# Patient Record
Sex: MALE | Race: WHITE | Employment: OTHER | URBAN - METROPOLITAN AREA
[De-identification: names, ages, dates, MRNs, and addresses within clinical notes are randomized per-mention and may not be internally consistent; named-entity substitution may affect disease eponyms.]

---

## 2017-01-23 ENCOUNTER — OFFICE VISIT (OUTPATIENT)
Dept: FAMILY MEDICINE CLINIC | Age: 72
End: 2017-01-23

## 2017-01-23 VITALS
HEART RATE: 53 BPM | SYSTOLIC BLOOD PRESSURE: 109 MMHG | BODY MASS INDEX: 27.72 KG/M2 | RESPIRATION RATE: 16 BRPM | WEIGHT: 216 LBS | OXYGEN SATURATION: 98 % | HEIGHT: 74 IN | DIASTOLIC BLOOD PRESSURE: 66 MMHG | TEMPERATURE: 97.4 F

## 2017-01-23 DIAGNOSIS — I42.9 CARDIOMYOPATHY (HCC): Primary | ICD-10-CM

## 2017-01-23 DIAGNOSIS — I10 ESSENTIAL HYPERTENSION: ICD-10-CM

## 2017-01-23 DIAGNOSIS — E29.1 HYPOGONADISM IN MALE: ICD-10-CM

## 2017-01-23 DIAGNOSIS — I50.22 SYSTOLIC CHF, CHRONIC (HCC): ICD-10-CM

## 2017-01-23 DIAGNOSIS — N52.9 ED (ERECTILE DYSFUNCTION) OF ORGANIC ORIGIN: ICD-10-CM

## 2017-01-23 RX ORDER — ISOSORBIDE DINITRATE 5 MG/1
5 TABLET ORAL 3 TIMES DAILY
Qty: 90 TAB | Refills: 2 | Status: SHIPPED | OUTPATIENT
Start: 2017-01-23 | End: 2017-06-05 | Stop reason: SDUPTHER

## 2017-01-23 RX ORDER — TESTOSTERONE 10 MG/G
3 GEL TOPICAL DAILY
Qty: 3 BOTTLE | Refills: 1 | Status: SHIPPED | OUTPATIENT
Start: 2017-01-23 | End: 2017-05-30 | Stop reason: SDUPTHER

## 2017-01-23 NOTE — PROGRESS NOTES
Subjective:     Chief Complaint   Patient presents with    Follow-up     Cardiomyopathy      He  is a 80 y.o. male who presents for evaluation of:    F/u for CM, Acute ischemic hepatitis due to viral cardiomyopathy, A fib/flutter w/ RVR s/p ablation with Dr. Va Ocampo 3/16/16 and cardiac catheterization without occlusive CAD on 3/19/2016. Following up with Dr. Leandro Slade. Still on B bl, ACEI, diuretic, imdur. Stopped wearing Lifevest and unwilling to restart this. Repeat echo showed EF 20-25%, so planned to have an AICD implant. Saw Dr. Va Ocampo on 7/19/16 for eval for this but pt was not ready to have this done so decided to have another eval with Dr. Rin Norwood - Echo with slight improvement and will repeat in another 6 months to decide on ICD placement. Home BPs running 100-120s/50-70s. Feels well with no sx. Denies PND and orthopnea. Hyperlipidemia & HTN  Pt is doing well on current meds with no medication side effects noted  No new myalgias, no joint pains, no weakness  No TIA's, no chest pain on exertion, no dyspnea on exertion, no swelling of ankles.      Lab Results   Component Value Date/Time    Cholesterol, total 221 10/25/2016 08:42 AM    HDL Cholesterol 39 10/25/2016 08:42 AM    LDL, calculated 155 10/25/2016 08:42 AM    Triglyceride 136 10/25/2016 08:42 AM     ROS  Gen - no fever/chills  Resp - per HPI  CV - per HPI   - + ED and hypogonadism - doing well on testosterone in past  Rest per HPI     Objective:     Vitals:    01/23/17 1015   BP: 109/66   Pulse: (!) 53   Resp: 16   Temp: 97.4 °F (36.3 °C)   TempSrc: Oral   SpO2: 98%   Weight: 216 lb (98 kg)   Height: 6' 2\" (1.88 m)     Physical Examination:  General appearance - alert, well appearing, and in no distress  Eyes -sclera anicteric  Neck - supple, no significant adenopathy, no thyromegaly, no bruits, no JVD even with hepatojugular reflex  Chest - clear to auscultation, no wheezes, rales or rhonchi, symmetric air entry  Heart - normal rate with irregular rhythm, normal S1, S2, no murmurs, rubs, clicks or gallops  Neurological - alert, oriented, no focal findings or movement disorder noted  Extr - no edema     Past Medical History   Diagnosis Date    Arrhythmia 11/11/2014    ED (erectile dysfunction) of organic origin     High cholesterol 3/31/2010    HTN (hypertension) 3/31/2010    Prediabetes 11/11/2014    S/P ablation of atrial flutter 7/89/7433    Systolic CHF, acute ([facility] 3/22/2016     Past Surgical History   Procedure Laterality Date    Hx tonsillectomy      Hx orthopaedic       fx wrist     Current Outpatient Prescriptions on File Prior to Visit   Medication Sig Dispense Refill    lisinopril (PRINIVIL, ZESTRIL) 2.5 mg tablet TAKE ONE TABLET BY MOUTH DAILY 90 Tab 1    gabapentin (NEURONTIN) 300 mg capsule Take 1 Cap by mouth nightly. 90 Cap 3    bumetanide (BUMEX) 2 mg tablet TAKE ONE TABLET BY MOUTH TWICE A DAY (Patient taking differently: TAKE 0.5 TABLET BY MOUTH TWICE A DAY) 60 Tab 3    carvedilol (COREG) 12.5 mg tablet TAKE ONE TABLET BY MOUTH TWICE A DAY 60 Tab 3    apixaban (ELIQUIS) 2.5 mg tablet Take 1 Tab by mouth two (2) times a day. 180 Tab 2    Cholecalciferol, Vitamin D3, (VITAMIN D3) 1,000 unit Cap Take 1 Cap by mouth daily.  aspirin delayed-release 81 mg tablet Take 81 mg by mouth daily. No current facility-administered medications on file prior to visit. Assessment/ Plan:   Gina Goldstein was seen today for follow-up. Diagnoses and all orders for this visit:    Cardiomyopathy ([facility]  Systolic CHF, chronic ([facility]  -    Repeat echo with LV<35% (improved to 20-25%) and improving with time. Seeing Dr. Davian Burton and will have another echo in 3-6 months. Understands CHF recs. Taking Eliquis. BPs improved on lower dose of Bumex. Ct B bl, ACEI, ASA, diuretic, imdur. May need AICD    Essential hypertension - well controlled  -     isosorbide dinitrate (ISORDIL) 5 mg tablet;  Take 1 Tab by mouth three (3) times daily.    ED (erectile dysfunction) of organic origin  Hypogonadism in male  -     testosterone (ANDROGEL) 1.25 gram/ actuation (1 %) glpm; 3 Pump(s) by TransDERmal route daily. Max Daily Amount: 3 Pump(s). I have discussed the diagnosis with the patient and the intended plan as seen in the above orders. The patient has received an after-visit summary and questions were answered concerning future plans. I have discussed medication side effects and warnings with the patient as well. The patient verbalizes understanding and agreement with the plan. Follow-up Disposition:  Return in about 3 months (around 4/23/2017), or if symptoms worsen or fail to improve.

## 2017-01-23 NOTE — PROGRESS NOTES
Chief Complaint   Patient presents with    Follow-up     Cardiomyopathy   Discuss Isosorbide only taking twice daily and wants to discuss whether to continue  Room 2

## 2017-01-23 NOTE — MR AVS SNAPSHOT
Visit Information Date & Time Provider Department Dept. Phone Encounter #  
 1/23/2017 10:10 AM Doug Fernandes MD [facility] at [address] [id number] Follow-up Instructions Return in about 3 months (around 4/23/2017), or if symptoms worsen or fail to improve. Upcoming Health Maintenance Date Due  
 GLAUCOMA SCREENING Q2Y 9/1/2011 MEDICARE YEARLY EXAM 10/12/2012 FOBT Q 1 YEAR AGE 50-75 3/11/2017 DTaP/Tdap/Td series (2 - Td) 6/11/2023 Allergies as of 1/23/2017  Review Complete On: 1/23/2017 By: Agusto Petit LPN No Known Allergies Current Immunizations  Reviewed on 11/11/2014 Name Date Influenza High Dose Vaccine PF 10/18/2016, 11/18/2015, 11/11/2014 Influenza Vaccine 11/5/2013 Influenza Vaccine Split 11/6/2012, 10/12/2011 Pneumococcal Polysaccharide (PPSV-23) 10/18/2016 Pneumococcal Vaccine (Unspecified Type) 10/12/2011 Tdap 6/11/2013  5:20 PM  
  
 Not reviewed this visit You Were Diagnosed With   
  
 Codes Comments Cardiomyopathy ([facility]    -  Primary ICD-10-CM: I42.9 ICD-9-CM: 425.4 Systolic CHF, chronic (HCC)     ICD-10-CM: I50.22 ICD-9-CM: 428.22, 428.0 Essential hypertension     ICD-10-CM: I10 
ICD-9-CM: 401.9 ED (erectile dysfunction) of organic origin     ICD-10-CM: N52.9 ICD-9-CM: 607.84 Hypogonadism in male     ICD-10-CM: E29.1 ICD-9-CM: 257.2 Vitals BP Pulse Temp Resp Height(growth percentile) Weight(growth percentile) 109/66 (BP 1 Location: Left arm, BP Patient Position: Sitting) (!) 53 97.4 °F (36.3 °C) (Oral) 16 6' 2\" (1.88 m) 216 lb (98 kg) SpO2 BMI Smoking Status 98% 27.73 kg/m2 Former Smoker Vitals History BMI and BSA Data Body Mass Index Body Surface Area  
 27.73 kg/m 2 2.26 m 2 Preferred Pharmacy Pharmacy Name Phone [facility] [address], [address] [facility] [phone] Your Updated Medication List  
  
   
This list is accurate as of: 1/23/17 11:12 AM.  Always use your most recent med list.  
  
  
  
  
 apixaban 2.5 mg tablet Commonly known as:  Rosalino Gault Take 1 Tab by mouth two (2) times a day. aspirin delayed-release 81 mg tablet Take 81 mg by mouth daily. bumetanide 2 mg tablet Commonly known as:  Tonai Bueno TAKE ONE TABLET BY MOUTH TWICE A DAY  
  
 carvedilol 12.5 mg tablet Commonly known as:  COREG  
TAKE ONE TABLET BY MOUTH TWICE A DAY  
  
 gabapentin 300 mg capsule Commonly known as:  NEURONTIN Take 1 Cap by mouth nightly. isosorbide dinitrate 5 mg tablet Commonly known as:  ISORDIL Take 1 Tab by mouth three (3) times daily. lisinopril 2.5 mg tablet Commonly known as:  PRINIVIL, ZESTRIL  
TAKE ONE TABLET BY MOUTH DAILY  
  
 testosterone 1.25 gram/ actuation (1 %) Glpm  
Commonly known as:  ANDROGEL  
3 Pump(s) by TransDERmal route daily. Max Daily Amount: 3 Pump(s). VITAMIN D3 1,000 unit Cap Generic drug:  cholecalciferol Take 1 Cap by mouth daily. Prescriptions Printed Refills  
 testosterone (ANDROGEL) 1.25 gram/ actuation (1 %) glpm 1 Sig: 3 Pump(s) by TransDERmal route daily. Max Daily Amount: 3 Pump(s). Class: Print Route: TransDERmal  
  
Prescriptions Sent to Pharmacy Refills  
 isosorbide dinitrate (ISORDIL) 5 mg tablet 2 Sig: Take 1 Tab by mouth three (3) times daily. Class: Normal  
 Pharmacy: [facility] [address] [place] [address] #: [phone] Route: Oral  
  
Follow-up Instructions Return in about 3 months (around 4/23/2017), or if symptoms worsen or fail to improve. Introducing [facility] & HEALTH SERVICES! [employer] introduces [email or web address] patient portal. Now you can access parts of your medical record, email your doctor's office, and request medication refills online.    
 
1. In your internet browser, go to [email or web address] 2. Click on the First Time User? Click Here link in the Sign In box. You will see the New Member Sign Up page. 3. Enter your [employer] Software Access Code exactly as it appears below. You will not need to use this code after youve completed the sign-up process. If you do not sign up before the expiration date, you must request a new code. · [employer] Access Code: [email or web address] Expires: 4/23/2017 10:26 AM 
 
4. Enter the last four digits of your Social Security Number (xxxx) and Date of Birth (mm/dd/yyyy) as indicated and click Submit. You will be taken to the next sign-up page. 5. Create a Dejour E[employer] ID. This will be your [employer] Software login ID and cannot be changed, so think of one that is secure and easy to remember. 6. Create a [employer] password. You can change your password at any time. 7. Enter your Password Reset Question and Answer. This can be used at a later time if you forget your password. 8. Enter your e-mail address. You will receive e-mail notification when new information is available in [address] 9. Click Sign Up. You can now view and download portions of your medical record. 10. Click the Download Summary menu link to download a portable copy of your medical information. If you have questions, please visit the Frequently Asked Questions section of the [employer] website. Remember, [employer] Software is NOT to be used for urgent needs. For medical emergencies, dial 911. Now available from your iPhone and Android! Please provide this summary of care documentation to your next provider. Your primary care clinician is listed as Mehdi Bejarano. If you have any questions after today's visit, please call [phone].

## 2017-02-09 RX ORDER — CARVEDILOL 12.5 MG/1
TABLET ORAL
Qty: 60 TAB | Refills: 2 | Status: SHIPPED | OUTPATIENT
Start: 2017-02-09 | End: 2017-06-05 | Stop reason: SDUPTHER

## 2017-03-29 ENCOUNTER — DOCUMENTATION ONLY (OUTPATIENT)
Dept: FAMILY MEDICINE CLINIC | Age: 72
End: 2017-03-29

## 2017-04-02 RX ORDER — BUMETANIDE 2 MG/1
TABLET ORAL
Qty: 60 TAB | Refills: 2 | Status: SHIPPED | COMMUNITY
Start: 2017-04-02 | End: 2017-08-06 | Stop reason: SDUPTHER

## 2017-04-10 RX ORDER — APIXABAN 2.5 MG/1
TABLET, FILM COATED ORAL
Qty: 60 TAB | Refills: 2 | Status: SHIPPED | OUTPATIENT
Start: 2017-04-10 | End: 2017-04-24 | Stop reason: SDUPTHER

## 2017-04-24 ENCOUNTER — OFFICE VISIT (OUTPATIENT)
Dept: FAMILY MEDICINE CLINIC | Age: 72
End: 2017-04-24

## 2017-04-24 VITALS
HEIGHT: 74 IN | TEMPERATURE: 96.7 F | BODY MASS INDEX: 27.03 KG/M2 | HEART RATE: 52 BPM | RESPIRATION RATE: 16 BRPM | SYSTOLIC BLOOD PRESSURE: 118 MMHG | OXYGEN SATURATION: 98 % | WEIGHT: 210.6 LBS | DIASTOLIC BLOOD PRESSURE: 54 MMHG

## 2017-04-24 DIAGNOSIS — I10 ESSENTIAL HYPERTENSION: ICD-10-CM

## 2017-04-24 DIAGNOSIS — Z13.39 SCREENING FOR ALCOHOLISM: ICD-10-CM

## 2017-04-24 DIAGNOSIS — E78.00 HIGH CHOLESTEROL: ICD-10-CM

## 2017-04-24 DIAGNOSIS — Z12.11 COLON CANCER SCREENING: ICD-10-CM

## 2017-04-24 DIAGNOSIS — E29.1 HYPOGONADISM IN MALE: ICD-10-CM

## 2017-04-24 DIAGNOSIS — Z00.00 ROUTINE GENERAL MEDICAL EXAMINATION AT A HEALTH CARE FACILITY: Primary | ICD-10-CM

## 2017-04-24 DIAGNOSIS — R73.03 PREDIABETES: ICD-10-CM

## 2017-04-24 DIAGNOSIS — E34.9 HYPOTESTOSTERONISM: ICD-10-CM

## 2017-04-24 DIAGNOSIS — Z13.31 SCREENING FOR DEPRESSION: ICD-10-CM

## 2017-04-24 DIAGNOSIS — Z79.899 ENCOUNTER FOR LONG-TERM (CURRENT) USE OF MEDICATIONS: ICD-10-CM

## 2017-04-24 DIAGNOSIS — I25.5 ISCHEMIC CARDIOMYOPATHY: ICD-10-CM

## 2017-04-24 NOTE — MR AVS SNAPSHOT
Visit Information Date & Time Provider Department Dept. Phone Encounter #  
 4/24/2017  9:50 AM Alicia Ortega MD [facility] at [address] [id number] Follow-up Instructions Return in about 3 months (around 7/24/2017), or if symptoms worsen or fail to improve. Upcoming Health Maintenance Date Due  
 GLAUCOMA SCREENING Q2Y 9/1/2011 MEDICARE YEARLY EXAM 10/12/2012 FOBT Q 1 YEAR, 18+ 3/11/2017 DTaP/Tdap/Td series (2 - Td) 6/11/2023 Allergies as of 4/24/2017  Review Complete On: 4/24/2017 By: Alicia Ortega MD  
 No Known Allergies Current Immunizations  Reviewed on 11/11/2014 Name Date Influenza High Dose Vaccine PF 10/18/2016, 11/18/2015, 11/11/2014 Influenza Vaccine 11/5/2013 Influenza Vaccine Split 11/6/2012, 10/12/2011 Pneumococcal Polysaccharide (PPSV-23) 10/18/2016 Pneumococcal Vaccine (Unspecified Type) 10/12/2011 Tdap 6/11/2013  5:20 PM  
  
 Not reviewed this visit You Were Diagnosed With   
  
 Codes Comments Routine general medical examination at a health care facility    -  Primary ICD-10-CM: Z00.00 ICD-9-CM: V70.0 Screening for alcoholism     ICD-10-CM: Z13.89 ICD-9-CM: V79.1 Screening for depression     ICD-10-CM: Z13.89 ICD-9-CM: V79.0 Colon cancer screening     ICD-10-CM: Z12.11 ICD-9-CM: V76.51 Ischemic cardiomyopathy     ICD-10-CM: I25.5 ICD-9-CM: 414.8 High cholesterol     ICD-10-CM: E78.00 ICD-9-CM: 272.0 Hypogonadism in male     ICD-10-CM: E29.1 ICD-9-CM: 257.2 Hypotestosteronism     ICD-10-CM: E29.1 ICD-9-CM: 257.2 Prediabetes     ICD-10-CM: R73.03 
ICD-9-CM: 790.29 Encounter for long-term (current) use of medications     ICD-10-CM: Z79.899 ICD-9-CM: V58.69 Essential hypertension     ICD-10-CM: I10 
ICD-9-CM: 401.9 Vitals BP Pulse Temp Resp Height(growth percentile) Weight(growth percentile) 118/54 (BP 1 Location: Left arm, BP Patient Position: Sitting) (!) 52 96.7 °F (35.9 °C) (Oral) 16 6' 2\" (1.88 m) 210 lb 9.6 oz (95.5 kg) SpO2 BMI Smoking Status 98% 27.04 kg/m2 Former Smoker Vitals History BMI and BSA Data Body Mass Index Body Surface Area  
 27.04 kg/m 2 2.23 m 2 Preferred Pharmacy Pharmacy Name Phone [facility] [address] , [address] [place] [address] [phone] Your Updated Medication List  
  
   
This list is accurate as of: 4/24/17 11:16 AM.  Always use your most recent med list.  
  
  
  
  
 apixaban 2.5 mg tablet Commonly known as:  Elta Cheek Take 1 Tab by mouth two (2) times a day. aspirin delayed-release 81 mg tablet Take 81 mg by mouth daily. bumetanide 2 mg tablet Commonly known as:  Anne Lavender TAKE ONE TABLET BY MOUTH TWICE A DAY  
  
 carvedilol 12.5 mg tablet Commonly known as:  COREG  
TAKE ONE TABLET BY MOUTH TWICE A DAY  
  
 gabapentin 300 mg capsule Commonly known as:  NEURONTIN Take 1 Cap by mouth nightly. isosorbide dinitrate 5 mg tablet Commonly known as:  ISORDIL Take 1 Tab by mouth three (3) times daily. lisinopril 2.5 mg tablet Commonly known as:  PRINIVIL, ZESTRIL  
TAKE ONE TABLET BY MOUTH DAILY  
  
 testosterone 12.5 mg/ 1.25 gram (1 %) Glpm  
Commonly known as:  ANDROGEL  
3 Pump(s) by TransDERmal route daily. Max Daily Amount: 3 Pump(s). VITAMIN D3 1,000 unit Cap Generic drug:  cholecalciferol Take 1 Cap by mouth daily. We Performed the Following [facility] [[id number] [facility]] HEMOGLOBIN A1C WITH EAG [15736 CPT(R)] LIPID PANEL [02152 CPT(R)] METABOLIC PANEL, COMPREHENSIVE [59477 CPT(R)] OCCULT BLOOD, IMMUNOASSAY (FIT) B5583391 CPT(R)] TESTOSTERONE, FREE & TOTAL [26324 CPT(R)] TSH 3RD GENERATION [68428 CPT(R)] Follow-up Instructions Return in about 3 months (around 7/24/2017), or if symptoms worsen or fail to improve. Introducing \[facility]\"" & HEALTH SERVICES! Rg Camacho introduces Phobious patient portal. Now you can access parts of your medical record, email your doctor's office, and request medication refills online. 1. In your internet browser, go to [email or web address] 2. Click on the First Time User? Click Here link in the Sign In box. You will see the New Member Sign Up page. 3. Enter your Phobious Access Code exactly as it appears below. You will not need to use this code after youve completed the sign-up process. If you do not sign up before the expiration date, you must request a new code. · Phobious Access Code: [email or web address] Expires: 7/23/2017 11:16 AM 
 
4. Enter the last four digits of your Social Security Number (xxxx) and Date of Birth (mm/dd/yyyy) as indicated and click Submit. You will be taken to the next sign-up page. 5. Create a Phobious ID. This will be your Phobious login ID and cannot be changed, so think of one that is secure and easy to remember. 6. Create a Phobious password. You can change your password at any time. 7. Enter your Password Reset Question and Answer. This can be used at a later time if you forget your password. 8. Enter your e-mail address. You will receive e-mail notification when new information is available in [address] 9. Click Sign Up. You can now view and download portions of your medical record. 10. Click the Download Summary menu link to download a portable copy of your medical information. If you have questions, please visit the Frequently Asked Questions section of the Phobious website. Remember, Phobious is NOT to be used for urgent needs. For medical emergencies, dial 911. Now available from your iPhone and Android! Please provide this summary of care documentation to your next provider. Your primary care clinician is listed as Dorian Krueger. If you have any questions after today's visit, please call [phone].

## 2017-04-24 NOTE — ACP (ADVANCE CARE PLANNING)
Advance Care Planning    Advance Care Planning (ACP) Provider Conversation Snapshot    Date of ACP Conversation: 04/24/17  Persons included in Conversation:  patient  Length of ACP Conversation in minutes:  <16 minutes (Non-Billable)    Authorized Decision Maker (if patient is incapable of making informed decisions):    This person is:   Healthcare Agent/Medical Power of  under Advance Directive          For Patients with Decision Making Capacity:   Values/Goals: Exploration of values, goals, and preferences if recovery is not expected, even with continued medical treatment in the event of:  Imminent death  Severe, permanent brain injury    Conversation Outcomes / Follow-Up Plan:   Recommended completion of Advance Directive form after review of ACP materials and conversation with prospective healthcare agent

## 2017-04-24 NOTE — PROGRESS NOTES
This is a Subsequent Medicare Annual Wellness Visit providing Personalized Prevention Plan Services (PPPS) (Performed 12 months after initial AWV and PPPS )    I have reviewed the patient's medical history in detail and updated the computerized patient record. History     Doing well today. 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 Katy Palomares 3/16/16 and cardiac catheterization without occlusive CAD on 3/19/2016. Still on B bl, ACEI, diuretic, imdur. Stopped wearing Lifevest and unwilling to restart this. Repeat echo showed EF 20-25%, so planned to have an AICD implant. Saw Dr. Katy Palomares on 7/19/16 for eval for this but pt was not ready to have this done so decided to have another eval with Dr. Ifeanyi Mcmullen - Echo with slight improvement and repeating serially. Home BPs running 100-120s/50-70s. Feels well with no sx. Denies PND and orthopnea.     Hyperlipidemia & HTN  Pt is doing well on current meds with no medication side effects noted  No new myalgias, no joint pains, no weakness  No TIA's, no chest pain on exertion, no dyspnea on exertion, no swelling of ankles.      Lab Results   Component Value Date/Time    Cholesterol, total 221 10/25/2016 08:42 AM    HDL Cholesterol 39 10/25/2016 08:42 AM    LDL, calculated 155 10/25/2016 08:42 AM    Triglyceride 136 10/25/2016 08:42 AM       Past Medical History:   Diagnosis Date    Arrhythmia 11/11/2014    ED (erectile dysfunction) of organic origin     High cholesterol 3/31/2010    HTN (hypertension) 3/31/2010    Prediabetes 11/11/2014    S/P ablation of atrial flutter 0/84/3819    Systolic CHF, acute ([address] 3/22/2016      Past Surgical History:   Procedure Laterality Date    HX ORTHOPAEDIC      fx wrist    HX TONSILLECTOMY       Current Outpatient Prescriptions   Medication Sig Dispense Refill    bumetanide (BUMEX) 2 mg tablet TAKE ONE TABLET BY MOUTH TWICE A DAY 60 Tab 2    carvedilol (COREG) 12.5 mg tablet TAKE ONE TABLET BY MOUTH TWICE A DAY 60 Tab 2    isosorbide dinitrate (ISORDIL) 5 mg tablet Take 1 Tab by mouth three (3) times daily. (Patient taking differently: Take 5 mg by mouth two (2) times a day.) 90 Tab 2    testosterone (ANDROGEL) 1.25 gram/ actuation (1 %) glpm 3 Pump(s) by TransDERmal route daily. Max Daily Amount: 3 Pump(s). 3 Bottle 1    lisinopril (PRINIVIL, ZESTRIL) 2.5 mg tablet TAKE ONE TABLET BY MOUTH DAILY 90 Tab 1    gabapentin (NEURONTIN) 300 mg capsule Take 1 Cap by mouth nightly. 90 Cap 3    apixaban (ELIQUIS) 2.5 mg tablet Take 1 Tab by mouth two (2) times a day. 180 Tab 2    Cholecalciferol, Vitamin D3, (VITAMIN D3) 1,000 unit Cap Take 1 Cap by mouth daily.  aspirin delayed-release 81 mg tablet Take 81 mg by mouth daily.        No Known Allergies  Family History   Problem Relation Age of Onset    Heart Disease Father     Hypertension Father     Hypertension Mother     Heart Disease Mother      Social History   Substance Use Topics    Smoking status: Former Smoker     Quit date: 4/1/1995    Smokeless tobacco: Never Used    Alcohol use 0.6 oz/week     1 Glasses of wine per week      Comment: 1 drink a month     Patient Active Problem List   Diagnosis Code    High cholesterol E78.00    Eczema L30.9    HTN (hypertension) I10    Encounter for long-term (current) use of medications Z79.899    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    Prediabetes R73.03    Arrhythmia I49.9    Acute renal failure (ARF) ([address] N17.9    Atrial flutter with rapid ventricular response (HCC) I48.92    Lower abdominal pain R10.30    Cardiomyopathy ([address] I42.9    Cardiogenic shock ([address] N49.9    Systolic CHF, acute ([address] I50.21    S/P ablation of atrial flutter Z98.890, Z86.79    Advance care planning Z71.89       Depression Risk Factor Screening:     PHQ 2 / 9, over the last two weeks 1/23/2017   Little interest or pleasure in doing things Not at all   Feeling down, depressed or hopeless Not at all   Total Score PHQ 2 0     Alcohol Risk Factor Screening: On any occasion during the past 3 months, have you had more than 4 drinks containing alcohol? No    Do you average more than 14 drinks per week? No      Functional Ability and Level of Safety:     Hearing Loss   none    Activities of Daily Living   Self-care. Requires assistance with: no ADLs    Fall Risk     Fall Risk Assessment, last 12 mths 4/24/2017   Able to walk? Yes   Fall in past 12 months? No     Abuse Screen   Patient is not abused    Review of Systems   A comprehensive review of systems was negative except for that written in the HPI. Physical Examination     Evaluation of Cognitive Function:  Mood/affect:  neutral  Appearance: age appropriate  Family member/caregiver input: None  Visit Vitals    /54 (BP 1 Location: Left arm, BP Patient Position: Sitting)  Comment: Large cuff    Pulse (!) 52    Temp 96.7 °F (35.9 °C) (Oral)    Resp 16    Ht 6' 2\" (1.88 m)    Wt 210 lb 9.6 oz (95.5 kg)    SpO2 98%    BMI 27.04 kg/m2       Physical Examination:  General appearance - alert, well appearing, and in no distress  Eyes -sclera anicteric  Neck - supple, no significant adenopathy, no thyromegaly, no bruits, no JVD even with hepatojugular reflex  Chest - clear to auscultation, no wheezes, rales or rhonchi, symmetric air entry  Heart - normal rate with irregular rhythm, normal S1, S2, 1/6 JON  Abd - soft, NT, ND, + BS  Neurological - alert, oriented, no focal findings or movement disorder noted  Extr - no edema    Patient Care Team:  Reza Matthew MD as PCP - General (Family Practice)  Mckenzie Lobo RN as Nurse Cindy Machuca RN as Ambulatory Care Navigator  Mehdi Rdz MD (Cardiology)    Advice/Referrals/Counseling   Education and counseling provided:  Are appropriate based on today's review and evaluation      Assessment/Plan       ICD-10-CM ICD-9-CM    1.  Routine general medical examination at a health care facility Z00.00 V70.0    2. Screening for alcoholism Z13.89 V79.1    3. Screening for depression Z13.89 V79.0 DEPRESSION SCREEN ANNUAL   4. Colon cancer screening Z12.11 V76.51 OCCULT BLOOD, IMMUNOASSAY (FIT)   5. Ischemic cardiomyopathy I25.5 414.8 LIPID PANEL   6. High cholesterol E78.00 272.0 HEMOGLOBIN A1C WITH EAG      LIPID PANEL      METABOLIC PANEL, COMPREHENSIVE      TSH 3RD GENERATION   7. Hypogonadism in male E29.1 257.2 TESTOSTERONE, FREE & TOTAL   8. Hypotestosteronism E29.1 257.2 TESTOSTERONE, FREE & TOTAL   9. Prediabetes R73.03 790.29 HEMOGLOBIN A1C WITH EAG   10. Encounter for long-term (current) use of medications Z79.899 V58.69 OCCULT BLOOD, IMMUNOASSAY (FIT)      HEMOGLOBIN A1C WITH EAG      LIPID PANEL      METABOLIC PANEL, COMPREHENSIVE      TSH 3RD GENERATION   11.  Essential hypertension I10 401.9

## 2017-05-30 DIAGNOSIS — N52.9 ED (ERECTILE DYSFUNCTION) OF ORGANIC ORIGIN: ICD-10-CM

## 2017-05-30 DIAGNOSIS — E29.1 HYPOGONADISM IN MALE: ICD-10-CM

## 2017-06-02 RX ORDER — TESTOSTERONE 10 MG/G
3 GEL TOPICAL DAILY
Qty: 3 BOTTLE | Refills: 0 | Status: SHIPPED | OUTPATIENT
Start: 2017-06-02 | End: 2017-08-11 | Stop reason: SDUPTHER

## 2017-06-05 DIAGNOSIS — I10 ESSENTIAL HYPERTENSION: ICD-10-CM

## 2017-06-07 RX ORDER — ISOSORBIDE DINITRATE 5 MG/1
5 TABLET ORAL 2 TIMES DAILY
Qty: 180 TAB | Refills: 1 | Status: SHIPPED | OUTPATIENT
Start: 2017-06-07 | End: 2017-12-04 | Stop reason: SDUPTHER

## 2017-06-07 RX ORDER — CARVEDILOL 12.5 MG/1
TABLET ORAL
Qty: 180 TAB | Refills: 1 | Status: SHIPPED | OUTPATIENT
Start: 2017-06-07 | End: 2017-12-04 | Stop reason: SDUPTHER

## 2017-08-07 RX ORDER — BUMETANIDE 2 MG/1
TABLET ORAL
Qty: 60 TAB | Refills: 1 | Status: SHIPPED | OUTPATIENT
Start: 2017-08-07 | End: 2017-10-16 | Stop reason: SDUPTHER

## 2017-08-08 ENCOUNTER — OFFICE VISIT (OUTPATIENT)
Dept: FAMILY MEDICINE CLINIC | Age: 72
End: 2017-08-08

## 2017-08-08 VITALS
TEMPERATURE: 96.6 F | HEART RATE: 55 BPM | OXYGEN SATURATION: 97 % | RESPIRATION RATE: 16 BRPM | BODY MASS INDEX: 27.26 KG/M2 | HEIGHT: 74 IN | SYSTOLIC BLOOD PRESSURE: 130 MMHG | DIASTOLIC BLOOD PRESSURE: 75 MMHG | WEIGHT: 212.4 LBS

## 2017-08-08 DIAGNOSIS — E78.00 HIGH CHOLESTEROL: ICD-10-CM

## 2017-08-08 DIAGNOSIS — I10 ESSENTIAL HYPERTENSION: ICD-10-CM

## 2017-08-08 DIAGNOSIS — I25.5 ISCHEMIC CARDIOMYOPATHY: Primary | ICD-10-CM

## 2017-08-08 DIAGNOSIS — I27.20 PULMONARY HYPERTENSION (HCC): ICD-10-CM

## 2017-08-08 NOTE — PROGRESS NOTES
Subjective:     Chief Complaint   Patient presents with    Cholesterol Problem    Hypertension      He  is a 80 y.o. male who presents for evaluation of:    Doing well today. Had echo with Dr. Donn Chino' office recently. Requested results and review this. EF of 25% now appears to have moderate pulmonary hypertension with a right ventricular peak systolic pressure of 67 mmHg. 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 Angela Shepard 3/16/16 and cardiac catheterization without occlusive CAD on 3/19/2016. Still on B bl, ACEI, diuretic, imdur. Stopped wearing Lifevest and unwilling to restart this. Repeat echo showed EF 20-25%, so planned to have an AICD implant. Saw Dr. Angela Shepard on 7/19/16 for eval for this but pt was not ready to have this done so decided to have another eval with Dr. Donn Chino - Echo with slight improvement and repeating serially. Home BPs running 100-120s/50-70s. Feels well with no sx. Denies PND and orthopnea.     Hyperlipidemia & HTN  Pt is doing well on current meds with no medication side effects noted  No new myalgias, no joint pains, no weakness  No TIA's, no chest pain on exertion, no dyspnea on exertion, no swelling of ankles.      Lab Results   Component Value Date/Time    Cholesterol, total 221 10/25/2016 08:42 AM    HDL Cholesterol 39 10/25/2016 08:42 AM    LDL, calculated 155 10/25/2016 08:42 AM    Triglyceride 136 10/25/2016 08:42 AM     ROS  Gen - no fever/chills  Resp - per HPI  CV - per HPI   - + ED and hypogonadism - doing well on testosterone in past  Rest per HPI     Objective:     Vitals:    08/08/17 0948   BP: 130/75   Pulse: (!) 55   Resp: 16   Temp: 96.6 °F (35.9 °C)   TempSrc: Oral   SpO2: 97%   Weight: 212 lb 6.4 oz (96.3 kg)   Height: 6' 2\" (1.88 m)     Physical Examination:  General appearance - alert, well appearing, and in no distress  Eyes -sclera anicteric  Neck - supple, no significant adenopathy, no thyromegaly, no bruits, no JVD even with hepatojugular reflex  Chest - clear to auscultation, no wheezes, rales or rhonchi, symmetric air entry  Heart - normal rate with irregular rhythm, normal S1, S2, no murmurs, rubs, clicks or gallops  Neurological - alert, oriented, no focal findings or movement disorder noted  Extr - no edema     Past Medical History:   Diagnosis Date    Arrhythmia 11/11/2014    ED (erectile dysfunction) of organic origin     High cholesterol 3/31/2010    HTN (hypertension) 3/31/2010    Prediabetes 11/11/2014    Pulmonary hypertension ([facility] [address] 8/8/2017    S/P ablation of atrial flutter 7/11/2869    Systolic CHF, acute ([facility] 3/22/2016     Past Surgical History:   Procedure Laterality Date    HX ORTHOPAEDIC      fx wrist    HX TONSILLECTOMY       Current Outpatient Prescriptions on File Prior to Visit   Medication Sig Dispense Refill    bumetanide (BUMEX) 2 mg tablet TAKE ONE TABLET BY MOUTH TWICE A DAY 60 Tab 1    lisinopril (PRINIVIL, ZESTRIL) 2.5 mg tablet TAKE ONE TABLET BY MOUTH DAILY 90 Tab 1    carvedilol (COREG) 12.5 mg tablet TAKE ONE TABLET BY MOUTH TWICE A  Tab 1    isosorbide dinitrate (ISORDIL) 5 mg tablet Take 1 Tab by mouth two (2) times a day. 180 Tab 1    testosterone (ANDROGEL) 12.5 mg/ 1.25 gram (1 %) glpm 3 Pump(s) by TransDERmal route daily. Max Daily Amount: 3 Pump(s). 3 Bottle 0    gabapentin (NEURONTIN) 300 mg capsule Take 1 Cap by mouth nightly. 90 Cap 3    apixaban (ELIQUIS) 2.5 mg tablet Take 1 Tab by mouth two (2) times a day. 180 Tab 2    Cholecalciferol, Vitamin D3, (VITAMIN D3) 1,000 unit Cap Take 1 Cap by mouth daily.  aspirin delayed-release 81 mg tablet Take 81 mg by mouth daily. No current facility-administered medications on file prior to visit. Assessment/ Plan:   Diagnoses and all orders for this visit:    1. Ischemic cardiomyopathy - appears stable with EF 25% and no concerning sx.     2. Essential hypertension - well controlled    3. High cholesterol - stable. To get labs drawn today    4. Pulmonary hypertension ([address] - called pt after getting results of echocardiogram and recommended he discuss this with Dr. Anju Jamison. Given the moderate severity, suspect he may go on PDE 5 inhibitor. I have discussed the diagnosis with the patient and the intended plan as seen in the above orders. The patient has received an after-visit summary and questions were answered concerning future plans. I have discussed medication side effects and warnings with the patient as well. The patient verbalizes understanding and agreement with the plan. Follow-up Disposition:  Return in about 4 months (around 12/8/2017).

## 2017-08-08 NOTE — PROGRESS NOTES
Chief Complaint   Patient presents with    Cholesterol Problem    Hypertension     3 mo check    rm B6

## 2017-08-08 NOTE — MR AVS SNAPSHOT
Visit Information Date & Time Provider Department Dept. Phone Encounter #  
 8/8/2017  9:30 AM Roel Tirado MD [facility] at [address] [id number] Follow-up Instructions Return in about 4 months (around 12/8/2017). Upcoming Health Maintenance Date Due  
 GLAUCOMA SCREENING Q2Y 9/1/2011 FOBT Q 1 YEAR, 18+ 3/11/2017 INFLUENZA AGE 9 TO ADULT 8/1/2017 MEDICARE YEARLY EXAM 4/25/2018 DTaP/Tdap/Td series (2 - Td) 6/11/2023 Allergies as of 8/8/2017  Review Complete On: 8/8/2017 By: Roel Tirado MD  
 No Known Allergies Current Immunizations  Reviewed on 11/11/2014 Name Date Influenza High Dose Vaccine PF 10/18/2016, 11/18/2015, 11/11/2014 Influenza Vaccine 11/5/2013 Influenza Vaccine Split 11/6/2012, 10/12/2011 Pneumococcal Polysaccharide (PPSV-23) 10/18/2016 Tdap 6/11/2013  5:20 PM  
 ZZZ-RETIRED (DO NOT USE) Pneumococcal Vaccine (Unspecified Type) 10/12/2011 Not reviewed this visit You Were Diagnosed With   
  
 Codes Comments Ischemic cardiomyopathy    -  Primary ICD-10-CM: I25.5 ICD-9-CM: 414.8 Essential hypertension     ICD-10-CM: I10 
ICD-9-CM: 401.9 High cholesterol     ICD-10-CM: E78.00 ICD-9-CM: 272.0 Vitals BP Pulse Temp Resp Height(growth percentile) Weight(growth percentile) 130/75 (BP 1 Location: Right arm, BP Patient Position: Sitting) (!) 55 96.6 °F (35.9 °C) (Oral) 16 6' 2\" (1.88 m) 212 lb 6.4 oz (96.3 kg) SpO2 BMI Smoking Status 97% 27.27 kg/m2 Former Smoker BMI and BSA Data Body Mass Index Body Surface Area  
 27.27 kg/m 2 2.24 m 2 Preferred Pharmacy Pharmacy Name Phone [facility] [address] , [address] [facility] [phone] Your Updated Medication List  
  
   
This list is accurate as of: 8/8/17 10:17 AM.  Always use your most recent med list.  
  
  
  
  
 apixaban 2.5 mg tablet Commonly known as:  Luciano Janelle Take 1 Tab by mouth two (2) times a day. aspirin delayed-release 81 mg tablet Take 81 mg by mouth daily. bumetanide 2 mg tablet Commonly known as:  [place] TAKE ONE TABLET BY MOUTH TWICE A DAY  
  
 carvedilol 12.5 mg tablet Commonly known as:  COREG  
TAKE ONE TABLET BY MOUTH TWICE A DAY  
  
 gabapentin 300 mg capsule Commonly known as:  NEURONTIN Take 1 Cap by mouth nightly. isosorbide dinitrate 5 mg tablet Commonly known as:  ISORDIL Take 1 Tab by mouth two (2) times a day. lisinopril 2.5 mg tablet Commonly known as:  PRINIVIL, ZESTRIL  
TAKE ONE TABLET BY MOUTH DAILY  
  
 testosterone 12.5 mg/ 1.25 gram (1 %) Glpm  
Commonly known as:  ANDROGEL  
3 Pump(s) by TransDERmal route daily. Max Daily Amount: 3 Pump(s). VITAMIN D3 1,000 unit Cap Generic drug:  cholecalciferol Take 1 Cap by mouth daily. Follow-up Instructions Return in about 4 months (around 12/8/2017). Introducing [facility]! [facility] introduces [employer] patient portal. Now you can access parts of your medical record, email your doctor's office, and request medication refills online. 1. In your internet browser, go to [email or web address] 2. Click on the First Time User? Click Here link in the Sign In box. You will see the New Member Sign Up page. 3. Enter your [employer] Access Code exactly as it appears below. You will not need to use this code after youve completed the sign-up process. If you do not sign up before the expiration date, you must request a new code. · [employer] Access Code: [email or web address] Expires: 11/6/2017 10:17 AM 
 
4. Enter the last four digits of your Social Security Number (xxxx) and Date of Birth (mm/dd/yyyy) as indicated and click Submit. You will be taken to the next sign-up page. 5. Create a [employer] ID.  This will be your [employer] login ID and cannot be changed, so think of one that is secure and easy to remember. 6. Create a [employer] password. You can change your password at any time. 7. Enter your Password Reset Question and Answer. This can be used at a later time if you forget your password. 8. Enter your e-mail address. You will receive e-mail notification when new information is available in [address] 9. Click Sign Up. You can now view and download portions of your medical record. 10. Click the Download Summary menu link to download a portable copy of your medical information. If you have questions, please visit the Frequently Asked Questions section of the [employer] website. Remember, [employer] is NOT to be used for urgent needs. For medical emergencies, dial 911. Now available from your iPhone and Android! Please provide this summary of care documentation to your next provider. Your primary care clinician is listed as Love Choi. If you have any questions after today's visit, please call [phone].

## 2017-08-09 ENCOUNTER — HOSPITAL ENCOUNTER (OUTPATIENT)
Dept: LAB | Age: 72
Discharge: HOME | End: 2017-08-09
Payer: MEDICARE

## 2017-08-09 PROCEDURE — 80061 LIPID PANEL: CPT

## 2017-08-09 PROCEDURE — 84403 ASSAY OF TOTAL TESTOSTERONE: CPT

## 2017-08-09 PROCEDURE — 83036 HEMOGLOBIN GLYCOSYLATED A1C: CPT

## 2017-08-09 PROCEDURE — 84443 ASSAY THYROID STIM HORMONE: CPT

## 2017-08-09 PROCEDURE — 80053 COMPREHEN METABOLIC PANEL: CPT

## 2017-08-09 PROCEDURE — 36415 COLL VENOUS BLD VENIPUNCTURE: CPT

## 2017-08-10 LAB
ALBUMIN SERPL-MCNC: 4.8 G/DL (ref 3.5–4.8)
ALBUMIN/GLOB SERPL: 1.9 {RATIO} (ref 1.2–2.2)
ALP SERPL-CCNC: 48 IU/L (ref 39–117)
ALT SERPL-CCNC: 25 IU/L (ref 0–44)
AST SERPL-CCNC: 25 IU/L (ref 0–40)
BILIRUB SERPL-MCNC: 0.7 MG/DL (ref 0–1.2)
BUN SERPL-MCNC: 16 MG/DL (ref 8–27)
BUN/CREAT SERPL: 13 (ref 10–24)
CALCIUM SERPL-MCNC: 9.9 MG/DL (ref 8.6–10.2)
CHLORIDE SERPL-SCNC: 95 MMOL/L (ref 96–106)
CHOLEST SERPL-MCNC: 248 MG/DL (ref 100–199)
CO2 SERPL-SCNC: 32 MMOL/L (ref 18–29)
CREAT SERPL-MCNC: 1.26 MG/DL (ref 0.76–1.27)
EST. AVERAGE GLUCOSE BLD GHB EST-MCNC: 123 MG/DL
GLOBULIN SER CALC-MCNC: 2.5 G/DL (ref 1.5–4.5)
GLUCOSE SERPL-MCNC: 104 MG/DL (ref 65–99)
HBA1C MFR BLD: 5.9 % (ref 4.8–5.6)
HDLC SERPL-MCNC: 39 MG/DL
INTERPRETATION: NORMAL
LDLC SERPL CALC-MCNC: 185 MG/DL (ref 0–99)
POTASSIUM SERPL-SCNC: 4.3 MMOL/L (ref 3.5–5.2)
PROT SERPL-MCNC: 7.3 G/DL (ref 6–8.5)
SODIUM SERPL-SCNC: 144 MMOL/L (ref 134–144)
TESTOST FREE SERPL-MCNC: 6.6 PG/ML (ref 6.6–18.1)
TESTOST SERPL-MCNC: 299 NG/DL (ref 264–916)
TRIGL SERPL-MCNC: 120 MG/DL (ref 0–149)
TSH SERPL DL<=0.005 MIU/L-ACNC: 1.96 UIU/ML (ref 0.45–4.5)
VLDLC SERPL CALC-MCNC: 24 MG/DL (ref 5–40)

## 2017-08-11 DIAGNOSIS — E29.1 HYPOGONADISM IN MALE: ICD-10-CM

## 2017-08-11 DIAGNOSIS — N52.9 ED (ERECTILE DYSFUNCTION) OF ORGANIC ORIGIN: ICD-10-CM

## 2017-08-11 NOTE — TELEPHONE ENCOUNTER
Pt wanted to leave message     See Pulmonalogist on 9-12-17     Also, needs a refill for: testosterone (ANDROGEL) 12.5 mg/ 1.25 gram (1 %) [facility] [[id number]]       Best number to reach him is [phone]

## 2017-08-14 RX ORDER — TESTOSTERONE 10 MG/G
3 GEL TOPICAL DAILY
Qty: 3 BOTTLE | Refills: 2 | Status: SHIPPED | OUTPATIENT
Start: 2017-08-14 | End: 2018-02-17 | Stop reason: SDUPTHER

## 2017-08-16 ENCOUNTER — TELEPHONE (OUTPATIENT)
Dept: FAMILY MEDICINE CLINIC | Age: 72
End: 2017-08-16

## 2017-08-16 DIAGNOSIS — E78.2 MIXED HYPERLIPIDEMIA: Primary | ICD-10-CM

## 2017-08-16 RX ORDER — ROSUVASTATIN CALCIUM 20 MG/1
20 TABLET, COATED ORAL
Qty: 30 TAB | Refills: 5 | Status: SHIPPED | OUTPATIENT
Start: 2017-08-16 | End: 2018-08-16 | Stop reason: SDUPTHER

## 2017-08-16 NOTE — TELEPHONE ENCOUNTER
Pt checking on:  a refill for request for:  testosterone (ANDROGEL) 12.5 mg/ 1.25 gram (1 %) [facility] [[id number]]         Best number to reach him is [phone]

## 2017-08-17 ENCOUNTER — DOCUMENTATION ONLY (OUTPATIENT)
Dept: FAMILY MEDICINE CLINIC | Age: 72
End: 2017-08-17

## 2017-08-17 NOTE — PROGRESS NOTES
Patient advised cholesterol is elevated and Crestor called into pharmacy The patient advised to recheck lipid panel in 6 weeks. Letter and lab request mailed to patient.

## 2017-08-22 ENCOUNTER — HOSPITAL ENCOUNTER (OUTPATIENT)
Dept: GENERAL RADIOLOGY | Age: 72
Discharge: HOME | End: 2017-08-22
Payer: MEDICARE

## 2017-08-22 DIAGNOSIS — I27.20 PULMONARY HYPERTENSION (HCC): ICD-10-CM

## 2017-08-22 PROCEDURE — 71020 XR CHEST PA LAT: CPT

## 2017-09-05 NOTE — TELEPHONE ENCOUNTER
Patient is requesting refill for   Eliquis 2.5 mg     Please send to [facility] [employer]    Pt's phone  [phone]

## 2017-09-24 DIAGNOSIS — G60.9 IDIOPATHIC PERIPHERAL NEUROPATHY: ICD-10-CM

## 2017-09-25 RX ORDER — GABAPENTIN 300 MG/1
CAPSULE ORAL
Qty: 90 CAP | Refills: 2 | Status: SHIPPED | OUTPATIENT
Start: 2017-09-25 | End: 2018-06-24 | Stop reason: SDUPTHER

## 2017-09-27 DIAGNOSIS — E78.2 MIXED HYPERLIPIDEMIA: ICD-10-CM

## 2017-10-17 RX ORDER — BUMETANIDE 2 MG/1
TABLET ORAL
Qty: 60 TAB | Refills: 0 | Status: SHIPPED | OUTPATIENT
Start: 2017-10-17 | End: 2017-11-26 | Stop reason: SDUPTHER

## 2017-11-27 RX ORDER — BUMETANIDE 2 MG/1
TABLET ORAL
Qty: 60 TAB | Refills: 0 | Status: SHIPPED | OUTPATIENT
Start: 2017-11-27 | End: 2018-01-02 | Stop reason: SDUPTHER

## 2017-12-04 DIAGNOSIS — I10 ESSENTIAL HYPERTENSION: ICD-10-CM

## 2017-12-04 RX ORDER — CARVEDILOL 12.5 MG/1
TABLET ORAL
Qty: 180 TAB | Refills: 0 | Status: SHIPPED | OUTPATIENT
Start: 2017-12-04 | End: 2018-03-05 | Stop reason: SDUPTHER

## 2017-12-04 RX ORDER — ISOSORBIDE DINITRATE 5 MG/1
TABLET ORAL
Qty: 180 TAB | Refills: 0 | Status: SHIPPED | OUTPATIENT
Start: 2017-12-04 | End: 2018-03-05 | Stop reason: SDUPTHER

## 2017-12-12 ENCOUNTER — OFFICE VISIT (OUTPATIENT)
Dept: FAMILY MEDICINE CLINIC | Age: 72
End: 2017-12-12

## 2017-12-12 VITALS
HEIGHT: 74 IN | TEMPERATURE: 96.7 F | RESPIRATION RATE: 20 BRPM | SYSTOLIC BLOOD PRESSURE: 128 MMHG | WEIGHT: 212.8 LBS | DIASTOLIC BLOOD PRESSURE: 70 MMHG | OXYGEN SATURATION: 99 % | HEART RATE: 53 BPM | BODY MASS INDEX: 27.31 KG/M2

## 2017-12-12 DIAGNOSIS — I10 ESSENTIAL HYPERTENSION: ICD-10-CM

## 2017-12-12 DIAGNOSIS — I27.20 PULMONARY HYPERTENSION (HCC): ICD-10-CM

## 2017-12-12 DIAGNOSIS — B33.24 VIRAL CARDIOMYOPATHY (HCC): Primary | ICD-10-CM

## 2017-12-12 DIAGNOSIS — I50.22 CHF NYHA CLASS I (NO SYMPTOMS FROM ORDINARY ACTIVITIES), CHRONIC, SYSTOLIC (HCC): ICD-10-CM

## 2017-12-12 DIAGNOSIS — Z23 ENCOUNTER FOR IMMUNIZATION: ICD-10-CM

## 2017-12-12 NOTE — ASSESSMENT & PLAN NOTE
Key CAD CHF Meds             carvedilol (COREG) 12.5 mg tablet  (Taking) TAKE ONE TABLET BY MOUTH TWICE A DAY    isosorbide dinitrate (ISORDIL) 5 mg tablet  (Taking) TAKE ONE TABLET BY MOUTH TWICE A DAY    bumetanide (BUMEX) 2 mg tablet  (Taking) TAKE ONE TABLET BY MOUTH TWICE A DAY    lisinopril (PRINIVIL, ZESTRIL) 2.5 mg tablet  (Taking) TAKE ONE TABLET BY MOUTH DAILY    apixaban (ELIQUIS) 2.5 mg tablet  (Taking) Take 1 Tab by mouth two (2) times a day. aspirin delayed-release 81 mg tablet  (Taking) Take 81 mg by mouth daily. Key Antihyperlipidemia Meds             rosuvastatin (CRESTOR) 20 mg tablet  (Taking) Take 1 Tab by mouth nightly.         Lab Results   Component Value Date/Time    Sodium 144 08/09/2017 07:47 AM    Potassium 4.3 08/09/2017 07:47 AM    Cholesterol, total 248 08/09/2017 07:47 AM    HDL Cholesterol 39 08/09/2017 07:47 AM    LDL, calculated 185 08/09/2017 07:47 AM    Triglyceride 120 08/09/2017 07:47 AM

## 2017-12-12 NOTE — PROGRESS NOTES
Subjective:     Chief Complaint   Patient presents with    Hypertension      He  is a 71 y.o. male who presents for evaluation of:    Doing well today. Last echo with Dr. Alia Newman showed EF of 25% and possible moderate pulmonary hypertension with a right ventricular peak systolic pressure of 67 mmHg. 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 Aime Browning 3/16/16 and cardiac catheterization without occlusive CAD on 3/19/2016. Still on B bl, ACEI, diuretic, imdur. Stopped wearing Lifevest and unwilling to restart this. Repeat echo showed EF 20-25%, so planned to have an AICD implant. Saw Dr. Aime Browning on 7/19/16 for eval for this but pt was not ready to have this done so decided to have another eval with Dr. Alia Newman - Echo with slight improvement and repeating serially. Home BPs running 100-120s/50-70s. Feels well with no sx. Denies PND and orthopnea.     Hyperlipidemia & HTN  Pt is doing well on current meds with no medication side effects noted  No TIA's, no chest pain on exertion, no dyspnea on exertion, no swelling of ankles.      Lab Results   Component Value Date/Time    Cholesterol, total 248 08/09/2017 07:47 AM    HDL Cholesterol 39 08/09/2017 07:47 AM    LDL, calculated 185 08/09/2017 07:47 AM    Triglyceride 120 08/09/2017 07:47 AM     ROS  Gen - no fever/chills  Resp - per HPI  CV - per HPI   - + ED and hypogonadism - doing well on testosterone in past  Rest per HPI     Objective:     Vitals:    12/12/17 0935   BP: 139/70   Pulse: (!) 53   Resp: 20   Temp: 96.7 °F (35.9 °C)   TempSrc: Oral   SpO2: 99%   Weight: 212 lb 12.8 oz (96.5 kg)   Height: 6' 2\" (1.88 m)     Physical Examination:  General appearance - alert, well appearing, and in no distress  Eyes -sclera anicteric  Neck - supple, no significant adenopathy, no thyromegaly, no bruits, no JVD even with hepatojugular reflex  Chest - clear to auscultation, no wheezes, rales or rhonchi, symmetric air entry  Heart - normal rate with irregular rhythm, normal S1, S2, no murmurs, rubs, clicks or gallops  Neurological - alert, oriented, no focal findings or movement disorder noted  Extr - no edema     Past Medical History:   Diagnosis Date    Arrhythmia 11/11/2014    ED (erectile dysfunction) of organic origin     High cholesterol 3/31/2010    HTN (hypertension) 3/31/2010    Prediabetes 11/11/2014    Pulmonary hypertension ([facility] [address] 8/8/2017    S/P ablation of atrial flutter 5/82/7161    Systolic CHF, acute ([facility] [address] 3/22/2016     Past Surgical History:   Procedure Laterality Date    HX ORTHOPAEDIC      fx wrist    HX TONSILLECTOMY       Current Outpatient Prescriptions on File Prior to Visit   Medication Sig Dispense Refill    carvedilol (COREG) 12.5 mg tablet TAKE ONE TABLET BY MOUTH TWICE A  Tab 0    isosorbide dinitrate (ISORDIL) 5 mg tablet TAKE ONE TABLET BY MOUTH TWICE A  Tab 0    bumetanide (BUMEX) 2 mg tablet TAKE ONE TABLET BY MOUTH TWICE A DAY 60 Tab 0    gabapentin (NEURONTIN) 300 mg capsule TAKE ONE CAPSULE BY MOUTH ONCE NIGHTLY 90 Cap 2    rosuvastatin (CRESTOR) 20 mg tablet Take 1 Tab by mouth nightly. 30 Tab 5    testosterone (ANDROGEL) 12.5 mg/ 1.25 gram (1 %) glpm 3 Pump(s) by TransDERmal route daily. Max Daily Amount: 3 Pump(s). 3 Bottle 2    lisinopril (PRINIVIL, ZESTRIL) 2.5 mg tablet TAKE ONE TABLET BY MOUTH DAILY 90 Tab 1    apixaban (ELIQUIS) 2.5 mg tablet Take 1 Tab by mouth two (2) times a day. 180 Tab 2    Cholecalciferol, Vitamin D3, (VITAMIN D3) 1,000 unit Cap Take 1 Cap by mouth daily.  aspirin delayed-release 81 mg tablet Take 81 mg by mouth daily. No current facility-administered medications on file prior to visit. Assessment/ Plan:   Diagnoses and all orders for this visit:    1. Viral cardiomyopathy - appears stable with EF 25% and no concerning sx. 2. Essential hypertension - well controlled    3. High cholesterol - stable. To get labs drawn today. 4. Pulmonary hypertension ([facility] [address] - seen on cath in 3/2016 and on recent echo. Saw Dr. Alyssa Beckwith for this and awaiting repeat echo to decide if this is truly pulm HTN. On nitrate so may need to go off this if starting on PDE5i. I have discussed the diagnosis with the patient and the intended plan as seen in the above orders. The patient has received an after-visit summary and questions were answered concerning future plans. I have discussed medication side effects and warnings with the patient as well. The patient verbalizes understanding and agreement with the plan. Follow-up Disposition:  Return in about 3 months (around 3/12/2018), or if symptoms worsen or fail to improve.

## 2017-12-12 NOTE — MR AVS SNAPSHOT
Visit Information Date & Time Provider Department Dept. Phone Encounter #  
 12/12/2017  9:30 AM Jaimee Garnica MD [facility] at [address] [id number] Follow-up Instructions Return in about 3 months (around 3/12/2018), or if symptoms worsen or fail to improve. Upcoming Health Maintenance Date Due  
 GLAUCOMA SCREENING Q2Y 9/1/2011 FOBT Q 1 YEAR, 18+ 3/11/2017 MEDICARE YEARLY EXAM 4/25/2018 DTaP/Tdap/Td series (2 - Td) 6/11/2023 Allergies as of 12/12/2017  Review Complete On: 12/12/2017 By: Jaimee Garnica MD  
 No Known Allergies Current Immunizations  Reviewed on 11/11/2014 Name Date Influenza High Dose Vaccine PF 12/12/2017  9:58 AM, 10/18/2016, 11/18/2015, 11/11/2014 Influenza Vaccine 11/5/2013 Influenza Vaccine Split 11/6/2012, 10/12/2011 Pneumococcal Polysaccharide (PPSV-23) 10/18/2016 Tdap 6/11/2013  5:20 PM  
 ZZZ-RETIRED (DO NOT USE) Pneumococcal Vaccine (Unspecified Type) 10/12/2011 Not reviewed this visit You Were Diagnosed With   
  
 Codes Comments [facility]al [facility] ([facility]    -  Primary ICD-10-CM: H74.36 ICD-9-CM: 425.4 Essential hypertension     ICD-10-CM: I10 
ICD-9-CM: 401.9 Pulmonary hypertension     ICD-10-CM: I27.20 ICD-9-CM: 416.8 Encounter for immunization     ICD-10-CM: C17 ICD-9-CM: V03.89   
 CHF NYHA class I (no symptoms from ordinary activities), chronic, systolic (HCC)     OBA-58-VZ: I50.22 ICD-9-CM: 428.22, 428.0 Vitals BP Pulse Temp Resp Height(growth percentile) Weight(growth percentile) 128/70 (!) 53 96.7 °F (35.9 °C) (Oral) 20 6' 2\" (1.88 m) 212 lb 12.8 oz (96.5 kg) SpO2 BMI Smoking Status 99% 27.32 kg/m2 Former Smoker Vitals History BMI and BSA Data Body Mass Index Body Surface Area  
 27.32 kg/m 2 2.24 m 2 Preferred Pharmacy Pharmacy Name Phone [facility] [address] , [address] [facility] [phone] Your Updated Medication List  
  
   
This list is accurate as of: 12/12/17 10:24 AM.  Always use your most recent med list.  
  
  
  
  
 apixaban 2.5 mg tablet Commonly known as:  Leena Kira Take 1 Tab by mouth two (2) times a day. aspirin delayed-release 81 mg tablet Take 81 mg by mouth daily. bumetanide 2 mg tablet Commonly known as:  Lakshmi Giovanna TAKE ONE TABLET BY MOUTH TWICE A DAY  
  
 carvedilol 12.5 mg tablet Commonly known as:  COREG  
TAKE ONE TABLET BY MOUTH TWICE A DAY  
  
 gabapentin 300 mg capsule Commonly known as:  NEURONTIN  
TAKE ONE CAPSULE BY MOUTH ONCE NIGHTLY  
  
 isosorbide dinitrate 5 mg tablet Commonly known as:  ISORDIL  
TAKE ONE TABLET BY MOUTH TWICE A DAY  
  
 lisinopril 2.5 mg tablet Commonly known as:  PRINIVIL, ZESTRIL  
TAKE ONE TABLET BY MOUTH DAILY  
  
 rosuvastatin 20 mg tablet Commonly known as:  CRESTOR Take 1 Tab by mouth nightly. testosterone 12.5 mg/ 1.25 gram (1 %) Glpm  
Commonly known as:  ANDROGEL  
3 Pump(s) by TransDERmal route daily. Max Daily Amount: 3 Pump(s). VITAMIN D3 1,000 unit Cap Generic drug:  cholecalciferol Take 1 Cap by mouth daily. We Performed the Following INFLUENZA VIRUS VACCINE, HIGH DOSE SEASONAL, PRESERVATIVE FREE [97489 CPT(R)] VT IMMUNIZ ADMIN,1 SINGLE/COMB VAC/TOXOID J156681 CPT(R)] Follow-up Instructions Return in about 3 months (around 3/12/2018), or if symptoms worsen or fail to improve. Patient Instructions Vaccine Information Statement Influenza (Flu) Vaccine (Inactivated or Recombinant): What you need to know Many Vaccine Information Statements are available in [place]ian and other languages. See www.immunize.org/vis Hojas de Información Sobre Vacunas están disponibles en Español y en muchos otros idiomas. Visite www.immunize.org/vis 1. Why get vaccinated? Influenza (flu) is a contagious disease that spreads around the [facility] every year, usually between October and May. Flu is caused by influenza viruses, and is spread mainly by coughing, sneezing, and close contact. Anyone can get flu. Flu strikes suddenly and can last several days. Symptoms vary by age, but can include: 
 fever/chills  sore throat  muscle aches  fatigue  cough  headache  runny or stuffy nose Flu can also lead to pneumonia and blood infections, and cause diarrhea and seizures in children. If you have a medical condition, such as heart or lung disease, flu can make it worse. Flu is more dangerous for some people. Infants and young children, people 72years of age and older, pregnant women, and people with certain health conditions or a weakened immune system are at greatest risk. Each year thousands of people in the [facility] die from flu, and many more are hospitalized. Flu vaccine can: 
 keep you from getting flu, 
 make flu less severe if you do get it, and 
 keep you from spreading flu to your family and other people. 2. Inactivated and recombinant flu vaccines A dose of flu vaccine is recommended every flu season. Children 6 months through 6years of age may need two doses during the same flu season. Everyone else needs only one dose each flu season. Some inactivated flu vaccines contain a very small amount of a mercury-based preservative called thimerosal. Studies have not shown thimerosal in vaccines to be harmful, but flu vaccines that do not contain thimerosal are available. There is no live flu virus in flu shots. They cannot cause the flu. There are many flu viruses, and they are always changing. Each year a new flu vaccine is made to protect against three or four viruses that are likely to cause disease in the upcoming flu season.  But even when the vaccine doesnt exactly match these viruses, it may still provide some protection Flu vaccine cannot prevent: 
 flu that is caused by a virus not covered by the vaccine, or 
 illnesses that look like flu but are not. It takes about 2 weeks for protection to develop after vaccination, and protection lasts through the flu season. 3. Some people should not get this vaccine Tell the person who is giving you the vaccine:  If you have any severe, life-threatening allergies. If you ever had a life-threatening allergic reaction after a dose of flu vaccine, or have a severe allergy to any part of this vaccine, you may be advised not to get vaccinated. Most, but not all, types of flu vaccine contain a small amount of egg protein.  If you ever had Guillain-Barré Syndrome (also called GBS). Some people with a history of GBS should not get this vaccine. This should be discussed with your doctor.  If you are not feeling well. It is usually okay to get flu vaccine when you have a mild illness, but you might be asked to come back when you feel better. 4. Risks of a vaccine reaction With any medicine, including vaccines, there is a chance of reactions. These are usually mild and go away on their own, but serious reactions are also possible. Most people who get a flu shot do not have any problems with it. Minor problems following a flu shot include:  
 soreness, redness, or swelling where the shot was given  hoarseness  sore, red or itchy eyes  cough  fever  aches  headache  itching  fatigue If these problems occur, they usually begin soon after the shot and last 1 or 2 days. More serious problems following a flu shot can include the following:  There may be a small increased risk of Guillain-Barré Syndrome (GBS) after inactivated flu vaccine.   This risk has been estimated at 1 or 2 additional cases per million people vaccinated. This is much lower than the risk of severe complications from flu, which can be prevented by flu vaccine.  Young children who get the flu shot along with pneumococcal vaccine (PCV13) and/or DTaP vaccine at the same time might be slightly more likely to have a seizure caused by fever. Ask your doctor for more information. Tell your doctor if a child who is getting flu vaccine has ever had a seizure. Problems that could happen after any injected vaccine:  People sometimes faint after a medical procedure, including vaccination. Sitting or lying down for about 15 minutes can help prevent fainting, and injuries caused by a fall. Tell your doctor if you feel dizzy, or have vision changes or ringing in the ears.  Some people get severe pain in the shoulder and have difficulty moving the arm where a shot was given. This happens very rarely.  Any medication can cause a severe allergic reaction. Such reactions from a vaccine are very rare, estimated at about 1 in a million doses, and would happen within a few minutes to a few hours after the vaccination. As with any medicine, there is a very remote chance of a vaccine causing a serious injury or death. The safety of vaccines is always being monitored. For more information, visit: www.cdc.gov/vaccinesafety/ 
 
5. What if there is a serious reaction? What should I look for?  Look for anything that concerns you, such as signs of a severe allergic reaction, very high fever, or unusual behavior. Signs of a severe allergic reaction can include hives, swelling of the face and throat, difficulty breathing, a fast heartbeat, dizziness, and weakness  usually within a few minutes to a few hours after the vaccination. What should I do?  
 
 If you think it is a severe allergic reaction or other emergency that cant wait, call 9-1-1 and get the person to the nearest hospital. Otherwise, call your doctor.  Reactions should be reported to the Vaccine Adverse Event Reporting System (VAERS). Your doctor should file this report, or you can do it yourself through  the VAERS web site at www.vaers. hhs.gov, or by calling 0-484.456.8950. VAERS does not give medical advice. 6. The National Vaccine Injury Compensation Program 
 
The [facility] Vaccine Injury Compensation Program (VICP) is a federal program that was created to compensate people who may have been injured by certain vaccines. Persons who believe they may have been injured by a vaccine can learn about the program and about filing a claim by calling 6-675.548.3558 or visiting the [employer] website at www.[facility].gov/vaccinecompensation. There is a time limit to file a claim for compensation. 7. How can I learn more?  Ask your healthcare provider. He or she can give you the vaccine package insert or suggest other sources of information.  Call your local or state health department.  Contact the Centers for Disease Control and Prevention (CDC): 
- Call 9-870.145.5452 (1-800-CDC-INFO) or 
- Visit CDCs website at www.cdc.gov/flu Vaccine Information Statement Inactivated Influenza Vaccine 8/7/2015 
42 AMBAR Murray Fljuanis [id number]-[id number] [facility] and [employer] Centers for Disease Control and Prevention Office Use Only [facility] HEALTH SERVICES! Yohana Escobedo introduces [employer] patient portal. Now you can access parts of your medical record, email your doctor's office, and request medication refills online. 1. In your internet browser, go to [email or web address] 2. Click on the First Time User? Click Here link in the Sign In box. You will see the New Member Sign Up page. 3. Enter your [employer] Access Code exactly as it appears below. You will not need to use this code after youve completed the sign-up process.  If you do not sign up before the expiration date, you must request a new code. 
 
· Zigswitch Access Code: [email or web address] Expires: 3/12/2018 10:24 AM 
 
4. Enter the last four digits of your Social Security Number (xxxx) and Date of Birth (mm/dd/yyyy) as indicated and click Submit. You will be taken to the next sign-up page. 5. Create a Zigswitch ID. This will be your Zigswitch login ID and cannot be changed, so think of one that is secure and easy to remember. 6. Create a Zigswitch password. You can change your password at any time. 7. Enter your Password Reset Question and Answer. This can be used at a later time if you forget your password. 8. Enter your e-mail address. You will receive e-mail notification when new information is available in [address] 9. Click Sign Up. You can now view and download portions of your medical record. 10. Click the Download Summary menu link to download a portable copy of your medical information. If you have questions, please visit the Frequently Asked Questions section of the [employer] website. Remember, Zigswitch is NOT to be used for urgent needs. For medical emergencies, dial 911. Now available from your iPhone and Android! Please provide this summary of care documentation to your next provider. Your primary care clinician is listed as Daryl Silvestre. If you have any questions after today's visit, please call [phone].

## 2017-12-12 NOTE — PROGRESS NOTES
Chief Complaint   Patient presents with    Hypertension     Patient is here today as 4 month follow up

## 2017-12-12 NOTE — ASSESSMENT & PLAN NOTE
Well Controlled, based on history, physical exam and review of pertinent labs, studies and medications; meds reconciled; continue current treatment plan. Key CAD CHF Meds             carvedilol (COREG) 12.5 mg tablet  (Taking) TAKE ONE TABLET BY MOUTH TWICE A DAY    isosorbide dinitrate (ISORDIL) 5 mg tablet  (Taking) TAKE ONE TABLET BY MOUTH TWICE A DAY    bumetanide (BUMEX) 2 mg tablet  (Taking) TAKE ONE TABLET BY MOUTH TWICE A DAY    lisinopril (PRINIVIL, ZESTRIL) 2.5 mg tablet  (Taking) TAKE ONE TABLET BY MOUTH DAILY    apixaban (ELIQUIS) 2.5 mg tablet  (Taking) Take 1 Tab by mouth two (2) times a day. aspirin delayed-release 81 mg tablet  (Taking) Take 81 mg by mouth daily. Key Antihyperlipidemia Meds             rosuvastatin (CRESTOR) 20 mg tablet  (Taking) Take 1 Tab by mouth nightly.         Lab Results   Component Value Date/Time    Sodium 144 08/09/2017 07:47 AM    Potassium 4.3 08/09/2017 07:47 AM    Cholesterol, total 248 08/09/2017 07:47 AM    HDL Cholesterol 39 08/09/2017 07:47 AM    LDL, calculated 185 08/09/2017 07:47 AM    Triglyceride 120 08/09/2017 07:47 AM

## 2018-02-17 DIAGNOSIS — N52.9 ED (ERECTILE DYSFUNCTION) OF ORGANIC ORIGIN: ICD-10-CM

## 2018-02-17 DIAGNOSIS — E29.1 HYPOGONADISM IN MALE: ICD-10-CM

## 2018-02-22 ENCOUNTER — DOCUMENTATION ONLY (OUTPATIENT)
Dept: FAMILY MEDICINE CLINIC | Age: 73
End: 2018-02-22

## 2018-02-22 RX ORDER — TESTOSTERONE 10 MG/G
GEL TOPICAL
Qty: 225 G | Refills: 1 | Status: SHIPPED | OUTPATIENT
Start: 2018-02-22 | End: 2018-06-11 | Stop reason: SDUPTHER

## 2018-03-05 DIAGNOSIS — I10 ESSENTIAL HYPERTENSION: ICD-10-CM

## 2018-03-05 RX ORDER — ISOSORBIDE DINITRATE 5 MG/1
TABLET ORAL
Qty: 180 TAB | Refills: 0 | Status: SHIPPED | OUTPATIENT
Start: 2018-03-05 | End: 2018-06-03 | Stop reason: SDUPTHER

## 2018-03-05 RX ORDER — CARVEDILOL 12.5 MG/1
TABLET ORAL
Qty: 180 TAB | Refills: 0 | Status: SHIPPED | OUTPATIENT
Start: 2018-03-05 | End: 2018-06-03 | Stop reason: SDUPTHER

## 2018-03-13 ENCOUNTER — OFFICE VISIT (OUTPATIENT)
Dept: FAMILY MEDICINE CLINIC | Age: 73
End: 2018-03-13

## 2018-03-13 ENCOUNTER — HOSPITAL ENCOUNTER (OUTPATIENT)
Dept: LAB | Age: 73
Discharge: HOME | End: 2018-03-13
Payer: MEDICARE

## 2018-03-13 VITALS
TEMPERATURE: 95.5 F | DIASTOLIC BLOOD PRESSURE: 74 MMHG | WEIGHT: 216.4 LBS | BODY MASS INDEX: 27.77 KG/M2 | HEIGHT: 74 IN | HEART RATE: 67 BPM | RESPIRATION RATE: 18 BRPM | OXYGEN SATURATION: 97 % | SYSTOLIC BLOOD PRESSURE: 139 MMHG

## 2018-03-13 DIAGNOSIS — Z79.899 ENCOUNTER FOR LONG-TERM (CURRENT) USE OF MEDICATIONS: ICD-10-CM

## 2018-03-13 DIAGNOSIS — I27.20 PULMONARY HYPERTENSION (HCC): ICD-10-CM

## 2018-03-13 DIAGNOSIS — I50.22 CHF NYHA CLASS I (NO SYMPTOMS FROM ORDINARY ACTIVITIES), CHRONIC, SYSTOLIC (HCC): Primary | ICD-10-CM

## 2018-03-13 DIAGNOSIS — I10 ESSENTIAL HYPERTENSION: ICD-10-CM

## 2018-03-13 DIAGNOSIS — B33.24 VIRAL CARDIOMYOPATHY (HCC): ICD-10-CM

## 2018-03-13 PROCEDURE — 36415 COLL VENOUS BLD VENIPUNCTURE: CPT

## 2018-03-13 PROCEDURE — 85018 HEMOGLOBIN: CPT

## 2018-03-13 PROCEDURE — 80048 BASIC METABOLIC PNL TOTAL CA: CPT

## 2018-03-13 NOTE — PROGRESS NOTES
Chief Complaint   Patient presents with    Cardiomyopathy     4 month follow-up   1. Have you been to the ER, urgent care clinic since your last visit? Hospitalized since your last visit? No    2. Have you seen or consulted any other health care providers outside of the [address] [place] [address] since your last visit? Include any pap smears or colon screening.  Yes Where: Pulmonary   Room 7

## 2018-03-13 NOTE — PROGRESS NOTES
Subjective:     Chief Complaint   Patient presents with    Cardiomyopathy     4 month follow-up      He  is a 71 y.o. male who presents for evaluation of:    Doing well today. Last echo showed EF of 25% and Pulm HTN improved. 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 Buster Lipscomb 3/16/16 and cardiac catheterization without occlusive CAD on 3/19/2016. Still on B bl, ACEI, diuretic, imdur. Stopped wearing Lifevest and unwilling to restart this. Repeat echo showed EF 20-25%, so planned to have an AICD implant. Saw Dr. Buster Lipscomb on 7/19/16 for eval for this but pt was not ready to have this done so decided to have another eval with Dr. Savanah Thorne - Echo with slight improvement and repeating serially. Home BPs running 100-120s/50-70s. Feels well with no sx. Denies PND and orthopnea. Pt ct to decline AICD for primary prevention though he meets criteria.     Hyperlipidemia & HTN  Pt is doing well on current meds with no medication side effects noted  No TIA's, no chest pain on exertion, no dyspnea on exertion, no swelling of ankles.      Lab Results   Component Value Date/Time    Cholesterol, total 248 (H) 08/09/2017 07:47 AM    HDL Cholesterol 39 (L) 08/09/2017 07:47 AM    LDL, calculated 185 (H) 08/09/2017 07:47 AM    Triglyceride 120 08/09/2017 07:47 AM     ROS  Gen - no fever/chills  Resp - per HPI  CV - per HPI   - + ED and hypogonadism - doing well on testosterone in past  Rest per HPI     Objective:     Vitals:    03/13/18 0856   BP: 139/74   Pulse: 67   Resp: 18   Temp: 95.5 °F (35.3 °C)   TempSrc: Oral   SpO2: 97%   Weight: 216 lb 6.4 oz (98.2 kg)   Height: 6' 2\" (1.88 m)     Physical Examination:  General appearance - alert, well appearing, and in no distress  Eyes -sclera anicteric  Neck - supple, no significant adenopathy, no thyromegaly, no bruits, no JVD even with hepatojugular reflex  Chest - clear to auscultation, no wheezes, rales or rhonchi, symmetric air entry  Heart - normal rate with irregular rhythm, normal S1, S2, no murmurs, rubs, clicks or gallops  Neurological - alert, oriented, no focal findings or movement disorder noted  Extr - no edema     Past Medical History:   Diagnosis Date    Arrhythmia 11/11/2014    ED (erectile dysfunction) of organic origin     High cholesterol 3/31/2010    HTN (hypertension) 3/31/2010    Prediabetes 11/11/2014    Pulmonary hypertension 8/8/2017    S/P ablation of atrial flutter 6/27/9432    Systolic CHF, acute ([address] 3/22/2016     Past Surgical History:   Procedure Laterality Date    HX ORTHOPAEDIC      fx wrist    HX TONSILLECTOMY       Current Outpatient Prescriptions on File Prior to Visit   Medication Sig Dispense Refill    carvedilol (COREG) 12.5 mg tablet TAKE ONE TABLET BY MOUTH TWICE A  Tab 0    isosorbide dinitrate (ISORDIL) 5 mg tablet TAKE ONE TABLET BY MOUTH TWICE A  Tab 0    testosterone 12.5 mg/ 1.25 gram (1 %) glpm APPLY THREE PUMPS TO THE SKIN DAILY 225 g 1    bumetanide (BUMEX) 2 mg tablet TAKE ONE TABLET BY MOUTH TWICE A DAY 60 Tab 5    lisinopril (PRINIVIL, ZESTRIL) 2.5 mg tablet TAKE ONE TABLET BY MOUTH DAILY 90 Tab 1    gabapentin (NEURONTIN) 300 mg capsule TAKE ONE CAPSULE BY MOUTH ONCE NIGHTLY 90 Cap 2    rosuvastatin (CRESTOR) 20 mg tablet Take 1 Tab by mouth nightly. 30 Tab 5    apixaban (ELIQUIS) 2.5 mg tablet Take 1 Tab by mouth two (2) times a day. 180 Tab 2    Cholecalciferol, Vitamin D3, (VITAMIN D3) 1,000 unit Cap Take 1 Cap by mouth daily.  aspirin delayed-release 81 mg tablet Take 81 mg by mouth daily. No current facility-administered medications on file prior to visit. Assessment/ Plan:   Diagnoses and all orders for this visit:    1. CHF NYHA class I (no symptoms from ordinary activities), chronic, systolic (HCC) - stable, discussed considering Entresto. Sending to Dr. Nikolai Kemp since Dr. Anthony Wiley has retired.   - [employer] Int Card Ref [facility]  -     METABOLIC PANEL, BASIC    2. Viral cardiomyopathy ([address]- appears stable with EF 25% and no concerning sx. -     [employer] Int Card Ref [id number]    3. Essential hypertension - well controlled  -     METABOLIC PANEL, BASIC    4. Pulmonary hypertension -  Saw Dr. Nasreen Meredith and had repeat echo with improved pulm artery pressures so to f/u in 6 months. On nitrate so may need to go off this if starting on PDE5i.  -     [employer] Int Card Ref [id number]    5. Encounter for long-term (current) use of medications  -     METABOLIC PANEL, BASIC  -     HEMOGLOBIN    I have discussed the diagnosis with the patient and the intended plan as seen in the above orders. The patient has received an after-visit summary and questions were answered concerning future plans. I have discussed medication side effects and warnings with the patient as well. The patient verbalizes understanding and agreement with the plan. Follow-up Disposition:  Return in about 3 months (around 6/13/2018), or if symptoms worsen or fail to improve, for Regular Follow up.

## 2018-03-13 NOTE — MR AVS SNAPSHOT
Elsy Singh [address] [facility] 
[phone] Patient: Deann Navarro MRN: [id number] TID:5/6/2067 Visit Information Date & Time Provider Department Dept. Phone Encounter #  
 3/13/2018  9:10 AM Devyn Gunter MD [facility] at [address] [place] [address] [id number] Follow-up Instructions Return in about 3 months (around 6/13/2018), or if symptoms worsen or fail to improve, for Regular Follow up. Upcoming Health Maintenance Date Due  
 GLAUCOMA SCREENING Q2Y 9/1/2011 Bone Densitometry (Dexa) Screening 9/1/2011 FOBT Q 1 YEAR, 18+ 3/11/2017 MEDICARE YEARLY EXAM 4/25/2018 DTaP/Tdap/Td series (2 - Td) 6/11/2023 Allergies as of 3/13/2018  Review Complete On: 3/13/2018 By: Christine Foster LPN No Known Allergies Current Immunizations  Reviewed on 11/11/2014 Name Date Influenza High Dose Vaccine PF 12/12/2017  9:58 AM, 10/18/2016, 11/18/2015, 11/11/2014 Influenza Vaccine 11/5/2013 Influenza Vaccine Split 11/6/2012, 10/12/2011 Pneumococcal Polysaccharide (PPSV-23) 10/18/2016 Tdap 6/11/2013  5:20 PM  
 ZZZ-RETIRED (DO NOT USE) Pneumococcal Vaccine (Unspecified Type) 10/12/2011 Not reviewed this visit You Were Diagnosed With   
  
 Codes Comments CHF NYHA class I (no symptoms from ordinary activities), chronic, systolic (HCC)    -  Primary ICD-10-CM: I50.22 ICD-9-CM: 428.22, 428.0 Viral cardiomyopathy ([facility]     ICD-10-CM: B33.24 ICD-9-CM: 425.4 Essential hypertension     ICD-10-CM: I10 
ICD-9-CM: 401.9 Pulmonary hypertension     ICD-10-CM: I27.20 ICD-9-CM: 416.8 Encounter for long-term (current) use of medications     ICD-10-CM: Z79.899 ICD-9-CM: V58.69 Vitals BP Pulse Temp Resp Height(growth percentile) Weight(growth percentile)  139/74 (BP 1 Location: Right arm, BP Patient Position: Sitting) 67 95.5 °F (35.3 °C) (Oral) 18 6' 2\" (1.88 m) 216 lb 6.4 oz (98.2 kg) SpO2 BMI Smoking Status 97% 27.78 kg/m2 Former Smoker Vitals History BMI and BSA Data Body Mass Index Body Surface Area  
 27.78 kg/m 2 2.26 m 2 Preferred Pharmacy Pharmacy Name Phone [facility], [address] [place] [address] [phone] Your Updated Medication List  
  
   
This list is accurate as of 3/13/18  9:42 AM.  Always use your most recent med list.  
  
  
  
  
 apixaban 2.5 mg tablet Commonly known as:  Elmira Lujan Take 1 Tab by mouth two (2) times a day. aspirin delayed-release 81 mg tablet Take 81 mg by mouth daily. bumetanide 2 mg tablet Commonly known as:  Lowella Tonasket TAKE ONE TABLET BY MOUTH TWICE A DAY  
  
 carvedilol 12.5 mg tablet Commonly known as:  COREG  
TAKE ONE TABLET BY MOUTH TWICE A DAY  
  
 gabapentin 300 mg capsule Commonly known as:  NEURONTIN  
TAKE ONE CAPSULE BY MOUTH ONCE NIGHTLY  
  
 isosorbide dinitrate 5 mg tablet Commonly known as:  ISORDIL  
TAKE ONE TABLET BY MOUTH TWICE A DAY  
  
 lisinopril 2.5 mg tablet Commonly known as:  PRINIVIL, ZESTRIL  
TAKE ONE TABLET BY MOUTH DAILY  
  
 rosuvastatin 20 mg tablet Commonly known as:  CRESTOR Take 1 Tab by mouth nightly. testosterone 12.5 mg/ 1.25 gram (1 %) Glpm  
APPLY THREE PUMPS TO THE SKIN DAILY  
  
 VITAMIN D3 1,000 unit Cap Generic drug:  cholecalciferol Take 1 Cap by mouth daily. We Performed the Following HEMOGLOBIN [id number] CPT(R)] METABOLIC PANEL, BASIC [74181 CPT(R)] REFERRAL TO CARDIOLOGY [EFQ99 Custom] Comments:  
 Viral CM with EF 25% with Pulm HTN, prev followed by Dr. Ras Mcadams and needs to transition Follow-up Instructions Return in about 3 months (around 6/13/2018), or if symptoms worsen or fail to improve, for Regular Follow up. Referral Information Referral ID Referred By Referred To [id number] [facility] [address] [place], [address] Visits Status Start Date End Date 1 New Request 3/13/18 3/13/19 If your referral has a status of pending review or denied, additional information will be sent to support the outcome of this decision. Introducing [facility] & HEALTH SERVICES! [facility] introduces Thinker Thing patient portal. Now you can access parts of your medical record, email your doctor's office, and request medication refills online. 1. In your internet browser, go to [email or web address] 2. Click on the First Time User? Click Here link in the Sign In box. You will see the New Member Sign Up page. 3. Enter your Thinker Thing Access Code exactly as it appears below. You will not need to use this code after youve completed the sign-up process. If you do not sign up before the expiration date, you must request a new code. · Thinker Thing Access Code: [email or web address] Expires: 6/11/2018  9:42 AM 
 
4. Enter the last four digits of your Social Security Number (xxxx) and Date of Birth (mm/dd/yyyy) as indicated and click Submit. You will be taken to the next sign-up page. 5. Create a Thinker Thing ID. This will be your Thinker Thing login ID and cannot be changed, so think of one that is secure and easy to remember. 6. Create a Thinker Thing password. You can change your password at any time. 7. Enter your Password Reset Question and Answer. This can be used at a later time if you forget your password. 8. Enter your e-mail address. You will receive e-mail notification when new information is available in [address] 9. Click Sign Up. You can now view and download portions of your medical record. 10. Click the Download Summary menu link to download a portable copy of your medical information.  
 
If you have questions, please visit the Frequently Asked Questions section of the ScoreStream. Remember, Bizimplyt is NOT to be used for urgent needs. For medical emergencies, dial 911. Now available from your iPhone and Android! Please provide this summary of care documentation to your next provider. Your primary care clinician is listed as Court Johnson. If you have any questions after today's visit, please call [phone].

## 2018-03-14 LAB
BUN SERPL-MCNC: 20 MG/DL (ref 8–27)
BUN/CREAT SERPL: 17 (ref 10–24)
CALCIUM SERPL-MCNC: 9.2 MG/DL (ref 8.6–10.2)
CHLORIDE SERPL-SCNC: 97 MMOL/L (ref 96–106)
CO2 SERPL-SCNC: 34 MMOL/L (ref 18–29)
CREAT SERPL-MCNC: 1.21 MG/DL (ref 0.76–1.27)
GFR SERPLBLD CREATININE-BSD FMLA CKD-EPI: 60 ML/MIN/1.73
GFR SERPLBLD CREATININE-BSD FMLA CKD-EPI: 69 ML/MIN/1.73
GLUCOSE SERPL-MCNC: 109 MG/DL (ref 65–99)
HGB BLD-MCNC: 15.2 G/DL (ref 13–17.7)
POTASSIUM SERPL-SCNC: 3.6 MMOL/L (ref 3.5–5.2)
SODIUM SERPL-SCNC: 144 MMOL/L (ref 134–144)

## 2018-03-23 ENCOUNTER — TELEPHONE (OUTPATIENT)
Dept: FAMILY MEDICINE CLINIC | Age: 73
End: 2018-03-23

## 2018-03-23 NOTE — TELEPHONE ENCOUNTER
Carisa with [facility]     Would like last OV note       Appt on 3/26/18    Best number to reach them is [phone]  Fax [phone]

## 2018-04-02 RX ORDER — APIXABAN 2.5 MG/1
TABLET, FILM COATED ORAL
Qty: 60 TAB | Refills: 4 | Status: SHIPPED | OUTPATIENT
Start: 2018-04-02 | End: 2018-09-03 | Stop reason: SDUPTHER

## 2018-05-02 ENCOUNTER — DOCUMENTATION ONLY (OUTPATIENT)
Dept: FAMILY MEDICINE CLINIC | Age: 73
End: 2018-05-02

## 2018-06-03 DIAGNOSIS — I10 ESSENTIAL HYPERTENSION: ICD-10-CM

## 2018-06-04 RX ORDER — ISOSORBIDE DINITRATE 5 MG/1
TABLET ORAL
Qty: 180 TAB | Refills: 0 | Status: SHIPPED | OUTPATIENT
Start: 2018-06-04 | End: 2018-09-03 | Stop reason: SDUPTHER

## 2018-06-04 RX ORDER — CARVEDILOL 12.5 MG/1
TABLET ORAL
Qty: 180 TAB | Refills: 0 | Status: SHIPPED | OUTPATIENT
Start: 2018-06-04 | End: 2018-09-03 | Stop reason: SDUPTHER

## 2018-06-11 DIAGNOSIS — N52.9 ED (ERECTILE DYSFUNCTION) OF ORGANIC ORIGIN: ICD-10-CM

## 2018-06-11 DIAGNOSIS — E29.1 HYPOGONADISM IN MALE: ICD-10-CM

## 2018-06-11 RX ORDER — TESTOSTERONE 10 MG/G
GEL TOPICAL
Qty: 225 BOTTLE | Refills: 0 | Status: SHIPPED | OUTPATIENT
Start: 2018-06-11 | End: 2018-08-08 | Stop reason: SDUPTHER

## 2018-06-13 ENCOUNTER — TELEPHONE (OUTPATIENT)
Dept: FAMILY MEDICINE CLINIC | Age: 73
End: 2018-06-13

## 2018-06-13 ENCOUNTER — DOCUMENTATION ONLY (OUTPATIENT)
Dept: FAMILY MEDICINE CLINIC | Age: 73
End: 2018-06-13

## 2018-06-19 ENCOUNTER — OFFICE VISIT (OUTPATIENT)
Dept: FAMILY MEDICINE CLINIC | Age: 73
End: 2018-06-19

## 2018-06-19 VITALS
BODY MASS INDEX: 27.52 KG/M2 | HEIGHT: 74 IN | HEART RATE: 52 BPM | RESPIRATION RATE: 16 BRPM | DIASTOLIC BLOOD PRESSURE: 70 MMHG | SYSTOLIC BLOOD PRESSURE: 106 MMHG | WEIGHT: 214.4 LBS | OXYGEN SATURATION: 95 % | TEMPERATURE: 97.7 F

## 2018-06-19 DIAGNOSIS — I27.20 PULMONARY HYPERTENSION (HCC): ICD-10-CM

## 2018-06-19 DIAGNOSIS — I10 ESSENTIAL HYPERTENSION: ICD-10-CM

## 2018-06-19 DIAGNOSIS — I50.22 CHF NYHA CLASS I (NO SYMPTOMS FROM ORDINARY ACTIVITIES), CHRONIC, SYSTOLIC (HCC): Primary | ICD-10-CM

## 2018-06-19 DIAGNOSIS — B33.24 VIRAL CARDIOMYOPATHY (HCC): ICD-10-CM

## 2018-06-19 RX ORDER — LISINOPRIL 5 MG/1
5 TABLET ORAL DAILY
Qty: 90 TAB | Refills: 0 | Status: SHIPPED | OUTPATIENT
Start: 2018-06-19 | End: 2018-09-23 | Stop reason: SDUPTHER

## 2018-06-19 NOTE — PROGRESS NOTES
Chief Complaint   Patient presents with    Cardiomyopathy     3 month follow-up   1. Have you been to the ER, urgent care clinic since your last visit? Hospitalized since your last visit? No    2. Have you seen or consulted any other health care providers outside of the [address] [place] [address] since your last visit? Include any pap smears or colon screening.  Yes Where: Cardiologist   Room 7

## 2018-06-19 NOTE — PROGRESS NOTES
Subjective:     Chief Complaint   Patient presents with    Cardiomyopathy     3 month follow-up      He  is a 71 y.o. male who presents for evaluation of:    Doing well today. Last echo showed EF of 25% and Pulm HTN improved. 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 Surendra Wilder 3/16/16 and cardiac catheterization without occlusive CAD on 3/19/2016. Still on B bl, ACEI, diuretic, imdur. Stopped wearing Lifevest and unwilling to restart this. Repeat echo showed EF 20-25%, so planned to have an AICD implant. Saw Dr. Surendra Wilder on 7/19/16 for eval for this but pt was not ready to have this done so decided to have another eval with Dr. Kelly Plane - Echo with slight improvement and repeating serially. Home BPs running 100-120s/50-70s. Feels well with no sx. Denies PND and orthopnea. Pt ct to decline AICD for primary prevention though he meets criteria.     Hyperlipidemia & HTN  Pt is doing well on current meds with no medication side effects noted  No TIA's, no chest pain on exertion, no dyspnea on exertion, no swelling of ankles.      Lab Results   Component Value Date/Time    Cholesterol, total 248 (H) 08/09/2017 07:47 AM    HDL Cholesterol 39 (L) 08/09/2017 07:47 AM    LDL, calculated 185 (H) 08/09/2017 07:47 AM    Triglyceride 120 08/09/2017 07:47 AM     ROS  Gen - no fever/chills  Resp - per HPI  CV - per HPI   - + ED and hypogonadism - doing well on testosterone  Rest per HPI     Objective:     Vitals:    06/19/18 1001   BP: 106/70   Pulse: (!) 52   Resp: 16   Temp: 97.7 °F (36.5 °C)   TempSrc: Oral   SpO2: 95%   Weight: 214 lb 6.4 oz (97.3 kg)   Height: 6' 2\" (1.88 m)     Physical Examination:  General appearance - alert, well appearing, and in no distress  Eyes -sclera anicteric  Neck - supple, no significant adenopathy, no thyromegaly, no bruits, no JVD even with hepatojugular reflex  Chest - clear to auscultation, no wheezes, rales or rhonchi, symmetric air entry  Heart - normal rate with irregular rhythm, normal S1, S2, no murmurs, rubs, clicks or gallops  Neurological - alert, oriented, no focal findings or movement disorder noted  Extr - no edema     Past Medical History:   Diagnosis Date    Arrhythmia 11/11/2014    ED (erectile dysfunction) of organic origin     High cholesterol 3/31/2010    HTN (hypertension) 3/31/2010    Prediabetes 11/11/2014    Pulmonary hypertension ([facility] 8/8/2017    S/P ablation of atrial flutter 2/58/9045    Systolic CHF, acute ([facility] 3/22/2016     Past Surgical History:   Procedure Laterality Date    HX ORTHOPAEDIC      fx wrist    HX TONSILLECTOMY       Current Outpatient Prescriptions on File Prior to Visit   Medication Sig Dispense Refill    testosterone 12.5 mg/ 1.25 gram (1 %) glpm APPLY THREE PUMPS TO THE SKIN DAILY 225 Bottle 0    carvedilol (COREG) 12.5 mg tablet TAKE ONE TABLET BY MOUTH TWICE A  Tab 0    isosorbide dinitrate (ISORDIL) 5 mg tablet TAKE ONE TABLET BY MOUTH TWICE A  Tab 0    ELIQUIS 2.5 mg tablet TAKE ONE TABLET BY MOUTH TWICE A DAY 60 Tab 4    bumetanide (BUMEX) 2 mg tablet TAKE ONE TABLET BY MOUTH TWICE A DAY 60 Tab 5    gabapentin (NEURONTIN) 300 mg capsule TAKE ONE CAPSULE BY MOUTH ONCE NIGHTLY 90 Cap 2    rosuvastatin (CRESTOR) 20 mg tablet Take 1 Tab by mouth nightly. 30 Tab 5    Cholecalciferol, Vitamin D3, (VITAMIN D3) 1,000 unit Cap Take 1 Cap by mouth daily.  aspirin delayed-release 81 mg tablet Take 81 mg by mouth daily. No current facility-administered medications on file prior to visit. Assessment/ Plan:   Diagnoses and all orders for this visit:    1. CHF NYHA class I (no symptoms from ordinary activities), chronic, systolic (HCC) - stable, discussed considering Entresto, following with Cardiology. Increasing Lisinopril to 5 mg daily    2. Viral cardiomyopathy ([facility]- appears stable with EF 25% and no concerning sx.     3. Essential hypertension - well controlled    4. Pulmonary hypertension - Following with Pulm    I have discussed the diagnosis with the patient and the intended plan as seen in the above orders. The patient has received an after-visit summary and questions were answered concerning future plans. I have discussed medication side effects and warnings with the patient as well. The patient verbalizes understanding and agreement with the plan. Follow-up Disposition:  Return in about 3 months (around 9/19/2018), or if symptoms worsen or fail to improve, for Regular Follow up.

## 2018-06-19 NOTE — MR AVS SNAPSHOT
Elsy Singh [address] [facility] 
[phone] Patient: Janith Sacks MRN: [id number] USB:4[birth year] Visit Information Date & Time Provider Department Dept. Phone Encounter #  
 6/19/2018  9:50 AM Daryle Adolf, MD [facility] at [address] [id number] Follow-up Instructions Return in about 3 months (around 9/19/2018), or if symptoms worsen or fail to improve, for Regular Follow up. Upcoming Health Maintenance Date Due  
 GLAUCOMA SCREENING Q2Y 9/1/2011 FOBT Q 1 YEAR, 18+ 3/11/2017 MEDICARE YEARLY EXAM 4/25/2018 Influenza Age 5 to Adult 8/1/2018 DTaP/Tdap/Td series (2 - Td) 6/11/2023 Allergies as of 6/19/2018  Review Complete On: 6/19/2018 By: Daryle Adolf, MD  
 No Known Allergies Current Immunizations  Reviewed on 11/11/2014 Name Date Influenza High Dose Vaccine PF 12/12/2017  9:58 AM, 10/18/2016, 11/18/2015, 11/11/2014 Influenza Vaccine 11/5/2013 Influenza Vaccine Split 11/6/2012, 10/12/2011 Pneumococcal Polysaccharide (PPSV-23) 10/18/2016 Tdap 6/11/2013  5:20 PM  
 ZZZ-RETIRED (DO NOT USE) Pneumococcal Vaccine (Unspecified Type) 10/12/2011 Not reviewed this visit You Were Diagnosed With   
  
 Codes Comments CHF NYHA class I (no symptoms from ordinary activities), chronic, systolic (HCC)    -  Primary ICD-10-CM: I50.22 ICD-9-CM: 428.22, 428.0 Viral cardiomyopathy ([facility]     ICD-10-CM: B33.24 ICD-9-CM: 425.4 Essential hypertension     ICD-10-CM: I10 
ICD-9-CM: 401.9 Pulmonary hypertension (HCC)     ICD-10-CM: I27.20 ICD-9-CM: 416.8 Vitals BP Pulse Temp Resp Height(growth percentile) Weight(growth percentile) 106/70 (BP 1 Location: Right arm, BP Patient Position: Sitting) (!) 52 97.7 °F (36.5 °C) (Oral) 16 6' 2\" (1.88 m) 214 lb 6.4 oz (97.3 kg) SpO2 BMI Smoking Status 95% 27.53 kg/m2 Former Smoker Vitals History BMI and BSA Data Body Mass Index Body Surface Area  
 27.53 kg/m 2 2.25 m 2 Preferred Pharmacy Pharmacy Name Phone [address] [phone] Your Updated Medication List  
  
   
This list is accurate as of 6/19/18 11:20 AM.  Always use your most recent med list.  
  
  
  
  
 aspirin delayed-release 81 mg tablet Take 81 mg by mouth daily. bumetanide 2 mg tablet Commonly known as:  Bernell Rumble TAKE ONE TABLET BY MOUTH TWICE A DAY  
  
 carvedilol 12.5 mg tablet Commonly known as:  COREG  
TAKE ONE TABLET BY MOUTH TWICE A DAY  
  
 ELIQUIS 2.5 mg tablet Generic drug:  apixaban TAKE ONE TABLET BY MOUTH TWICE A DAY  
  
 gabapentin 300 mg capsule Commonly known as:  NEURONTIN  
TAKE ONE CAPSULE BY MOUTH ONCE NIGHTLY  
  
 isosorbide dinitrate 5 mg tablet Commonly known as:  ISORDIL  
TAKE ONE TABLET BY MOUTH TWICE A DAY  
  
 lisinopril 5 mg tablet Commonly known as:  Lollie Jump Take 1 Tab by mouth daily. rosuvastatin 20 mg tablet Commonly known as:  CRESTOR Take 1 Tab by mouth nightly. testosterone 12.5 mg/ 1.25 gram (1 %) Glpm  
APPLY THREE PUMPS TO THE SKIN DAILY  
  
 VITAMIN D3 1,000 unit Cap Generic drug:  cholecalciferol Take 1 Cap by mouth daily. Prescriptions Sent to Pharmacy Refills  
 lisinopril (PRINIVIL, ZESTRIL) 5 mg tablet 0 Sig: Take 1 Tab by mouth daily. Class: Normal  
 Pharmacy: [address] Ph #: [phone] Route: Oral  
  
Follow-up Instructions Return in about 3 months (around 9/19/2018), or if symptoms worsen or fail to improve, for Regular Follow up. Introducing [facility]!    
 [facility] [employer] introduces StudyApps patient portal. Now you can access parts of your medical record, email your doctor's office, and request medication refills online. 1. In your internet browser, go to [email or web address] in the Boxt 2. Click on the First Time User? Click Here link in the Sign In box. You will see the New Member Sign Up page. 3. Enter your Kngroo Access Code exactly as it appears below. You will not need to use this code after youve completed the sign-up process. If you do not sign up before the expiration date, you must request a new code. · Kngroo Access Code: [email or web address] Expires: 9/17/2018 10:05 AM 
 
4. Enter the last four digits of your Social Security Number (xxxx) and Date of Birth (mm/dd/yyyy) as indicated and click Submit. You will be taken to the next sign-up page. 5. Create a Kngroo ID. This will be your Kngroo login ID and cannot be changed, so think of one that is secure and easy to remember. 6. Create a Kngroo password. You can change your password at any time. 7. Enter your Password Reset Question and Answer. This can be used at a later time if you forget your password. 8. Enter your e-mail address. You will receive e-mail notification when new information is available in [address] 9. Click Sign Up. You can now view and download portions of your medical record. 10. Click the Download Summary menu link to download a portable copy of your medical information. If you have questions, please visit the Frequently Asked Questions section of the Kngroo website. Remember, Kngroo is NOT to be used for urgent needs. For medical emergencies, dial 911. Now available from your iPhone and Android! Please provide this summary of care documentation to your next provider. Your primary care clinician is listed as Ezra De. If you have any questions after today's visit, please call [phone].

## 2018-06-24 DIAGNOSIS — G60.9 IDIOPATHIC PERIPHERAL NEUROPATHY: ICD-10-CM

## 2018-06-25 RX ORDER — GABAPENTIN 300 MG/1
CAPSULE ORAL
Qty: 90 CAP | Refills: 1 | Status: SHIPPED | OUTPATIENT
Start: 2018-06-25 | End: 2018-12-21 | Stop reason: SDUPTHER

## 2018-07-24 RX ORDER — BUMETANIDE 2 MG/1
TABLET ORAL
Qty: 60 TAB | Refills: 4 | Status: SHIPPED | OUTPATIENT
Start: 2018-07-24 | End: 2018-12-17 | Stop reason: SDUPTHER

## 2018-08-08 DIAGNOSIS — N52.9 ED (ERECTILE DYSFUNCTION) OF ORGANIC ORIGIN: ICD-10-CM

## 2018-08-08 DIAGNOSIS — E29.1 HYPOGONADISM IN MALE: ICD-10-CM

## 2018-08-08 RX ORDER — TESTOSTERONE 10 MG/G
GEL TOPICAL
Qty: 225 G | Refills: 3 | Status: SHIPPED | OUTPATIENT
Start: 2018-08-08 | End: 2018-08-10 | Stop reason: SDUPTHER

## 2018-08-10 ENCOUNTER — DOCUMENTATION ONLY (OUTPATIENT)
Dept: FAMILY MEDICINE CLINIC | Age: 73
End: 2018-08-10

## 2018-08-10 DIAGNOSIS — E29.1 HYPOGONADISM IN MALE: ICD-10-CM

## 2018-08-10 DIAGNOSIS — N52.9 ED (ERECTILE DYSFUNCTION) OF ORGANIC ORIGIN: ICD-10-CM

## 2018-08-14 RX ORDER — TESTOSTERONE 10 MG/G
GEL TOPICAL
Qty: 225 G | Refills: 3 | Status: SHIPPED | OUTPATIENT
Start: 2018-08-14 | End: 2019-02-20 | Stop reason: SDUPTHER

## 2018-09-03 DIAGNOSIS — I10 ESSENTIAL HYPERTENSION: ICD-10-CM

## 2018-09-04 RX ORDER — ISOSORBIDE DINITRATE 5 MG/1
TABLET ORAL
Qty: 180 TAB | Refills: 0 | Status: SHIPPED | OUTPATIENT
Start: 2018-09-04 | End: 2018-11-02 | Stop reason: SDUPTHER

## 2018-09-04 RX ORDER — APIXABAN 2.5 MG/1
TABLET, FILM COATED ORAL
Qty: 60 TAB | Refills: 3 | Status: SHIPPED | OUTPATIENT
Start: 2018-09-04 | End: 2019-01-02 | Stop reason: SDUPTHER

## 2018-09-04 RX ORDER — CARVEDILOL 12.5 MG/1
TABLET ORAL
Qty: 180 TAB | Refills: 0 | Status: SHIPPED | OUTPATIENT
Start: 2018-09-04 | End: 2018-11-02 | Stop reason: SDUPTHER

## 2018-09-20 ENCOUNTER — OFFICE VISIT (OUTPATIENT)
Dept: FAMILY MEDICINE CLINIC | Age: 73
End: 2018-09-20

## 2018-09-20 VITALS — HEIGHT: 74 IN

## 2018-09-20 NOTE — PROGRESS NOTES
Subjective:     No chief complaint on file. He  is a 68 y.o. male who presents for evaluation of:    Doing well today. Last echo showed EF of 25% and Pulm HTN improved. 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 Aime Browning 3/16/16 and cardiac catheterization without occlusive CAD on 3/19/2016. Still on B bl, ACEI, diuretic, imdur. Stopped wearing Lifevest and unwilling to restart this. Repeat echo showed EF 20-25%, so planned to have an AICD implant. Saw Dr. Aime Browning on 7/19/16 for eval for this but pt was not ready to have this done so decided to have another eval with Dr. Alia Newman - Echo with slight improvement and repeating serially. Home BPs running 100-120s/50-70s. Feels well with no sx. Denies PND and orthopnea. Pt ct to decline AICD for primary prevention though he meets criteria.     Hyperlipidemia & HTN  Pt is doing well on current meds with no medication side effects noted  No TIA's, no chest pain on exertion, no dyspnea on exertion, no swelling of ankles. Lab Results   Component Value Date/Time    Cholesterol, total 248 (H) 08/09/2017 07:47 AM    HDL Cholesterol 39 (L) 08/09/2017 07:47 AM    LDL, calculated 185 (H) 08/09/2017 07:47 AM    Triglyceride 120 08/09/2017 07:47 AM     ROS  Gen - no fever/chills  Resp - per HPI  CV - per HPI   - + ED and hypogonadism - doing well on testosterone  Rest per HPI     Objective: There were no vitals filed for this visit.   Physical Examination:  General appearance - alert, well appearing, and in no distress  Eyes -sclera anicteric  Neck - supple, no significant adenopathy, no thyromegaly, no bruits, no JVD even with hepatojugular reflex  Chest - clear to auscultation, no wheezes, rales or rhonchi, symmetric air entry  Heart - normal rate with irregular rhythm, normal S1, S2, no murmurs, rubs, clicks or gallops  Neurological - alert, oriented, no focal findings or movement disorder noted  Extr - no edema     Past Medical History:   Diagnosis Date    Arrhythmia 11/11/2014    ED (erectile dysfunction) of organic origin     High cholesterol 3/31/2010    HTN (hypertension) 3/31/2010    Prediabetes 11/11/2014    Pul[facility] 8/8/2017    S/P ablation of atrial flutter 3/98/3292    Systolic CHF, acute ([facility] 3/22/2016     Past Surgical History:   Procedure Laterality Date    HX ORTHOPAEDIC      fx wrist    HX TONSILLECTOMY       Current Outpatient Prescriptions on File Prior to Visit   Medication Sig Dispense Refill    ELIQUIS 2.5 mg tablet TAKE ONE TABLET BY MOUTH TWICE A DAY 60 Tab 3    carvedilol (COREG) 12.5 mg tablet TAKE ONE TABLET BY MOUTH TWICE A  Tab 0    isosorbide dinitrate (ISORDIL) 5 mg tablet TAKE ONE TABLET BY MOUTH TWICE A  Tab 0    rosuvastatin (CRESTOR) 20 mg tablet TAKE ONE TABLET BY MOUTH ONCE NIGHTLY 30 Tab 5    testosterone 12.5 mg/ 1.25 gram (1 %) glpm APPLY 3 PUMPS TO THE SKIN DAILY 225 g 3    bumetanide (BUMEX) 2 mg tablet TAKE ONE TABLET BY MOUTH TWICE A DAY 60 Tab 4    gabapentin (NEURONTIN) 300 mg capsule TAKE ONE CAPSULE BY MOUTH ONCE NIGHTLY 90 Cap 1    lisinopril (PRINIVIL, ZESTRIL) 5 mg tablet Take 1 Tab by mouth daily. 90 Tab 0    Cholecalciferol, Vitamin D3, (VITAMIN D3) 1,000 unit Cap Take 1 Cap by mouth daily.  aspirin delayed-release 81 mg tablet Take 81 mg by mouth daily. No current facility-administered medications on file prior to visit. Assessment/ Plan:   Diagnoses and all orders for this visit:    1. CHF NYHA class I (no symptoms from ordinary activities), chronic, systolic (HCC) - stable, discussed considering Entresto, following with Cardiology. Increasing Lisinopril to 5 mg daily    2. Viral cardiomyopathy ([facility]- appears stable with EF 25% and no concerning sx. 3. Essential hypertension - well controlled    4.  Pulmonary hypertension - Following with Pulm    I have discussed the diagnosis with the patient and the intended plan as seen in the above orders. The patient has received an after-visit summary and questions were answered concerning future plans. I have discussed medication side effects and warnings with the patient as well. The patient verbalizes understanding and agreement with the plan.     Follow-up Disposition: Not on File

## 2018-09-23 DIAGNOSIS — I10 ESSENTIAL HYPERTENSION: ICD-10-CM

## 2018-09-23 DIAGNOSIS — I50.22 CHF NYHA CLASS I (NO SYMPTOMS FROM ORDINARY ACTIVITIES), CHRONIC, SYSTOLIC (HCC): ICD-10-CM

## 2018-09-24 RX ORDER — LISINOPRIL 5 MG/1
TABLET ORAL
Qty: 90 TAB | Refills: 0 | Status: SHIPPED | OUTPATIENT
Start: 2018-09-24 | End: 2018-12-21 | Stop reason: SDUPTHER

## 2018-10-10 ENCOUNTER — OFFICE VISIT (OUTPATIENT)
Dept: FAMILY MEDICINE CLINIC | Age: 73
End: 2018-10-10

## 2018-10-10 VITALS
DIASTOLIC BLOOD PRESSURE: 80 MMHG | BODY MASS INDEX: 28.06 KG/M2 | SYSTOLIC BLOOD PRESSURE: 111 MMHG | WEIGHT: 218.6 LBS | RESPIRATION RATE: 16 BRPM | OXYGEN SATURATION: 96 % | TEMPERATURE: 96.7 F | HEIGHT: 74 IN | HEART RATE: 55 BPM

## 2018-10-10 DIAGNOSIS — B33.24 VIRAL CARDIOMYOPATHY (HCC): ICD-10-CM

## 2018-10-10 DIAGNOSIS — I50.22 CHF NYHA CLASS I (NO SYMPTOMS FROM ORDINARY ACTIVITIES), CHRONIC, SYSTOLIC (HCC): ICD-10-CM

## 2018-10-10 DIAGNOSIS — Z79.899 ENCOUNTER FOR LONG-TERM (CURRENT) USE OF MEDICATIONS: ICD-10-CM

## 2018-10-10 DIAGNOSIS — I10 ESSENTIAL HYPERTENSION: ICD-10-CM

## 2018-10-10 DIAGNOSIS — Z00.00 MEDICARE ANNUAL WELLNESS VISIT, SUBSEQUENT: Primary | ICD-10-CM

## 2018-10-10 DIAGNOSIS — Z13.31 SCREENING FOR DEPRESSION: ICD-10-CM

## 2018-10-10 DIAGNOSIS — E78.2 MIXED HYPERLIPIDEMIA: ICD-10-CM

## 2018-10-10 DIAGNOSIS — Z13.39 SCREENING FOR ALCOHOLISM: ICD-10-CM

## 2018-10-10 DIAGNOSIS — Z12.11 COLON CANCER SCREENING: ICD-10-CM

## 2018-10-10 NOTE — PROGRESS NOTES
Chief Complaint Patient presents with Ritika Sadler Annual Wellness Visit Medicare 1. Have you been to the ER, urgent care clinic since your last visit? Hospitalized since your last visit? No 
 
2. Have you seen or consulted any other health care providers outside of the [address] [place] [address] since your last visit? Include any pap smears or colon screening. Yes Dr Wilfredo Newberry Room 4

## 2018-10-10 NOTE — PATIENT INSTRUCTIONS
Medicare Wellness Visit, Male The best way to live healthy is to have a lifestyle where you eat a well-balanced diet, exercise regularly, limit alcohol use, and quit all forms of tobacco/nicotine, if applicable. Regular preventive services are another way to keep healthy. Preventive services (vaccines, screening tests, monitoring & exams) can help personalize your care plan, which helps you manage your own care. Screening tests can find health problems at the earliest stages, when they are easiest to treat. [address] [facility] follows the current, evidence-based guidelines published by the [facility] Jason Cherelle ([facility]) when recommending preventive services for our patients. Because we follow these guidelines, sometimes recommendations change over time as research supports it. (For example, a prostate screening blood test is no longer routinely recommended for men with no symptoms.) Of course, you and your doctor may decide to screen more often for some diseases, based on your risk and co-morbidities (chronic disease you are already diagnosed with). Preventive services for you include: - Medicare offers their members a free annual wellness visit, which is time for you and your primary care provider to discuss and plan for your preventive service needs. Take advantage of this benefit every year! 
-All adults over age 72 should receive the recommended pneumonia vaccines. Current USPSTF guidelines recommend a series of two vaccines for the best pneumonia protection.  
-All adults should have a flu vaccine yearly and an ECG.  All adults age 61 and older should receive a shingles vaccine once in their lifetime.   
-All adults age 38-68 who are overweight should have a diabetes screening test once every three years.  
-Other screening tests & preventive services for persons with diabetes include: an eye exam to screen for diabetic retinopathy, a kidney function test, a foot exam, and stricter control over your cholesterol.  
-Cardiovascular screening for adults with routine risk involves an electrocardiogram (ECG) at intervals determined by the provider.  
-Colorectal cancer screening should be done for adults age 54-65 with no increased risk factors for colorectal cancer. There are a number of acceptable methods of screening for this type of cancer. Each test has its own benefits and drawbacks. Discuss with your provider what is most appropriate for you during your annual wellness visit. The different tests include: colonoscopy (considered the best screening method), a fecal occult blood test, a fecal DNA test, and sigmoidoscopy. 
-All adults born between [facility] should be screened once for Hepatitis C. 
-An Abdominal Aortic Aneurysm (AAA) Screening is recommended for men age 73-68 who has ever smoked in their lifetime. Here is a list of your current Health Maintenance items (your personalized list of preventive services) with a due date: 
Health Maintenance Due Topic Date Due  Shingles Vaccine (1 of 2) 09/01/1996  Glaucoma Screening   09/01/2011  Stool testing for trace blood  03/11/2017 [facility] Annual Well Visit  04/25/2018

## 2018-10-10 NOTE — PROGRESS NOTES
Subjective: Chief Complaint Patient presents with Yessica Dominguez Annual Wellness Visit Medicare He  is a 68 y.o. male who presents for evaluation of: Doing well today. Last echo in 3/2018 showed EF of 25% and Pulm HTN improved. 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 Cristy Carney 3/16/16 and cardiac catheterization without occlusive CAD on 3/19/2016. Still on B bl, ACEI, diuretic, imdur. Stopped wearing Lifevest and unwilling to restart this. Repeat echo showed EF 20-25%, so planned to have an AICD implant. Saw Dr. Cristy Carney on 7/19/16 for eval for this but pt was not ready to have this done so decided to have another eval with Dr. Evaristo Aaron - Echo with slight improvement and repeating serially. Home BPs running 100-120s/50-70s. Feels well with no sx. Denies PND and orthopnea. Pt ct to decline AICD for primary prevention though he meets criteria. After Dr. Trip Arevalo care home, patient has started seeing Dr. Rachna Pickard and is happy to continue monitoring ejection fraction. 
  
Hyperlipidemia & HTN Pt is doing well on current meds with no medication side effects noted No TIA's, no chest pain on exertion, no dyspnea on exertion, no swelling of ankles. Lab Results Component Value Date/Time Cholesterol, total 248 (H) 08/09/2017 07:47 AM  
 HDL Cholesterol 39 (L) 08/09/2017 07:47 AM  
 LDL, calculated 185 (H) 08/09/2017 07:47 AM  
 Triglyceride 120 08/09/2017 07:47 AM  
 
ROS Gen - no fever/chills Resp - per HPI 
CV - per HPI 
 - + ED and hypogonadism - doing well on testosterone Rest per HPI Objective:  
 
Vitals:  
 10/10/18 1035 BP: 111/80 Pulse: (!) 55 Resp: 16 Temp: 96.7 °F (35.9 °C) TempSrc: Oral  
SpO2: 96% Weight: 218 lb 9.6 oz (99.2 kg) Height: 6' 2\" (1.88 m) Physical Examination: 
General appearance - alert, well appearing, and in no distress Eyes -sclera anicteric Neck - supple, no significant adenopathy, no thyromegaly, no bruits, no JVD even with hepatojugular reflex Chest - clear to auscultation, no wheezes, rales or rhonchi, symmetric air entry Heart - normal rate with irregular rhythm, normal S1, S2, no murmurs, rubs, clicks or gallops Neurological - alert, oriented, no focal findings or movement disorder noted Extr - no edema Past Medical History:  
Diagnosis Date  Arrhythmia 11/11/2014  ED (erectile dysfunction) of organic origin  High cholesterol 3/31/2010  
 HTN (hypertension) 3/31/2010  Prediabetes 11/11/2014  Pulmonary hypertension ([facility] [facility] 8/8/2017  S/P ablation of atrial flutter 3/22/2016  [facility]2016 Past Surgical History:  
Procedure Laterality Date  HX ORTHOPAEDIC    
 fx wrist  
 HX TONSILLECTOMY Current Outpatient Prescriptions on File Prior to Visit Medication Sig Dispense Refill  lisinopril (PRINIVIL, ZESTRIL) 5 mg tablet TAKE ONE TABLET BY MOUTH DAILY 90 Tab 0  
 ELIQUIS 2.5 mg tablet TAKE ONE TABLET BY MOUTH TWICE A DAY 60 Tab 3  carvedilol (COREG) 12.5 mg tablet TAKE ONE TABLET BY MOUTH TWICE A  Tab 0  
 isosorbide dinitrate (ISORDIL) 5 mg tablet TAKE ONE TABLET BY MOUTH TWICE A  Tab 0  
 rosuvastatin (CRESTOR) 20 mg tablet TAKE ONE TABLET BY MOUTH ONCE NIGHTLY 30 Tab 5  
 testosterone 12.5 mg/ 1.25 gram (1 %) glpm APPLY 3 PUMPS TO THE SKIN DAILY 225 g 3  
 bumetanide (BUMEX) 2 mg tablet TAKE ONE TABLET BY MOUTH TWICE A DAY 60 Tab 4  
 gabapentin (NEURONTIN) 300 mg capsule TAKE ONE CAPSULE BY MOUTH ONCE NIGHTLY 90 Cap 1  Cholecalciferol, Vitamin D3, (VITAMIN D3) 1,000 unit Cap Take 1 Cap by mouth daily.  aspirin delayed-release 81 mg tablet Take 81 mg by mouth daily. No current facility-administered medications on file prior to visit.    
 
 
Assessment/ Plan:  
Diagnoses and all orders for this visit: 
 
 1. Medicare annual wellness visit, subsequent 2. Screening for alcoholism -     Annual  Alcohol Screen 15 min () 3. Screening for depression -     Depression Screen Annual 
 
4. CHF NYHA class I (no symptoms from ordinary activities), chronic, [facility]- stable, discussed considering Entresto, following with Cardiology. Tolerating lisinopril 5 mg daily 
-     HEMOGLOBIN A1C WITH EAG 
-     LIPID PANEL 
-     METABOLIC PANEL, COMPREHENSIVE 
-     TSH 3RD GENERATION 5. Essential hypertensionwell controlled -     METABOLIC PANEL, COMPREHENSIVE 6. Mixed hyperlipidemiastable 
-     HEMOGLOBIN A1C WITH EAG 
-     LIPID PANEL 
-     METABOLIC PANEL, COMPREHENSIVE 
-     TSH 3RD GENERATION 7. Viral cardiomyopathy [facility]- appears stable with EF 25% and no concerning sx. 8. Colon cancer screening -     OCCULT BLOOD IMMUNOASSAY,DIAGNOSTIC 9. Encounter for long-term (current) use of medications 
-     CBC W/O DIFF 
-     HEMOGLOBIN A1C WITH EAG 
-     LIPID PANEL 
-     METABOLIC PANEL, COMPREHENSIVE 
-     TSH 3RD GENERATION I have discussed the diagnosis with the patient and the intended plan as seen in the above orders. The patient has received an after-visit summary and questions were answered concerning future plans. I have discussed medication side effects and warnings with the patient as well. The patient verbalizes understanding and agreement with the plan. Follow-up Disposition: 
Return in about 3 months (around 1/10/2019), or if symptoms worsen or fail to improve. This is the Subsequent Medicare Annual Wellness Exam, performed 12 months or more after the Initial AWV or the last Subsequent AWV I have reviewed the patient's medical history in detail and updated the computerized patient record. History Past Medical History:  
Diagnosis Date  Arrhythmia 11/11/2014  ED (erectile dysfunction) of organic origin  High cholesterol 3/31/2010  HTN (hypertension) 3/31/2010  Prediabetes 11/11/2014  Pulmonary hypertension ([facility] 8/8/2017  S/P ablation of atrial flutter 3/22/2016  Systolic CHF, acute ([facility] 3/22/2016 Past Surgical History:  
Procedure Laterality Date  HX ORTHOPAEDIC    
 fx wrist  
 HX TONSILLECTOMY Current Outpatient Prescriptions Medication Sig Dispense Refill  lisinopril (PRINIVIL, ZESTRIL) 5 mg tablet TAKE ONE TABLET BY MOUTH DAILY 90 Tab 0  
 ELIQUIS 2.5 mg tablet TAKE ONE TABLET BY MOUTH TWICE A DAY 60 Tab 3  carvedilol (COREG) 12.5 mg tablet TAKE ONE TABLET BY MOUTH TWICE A  Tab 0  
 isosorbide dinitrate (ISORDIL) 5 mg tablet TAKE ONE TABLET BY MOUTH TWICE A  Tab 0  
 rosuvastatin (CRESTOR) 20 mg tablet TAKE ONE TABLET BY MOUTH ONCE NIGHTLY 30 Tab 5  
 testosterone 12.5 mg/ 1.25 gram (1 %) glpm APPLY 3 PUMPS TO THE SKIN DAILY 225 g 3  
 bumetanide (BUMEX) 2 mg tablet TAKE ONE TABLET BY MOUTH TWICE A DAY 60 Tab 4  
 gabapentin (NEURONTIN) 300 mg capsule TAKE ONE CAPSULE BY MOUTH ONCE NIGHTLY 90 Cap 1  Cholecalciferol, Vitamin D3, (VITAMIN D3) 1,000 unit Cap Take 1 Cap by mouth daily.  aspirin delayed-release 81 mg tablet Take 81 mg by mouth daily. No Known Allergies Family History Problem Relation Age of Onset  Heart Disease Father  Hypertension Father  Hypertension Mother  Heart Disease Mother Social History Substance Use Topics  Smoking status: Former Smoker Quit date: 4/1/1995  Smokeless tobacco: Never Used  Alcohol use 0.6 oz/week 1 Glasses of wine per week Comment: 1 drink a month Patient Active Problem List  
Diagnosis Code  High cholesterol E78.00  
 Eczema L30.9  
 HTN (hypertension) I10  
 Encounter for long-term (current) use of medications Z79.899  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  Prediabetes R73.03  
 Arrhythmia I49.9  Cardiomyopathy ([facility] I42.9  CHF NYHA class I (no symptoms from ordinary activities), chronic, systolic (HCC) S08.82  S/P ablation of atrial flutter Z98.890, Z86.79  
 Advance care planning Z71.89  
 Pulmonary hypertension (HCC) I27.20 Depression Risk Factor Screening: PHQ over the last two weeks 10/10/2018 Little interest or pleasure in doing things Not at all Feeling down, depressed, irritable, or hopeless Not at all Total Score PHQ 2 0 Alcohol Risk Factor Screening: You do not drink alcohol or very rarely. Functional Ability and Level of Safety:  
Hearing Loss Hearing is good. Activities of Daily Living The home contains: no safety equipment. Patient does total self care Fall Risk Fall Risk Assessment, last 12 mths 10/10/2018 Able to walk? Yes Fall in past 12 months? No  
 
 
Abuse Screen Patient is not abused Cognitive Screening Evaluation of Cognitive Function: 
Has your family/caregiver stated any concerns about your memory: no 
Normal, Verbal Fluency Test 
 
Patient Care Team  
Patient Care Team: 
Geovani Miguel MD as PCP - [facility]) Nicole Bojorquez RN as Nurse Navigator Laure Arita RN as Ambulatory Care Navigator Johanna Xiao MD (Cardiology) Leonard Fritz MD (Pulmonary Disease) Assessment/Plan Education and counseling provided: 
Are appropriate based on today's review and evaluation Diagnoses and all orders for this visit: 
 
1. Medicare annual wellness visit, subsequent 2. Screening for alcoholism -     Annual  Alcohol Screen 15 min () 3. Screening for depression -     Depression Screen Annual 
 
4. CHF NYHA class I (no symptoms from ordinary activities), chronic, systolic (HCC) 
-     HEMOGLOBIN A1C WITH EAG 
-     LIPID PANEL 
-     METABOLIC PANEL, COMPREHENSIVE 
-     TSH 3RD GENERATION 5. Essential hypertension -     METABOLIC PANEL, COMPREHENSIVE 6.  Mixed hyperlipidemia 
-     HEMOGLOBIN A1C WITH EAG 
 -     LIPID PANEL 
-     METABOLIC PANEL, COMPREHENSIVE 
-     TSH 3RD GENERATION 7. Viral cardiomyopathy ([facility] [facility] 8. Colon cancer screening -     OCCULT BLOOD IMMUNOASSAY,DIAGNOSTIC 9. Encounter for long-term (current) use of medications 
-     CBC W/O DIFF 
-     HEMOGLOBIN A1C WITH EAG 
-     LIPID PANEL 
-     METABOLIC PANEL, COMPREHENSIVE 
-     TSH 3RD GENERATION Health Maintenance Due Topic Date Due  Shingrix Vaccine Age 50> (1 of 2) 09/01/1996  GLAUCOMA SCREENING Q2Y  09/01/2011  
 FOBT Q 1 YEAR, 18+  03/11/2017  MEDICARE YEARLY EXAM  04/25/2018

## 2018-10-10 NOTE — MR AVS SNAPSHOT
[address] [place] [facility] 
[phone] Patient: Stephany Theodore MRN: [id number] VRL:1[birth year] Visit Information Date & Time Provider Department Dept. Phone Encounter #  
 10/10/2018  9:50 AM Philippe Winters MD [facility] at [address] [id number] Follow-up Instructions Return in about 3 months (around 1/10/2019), or if symptoms worsen or fail to improve. Upcoming Health Maintenance Date Due Shingrix Vaccine Age 50> (1 of 2) 9/1/1996 GLAUCOMA SCREENING Q2Y 9/1/2011 FOBT Q 1 YEAR, 18+ 3/11/2017 MEDICARE YEARLY EXAM 4/25/2018 DTaP/Tdap/Td series (2 - Td) 6/11/2023 Allergies as of 10/10/2018  Review Complete On: 10/10/2018 By: Philippe Winters MD  
 No Known Allergies Current Immunizations  Reviewed on 11/11/2014 Name Date Influenza High Dose Vaccine PF 9/12/2018, 12/12/2017  9:58 AM, 10/18/2016, 11/18/2015, 11/11/2014 Influenza Vaccine 11/5/2013 Influenza Vaccine Split 11/6/2012, 10/12/2011 Pneumococcal Polysaccharide (PPSV-23) 10/18/2016 Tdap 6/11/2013  5:20 PM  
 ZZZ-RETIRED (DO NOT USE) Pneumococcal Vaccine (Unspecified Type) 10/12/2011 Not reviewed this visit You Were Diagnosed With   
  
 Codes Comments Medicare annual wellness visit, subsequent    -  Primary ICD-10-CM: Z00.00 ICD-9-CM: V70.0 Screening for alcoholism     ICD-10-CM: Z13.39 
ICD-9-CM: V79.1 Screening for depression     ICD-10-CM: Z13.31 
ICD-9-CM: V79.0   
 CHF NYHA class I (no symptoms from ordinary activities), chronic, systolic (HCC)     MVY-48-UH: I50.22 ICD-9-CM: 428.22, 428.0 Essential hypertension     ICD-10-CM: I10 
ICD-9-CM: 401.9 Mixed hyperlipidemia     ICD-10-CM: E78.2 ICD-9-CM: 272.2 Viral cardiomyopathy ([address]     ICD-10-CM: B33.24 ICD-9-CM: 425.4 Colon cancer screening     ICD-10-CM: Z12.11 ICD-9-CM: V76.51   
 Encounter for long-term (current) use of medications     ICD-10-CM: Z79.899 ICD-9-CM: V58.69 Vitals BP Pulse Temp Resp Height(growth percentile) Weight(growth percentile) 111/80 (BP 1 Location: Right arm, BP Patient Position: Sitting) (!) 55 96.7 °F (35.9 °C) (Oral) 16 6' 2\" (1.88 m) 218 lb 9.6 oz (99.2 kg) SpO2 BMI Smoking Status 96% 28.07 kg/m2 Former Smoker Vitals History BMI and BSA Data Body Mass Index Body Surface Area 28.07 kg/m 2 2.28 m 2 Preferred Pharmacy Pharmacy Name Phone [facility] [address] [facility], [address] [place] [address] [phone] Your Updated Medication List  
  
   
This list is accurate as of 10/10/18 11:38 AM.  Always use your most recent med list.  
  
  
  
  
 aspirin delayed-release 81 mg tablet Take 81 mg by mouth daily. bumetanide 2 mg tablet Commonly known as:  Santos How TAKE ONE TABLET BY MOUTH TWICE A DAY  
  
 carvedilol 12.5 mg tablet Commonly known as:  COREG  
TAKE ONE TABLET BY MOUTH TWICE A DAY  
  
 ELIQUIS 2.5 mg tablet Generic drug:  apixaban TAKE ONE TABLET BY MOUTH TWICE A DAY  
  
 gabapentin 300 mg capsule Commonly known as:  NEURONTIN  
TAKE ONE CAPSULE BY MOUTH ONCE NIGHTLY  
  
 isosorbide dinitrate 5 mg tablet Commonly known as:  ISORDIL  
TAKE ONE TABLET BY MOUTH TWICE A DAY  
  
 lisinopril 5 mg tablet Commonly known as:  PRINIVIL, ZESTRIL  
TAKE ONE TABLET BY MOUTH DAILY  
  
 rosuvastatin 20 mg tablet Commonly known as:  CRESTOR  
TAKE ONE TABLET BY MOUTH ONCE NIGHTLY  
  
 testosterone 12.5 mg/ 1.25 gram (1 %) Glpm  
APPLY 3 PUMPS TO THE SKIN DAILY  
  
 VITAMIN D3 1,000 unit Cap Generic drug:  cholecalciferol Take 1 Cap by mouth daily. We Performed the Following CBC W/O DIFF [43709 CPT(R)] [address] [[id number] [facility]] HEMOGLOBIN A1C WITH EAG [40543 CPT(R)] LIPID PANEL [05812 CPT(R)] METABOLIC PANEL, COMPREHENSIVE [33928 CPT(R)] OCCULT BLOOD IMMUNOASSAY,DIAGNOSTIC [17331 CPT(R)] [place] ANNUAL ALCOHOL SCREEN 15 MIN [id number] [facility]] TSH 3RD GENERATION [66348 CPT(R)] Follow-up Instructions Return in about 3 months (around 1/10/2019), or if symptoms worsen or fail to improve. Patient Instructions Medicare Wellness Visit, Male The best way to live healthy is to have a lifestyle where you eat a well-balanced diet, exercise regularly, limit alcohol use, and quit all forms of tobacco/nicotine, if applicable. Regular preventive services are another way to keep healthy. Preventive services (vaccines, screening tests, monitoring & exams) can help personalize your care plan, which helps you manage your own care. Screening tests can find health problems at the earliest stages, when they are easiest to treat. [address] [facility] follows the current, evidence-based guidelines published by the [facility] Jason Villarreal ([facility]) when recommending preventive services for our patients. Because we follow these guidelines, sometimes recommendations change over time as research supports it. (For example, a prostate screening blood test is no longer routinely recommended for men with no symptoms.) Of course, you and your doctor may decide to screen more often for some diseases, based on your risk and co-morbidities (chronic disease you are already diagnosed with). Preventive services for you include: - Medicare offers their members a free annual wellness visit, which is time for you and your primary care provider to discuss and plan for your preventive service needs. Take advantage of this benefit every year! 
-All adults over age 72 should receive the recommended pneumonia vaccines. Current USPSTF guidelines recommend a series of two vaccines for the best pneumonia protection.  
-All adults should have a flu vaccine yearly and an ECG.  All adults age 61 and older should receive a shingles vaccine once in their lifetime.   
-All adults age 38-68 who are overweight should have a diabetes screening test once every three years.  
-Other screening tests & preventive services for persons with diabetes include: an eye exam to screen for diabetic retinopathy, a kidney function test, a foot exam, and stricter control over your cholesterol.  
-Cardiovascular screening for adults with routine risk involves an electrocardiogram (ECG) at intervals determined by the provider.  
-Colorectal cancer screening should be done for adults age 54-65 with no increased risk factors for colorectal cancer. There are a number of acceptable methods of screening for this type of cancer. Each test has its own benefits and drawbacks. Discuss with your provider what is most appropriate for you during your annual wellness visit. The different tests include: colonoscopy (considered the best screening method), a fecal occult blood test, a fecal DNA test, and sigmoidoscopy. 
-All adults born between [facility] should be screened once for Hepatitis C. 
-An Abdominal Aortic Aneurysm (AAA) Screening is recommended for men age 73-68 who has ever smoked in their lifetime. Here is a list of your current Health Maintenance items (your personalized list of preventive services) with a due date: 
Health Maintenance Due Topic Date Due  Shingles Vaccine (1 of 2) 09/01/1996  Glaucoma Screening   09/01/2011  Stool testing for trace blood  03/11/2017 [email or web address] Annual Well Visit  04/25/2018 Introducing [facility]! [employer] introduces [employer] patient portal. Now you can access parts of your medical record, email your doctor's office, and request medication refills online. 1. In your internet browser, go to [email or web address] 2. Click on the First Time User? Click Here link in the Sign In box. You will see the New Member Sign Up page. 3. Enter your [facility] Access Code exactly as it appears below. You will not need to use this code after youve completed the sign-up process. If you do not sign up before the expiration date, you must request a new code. · [facility] Access Code: [email or web address] Expires: 1/8/2019 11:38 AM 
 
4. Enter the last four digits of your Social Security Number (xxxx) and Date of Birth (mm/dd/yyyy) as indicated and click Submit. You will be taken to the next sign-up page. 5. Create a [facility] ID. This will be your [facility] login ID and cannot be changed, so think of one that is secure and easy to remember. 6. Create a [facility] password. You can change your password at any time. 7. Enter your Password Reset Question and Answer. This can be used at a later time if you forget your password. 8. Enter your e-mail address. You will receive e-mail notification when new information is available in [address]. 9. Click Sign Up. You can now view and download portions of your medical record. 10. Click the Download Summary menu link to download a portable copy of your medical information. If you have questions, please visit the Frequently Asked Questions section of the [employer] [employer] website. Remember, [employer] is NOT to be used for urgent needs. For medical emergencies, dial 911. Now available from your iPhone and Android! Please provide this summary of care documentation to your next provider. Your primary care clinician is listed as Madeline Cedillo. If you have any questions after today's visit, please call [phone].

## 2018-10-11 ENCOUNTER — HOSPITAL ENCOUNTER (OUTPATIENT)
Dept: LAB | Age: 73
Discharge: HOME | End: 2018-10-11
Payer: MEDICARE

## 2018-10-11 PROCEDURE — 82270 OCCULT BLOOD FECES: CPT

## 2018-10-11 PROCEDURE — 80061 LIPID PANEL: CPT

## 2018-10-11 PROCEDURE — 85027 COMPLETE CBC AUTOMATED: CPT

## 2018-10-11 PROCEDURE — 36415 COLL VENOUS BLD VENIPUNCTURE: CPT

## 2018-10-11 PROCEDURE — 80053 COMPREHEN METABOLIC PANEL: CPT

## 2018-10-11 PROCEDURE — 84443 ASSAY THYROID STIM HORMONE: CPT

## 2018-10-11 PROCEDURE — 83036 HEMOGLOBIN GLYCOSYLATED A1C: CPT

## 2018-10-12 LAB
ALBUMIN SERPL-MCNC: 4.8 G/DL (ref 3.5–4.8)
ALBUMIN/GLOB SERPL: 2.3 {RATIO} (ref 1.2–2.2)
ALP SERPL-CCNC: 36 IU/L (ref 39–117)
ALT SERPL-CCNC: 25 IU/L (ref 0–44)
AST SERPL-CCNC: 22 IU/L (ref 0–40)
BILIRUB SERPL-MCNC: 0.8 MG/DL (ref 0–1.2)
BUN SERPL-MCNC: 18 MG/DL (ref 8–27)
BUN/CREAT SERPL: 12 (ref 10–24)
CALCIUM SERPL-MCNC: 9.5 MG/DL (ref 8.6–10.2)
CHLORIDE SERPL-SCNC: 96 MMOL/L (ref 96–106)
CHOLEST SERPL-MCNC: 153 MG/DL (ref 100–199)
CO2 SERPL-SCNC: 30 MMOL/L (ref 20–29)
CREAT SERPL-MCNC: 1.49 MG/DL (ref 0.76–1.27)
ERYTHROCYTE [DISTWIDTH] IN BLOOD BY AUTOMATED COUNT: 14.7 % (ref 12.3–15.4)
EST. AVERAGE GLUCOSE BLD GHB EST-MCNC: 131 MG/DL
GLOBULIN SER CALC-MCNC: 2.1 G/DL (ref 1.5–4.5)
GLUCOSE SERPL-MCNC: 116 MG/DL (ref 65–99)
HBA1C MFR BLD: 6.2 % (ref 4.8–5.6)
HCT VFR BLD AUTO: 47.6 % (ref 37.5–51)
HDLC SERPL-MCNC: 32 MG/DL
HGB BLD-MCNC: 16.2 G/DL (ref 13–17.7)
INTERPRETATION: NORMAL
LDLC SERPL CALC-MCNC: 101 MG/DL (ref 0–99)
MCH RBC QN AUTO: 32.5 PG (ref 26.6–33)
MCHC RBC AUTO-ENTMCNC: 34 G/DL (ref 31.5–35.7)
MCV RBC AUTO: 96 FL (ref 79–97)
PLATELET # BLD AUTO: 137 X10E3/UL (ref 150–379)
POTASSIUM SERPL-SCNC: 4 MMOL/L (ref 3.5–5.2)
PROT SERPL-MCNC: 6.9 G/DL (ref 6–8.5)
RBC # BLD AUTO: 4.98 X10E6/UL (ref 4.14–5.8)
SODIUM SERPL-SCNC: 143 MMOL/L (ref 134–144)
TRIGL SERPL-MCNC: 102 MG/DL (ref 0–149)
TSH SERPL DL<=0.005 MIU/L-ACNC: 0.91 UIU/ML (ref 0.45–4.5)
VLDLC SERPL CALC-MCNC: 20 MG/DL (ref 5–40)
WBC # BLD AUTO: 7.6 X10E3/UL (ref 3.4–10.8)

## 2018-10-16 LAB — HEMOCCULT STL QL IA: NEGATIVE

## 2018-12-17 RX ORDER — BUMETANIDE 2 MG/1
TABLET ORAL
Qty: 60 TAB | Refills: 3 | Status: SHIPPED | OUTPATIENT
Start: 2018-12-17 | End: 2019-05-22 | Stop reason: SDUPTHER

## 2019-01-11 ENCOUNTER — HOSPITAL ENCOUNTER (OUTPATIENT)
Dept: LAB | Age: 74
Discharge: HOME | End: 2019-01-11
Payer: MEDICARE

## 2019-01-11 ENCOUNTER — OFFICE VISIT (OUTPATIENT)
Dept: FAMILY MEDICINE CLINIC | Age: 74
End: 2019-01-11

## 2019-01-11 VITALS
WEIGHT: 220 LBS | OXYGEN SATURATION: 95 % | BODY MASS INDEX: 28.23 KG/M2 | DIASTOLIC BLOOD PRESSURE: 56 MMHG | TEMPERATURE: 95.9 F | HEIGHT: 74 IN | SYSTOLIC BLOOD PRESSURE: 129 MMHG | HEART RATE: 54 BPM | RESPIRATION RATE: 18 BRPM

## 2019-01-11 DIAGNOSIS — N18.30 STAGE 3 CHRONIC KIDNEY DISEASE (HCC): ICD-10-CM

## 2019-01-11 DIAGNOSIS — B33.24 VIRAL CARDIOMYOPATHY (HCC): Primary | ICD-10-CM

## 2019-01-11 DIAGNOSIS — E78.2 MIXED HYPERLIPIDEMIA: ICD-10-CM

## 2019-01-11 DIAGNOSIS — I10 ESSENTIAL HYPERTENSION: ICD-10-CM

## 2019-01-11 DIAGNOSIS — I50.22 CHF NYHA CLASS I (NO SYMPTOMS FROM ORDINARY ACTIVITIES), CHRONIC, SYSTOLIC (HCC): ICD-10-CM

## 2019-01-11 PROCEDURE — 80048 BASIC METABOLIC PNL TOTAL CA: CPT

## 2019-01-11 PROCEDURE — 36415 COLL VENOUS BLD VENIPUNCTURE: CPT

## 2019-01-11 NOTE — PROGRESS NOTES
Subjective:     Chief Complaint   Patient presents with    Cardiomyopathy     6 month follow-up      He  is a 68 y.o. male who presents for evaluation of:    Doing well today. Last echo in 3/2018 showed EF of 25% and Pulm HTN improved. 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 Jake Rowan 3/16/16 and cardiac catheterization without occlusive CAD on 3/19/2016. Still on B bl, ACEI, diuretic, imdur. Stopped wearing Lifevest and unwilling to restart this. Repeat echo showed EF 20-25%, so planned to have an AICD implant. Saw Dr. Jake Rowan on 7/19/16 for eval for this but pt was not ready to have this done so decided to have another eval with Dr. Robert Khoruy - Echo with slight improvement and repeating serially. Home BPs running 100-120s/50-70s. Feels well with no sx. Denies PND and orthopnea. Pt ct to decline AICD for primary prevention though he meets criteria. After Dr. Rickie Proctor [facility], patient has started seeing Dr. Daysi Ashley and is happy to continue monitoring ejection fraction.     Hyperlipidemia & HTN  Pt is doing well on current meds with no medication side effects noted  No TIA's, no chest pain on exertion, no dyspnea on exertion, no swelling of ankles.      Lab Results   Component Value Date/Time    Cholesterol, total 153 10/11/2018 09:29 AM    HDL Cholesterol 32 (L) 10/11/2018 09:29 AM    LDL, calculated 101 (H) 10/11/2018 09:29 AM    Triglyceride 102 10/11/2018 09:29 AM     ROS  Gen - no fever/chills  Resp - per HPI  CV - per HPI   - + ED and hypogonadism - doing well on testosterone  Rest per HPI     Objective:     Vitals:    01/11/19 1005   BP: 129/56   Pulse: (!) 54   Resp: 18   Temp: 95.9 °F (35.5 °C)   TempSrc: Oral   SpO2: 95%   Weight: 220 lb (99.8 kg)   Height: 6' 2\" (1.88 m)     Physical Examination:  General appearance - alert, well appearing, and in no distress  Eyes -sclera anicteric  Neck - supple, no significant adenopathy, no thyromegaly, no bruits, no JVD even with hepatojugular reflex  Chest - clear to auscultation, no wheezes, rales or rhonchi, symmetric air entry  Heart - normal rate with irregular rhythm, normal S1, S2, no murmurs, rubs, clicks or gallops  Neurological - alert, oriented, no focal findings or movement disorder noted  Extr - no edema     Past Medical History:   Diagnosis Date    Arrhythmia 11/11/2014    ED (erectile dysfunction) of organic origin     High cholesterol 3/31/2010    HTN (hypertension) 3/31/2010    Prediabetes 11/11/2014    Pulmonary hypertension ([facility] 8/8/2017    S/P ablation of atrial flutter 1/03/1916    Systolic CHF, acute ([facility] 3/22/2016     Past Surgical History:   Procedure Laterality Date    HX ORTHOPAEDIC      fx wrist    HX TONSILLECTOMY       Current Outpatient Medications on File Prior to Visit   Medication Sig Dispense Refill    ELIQUIS 2.5 mg tablet TAKE ONE TABLET BY MOUTH TWICE A DAY 60 Tab 5    lisinopril (PRINIVIL, ZESTRIL) 5 mg tablet TAKE ONE TABLET BY MOUTH DAILY 90 Tab 1    gabapentin (NEURONTIN) 300 mg capsule TAKE ONE CAPSULE BY MOUTH ONCE NIGHTLY 90 Cap 1    bumetanide (BUMEX) 2 mg tablet TAKE ONE TABLET BY MOUTH TWICE A DAY (Patient taking differently: TAKE ONE TABLET BY MOUTH in morning and 1/2 at bedtime) 60 Tab 3    carvedilol (COREG) 12.5 mg tablet TAKE ONE TABLET BY MOUTH TWICE A  Tab 1    isosorbide dinitrate (ISORDIL) 5 mg tablet TAKE ONE TABLET BY MOUTH TWICE A  Tab 1    rosuvastatin (CRESTOR) 20 mg tablet TAKE ONE TABLET BY MOUTH ONCE NIGHTLY 30 Tab 5    testosterone 12.5 mg/ 1.25 gram (1 %) glpm APPLY 3 PUMPS TO THE SKIN DAILY 225 g 3    Cholecalciferol, Vitamin D3, (VITAMIN D3) 1,000 unit Cap Take 1 Cap by mouth daily.  aspirin delayed-release 81 mg tablet Take 81 mg by mouth daily. No current facility-administered medications on file prior to visit.         Assessment/ Plan:   Diagnoses and all orders for this visit: 1. Viral cardiomyopathy ([facility]- doing remarkably well with no sx    2. CHF NYHA class I (no symptoms from ordinary activities), chronic, systolic ([facility]- stable, discussed considering Entresto, following with Cardiology. Tolerating lisinopril 5 mg daily    3. Essential hypertension-well controlled  -     METABOLIC PANEL, BASIC    4. Mixed hyperlipidemia-stable    5. Stage 3 chronic kidney disease (HCC) - GFR down slightly so rechecking labs  -     METABOLIC PANEL, BASIC    I have discussed the diagnosis with the patient and the intended plan as seen in the above orders. The patient has received an after-visit summary and questions were answered concerning future plans. I have discussed medication side effects and warnings with the patient as well. The patient verbalizes understanding and agreement with the plan. Follow-up Disposition:  Return in about 3 months (around 4/11/2019), or if symptoms worsen or fail to improve.

## 2019-01-11 NOTE — PROGRESS NOTES
Chief Complaint   Patient presents with    Cardiomyopathy     6 month follow-up   1. Have you been to the ER, urgent care clinic since your last visit? Hospitalized since your last visit? No    2. Have you seen or consulted any other health care providers outside of the [address] [place] [address] since your last visit? Include any pap smears or colon screening.  Yes Pulmonologist  Has ultrasound schedule of carotid 2/5/19

## 2019-01-12 LAB
BUN SERPL-MCNC: 18 MG/DL (ref 8–27)
BUN/CREAT SERPL: 14 (ref 10–24)
CALCIUM SERPL-MCNC: 9.3 MG/DL (ref 8.6–10.2)
CHLORIDE SERPL-SCNC: 95 MMOL/L (ref 96–106)
CO2 SERPL-SCNC: 31 MMOL/L (ref 20–29)
CREAT SERPL-MCNC: 1.26 MG/DL (ref 0.76–1.27)
GLUCOSE SERPL-MCNC: 104 MG/DL (ref 65–99)
INTERPRETATION: NORMAL
POTASSIUM SERPL-SCNC: 3.6 MMOL/L (ref 3.5–5.2)
SODIUM SERPL-SCNC: 142 MMOL/L (ref 134–144)

## 2019-02-20 DIAGNOSIS — E29.1 HYPOGONADISM IN MALE: ICD-10-CM

## 2019-02-20 DIAGNOSIS — N52.9 ED (ERECTILE DYSFUNCTION) OF ORGANIC ORIGIN: ICD-10-CM

## 2019-02-21 RX ORDER — TESTOSTERONE 10 MG/G
GEL TOPICAL
Qty: 225 G | Refills: 2 | Status: SHIPPED | OUTPATIENT
Start: 2019-02-21 | End: 2019-04-23 | Stop reason: SDUPTHER

## 2019-02-22 ENCOUNTER — TELEPHONE (OUTPATIENT)
Dept: FAMILY MEDICINE CLINIC | Age: 74
End: 2019-02-22

## 2019-04-11 DIAGNOSIS — I10 ESSENTIAL HYPERTENSION: ICD-10-CM

## 2019-04-15 RX ORDER — ISOSORBIDE DINITRATE 5 MG/1
TABLET ORAL
Qty: 180 TAB | Refills: 0 | Status: SHIPPED | OUTPATIENT
Start: 2019-04-15 | End: 2019-07-19 | Stop reason: SDUPTHER

## 2019-04-15 RX ORDER — CARVEDILOL 12.5 MG/1
TABLET ORAL
Qty: 180 TAB | Refills: 0 | Status: SHIPPED | OUTPATIENT
Start: 2019-04-15 | End: 2019-07-19 | Stop reason: SDUPTHER

## 2019-04-16 ENCOUNTER — HOSPITAL ENCOUNTER (OUTPATIENT)
Dept: LAB | Age: 74
Discharge: HOME | End: 2019-04-16
Payer: MEDICARE

## 2019-04-16 ENCOUNTER — OFFICE VISIT (OUTPATIENT)
Dept: FAMILY MEDICINE CLINIC | Age: 74
End: 2019-04-16

## 2019-04-16 VITALS
DIASTOLIC BLOOD PRESSURE: 77 MMHG | RESPIRATION RATE: 16 BRPM | OXYGEN SATURATION: 99 % | SYSTOLIC BLOOD PRESSURE: 129 MMHG | HEART RATE: 47 BPM | HEIGHT: 74 IN | TEMPERATURE: 96.6 F | WEIGHT: 211.8 LBS | BODY MASS INDEX: 27.18 KG/M2

## 2019-04-16 DIAGNOSIS — Z79.899 ENCOUNTER FOR LONG-TERM (CURRENT) USE OF MEDICATIONS: ICD-10-CM

## 2019-04-16 DIAGNOSIS — Z23 ENCOUNTER FOR IMMUNIZATION: ICD-10-CM

## 2019-04-16 DIAGNOSIS — B33.24 VIRAL CARDIOMYOPATHY (HCC): ICD-10-CM

## 2019-04-16 DIAGNOSIS — N18.30 STAGE 3 CHRONIC KIDNEY DISEASE (HCC): ICD-10-CM

## 2019-04-16 DIAGNOSIS — Z79.890 ENCOUNTER FOR MONITORING TESTOSTERONE REPLACEMENT THERAPY: ICD-10-CM

## 2019-04-16 DIAGNOSIS — E29.1 HYPOGONADISM IN MALE: ICD-10-CM

## 2019-04-16 DIAGNOSIS — I10 ESSENTIAL HYPERTENSION: Primary | ICD-10-CM

## 2019-04-16 DIAGNOSIS — N52.9 ED (ERECTILE DYSFUNCTION) OF ORGANIC ORIGIN: ICD-10-CM

## 2019-04-16 DIAGNOSIS — I50.22 CHF NYHA CLASS I (NO SYMPTOMS FROM ORDINARY ACTIVITIES), CHRONIC, SYSTOLIC (HCC): ICD-10-CM

## 2019-04-16 DIAGNOSIS — Z51.81 ENCOUNTER FOR MONITORING TESTOSTERONE REPLACEMENT THERAPY: ICD-10-CM

## 2019-04-16 DIAGNOSIS — E78.2 MIXED HYPERLIPIDEMIA: ICD-10-CM

## 2019-04-16 PROCEDURE — 36415 COLL VENOUS BLD VENIPUNCTURE: CPT

## 2019-04-16 PROCEDURE — 84403 ASSAY OF TOTAL TESTOSTERONE: CPT

## 2019-04-16 PROCEDURE — 84153 ASSAY OF PSA TOTAL: CPT

## 2019-04-16 PROCEDURE — 80053 COMPREHEN METABOLIC PANEL: CPT

## 2019-04-16 PROCEDURE — 85027 COMPLETE CBC AUTOMATED: CPT

## 2019-04-16 PROCEDURE — 80061 LIPID PANEL: CPT

## 2019-04-16 NOTE — PROGRESS NOTES
Chief Complaint   Patient presents with    Hypertension     6 month follow-up   1. Have you been to the ER, urgent care clinic since your last visit? Hospitalized since your last visit? No    2. Have you seen or consulted any other health care providers outside of the [address] [place] [address] since your last visit? Include any pap smears or colon screening. Yes Where: Cardiology     He denies any symptoms , reactions or allergies that would exclude them from being immunized today. Risks and adverse reactions were discussed and the VIS was given to them. All questions were addressed. He was observed for 15 min post injection. There were no reactions observed.     Calvin Bravo LPN

## 2019-04-16 NOTE — PROGRESS NOTES
Subjective:     Chief Complaint   Patient presents with    Hypertension     6 month follow-up      He  is a 68 y.o. male who presents for evaluation of:    Doing well today. Last echo in 3/2018 showed EF of 25% and Pulm HTN improved. Will see Dr. 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 Harding Parents 3/16/16 and cardiac catheterization without occlusive CAD on 3/19/2016. Still on B bl, ACEI, diuretic, imdur. Stopped wearing Lifevest and unwilling to restart this. Repeat echo showed EF 20-25%, so planned to have an AICD implant. Saw Dr. Meaghan Jessica on 7/19/16 for eval for this but pt was not ready to have this done so decided to have another eval with Dr. Bill Price - Echo with slight improvement and repeating serially. Home BPs running 100-120s/50-70s. Feels well with no sx. Denies PND and orthopnea. Pt ct to decline AICD for primary prevention though he meets criteria. After Dr. Ebonie Merino senior care, patient has started seeing Dr. Promise Toledo and is happy to continue monitoring ejection fraction.     Hyperlipidemia & HTN  Pt is doing well on current meds with no medication side effects noted  No TIA's, no chest pain on exertion, no dyspnea on exertion, no swelling of ankles. Lab Results   Component Value Date/Time    Cholesterol, total 153 10/11/2018 09:29 AM    HDL Cholesterol 32 (L) 10/11/2018 09:29 AM    LDL, calculated 101 (H) 10/11/2018 09:29 AM    Triglyceride 102 10/11/2018 09:29 AM     ROS  Gen - no fever/chills  Resp - per HPI  CV - per HPI   - + ED and hypogonadism - doing well on testosterone. No levels checked in a while.   Rest per HPI     Objective:     Vitals:    04/16/19 0957   BP: 129/77   Pulse: (!) 47   Resp: 16   Temp: 96.6 °F (35.9 °C)   TempSrc: Oral   SpO2: 99%   Weight: 211 lb 12.8 oz (96.1 kg)   Height: 6' 2\" (1.88 m)     Physical Examination:  General appearance - alert, well appearing, and in no distress  Eyes -sclera anicteric  Neck - supple, no significant adenopathy, no thyromegaly, no bruits, no JVD even with hepatojugular reflex  Chest - clear to auscultation, no wheezes, rales or rhonchi, symmetric air entry  Heart - normal rate with irregular rhythm, normal S1, S2, no murmurs, rubs, clicks or gallops  Neurological - alert, oriented, no focal findings or movement disorder noted  Extr - no edema     Past Medical History:   Diagnosis Date    Arrhythmia 11/11/2014    ED (erectile dysfunction) of organic origin     High cholesterol 3/31/2010    HTN (hypertension) 3/31/2010    Prediabetes 11/11/2014    Pulmonary hypertension ([facility] [address] 8/8/2017    S/P ablation of atrial flutter 1/55/5241    [facility] 3/22/2016     Past Surgical History:   Procedure Laterality Date    HX ORTHOPAEDIC      fx wrist    HX TONSILLECTOMY       Current Outpatient Medications on File Prior to Visit   Medication Sig Dispense Refill    isosorbide dinitrate (ISORDIL) 5 mg tablet TAKE ONE TABLET BY MOUTH TWICE A  Tab 0    carvedilol (COREG) 12.5 mg tablet TAKE ONE TABLET BY MOUTH TWICE A  Tab 0    testosterone 12.5 mg/ 1.25 gram (1 %) glpm APPLY 3 PUMPS TO SHOULDERS AND UPPER ARMS ONCE DAILY IN THE MORNING 225 g 2    ELIQUIS 2.5 mg tablet TAKE ONE TABLET BY MOUTH TWICE A DAY 60 Tab 5    lisinopril (PRINIVIL, ZESTRIL) 5 mg tablet TAKE ONE TABLET BY MOUTH DAILY 90 Tab 1    gabapentin (NEURONTIN) 300 mg capsule TAKE ONE CAPSULE BY MOUTH ONCE NIGHTLY 90 Cap 1    bumetanide (BUMEX) 2 mg tablet TAKE ONE TABLET BY MOUTH TWICE A DAY (Patient taking differently: TAKE ONE TABLET BY MOUTH in morning and 1 at bedtime) 60 Tab 3    rosuvastatin (CRESTOR) 20 mg tablet TAKE ONE TABLET BY MOUTH ONCE NIGHTLY 30 Tab 5    Cholecalciferol, Vitamin D3, (VITAMIN D3) 1,000 unit Cap Take 1 Cap by mouth daily.  aspirin delayed-release 81 mg tablet Take 81 mg by mouth daily.        No current facility-administered medications on file prior to visit. Assessment/ Plan:   Diagnoses and all orders for this visit:    1. Viral cardiomyopathy ([facility] [address]- doing remarkably well with no sx    2. CHF NYHA class I (no symptoms from ordinary activities), chronic, systolic ([facility] [address] [facility]- stable, taking ACE, B bl, ASA, and statin. following with Cardiology.  -     METABOLIC PANEL, COMPREHENSIVE  -     LIPID PANEL    3. Essential hypertension-well controlled  -     METABOLIC PANEL, COMPREHENSIVE    4. Mixed hyperlipidemia-stable  -     METABOLIC PANEL, COMPREHENSIVE  -     LIPID PANEL    5. Encounter for immunization  -     PNEUMOCOCCAL CONJ VACCINE 13 VALENT IM  -     ADMIN INFLUENZA VIRUS VAC  -     ADMIN PNEUMOCOCCAL VACCINE      6. Stage 3 chronic kidney disease (HCC) - stable, rechecking labs  -     TESTOSTERONE, TOTAL, ADULT MALE  -     METABOLIC PANEL, COMPREHENSIVE  -     LIPID PANEL  -     CBC W/O DIFF    7. Hypogonadism in male  8. ED (erectile dysfunction) of organic origin  - on testosterone replacement so rechecking labs and will plan for rectal exam at next appt. -     TESTOSTERONE, TOTAL, ADULT MALE  -     PSA, DIAGNOSTIC (PROSTATE SPECIFIC AG)    9. Encounter for monitoring testosterone replacement therapy  -     TESTOSTERONE, TOTAL, ADULT MALE  -     CBC W/O DIFF  -     PSA, DIAGNOSTIC (PROSTATE SPECIFIC AG)    I have discussed the diagnosis with the patient and the intended plan as seen in the above orders. The patient has received an after-visit summary and questions were answered concerning future plans. I have discussed medication side effects and warnings with the patient as well. The patient verbalizes understanding and agreement with the plan. Follow-up and Dispositions    · Return in about 6 months (around 10/16/2019).

## 2019-04-17 LAB
ALBUMIN SERPL-MCNC: 5 G/DL (ref 3.5–4.8)
ALBUMIN/GLOB SERPL: 2.2 {RATIO} (ref 1.2–2.2)
ALP SERPL-CCNC: 46 IU/L (ref 39–117)
ALT SERPL-CCNC: 17 IU/L (ref 0–44)
AST SERPL-CCNC: 22 IU/L (ref 0–40)
BILIRUB SERPL-MCNC: 1.1 MG/DL (ref 0–1.2)
BUN SERPL-MCNC: 16 MG/DL (ref 8–27)
BUN/CREAT SERPL: 12 (ref 10–24)
CALCIUM SERPL-MCNC: 9.6 MG/DL (ref 8.6–10.2)
CHLORIDE SERPL-SCNC: 99 MMOL/L (ref 96–106)
CHOLEST SERPL-MCNC: 264 MG/DL (ref 100–199)
CO2 SERPL-SCNC: 31 MMOL/L (ref 20–29)
COMMENT, 011824: ABNORMAL
CREAT SERPL-MCNC: 1.36 MG/DL (ref 0.76–1.27)
ERYTHROCYTE [DISTWIDTH] IN BLOOD BY AUTOMATED COUNT: 14.3 % (ref 12.3–15.4)
GLOBULIN SER CALC-MCNC: 2.3 G/DL (ref 1.5–4.5)
GLUCOSE SERPL-MCNC: 112 MG/DL (ref 65–99)
HCT VFR BLD AUTO: 46 % (ref 37.5–51)
HDLC SERPL-MCNC: 40 MG/DL
HGB BLD-MCNC: 16.1 G/DL (ref 13–17.7)
INTERPRETATION: NORMAL
LDLC SERPL CALC-MCNC: 201 MG/DL (ref 0–99)
MCH RBC QN AUTO: 32.6 PG (ref 26.6–33)
MCHC RBC AUTO-ENTMCNC: 35 G/DL (ref 31.5–35.7)
MCV RBC AUTO: 93 FL (ref 79–97)
PLATELET # BLD AUTO: 161 X10E3/UL (ref 150–379)
POTASSIUM SERPL-SCNC: 3.7 MMOL/L (ref 3.5–5.2)
PROT SERPL-MCNC: 7.3 G/DL (ref 6–8.5)
PSA SERPL-MCNC: 0.7 NG/ML (ref 0–4)
RBC # BLD AUTO: 4.94 X10E6/UL (ref 4.14–5.8)
SODIUM SERPL-SCNC: 147 MMOL/L (ref 134–144)
TESTOST SERPL-MCNC: 265 NG/DL (ref 264–916)
TRIGL SERPL-MCNC: 113 MG/DL (ref 0–149)
VLDLC SERPL CALC-MCNC: 23 MG/DL (ref 5–40)
WBC # BLD AUTO: 7.6 X10E3/UL (ref 3.4–10.8)

## 2019-04-22 DIAGNOSIS — E78.2 MIXED HYPERLIPIDEMIA: ICD-10-CM

## 2019-04-23 DIAGNOSIS — E29.1 HYPOGONADISM IN MALE: ICD-10-CM

## 2019-04-23 DIAGNOSIS — N52.9 ED (ERECTILE DYSFUNCTION) OF ORGANIC ORIGIN: ICD-10-CM

## 2019-04-23 RX ORDER — ROSUVASTATIN CALCIUM 20 MG/1
TABLET, COATED ORAL
Qty: 30 TAB | Refills: 4 | Status: SHIPPED | OUTPATIENT
Start: 2019-04-23 | End: 2019-09-25 | Stop reason: SDUPTHER

## 2019-04-23 NOTE — TELEPHONE ENCOUNTER
Patient called, stating he will need a new rx for the testosterone     You had told him at the last visit to use 4 pumps    His rx is for 3 pumps    Maurilio Alejandro    pt's phone [phone]

## 2019-04-24 RX ORDER — TESTOSTERONE 10 MG/G
GEL TOPICAL
Qty: 225 G | Refills: 2 | Status: SHIPPED | OUTPATIENT
Start: 2019-04-24 | End: 2019-08-21 | Stop reason: SDUPTHER

## 2019-05-02 ENCOUNTER — DOCUMENTATION ONLY (OUTPATIENT)
Dept: FAMILY MEDICINE CLINIC | Age: 74
End: 2019-05-02

## 2019-05-22 RX ORDER — BUMETANIDE 2 MG/1
TABLET ORAL
Qty: 60 TAB | Refills: 2 | Status: SHIPPED | OUTPATIENT
Start: 2019-05-22 | End: 2019-09-02 | Stop reason: SDUPTHER

## 2019-06-16 DIAGNOSIS — I50.22 CHF NYHA CLASS I (NO SYMPTOMS FROM ORDINARY ACTIVITIES), CHRONIC, SYSTOLIC (HCC): ICD-10-CM

## 2019-06-16 DIAGNOSIS — G60.9 IDIOPATHIC PERIPHERAL NEUROPATHY: ICD-10-CM

## 2019-06-16 DIAGNOSIS — I10 ESSENTIAL HYPERTENSION: ICD-10-CM

## 2019-06-17 RX ORDER — GABAPENTIN 300 MG/1
CAPSULE ORAL
Qty: 90 CAP | Refills: 0 | Status: SHIPPED | OUTPATIENT
Start: 2019-06-17 | End: 2019-09-16 | Stop reason: SDUPTHER

## 2019-06-17 RX ORDER — LISINOPRIL 5 MG/1
TABLET ORAL
Qty: 90 TAB | Refills: 0 | Status: SHIPPED | OUTPATIENT
Start: 2019-06-17 | End: 2019-09-16 | Stop reason: SDUPTHER

## 2019-07-02 NOTE — TELEPHONE ENCOUNTER
Patient called, requesting a refill for  Eliquis   2.5 mg   1 twice a day    [facility] [address]    Pt's phone  [phone]

## 2019-07-04 RX ORDER — APIXABAN 2.5 MG/1
TABLET, FILM COATED ORAL
Qty: 60 TAB | Refills: 4 | Status: SHIPPED | OUTPATIENT
Start: 2019-07-04 | End: 2019-10-22 | Stop reason: SDUPTHER

## 2019-07-19 DIAGNOSIS — I10 ESSENTIAL HYPERTENSION: ICD-10-CM

## 2019-07-19 RX ORDER — CARVEDILOL 12.5 MG/1
TABLET ORAL
Qty: 180 TAB | Refills: 0 | Status: SHIPPED | OUTPATIENT
Start: 2019-07-19 | End: 2019-11-27 | Stop reason: SDUPTHER

## 2019-07-19 RX ORDER — ISOSORBIDE DINITRATE 5 MG/1
TABLET ORAL
Qty: 180 TAB | Refills: 0 | Status: SHIPPED | OUTPATIENT
Start: 2019-07-19 | End: 2019-11-27 | Stop reason: SDUPTHER

## 2019-08-21 DIAGNOSIS — N52.9 ED (ERECTILE DYSFUNCTION) OF ORGANIC ORIGIN: ICD-10-CM

## 2019-08-21 DIAGNOSIS — E29.1 HYPOGONADISM IN MALE: ICD-10-CM

## 2019-08-22 DIAGNOSIS — N52.9 ED (ERECTILE DYSFUNCTION) OF ORGANIC ORIGIN: ICD-10-CM

## 2019-08-22 DIAGNOSIS — E29.1 HYPOGONADISM IN MALE: ICD-10-CM

## 2019-08-22 RX ORDER — TESTOSTERONE 10 MG/G
GEL TOPICAL
Qty: 225 G | Refills: 2 | Status: SHIPPED | OUTPATIENT
Start: 2019-08-22 | End: 2019-08-22 | Stop reason: SDUPTHER

## 2019-08-22 RX ORDER — TESTOSTERONE 10 MG/G
GEL TOPICAL
Qty: 225 G | Refills: 2 | Status: SHIPPED | OUTPATIENT
Start: 2019-08-22 | End: 2019-12-19 | Stop reason: SDUPTHER

## 2019-08-22 NOTE — TELEPHONE ENCOUNTER
Pt stated that he called [facility] in regards to his RX for testosterone gel 12.5 mg 1.25 gram 1% 3 a day the dr up to 4 a day. No more refills.     Please change dosage 4 pumps a day  [facility] [address] and [employer] contact number [phone]

## 2019-08-23 ENCOUNTER — DOCUMENTATION ONLY (OUTPATIENT)
Dept: FAMILY MEDICINE CLINIC | Age: 74
End: 2019-08-23

## 2019-09-03 RX ORDER — BUMETANIDE 2 MG/1
TABLET ORAL
Qty: 60 TAB | Refills: 1 | Status: SHIPPED | OUTPATIENT
Start: 2019-09-03 | End: 2019-10-29 | Stop reason: SDUPTHER

## 2019-09-25 DIAGNOSIS — E78.2 MIXED HYPERLIPIDEMIA: ICD-10-CM

## 2019-09-25 RX ORDER — ROSUVASTATIN CALCIUM 20 MG/1
TABLET, COATED ORAL
Qty: 90 TAB | Refills: 3 | Status: SHIPPED | OUTPATIENT
Start: 2019-09-25 | End: 2020-08-05 | Stop reason: SDUPTHER

## 2019-09-28 DIAGNOSIS — G60.9 IDIOPATHIC PERIPHERAL NEUROPATHY: ICD-10-CM

## 2019-09-30 RX ORDER — GABAPENTIN 300 MG/1
CAPSULE ORAL
Qty: 90 CAP | Refills: 0 | Status: SHIPPED | OUTPATIENT
Start: 2019-09-30 | End: 2020-04-23 | Stop reason: SDUPTHER

## 2019-10-04 ENCOUNTER — DOCUMENTATION ONLY (OUTPATIENT)
Dept: FAMILY MEDICINE CLINIC | Age: 74
End: 2019-10-04

## 2019-10-22 ENCOUNTER — OFFICE VISIT (OUTPATIENT)
Dept: FAMILY MEDICINE CLINIC | Age: 74
End: 2019-10-22

## 2019-10-22 VITALS
DIASTOLIC BLOOD PRESSURE: 76 MMHG | TEMPERATURE: 96.3 F | HEART RATE: 57 BPM | SYSTOLIC BLOOD PRESSURE: 124 MMHG | WEIGHT: 206 LBS | RESPIRATION RATE: 16 BRPM | BODY MASS INDEX: 26.44 KG/M2 | HEIGHT: 74 IN | OXYGEN SATURATION: 98 %

## 2019-10-22 DIAGNOSIS — Z13.31 SCREENING FOR DEPRESSION: ICD-10-CM

## 2019-10-22 DIAGNOSIS — Z12.11 COLON CANCER SCREENING: ICD-10-CM

## 2019-10-22 DIAGNOSIS — E78.2 MIXED HYPERLIPIDEMIA: ICD-10-CM

## 2019-10-22 DIAGNOSIS — I10 ESSENTIAL HYPERTENSION: ICD-10-CM

## 2019-10-22 DIAGNOSIS — Z79.899 ENCOUNTER FOR LONG-TERM (CURRENT) USE OF MEDICATIONS: ICD-10-CM

## 2019-10-22 DIAGNOSIS — Z51.81 ENCOUNTER FOR MONITORING TESTOSTERONE REPLACEMENT THERAPY: ICD-10-CM

## 2019-10-22 DIAGNOSIS — Z00.00 MEDICARE ANNUAL WELLNESS VISIT, SUBSEQUENT: Primary | ICD-10-CM

## 2019-10-22 DIAGNOSIS — N18.30 STAGE 3 CHRONIC KIDNEY DISEASE (HCC): ICD-10-CM

## 2019-10-22 DIAGNOSIS — I50.22 CHF NYHA CLASS I (NO SYMPTOMS FROM ORDINARY ACTIVITIES), CHRONIC, SYSTOLIC (HCC): ICD-10-CM

## 2019-10-22 DIAGNOSIS — Z71.89 ADVANCED DIRECTIVES, COUNSELING/DISCUSSION: ICD-10-CM

## 2019-10-22 DIAGNOSIS — Z79.890 ENCOUNTER FOR MONITORING TESTOSTERONE REPLACEMENT THERAPY: ICD-10-CM

## 2019-10-22 DIAGNOSIS — Z13.39 SCREENING FOR ALCOHOLISM: ICD-10-CM

## 2019-10-22 NOTE — ACP (ADVANCE CARE PLANNING)
Advance Care Planning    Advance Care Planning (ACP) Provider Conversation Snapshot    Date of ACP Conversation: 10/24/19  Persons included in Conversation:  patient  Length of ACP Conversation in minutes:  <16 minutes (Non-Billable)    Authorized Decision Maker (if patient is incapable of making informed decisions):    This person is:   Healthcare Agent/Medical Power of  under Advance Directive            For Patients with Decision Making Capacity:   Values/Goals: Exploration of values, goals, and preferences if recovery is not expected, even with continued medical treatment in the event of:  Imminent death    Conversation Outcomes / Follow-Up Plan:   Recommended completion of Advance Directive form after review of ACP materials and conversation with prospective healthcare agent

## 2019-10-22 NOTE — PATIENT INSTRUCTIONS
Medicare Wellness Visit, Male The best way to live healthy is to have a lifestyle where you eat a well-balanced diet, exercise regularly, limit alcohol use, and quit all forms of tobacco/nicotine, if applicable. Regular preventive services are another way to keep healthy. Preventive services (vaccines, screening tests, monitoring & exams) can help personalize your care plan, which helps you manage your own care. Screening tests can find health problems at the earliest stages, when they are easiest to treat. [facility] follows the current, evidence-based guidelines published by the [facility] Cherelle ([facility]) when recommending preventive services for our patients. Because we follow these guidelines, sometimes recommendations change over time as research supports it. (For example, a prostate screening blood test is no longer routinely recommended for men with no symptoms.) Of course, you and your doctor may decide to screen more often for some diseases, based on your risk and co-morbidities (chronic disease you are already diagnosed with). Preventive services for you include: - Medicare offers their members a free annual wellness visit, which is time for you and your primary care provider to discuss and plan for your preventive service needs. Take advantage of this benefit every year! 
-All adults over age 72 should receive the recommended pneumonia vaccines. Current USPSTF guidelines recommend a series of two vaccines for the best pneumonia protection.  
-All adults should have a flu vaccine yearly and an ECG.  All adults age 61 and older should receive a shingles vaccine once in their lifetime.   
-All adults age 38-68 who are overweight should have a diabetes screening test once every three years.  
-Other screening tests & preventive services for persons with diabetes include: an eye exam to screen for diabetic retinopathy, a kidney function test, a foot exam, and stricter control over your cholesterol.  
-Cardiovascular screening for adults with routine risk involves an electrocardiogram (ECG) at intervals determined by the provider.  
-Colorectal cancer screening should be done for adults age 54-65 with no increased risk factors for colorectal cancer. There are a number of acceptable methods of screening for this type of cancer. Each test has its own benefits and drawbacks. Discuss with your provider what is most appropriate for you during your annual wellness visit. The different tests include: colonoscopy (considered the best screening method), a fecal occult blood test, a fecal DNA test, and sigmoidoscopy. 
-All adults born between [facility] should be screened once for Hepatitis C. 
-An Abdominal Aortic Aneurysm (AAA) Screening is recommended for men age 73-68 who has ever smoked in their lifetime. Here is a list of your current Health Maintenance items (your personalized list of preventive services) with a due date: 
Health Maintenance Due Topic Date Due  Shingles Vaccine (1 of 2) 09/01/1996  Glaucoma Screening   09/01/2011  Stool testing for trace blood  10/11/2019 [address] [place] [address] Annual Well Visit  10/11/2019

## 2019-10-22 NOTE — PROGRESS NOTES
Subjective:     Chief Complaint   Patient presents with   [facility] Annual Wellness Visit     Medicare      He  is a 69 y.o. male who presents for evaluation of:    Doing well today. Last echo in 8/2018 and pt reports this was stable/improved. Ct to follow with Dr. 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 Ariane Upton 3/16/16 and cardiac catheterization without occlusive CAD on 3/19/2016. Still on B bl, ACEI, diuretic, imdur. Stopped wearing Lifevest and unwilling to restart this. Repeat echo showed EF 20-25%, so planned to have an AICD implant. Saw Dr. Ariane Upton on 7/19/16 for eval for this but pt was not ready to have this done so decided to have another eval with Dr. Iyer High - Echo with slight improvement and repeating serially. Home BPs running 100-120s/50-70s. Feels well with no sx. Denies PND and orthopnea. Pt ct to decline AICD for primary prevention though he meets criteria. After Dr. Kate Davalos [facility], patient has started seeing Dr. Suad Miller and is happy to continue monitoring ejection fraction.     Hyperlipidemia & HTN  Pt is doing well on current meds with no medication side effects noted  No TIA's, no chest pain on exertion, no dyspnea on exertion, no swelling of ankles. Lab Results   Component Value Date/Time    Cholesterol, total 264 (H) 04/16/2019 11:35 AM    HDL Cholesterol 40 04/16/2019 11:35 AM    LDL, calculated 201 (H) 04/16/2019 11:35 AM    Triglyceride 113 04/16/2019 11:35 AM     ROS  Gen - no fever/chills  Resp - per HPI  CV - per HPI   - + ED and hypogonadism - doing well on testosterone.   Rest per HPI     Objective:     Vitals:    10/22/19 0934   BP: 124/76   Pulse: (!) 57   Resp: 16   Temp: 96.3 °F (35.7 °C)   TempSrc: Oral   SpO2: 98%   Weight: 206 lb (93.4 kg)   Height: 6' 2\" (1.88 m)     Physical Examination:  General appearance - alert, well appearing, and in no distress  Eyes -sclera anicteric  Neck - supple, no significant adenopathy, no thyromegaly, no bruits, no JVD even with hepatojugular reflex  Chest - clear to auscultation, no wheezes, rales or rhonchi, symmetric air entry  Heart - normal rate with irregular rhythm, normal S1, S2, no murmurs, rubs, clicks or gallops  Neurological - alert, oriented, no focal findings or movement disorder noted  Extr - no edema     Past Medical History:   Diagnosis Date    Arrhythmia 11/11/2014    ED (erectile dysfunction) of organic origin     High cholesterol 3/31/2010    HTN (hypertension) 3/31/2010    Prediabetes 11/11/2014    Pulmonary hypertension ([facility] [address] 8/8/2017    S/P ablation of atrial flutter 2/79/4727    Systolic CHF, acute ([facility] [address] 3/22/2016     Past Surgical History:   Procedure Laterality Date    HX ORTHOPAEDIC      fx wrist    HX TONSILLECTOMY       Current Outpatient Medications on File Prior to Visit   Medication Sig Dispense Refill    gabapentin (NEURONTIN) 300 mg capsule TAKE ONE CAPSULE BY MOUTH ONCE NIGHTLY 90 Cap 0    rosuvastatin (CRESTOR) 20 mg tablet TAKE ONE TABLET BY MOUTH ONCE NIGHTLY 90 Tab 3    lisinopril (PRINIVIL, ZESTRIL) 5 mg tablet TAKE ONE TABLET BY MOUTH DAILY 90 Tab 1    bumetanide (BUMEX) 2 mg tablet TAKE ONE TABLET BY MOUTH TWICE A DAY 60 Tab 1    testosterone 12.5 mg/ 1.25 gram (1 %) glpm APPLY 4 PUMPS TO SHOULDERS AND UPPER ARMS ONCE DAILY IN THE MORNING 225 g 2    carvedilol (COREG) 12.5 mg tablet TAKE ONE TABLET BY MOUTH TWICE A  Tab 0    isosorbide dinitrate (ISORDIL) 5 mg tablet TAKE ONE TABLET BY MOUTH TWICE A  Tab 0    apixaban (ELIQUIS) 2.5 mg tablet TAKE ONE TABLET BY MOUTH TWICE A DAY 60 Tab 0    Cholecalciferol, Vitamin D3, (VITAMIN D3) 1,000 unit Cap Take 1 Cap by mouth daily.  aspirin delayed-release 81 mg tablet Take 81 mg by mouth daily.       [DISCONTINUED] ELIQUIS 2.5 mg tablet TAKE ONE TABLET BY MOUTH TWICE A DAY 60 Tab 4     No current facility-administered medications on file prior to visit. Assessment/ Plan:   Diagnoses and all orders for this visit:    1. Medicare annual wellness visit, subsequent  2. Advanced directives, counseling/discussion  3. Screening for alcoholism  -     [place] ANNUAL ALCOHOL SCREEN 15 MIN  4. Screening for depression  -     DEPRESSION SCREEN ANNUAL    5. CHF NYHA class I (no symptoms from ordinary activities), chronic, systolic (HCC) - stable, taking ACE, B bl, ASA, and statin. following with Cardiology.  -     HEMOGLOBIN A1C WITH EAG  -     LIPID PANEL  -     METABOLIC PANEL, COMPREHENSIVE  -     TSH 3RD GENERATION    6. Mixed hyperlipidemia - stable, rechecking labs  -     HEMOGLOBIN A1C WITH EAG  -     LIPID PANEL  -     METABOLIC PANEL, COMPREHENSIVE  -     TSH 3RD GENERATION    7. Essential hypertension - controlled  -     METABOLIC PANEL, COMPREHENSIVE  -     URINALYSIS W/ RFLX MICROSCOPIC    8. Stage 3 chronic kidney disease (HCC) - stable  -     METABOLIC PANEL, COMPREHENSIVE    9. Encounter for monitoring testosterone replacement therapy  -     HEMOGLOBIN A1C WITH EAG  -     LIPID PANEL  -     METABOLIC PANEL, COMPREHENSIVE  -     TSH 3RD GENERATION  -     CBC W/O DIFF  -     URINALYSIS W/ RFLX MICROSCOPIC  -     TESTOSTERONE, FREE+TOTAL    10. Encounter for long-term (current) use of medications  -     HEMOGLOBIN A1C WITH EAG  -     LIPID PANEL  -     METABOLIC PANEL, COMPREHENSIVE  -     TSH 3RD GENERATION  -     CBC W/O DIFF  -     URINALYSIS W/ RFLX MICROSCOPIC  -     TESTOSTERONE, FREE+TOTAL    11. Colon cancer screening  -     OCCULT BLOOD IMMUNOASSAY,DIAGNOSTIC    I have discussed the diagnosis with the patient and the intended plan as seen in the above orders. The patient has received an after-visit summary and questions were answered concerning future plans. I have discussed medication side effects and warnings with the patient as well. The patient verbalizes understanding and agreement with the plan.     Follow-up and Dispositions    · Return in about 6 months (around 4/22/2020), or if symptoms worsen or fail to improve. This is the Subsequent Medicare Annual Wellness Exam, performed 12 months or more after the Initial AWV or the last Subsequent AWV    I have reviewed the patient's medical history in detail and updated the computerized patient record. History     Past Medical History:   Diagnosis Date    Arrhythmia 11/11/2014    ED (erectile dysfunction) of organic origin     High cholesterol 3/31/2010    HTN (hypertension) 3/31/2010    Prediabetes 11/11/2014    Pulmonary hypertension ([facility] [address] 8/8/2017    S/P ablation of atrial flutter 5/99/9350    Systolic CHF, acute ([facility] [address] 3/22/2016      Past Surgical History:   Procedure Laterality Date    HX ORTHOPAEDIC      fx wrist    HX TONSILLECTOMY       Current Outpatient Medications   Medication Sig Dispense Refill    gabapentin (NEURONTIN) 300 mg capsule TAKE ONE CAPSULE BY MOUTH ONCE NIGHTLY 90 Cap 0    rosuvastatin (CRESTOR) 20 mg tablet TAKE ONE TABLET BY MOUTH ONCE NIGHTLY 90 Tab 3    lisinopril (PRINIVIL, ZESTRIL) 5 mg tablet TAKE ONE TABLET BY MOUTH DAILY 90 Tab 1    bumetanide (BUMEX) 2 mg tablet TAKE ONE TABLET BY MOUTH TWICE A DAY 60 Tab 1    testosterone 12.5 mg/ 1.25 gram (1 %) glpm APPLY 4 PUMPS TO SHOULDERS AND UPPER ARMS ONCE DAILY IN THE MORNING 225 g 2    carvedilol (COREG) 12.5 mg tablet TAKE ONE TABLET BY MOUTH TWICE A  Tab 0    isosorbide dinitrate (ISORDIL) 5 mg tablet TAKE ONE TABLET BY MOUTH TWICE A  Tab 0    apixaban (ELIQUIS) 2.5 mg tablet TAKE ONE TABLET BY MOUTH TWICE A DAY 60 Tab 0    Cholecalciferol, Vitamin D3, (VITAMIN D3) 1,000 unit Cap Take 1 Cap by mouth daily.  aspirin delayed-release 81 mg tablet Take 81 mg by mouth daily.        No Known Allergies  Family History   Problem Relation Age of Onset    Heart Disease Father     Hypertension Father     Hypertension Mother     Heart Disease Mother      Social History     Tobacco Use  Smoking status: Former Smoker     Last attempt to quit: 1995     Years since quittin.5    Smokeless tobacco: Never Used   Substance Use Topics    Alcohol use: Yes     Alcohol/week: 1.0 standard drinks     Types: 1 Glasses of wine per week     Comment: 1 drink a month     Patient Active Problem List   Diagnosis Code    High cholesterol E78.00    Eczema L30.9    HTN (hypertension) I10    Encounter for long-term (current) use of medications Z79.899    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    Prediabetes R73.03    Arrhythmia I49.9    Cardiomyopathy ([facility] [address] I42.9    CHF NYHA class I (no symptoms from ordinary activities), chronic, systolic (HCC) L81.31    S/P ablation of atrial flutter Z98.890, Z86.79    Advance care planning Z71.89    Pulmonary hypertension ([facility] [address] I27.20       Depression Risk Factor Screening:     3 most recent PHQ Screens 10/22/2019   Little interest or pleasure in doing things Not at all   Feeling down, depressed, irritable, or hopeless Not at all   Total Score PHQ 2 0     Alcohol Risk Factor Screening: You do not drink alcohol or very rarely. Functional Ability and Level of Safety:   Hearing Loss  Hearing is good. Activities of Daily Living  The home contains: no safety equipment. Patient does total self care    Fall Risk  Fall Risk Assessment, last 12 mths 10/22/2019   Able to walk? Yes   Fall in past 12 months? No       Abuse Screen  Patient is not abused    Cognitive Screening   Evaluation of Cognitive Function:  Has your family/caregiver stated any concerns about your memory: no  Normal, Verbal Fluency Test    Patient Care Team   Patient Care Team:  Marcia Britton MD as PCP - General (Family Practice)  Hernan Hernandez MD (Cardiology)  Uriel Schuster MD (Pulmonary Disease)    Assessment/Plan   Education and counseling provided:  Are appropriate based on today's review and evaluation    Diagnoses and all orders for this visit:    1.  Medicare annual wellness visit, subsequent    2. Advanced directives, counseling/discussion    3. Screening for alcoholism  -     [place] ANNUAL ALCOHOL SCREEN 15 MIN    4. Screening for depression  -     DEPRESSION SCREEN ANNUAL    5. CHF NYHA class I (no symptoms from ordinary activities), chronic, systolic (HCC)  -     HEMOGLOBIN A1C WITH EAG  -     LIPID PANEL  -     METABOLIC PANEL, COMPREHENSIVE  -     TSH 3RD GENERATION    6. Mixed hyperlipidemia  -     HEMOGLOBIN A1C WITH EAG  -     LIPID PANEL  -     METABOLIC PANEL, COMPREHENSIVE  -     TSH 3RD GENERATION    7. Essential hypertension  -     METABOLIC PANEL, COMPREHENSIVE  -     URINALYSIS W/ RFLX MICROSCOPIC    8. Stage 3 chronic kidney disease (HCC)  -     METABOLIC PANEL, COMPREHENSIVE    9. Encounter for monitoring testosterone replacement therapy  -     HEMOGLOBIN A1C WITH EAG  -     LIPID PANEL  -     METABOLIC PANEL, COMPREHENSIVE  -     TSH 3RD GENERATION  -     CBC W/O DIFF  -     URINALYSIS W/ RFLX MICROSCOPIC  -     TESTOSTERONE, FREE+TOTAL    10. Encounter for long-term (current) use of medications  -     HEMOGLOBIN A1C WITH EAG  -     LIPID PANEL  -     METABOLIC PANEL, COMPREHENSIVE  -     TSH 3RD GENERATION  -     CBC W/O DIFF  -     URINALYSIS W/ RFLX MICROSCOPIC  -     TESTOSTERONE, FREE+TOTAL    11.  Colon cancer screening  -     OCCULT BLOOD IMMUNOASSAY,DIAGNOSTIC        Health Maintenance Due   Topic Date Due    Shingrix Vaccine Age 50> (1 of 2) 09/01/1996    GLAUCOMA SCREENING Q2Y  09/01/2011    FOBT Q 1 YEAR, 18+  10/11/2019    MEDICARE YEARLY EXAM  10/11/2019

## 2019-10-22 NOTE — PROGRESS NOTES
Chief Complaint   Patient presents with   [facility] Annual Wellness Visit     Medicare   1. Have you been to the ER, urgent care clinic since your last visit? Hospitalized since your last visit? No    2. Have you seen or consulted any other health care providers outside of the [address] [place] [address] since your last visit? Include any pap smears or colon screening.  Yes Where: Cardiologist   Discuss   Isordil

## 2019-10-24 ENCOUNTER — HOSPITAL ENCOUNTER (OUTPATIENT)
Dept: LAB | Age: 74
Discharge: HOME | End: 2019-10-24
Payer: MEDICARE

## 2019-10-24 PROCEDURE — 80053 COMPREHEN METABOLIC PANEL: CPT

## 2019-10-24 PROCEDURE — 84403 ASSAY OF TOTAL TESTOSTERONE: CPT

## 2019-10-24 PROCEDURE — 82270 OCCULT BLOOD FECES: CPT

## 2019-10-24 PROCEDURE — 36415 COLL VENOUS BLD VENIPUNCTURE: CPT

## 2019-10-24 PROCEDURE — 85027 COMPLETE CBC AUTOMATED: CPT

## 2019-10-24 PROCEDURE — 80061 LIPID PANEL: CPT

## 2019-10-24 PROCEDURE — 84443 ASSAY THYROID STIM HORMONE: CPT

## 2019-10-24 PROCEDURE — 83036 HEMOGLOBIN GLYCOSYLATED A1C: CPT

## 2019-10-24 PROCEDURE — 81003 URINALYSIS AUTO W/O SCOPE: CPT

## 2019-10-27 LAB
ALBUMIN SERPL-MCNC: 4.8 G/DL (ref 3.5–4.8)
ALBUMIN/GLOB SERPL: 2.2 {RATIO} (ref 1.2–2.2)
ALP SERPL-CCNC: 44 IU/L (ref 39–117)
ALT SERPL-CCNC: 25 IU/L (ref 0–44)
APPEARANCE UR: CLEAR
AST SERPL-CCNC: 30 IU/L (ref 0–40)
BILIRUB SERPL-MCNC: 1.1 MG/DL (ref 0–1.2)
BILIRUB UR QL STRIP: NEGATIVE
BUN SERPL-MCNC: 16 MG/DL (ref 8–27)
BUN/CREAT SERPL: 12 (ref 10–24)
CALCIUM SERPL-MCNC: 9.7 MG/DL (ref 8.6–10.2)
CHLORIDE SERPL-SCNC: 95 MMOL/L (ref 96–106)
CHOLEST SERPL-MCNC: 234 MG/DL (ref 100–199)
CO2 SERPL-SCNC: 32 MMOL/L (ref 20–29)
COLOR UR: YELLOW
CREAT SERPL-MCNC: 1.35 MG/DL (ref 0.76–1.27)
ERYTHROCYTE [DISTWIDTH] IN BLOOD BY AUTOMATED COUNT: 13.1 % (ref 12.3–15.4)
EST. AVERAGE GLUCOSE BLD GHB EST-MCNC: 126 MG/DL
GLOBULIN SER CALC-MCNC: 2.2 G/DL (ref 1.5–4.5)
GLUCOSE SERPL-MCNC: 104 MG/DL (ref 65–99)
GLUCOSE UR QL: NEGATIVE
HBA1C MFR BLD: 6 % (ref 4.8–5.6)
HCT VFR BLD AUTO: 48.3 % (ref 37.5–51)
HDLC SERPL-MCNC: 36 MG/DL
HGB BLD-MCNC: 16.1 G/DL (ref 13–17.7)
HGB UR QL STRIP: NEGATIVE
INTERPRETATION: NORMAL
KETONES UR QL STRIP: NEGATIVE
LDLC SERPL CALC-MCNC: 174 MG/DL (ref 0–99)
LEUKOCYTE ESTERASE UR QL STRIP: NEGATIVE
MCH RBC QN AUTO: 31.7 PG (ref 26.6–33)
MCHC RBC AUTO-ENTMCNC: 33.3 G/DL (ref 31.5–35.7)
MCV RBC AUTO: 95 FL (ref 79–97)
MICRO URNS: NORMAL
NITRITE UR QL STRIP: NEGATIVE
PH UR STRIP: 7.5 [PH] (ref 5–7.5)
PLATELET # BLD AUTO: 108 X10E3/UL (ref 150–450)
POTASSIUM SERPL-SCNC: 4 MMOL/L (ref 3.5–5.2)
PROT SERPL-MCNC: 7 G/DL (ref 6–8.5)
PROT UR QL STRIP: NEGATIVE
RBC # BLD AUTO: 5.08 X10E6/UL (ref 4.14–5.8)
SODIUM SERPL-SCNC: 141 MMOL/L (ref 134–144)
SP GR UR: 1.01 (ref 1–1.03)
TESTOST FREE SERPL-MCNC: 1.7 PG/ML (ref 6.6–18.1)
TESTOST SERPL-MCNC: 157.8 NG/DL (ref 264–916)
TRIGL SERPL-MCNC: 118 MG/DL (ref 0–149)
TSH SERPL DL<=0.005 MIU/L-ACNC: 1.18 UIU/ML (ref 0.45–4.5)
UROBILINOGEN UR STRIP-MCNC: 0.2 MG/DL (ref 0.2–1)
VLDLC SERPL CALC-MCNC: 24 MG/DL (ref 5–40)
WBC # BLD AUTO: 6 X10E3/UL (ref 3.4–10.8)

## 2019-10-30 LAB — HEMOCCULT STL QL IA: NEGATIVE

## 2019-11-26 DIAGNOSIS — I10 ESSENTIAL HYPERTENSION: ICD-10-CM

## 2019-11-26 NOTE — TELEPHONE ENCOUNTER
----- Message from Dick Santana sent at 11/26/2019 11:55 AM EST -----  Regarding: Dr. Elisabet Mendoza refill  Medication Refill    Caller (if not patient):      Relationship of caller (if not patient):      Best contact number(s): [phone]      Name of medication and dosage if known: isosorbide dinitrate (ISORDIL) 5 mg tablet ,  apixaban (ELIQUIS) 2.5 mg tablet , and carvedilol (COREG) 12.5 mg tablet       Is patient out of this medication (yes/no): yes       Pharmacy name: [facility]     Pharmacy listed in chart? (yes/no): yes   Pharmacy phone number:      Details to clarify the request: The pharmacy has been sending request for over a week with no response       Dick Santana

## 2019-11-27 RX ORDER — CARVEDILOL 12.5 MG/1
TABLET ORAL
Qty: 180 TAB | Refills: 1 | Status: SHIPPED | OUTPATIENT
Start: 2019-11-27 | End: 2020-04-23 | Stop reason: SDUPTHER

## 2019-11-27 RX ORDER — ISOSORBIDE DINITRATE 5 MG/1
TABLET ORAL
Qty: 180 TAB | Refills: 1 | Status: SHIPPED | OUTPATIENT
Start: 2019-11-27 | End: 2020-04-23 | Stop reason: SDUPTHER

## 2019-12-19 DIAGNOSIS — E29.1 HYPOGONADISM IN MALE: ICD-10-CM

## 2019-12-19 DIAGNOSIS — N52.9 ED (ERECTILE DYSFUNCTION) OF ORGANIC ORIGIN: ICD-10-CM

## 2019-12-19 RX ORDER — TESTOSTERONE 10 MG/G
GEL TOPICAL
Qty: 225 G | Refills: 2 | Status: SHIPPED | OUTPATIENT
Start: 2019-12-19 | End: 2020-04-23 | Stop reason: SDUPTHER

## 2020-02-19 ENCOUNTER — HOSPITAL ENCOUNTER (OUTPATIENT)
Dept: GENERAL RADIOLOGY | Age: 75
Discharge: HOME | End: 2020-02-19
Payer: MEDICARE

## 2020-02-19 DIAGNOSIS — R06.02 SHORTNESS OF BREATH: ICD-10-CM

## 2020-02-19 PROCEDURE — 71046 X-RAY EXAM CHEST 2 VIEWS: CPT

## 2020-03-16 DIAGNOSIS — I10 ESSENTIAL HYPERTENSION: ICD-10-CM

## 2020-03-16 DIAGNOSIS — I50.22 CHF NYHA CLASS I (NO SYMPTOMS FROM ORDINARY ACTIVITIES), CHRONIC, SYSTOLIC (HCC): ICD-10-CM

## 2020-03-16 RX ORDER — LISINOPRIL 5 MG/1
TABLET ORAL
Qty: 90 TAB | Refills: 0 | Status: SHIPPED | OUTPATIENT
Start: 2020-03-16 | End: 2020-04-23 | Stop reason: SDUPTHER

## 2020-04-23 ENCOUNTER — VIRTUAL VISIT (OUTPATIENT)
Dept: FAMILY MEDICINE CLINIC | Age: 75
End: 2020-04-23

## 2020-04-23 VITALS
HEIGHT: 74 IN | TEMPERATURE: 97.6 F | BODY MASS INDEX: 26.44 KG/M2 | WEIGHT: 206 LBS | SYSTOLIC BLOOD PRESSURE: 100 MMHG | HEART RATE: 68 BPM | DIASTOLIC BLOOD PRESSURE: 61 MMHG

## 2020-04-23 DIAGNOSIS — I10 ESSENTIAL HYPERTENSION: ICD-10-CM

## 2020-04-23 DIAGNOSIS — E29.1 HYPOGONADISM IN MALE: ICD-10-CM

## 2020-04-23 DIAGNOSIS — I50.22 CHF NYHA CLASS I (NO SYMPTOMS FROM ORDINARY ACTIVITIES), CHRONIC, SYSTOLIC (HCC): ICD-10-CM

## 2020-04-23 DIAGNOSIS — G60.9 IDIOPATHIC PERIPHERAL NEUROPATHY: ICD-10-CM

## 2020-04-23 DIAGNOSIS — N52.9 ED (ERECTILE DYSFUNCTION) OF ORGANIC ORIGIN: ICD-10-CM

## 2020-04-23 DIAGNOSIS — I50.22 CHF NYHA CLASS I (NO SYMPTOMS FROM ORDINARY ACTIVITIES), CHRONIC, SYSTOLIC (HCC): Primary | ICD-10-CM

## 2020-04-23 RX ORDER — ALBUTEROL SULFATE 90 UG/1
AEROSOL, METERED RESPIRATORY (INHALATION)
COMMUNITY
Start: 2020-03-29 | End: 2020-08-05 | Stop reason: SINTOL

## 2020-04-23 RX ORDER — APIXABAN 5 MG/1
5 TABLET, FILM COATED ORAL 2 TIMES DAILY
COMMUNITY
Start: 2020-04-09

## 2020-04-23 RX ORDER — BUMETANIDE 2 MG/1
TABLET ORAL
Qty: 60 TAB | Refills: 4 | Status: SHIPPED | OUTPATIENT
Start: 2020-04-23 | End: 2020-08-05 | Stop reason: SDUPTHER

## 2020-04-23 RX ORDER — CARVEDILOL 12.5 MG/1
TABLET ORAL
Qty: 180 TAB | Refills: 1 | Status: SHIPPED | OUTPATIENT
Start: 2020-04-23 | End: 2020-08-05 | Stop reason: SDUPTHER

## 2020-04-23 RX ORDER — LISINOPRIL 5 MG/1
TABLET ORAL
Qty: 90 TAB | Refills: 0 | Status: SHIPPED | OUTPATIENT
Start: 2020-04-23 | End: 2020-08-05 | Stop reason: SDUPTHER

## 2020-04-23 RX ORDER — BUMETANIDE 2 MG/1
TABLET ORAL
Qty: 60 TAB | Refills: 5 | Status: SHIPPED | OUTPATIENT
Start: 2020-04-23 | End: 2020-04-23

## 2020-04-23 RX ORDER — GABAPENTIN 300 MG/1
CAPSULE ORAL
Qty: 90 CAP | Refills: 0 | Status: SHIPPED | OUTPATIENT
Start: 2020-04-23 | End: 2020-08-05 | Stop reason: SDUPTHER

## 2020-04-23 RX ORDER — ISOSORBIDE DINITRATE 5 MG/1
TABLET ORAL
Qty: 180 TAB | Refills: 1 | Status: SHIPPED | OUTPATIENT
Start: 2020-04-23 | End: 2020-08-05 | Stop reason: SDUPTHER

## 2020-04-23 RX ORDER — TESTOSTERONE 10 MG/G
GEL TOPICAL
Qty: 225 G | Refills: 2 | Status: SHIPPED | OUTPATIENT
Start: 2020-04-23 | End: 2020-08-05 | Stop reason: SDUPTHER

## 2020-04-23 NOTE — LETTER
5/20/2020 4:09 PM 
 
Mr. Marek Valles [address] [facility] [id number] Dear Mr. Throckmorton: Please call our office at [phone] and schedule a follow up appointment for 3 months in office for labs with Dr John Bobby for your continued care.  
 
 
 
Sincerely, 
 
 
Ziggy Joseph MD

## 2020-04-23 NOTE — PROGRESS NOTES
Lucian Gee is a 69 y.o. male evaluated via telephone on 4/23/2020. Consent:  He and/or health care decision maker is aware that he may receive a bill for this telephone service, depending on his insurance coverage, and has provided verbal consent to proceed: Yes    Documentation:  I communicated with the patient and/or health care decision maker about CHF, HTN. Hyperlipidemia, CKD stage III a  Details of this discussion including any medical advice provided: Doing well overall with no significant concerns today. Still trying to stay active. Doing excellent with COVID-19 precautions. Denies any chest pain, shortness of breath, increasing edema, or weight gain. Since last appointment, saw Dr. Benita Araya for concerns with URI sx and was given prednisone and albuterol. He then saw Dr. Chari Cerda for routine cardiology follow-up but had abn EKG after using albuterol. Had a repeat EKG after all of this a few weeks later and this looked better. I affirm this is a Patient Initiated Episode with an Established Patient who has not had a related appointment within my department in the past 7 days or scheduled within the next 24 hours.     Total Time: minutes: 5-10 minutes    Note: not billable if this call serves to triage the patient into an appointment for the relevant concern      Aaron Castañeda MD

## 2020-08-05 ENCOUNTER — HOSPITAL ENCOUNTER (OUTPATIENT)
Dept: LAB | Age: 75
Discharge: HOME | End: 2020-08-05
Payer: MEDICARE

## 2020-08-05 ENCOUNTER — OFFICE VISIT (OUTPATIENT)
Dept: FAMILY MEDICINE CLINIC | Age: 75
End: 2020-08-05
Payer: MEDICARE

## 2020-08-05 VITALS
SYSTOLIC BLOOD PRESSURE: 109 MMHG | DIASTOLIC BLOOD PRESSURE: 61 MMHG | HEART RATE: 53 BPM | BODY MASS INDEX: 26.87 KG/M2 | WEIGHT: 209.4 LBS | RESPIRATION RATE: 16 BRPM | TEMPERATURE: 97 F | HEIGHT: 74 IN | OXYGEN SATURATION: 97 %

## 2020-08-05 DIAGNOSIS — G60.9 IDIOPATHIC PERIPHERAL NEUROPATHY: ICD-10-CM

## 2020-08-05 DIAGNOSIS — M10.9 ACUTE GOUT INVOLVING TOE OF RIGHT FOOT, UNSPECIFIED CAUSE: ICD-10-CM

## 2020-08-05 DIAGNOSIS — N52.9 ED (ERECTILE DYSFUNCTION) OF ORGANIC ORIGIN: ICD-10-CM

## 2020-08-05 DIAGNOSIS — Z51.81 ENCOUNTER FOR MONITORING TESTOSTERONE REPLACEMENT THERAPY: ICD-10-CM

## 2020-08-05 DIAGNOSIS — I50.22 CHF NYHA CLASS I (NO SYMPTOMS FROM ORDINARY ACTIVITIES), CHRONIC, SYSTOLIC (HCC): ICD-10-CM

## 2020-08-05 DIAGNOSIS — N18.30 STAGE 3 CHRONIC KIDNEY DISEASE (HCC): ICD-10-CM

## 2020-08-05 DIAGNOSIS — Z86.79 S/P ABLATION OF ATRIAL FLUTTER: ICD-10-CM

## 2020-08-05 DIAGNOSIS — B33.24 VIRAL CARDIOMYOPATHY (HCC): ICD-10-CM

## 2020-08-05 DIAGNOSIS — E78.2 MIXED HYPERLIPIDEMIA: ICD-10-CM

## 2020-08-05 DIAGNOSIS — Z98.890 S/P ABLATION OF ATRIAL FLUTTER: ICD-10-CM

## 2020-08-05 DIAGNOSIS — Z79.890 ENCOUNTER FOR MONITORING TESTOSTERONE REPLACEMENT THERAPY: ICD-10-CM

## 2020-08-05 DIAGNOSIS — E29.1 HYPOGONADISM IN MALE: ICD-10-CM

## 2020-08-05 DIAGNOSIS — R73.03 PREDIABETES: ICD-10-CM

## 2020-08-05 DIAGNOSIS — I10 ESSENTIAL HYPERTENSION: Primary | ICD-10-CM

## 2020-08-05 DIAGNOSIS — Z79.899 ENCOUNTER FOR LONG-TERM (CURRENT) USE OF MEDICATIONS: ICD-10-CM

## 2020-08-05 DIAGNOSIS — E78.00 HIGH CHOLESTEROL: ICD-10-CM

## 2020-08-05 DIAGNOSIS — R35.1 NOCTURIA: ICD-10-CM

## 2020-08-05 DIAGNOSIS — I27.20 PULMONARY HYPERTENSION (HCC): ICD-10-CM

## 2020-08-05 PROCEDURE — 99215 OFFICE O/P EST HI 40 MIN: CPT | Performed by: FAMILY MEDICINE

## 2020-08-05 PROCEDURE — 84153 ASSAY OF PSA TOTAL: CPT

## 2020-08-05 PROCEDURE — G8510 SCR DEP NEG, NO PLAN REQD: HCPCS | Performed by: FAMILY MEDICINE

## 2020-08-05 PROCEDURE — G8419 CALC BMI OUT NRM PARAM NOF/U: HCPCS | Performed by: FAMILY MEDICINE

## 2020-08-05 PROCEDURE — 84403 ASSAY OF TOTAL TESTOSTERONE: CPT

## 2020-08-05 PROCEDURE — 80053 COMPREHEN METABOLIC PANEL: CPT

## 2020-08-05 PROCEDURE — 36415 COLL VENOUS BLD VENIPUNCTURE: CPT

## 2020-08-05 PROCEDURE — G8752 SYS BP LESS 140: HCPCS | Performed by: FAMILY MEDICINE

## 2020-08-05 PROCEDURE — G8536 NO DOC ELDER MAL SCRN: HCPCS | Performed by: FAMILY MEDICINE

## 2020-08-05 PROCEDURE — 3017F COLORECTAL CA SCREEN DOC REV: CPT | Performed by: FAMILY MEDICINE

## 2020-08-05 PROCEDURE — G8754 DIAS BP LESS 90: HCPCS | Performed by: FAMILY MEDICINE

## 2020-08-05 PROCEDURE — G8427 DOCREV CUR MEDS BY ELIG CLIN: HCPCS | Performed by: FAMILY MEDICINE

## 2020-08-05 PROCEDURE — 1101F PT FALLS ASSESS-DOCD LE1/YR: CPT | Performed by: FAMILY MEDICINE

## 2020-08-05 PROCEDURE — 84443 ASSAY THYROID STIM HORMONE: CPT

## 2020-08-05 PROCEDURE — 80061 LIPID PANEL: CPT

## 2020-08-05 RX ORDER — TESTOSTERONE 10 MG/G
GEL TOPICAL
Qty: 225 G | Refills: 2 | Status: SHIPPED | OUTPATIENT
Start: 2020-08-05 | End: 2020-09-04 | Stop reason: SDUPTHER

## 2020-08-05 RX ORDER — GABAPENTIN 300 MG/1
CAPSULE ORAL
Qty: 90 CAP | Refills: 1 | Status: SHIPPED | OUTPATIENT
Start: 2020-08-05 | End: 2021-03-25

## 2020-08-05 RX ORDER — ROSUVASTATIN CALCIUM 20 MG/1
TABLET, COATED ORAL
Qty: 90 TAB | Refills: 3 | Status: SHIPPED | OUTPATIENT
Start: 2020-08-05 | End: 2021-09-07

## 2020-08-05 RX ORDER — BUMETANIDE 2 MG/1
TABLET ORAL
Qty: 270 TAB | Refills: 1 | Status: SHIPPED | OUTPATIENT
Start: 2020-08-05 | End: 2021-02-03

## 2020-08-05 RX ORDER — LISINOPRIL 5 MG/1
TABLET ORAL
Qty: 90 TAB | Refills: 3 | Status: SHIPPED | OUTPATIENT
Start: 2020-08-05 | End: 2021-07-23

## 2020-08-05 RX ORDER — MONTELUKAST SODIUM 10 MG/1
TABLET ORAL
COMMUNITY
Start: 2020-07-06 | End: 2020-08-05 | Stop reason: SINTOL

## 2020-08-05 RX ORDER — PREDNISONE 10 MG/1
TABLET ORAL
Qty: 13 TAB | Refills: 0 | Status: SHIPPED | OUTPATIENT
Start: 2020-08-05 | End: 2020-11-04 | Stop reason: ALTCHOICE

## 2020-08-05 RX ORDER — CARVEDILOL 12.5 MG/1
TABLET ORAL
Qty: 180 TAB | Refills: 3 | Status: ON HOLD | OUTPATIENT
Start: 2020-08-05 | End: 2021-07-23 | Stop reason: SDUPTHER

## 2020-08-05 RX ORDER — ISOSORBIDE DINITRATE 5 MG/1
TABLET ORAL
Qty: 180 TAB | Refills: 3 | Status: SHIPPED | OUTPATIENT
Start: 2020-08-05 | End: 2021-07-23

## 2020-08-05 NOTE — PROGRESS NOTES
Chief Complaint   Patient presents with    Hypertension     6 month follow-up   1. Have you been to the ER, urgent care clinic since your last visit? Hospitalized since your last visit? No    2. Have you seen or consulted any other health care providers outside of the [address] [place] [address] since your last visit? Include any pap smears or colon screening.  No   Discuss possible Gout right big toe

## 2020-08-05 NOTE — PROGRESS NOTES
Subjective:     Chief Complaint   Patient presents with    Hypertension     6 month follow-up        He  is a 69 y.o. male who presents for evaluation of:    Since last appt, had right foot with redness and swelling over 1st MTP and med malleolus. New issue. No injury. Never had gout dx. Since last appointment had echo on 3/3/2020 with VCS and EF was down to 10 to 15%. There was also moderate calcific aortic stenosis with minimal regurgitation and moderate mitral regurgitation as well as mild pulmonary hypertension. He continues to follow with Dr. Vishnu Richards for cardiology and Dr. Jack Hamilton for pulmonology    Was given albuterol and singulair by Pulm for URI and postnasal drip. Had A Fib set off by Albuterol so stopped this and singulair caused problems with sleep so stopped this. Breathing is much better now. CM/CHF  Acute ischemic hepatitis due to viral cardiomyopathy resolved. A fib/flutter w/ RVR s/p ablation with Dr. Mana Morris 3/16/16 and cardiac catheterization without occlusive CAD on 3/19/2016  Still on B bl, ACEI, diuretic, imdur  Stopped wearing Lifevest and unwilling to restart this. Repeat echo after initial hospitalization showed EF 20-25%, so planned to have an AICD implant. Saw Dr. Mana Morris on 7/19/16 for eval for this but pt was not ready to have this done so decided to have another eval with Dr. Romel Zuniga - Echo with slight improvement and repeating serially. Home BPs running 100-120s/50-70s. Feels well with no sx. Denies PND and orthopnea. Pt ct to decline AICD for primary prevention though he meets criteria. After Dr[facility] Rolly [facility], transitioned to Dr. Guanaco Bradshaw and is happy to continue monitoring ejection fraction.     He has been using Bumex 2 mg BID and recently started taking another 1/2 tab at bedtime which he feels has helped with his sx.     Hyperlipidemia & HTN  Pt is doing well on current meds with no medication side effects noted  No TIA's, no chest pain on exertion, mild dyspnea on exertion, no swelling of ankles. Lab Results   Component Value Date/Time    Cholesterol, total 234 (H) 10/24/2019 09:50 AM    HDL Cholesterol 36 (L) 10/24/2019 09:50 AM    LDL, calculated 174 (H) 10/24/2019 09:50 AM    Triglyceride 118 10/24/2019 09:50 AM     ROS  Gen - no fever/chills  Resp - no dyspnea or cough  CV - no chest pain or FERRER  Rest per HPI    Past Medical History:   Diagnosis Date    Arrhythmia 11/11/2014    ED (erectile dysfunction) of organic origin     High cholesterol 3/31/2010    HTN (hypertension) 3/31/2010    Prediabetes 11/11/2014    Pulmonary hypertension ([facility] [address] 8/8/2017    S/P ablation of atrial flutter 6/18/0520    Systolic CHF, acute ([facility] 3/22/2016     Past Surgical History:   Procedure Laterality Date    HX ORTHOPAEDIC      fx wrist    HX TONSILLECTOMY       Current Outpatient Medications on File Prior to Visit   Medication Sig Dispense Refill    [DISCONTINUED] montelukast (SINGULAIR) 10 mg tablet       Eliquis 5 mg tablet Take 5 mg by mouth two (2) times a day.       [DISCONTINUED] albuterol (PROVENTIL HFA, VENTOLIN HFA, PROAIR HFA) 90 mcg/actuation inhaler       [DISCONTINUED] testosterone 12.5 mg/ 1.25 gram (1 %) glpm APPLY 4 PUMPS TO SHOULDERS AND UPPER ARMS ONCE DAILY IN THE MORNING 225 g 2    [DISCONTINUED] lisinopriL (PRINIVIL, ZESTRIL) 5 mg tablet TAKE ONE TABLET BY MOUTH DAILY 90 Tab 0    [DISCONTINUED] isosorbide dinitrate (ISORDIL) 5 mg tablet TAKE ONE TABLET BY MOUTH TWICE A  Tab 1    [DISCONTINUED] gabapentin (NEURONTIN) 300 mg capsule TAKE ONE CAPSULE BY MOUTH ONCE NIGHTLY 90 Cap 0    [DISCONTINUED] carvediloL (COREG) 12.5 mg tablet TAKE ONE TABLET BY MOUTH TWICE A  Tab 1    [DISCONTINUED] bumetanide (BUMEX) 2 mg tablet TAKE ONE TABLET BY MOUTH TWICE A DAY 60 Tab 4    [DISCONTINUED] rosuvastatin (CRESTOR) 20 mg tablet TAKE ONE TABLET BY MOUTH ONCE NIGHTLY 90 Tab 3    Cholecalciferol, Vitamin D3, (VITAMIN D3) 1,000 unit Cap Take 1 Cap by mouth daily.  aspirin delayed-release 81 mg tablet Take 81 mg by mouth daily. No current facility-administered medications on file prior to visit. Objective:     Vitals:    08/05/20 0957   BP: 109/61   Pulse: (!) 53   Resp: 16   Temp: 97 °F (36.1 °C)   TempSrc: Temporal   SpO2: 97%   Weight: 209 lb 6.4 oz (95 kg)   Height: 6' 2\" (1.88 m)       Physical Examination:  General appearance - alert, well appearing, and in no distress  Eyes -sclera anicteric  Neck - supple, no significant adenopathy, no thyromegaly, no JVD with hepatojugular reflex  Chest - clear to auscultation, no wheezes, rales or rhonchi, symmetric air entry  Heart - normal rate, regular rhythm, normal S1, S2, no murmurs, rubs, clicks or gallops  Neurological - alert, oriented, no focal findings or movement disorder noted  Extremitiesno edema  Psychnormal mood and affect    Assessment/ Plan:   Diagnoses and all orders for this visit:    1. Essential hypertension  -     METABOLIC PANEL, COMPREHENSIVE  -     lisinopriL (PRINIVIL, ZESTRIL) 5 mg tablet; TAKE ONE TABLET BY MOUTH DAILY  -     isosorbide dinitrate (ISORDIL) 5 mg tablet; TAKE ONE TABLET BY MOUTH TWICE A DAY  -     carvediloL (COREG) 12.5 mg tablet; TAKE ONE TABLET BY MOUTH TWICE A DAY    2. Viral cardiomyopathy (HCC)  -     LIPID PANEL    3. CHF NYHA class I (no symptoms from ordinary activities), chronic, systolic (HCC)  -     METABOLIC PANEL, COMPREHENSIVE  -     LIPID PANEL  -     TSH 3RD GENERATION  -     lisinopriL (PRINIVIL, ZESTRIL) 5 mg tablet; TAKE ONE TABLET BY MOUTH DAILY  -     carvediloL (COREG) 12.5 mg tablet; TAKE ONE TABLET BY MOUTH TWICE A DAY  -     bumetanide (BUMEX) 2 mg tablet; Take 2 tabs in am and 1 in pm    4. Pulmonary hypertension ([address]    5. High cholesterol  -     METABOLIC PANEL, COMPREHENSIVE  -     LIPID PANEL  -     TSH 3RD GENERATION    6. Prediabetes  -     METABOLIC PANEL, COMPREHENSIVE    7.  Encounter for long-term (current) use of medications  -     METABOLIC PANEL, COMPREHENSIVE  -     LIPID PANEL  -     TSH 3RD GENERATION    8. S/P ablation of atrial flutter    9. ED (erectile dysfunction) of organic origin  -     testosterone 12.5 mg/ 1.25 gram (1 %) glpm; APPLY 4 PUMPS TO SHOULDERS AND UPPER ARMS ONCE DAILY IN THE MORNING    10. Hypogonadism in male  -     testosterone 12.5 mg/ 1.25 gram (1 %) glpm; APPLY 4 PUMPS TO SHOULDERS AND UPPER ARMS ONCE DAILY IN THE MORNING    11. Mixed hyperlipidemia  -     rosuvastatin (CRESTOR) 20 mg tablet; TAKE ONE TABLET BY MOUTH ONCE NIGHTLY    12. Idiopathic peripheral neuropathy  -     gabapentin (NEURONTIN) 300 mg capsule; TAKE ONE CAPSULE BY MOUTH ONCE NIGHTLY    13. Stage 3 chronic kidney disease ([facility] [address]    14. Nocturia  -     PSA, DIAGNOSTIC (PROSTATE SPECIFIC AG)    15. Encounter for monitoring testosterone replacement therapy  -     PSA, DIAGNOSTIC (PROSTATE SPECIFIC AG)  -     TESTOSTERONE, TOTAL, ADULT MALE    16. Acute gout involving toe of right foot, unspecified cause  -     predniSONE (DELTASONE) 10 mg tablet; Take 3 pills for 2 days, then 2 pills for 2 days, then 1 pill for 3 days      Doing well overall. Checking labs, ct following with Dr. Seymour Briones and Dr. Reji Ojeda. Not interested in AICD still. Prednisone taper for gout flare and may consider allopurinol if needed in future. Refilling meds. I have discussed the diagnosis with the patient and the intended plan as seen in the above orders. The patient has received an after-visit summary and questions were answered concerning future plans. I have discussed medication side effects and warnings with the patient as well. The patient verbalizes understanding and agreement with the plan. Follow-up and Dispositions    · Return in about 3 months (around 11/5/2020).

## 2020-08-06 LAB
ALBUMIN SERPL-MCNC: 4.7 G/DL (ref 3.7–4.7)
ALBUMIN/GLOB SERPL: 1.8 {RATIO} (ref 1.2–2.2)
ALP SERPL-CCNC: 56 IU/L (ref 39–117)
ALT SERPL-CCNC: 24 IU/L (ref 0–44)
AST SERPL-CCNC: 27 IU/L (ref 0–40)
BILIRUB SERPL-MCNC: 1.2 MG/DL (ref 0–1.2)
BUN SERPL-MCNC: 19 MG/DL (ref 8–27)
BUN/CREAT SERPL: 13 (ref 10–24)
CALCIUM SERPL-MCNC: 9.7 MG/DL (ref 8.6–10.2)
CHLORIDE SERPL-SCNC: 95 MMOL/L (ref 96–106)
CHOLEST SERPL-MCNC: 122 MG/DL (ref 100–199)
CO2 SERPL-SCNC: 29 MMOL/L (ref 20–29)
CREAT SERPL-MCNC: 1.44 MG/DL (ref 0.76–1.27)
GLOBULIN SER CALC-MCNC: 2.6 G/DL (ref 1.5–4.5)
GLUCOSE SERPL-MCNC: 112 MG/DL (ref 65–99)
HDLC SERPL-MCNC: 36 MG/DL
INTERPRETATION: NORMAL
LDLC SERPL CALC-MCNC: 71 MG/DL (ref 0–99)
POTASSIUM SERPL-SCNC: 4 MMOL/L (ref 3.5–5.2)
PROT SERPL-MCNC: 7.3 G/DL (ref 6–8.5)
PSA SERPL-MCNC: 0.7 NG/ML (ref 0–4)
SODIUM SERPL-SCNC: 142 MMOL/L (ref 134–144)
TESTOST SERPL-MCNC: 386 NG/DL (ref 264–916)
TRIGL SERPL-MCNC: 73 MG/DL (ref 0–149)
TSH SERPL DL<=0.005 MIU/L-ACNC: 1.6 UIU/ML (ref 0.45–4.5)
VLDLC SERPL CALC-MCNC: 15 MG/DL (ref 5–40)

## 2020-08-12 DIAGNOSIS — E87.29 HYPERCHLOREMIC METABOLIC ACIDOSIS: Primary | ICD-10-CM

## 2020-08-12 RX ORDER — SODIUM BICARBONATE 650 MG/1
650 TABLET ORAL 2 TIMES DAILY
Qty: 180 TAB | Refills: 0 | Status: SHIPPED | OUTPATIENT
Start: 2020-08-12 | End: 2020-11-04 | Stop reason: SDUPTHER

## 2020-09-03 DIAGNOSIS — N52.9 ED (ERECTILE DYSFUNCTION) OF ORGANIC ORIGIN: ICD-10-CM

## 2020-09-03 DIAGNOSIS — E29.1 HYPOGONADISM IN MALE: ICD-10-CM

## 2020-09-03 NOTE — TELEPHONE ENCOUNTER
Pt is calling about: testosterone     He got a letter from his insurance about getting approval but it seems to be for the doctor not him     Pls advise       Best number to reach him is [phone]

## 2020-09-07 RX ORDER — TESTOSTERONE 10 MG/G
GEL TOPICAL
Qty: 225 G | Refills: 2 | Status: SHIPPED | OUTPATIENT
Start: 2020-09-07 | End: 2021-02-15

## 2020-11-04 ENCOUNTER — OFFICE VISIT (OUTPATIENT)
Dept: FAMILY MEDICINE CLINIC | Age: 75
End: 2020-11-04
Payer: MEDICARE

## 2020-11-04 VITALS
OXYGEN SATURATION: 100 % | TEMPERATURE: 97.5 F | WEIGHT: 203.8 LBS | SYSTOLIC BLOOD PRESSURE: 123 MMHG | BODY MASS INDEX: 26.17 KG/M2 | DIASTOLIC BLOOD PRESSURE: 62 MMHG | RESPIRATION RATE: 16 BRPM | HEART RATE: 67 BPM

## 2020-11-04 DIAGNOSIS — Z86.79 S/P ABLATION OF ATRIAL FLUTTER: ICD-10-CM

## 2020-11-04 DIAGNOSIS — Z78.9 VARICELLA VACCINATION STATUS UNKNOWN: ICD-10-CM

## 2020-11-04 DIAGNOSIS — Z00.00 MEDICARE ANNUAL WELLNESS VISIT, SUBSEQUENT: Primary | ICD-10-CM

## 2020-11-04 DIAGNOSIS — R73.03 PREDIABETES: ICD-10-CM

## 2020-11-04 DIAGNOSIS — R00.1 BRADYCARDIA: ICD-10-CM

## 2020-11-04 DIAGNOSIS — N18.31 STAGE 3A CHRONIC KIDNEY DISEASE (HCC): ICD-10-CM

## 2020-11-04 DIAGNOSIS — Z12.11 COLON CANCER SCREENING: ICD-10-CM

## 2020-11-04 DIAGNOSIS — Z98.890 S/P ABLATION OF ATRIAL FLUTTER: ICD-10-CM

## 2020-11-04 DIAGNOSIS — E87.29 HYPERCHLOREMIC METABOLIC ACIDOSIS: ICD-10-CM

## 2020-11-04 DIAGNOSIS — I27.20 PULMONARY HYPERTENSION (HCC): ICD-10-CM

## 2020-11-04 DIAGNOSIS — Z79.899 ENCOUNTER FOR LONG-TERM (CURRENT) USE OF MEDICATIONS: ICD-10-CM

## 2020-11-04 DIAGNOSIS — I50.22 CHF NYHA CLASS I (NO SYMPTOMS FROM ORDINARY ACTIVITIES), CHRONIC, SYSTOLIC (HCC): ICD-10-CM

## 2020-11-04 DIAGNOSIS — B33.24 VIRAL CARDIOMYOPATHY (HCC): ICD-10-CM

## 2020-11-04 PROCEDURE — G0439 PPPS, SUBSEQ VISIT: HCPCS | Performed by: FAMILY MEDICINE

## 2020-11-04 PROCEDURE — G8432 DEP SCR NOT DOC, RNG: HCPCS | Performed by: FAMILY MEDICINE

## 2020-11-04 PROCEDURE — G8536 NO DOC ELDER MAL SCRN: HCPCS | Performed by: FAMILY MEDICINE

## 2020-11-04 PROCEDURE — G8427 DOCREV CUR MEDS BY ELIG CLIN: HCPCS | Performed by: FAMILY MEDICINE

## 2020-11-04 PROCEDURE — G8754 DIAS BP LESS 90: HCPCS | Performed by: FAMILY MEDICINE

## 2020-11-04 PROCEDURE — G8419 CALC BMI OUT NRM PARAM NOF/U: HCPCS | Performed by: FAMILY MEDICINE

## 2020-11-04 PROCEDURE — G8752 SYS BP LESS 140: HCPCS | Performed by: FAMILY MEDICINE

## 2020-11-04 PROCEDURE — 99214 OFFICE O/P EST MOD 30 MIN: CPT | Performed by: FAMILY MEDICINE

## 2020-11-04 PROCEDURE — 1101F PT FALLS ASSESS-DOCD LE1/YR: CPT | Performed by: FAMILY MEDICINE

## 2020-11-04 PROCEDURE — G0463 HOSPITAL OUTPT CLINIC VISIT: HCPCS | Performed by: FAMILY MEDICINE

## 2020-11-04 PROCEDURE — 3017F COLORECTAL CA SCREEN DOC REV: CPT | Performed by: FAMILY MEDICINE

## 2020-11-04 RX ORDER — SODIUM BICARBONATE 650 MG/1
650 TABLET ORAL 2 TIMES DAILY
Qty: 180 TAB | Refills: 1 | Status: SHIPPED | OUTPATIENT
Start: 2020-11-04 | End: 2021-04-25

## 2020-11-04 NOTE — PROGRESS NOTES
Subjective:     Chief Complaint   Patient presents with    Hypertension     follow up        He  is a 77 y.o. male who presents for evaluation of:    Here for routine follow-up    Since last appointment had echo on 3/3/2020 with VCS and EF was down to 10 to 15%. There was also moderate calcific aortic stenosis with minimal regurgitation and moderate mitral regurgitation as well as mild pulmonary hypertension. He continues to follow with Dr. Laverna Hamman for cardiology and Dr. Author Cowan for pulmonology    Was given albuterol and singulair by Pulm for URI and postnasal drip. Had A Fib set off by Albuterol so stopped this and singulair caused problems with sleep so stopped this. Breathing is much better now. CM/CHF  Acute ischemic hepatitis due to viral cardiomyopathy resolved. A fib/flutter w/ RVR s/p ablation with Dr. Sammy Smith 3/16/16 and cardiac catheterization without occlusive CAD on 3/19/2016  Still on B bl, ACEI, diuretic, imdur  Stopped wearing Lifevest and unwilling to restart this. Repeat echo after initial hospitalization showed EF 20-25%, so planned to have an AICD implant. Saw Dr. Sammy Smith on 7/19/16 for eval for this but pt was not ready to have this done so decided to have another eval with Dr. Nikolas Durand - Echo with slight improvement and repeating serially. Home BPs running 100-120s/50-70s. Feels well with no sx. Denies PND and orthopnea. Pt ct to decline AICD for primary prevention though he meets criteria. After Dr. Eubanks Sober [facility], transitioned to Dr. Donna Das and is happy to continue monitoring ejection fraction. He has been using Bumex 2 mg BID and recently started taking another 1/2 tab at bedtime which he feels has helped with his sx.     Hyperlipidemia & HTN  Pt is doing well on current meds with no medication side effects noted  No TIA's, no chest pain on exertion, mild dyspnea on exertion, no swelling of ankles.      Lab Results   Component Value Date/Time    Cholesterol, total 122 08/05/2020 11:47 AM    HDL Cholesterol 36 (L) 08/05/2020 11:47 AM    LDL, calculated 71 08/05/2020 11:47 AM    Triglyceride 73 08/05/2020 11:47 AM     ROS  Gen - no fever/chills  Resp - no dyspnea or cough  CV - no chest pain or FERRER  Rest per HPI    Past Medical History:   Diagnosis Date    Arrhythmia 11/11/2014    ED (erectile dysfunction) of organic origin     High cholesterol 3/31/2010    HTN (hypertension) 3/31/2010    Prediabetes 11/11/2014    Pulmonary hypertension ([facility] [address] 8/8/2017    S/P ablation of atrial flutter 7/35/8156    Systolic CHF, acute ([facility] [address] 3/22/2016     Past Surgical History:   Procedure Laterality Date    HX ORTHOPAEDIC      fx wrist    HX TONSILLECTOMY       Current Outpatient Medications on File Prior to Visit   Medication Sig Dispense Refill    testosterone 12.5 mg/ 1.25 gram (1 %) glpm APPLY 4 PUMPS TO SHOULDERS AND UPPER ARMS ONCE DAILY IN THE MORNING 225 g 2    sodium bicarbonate 650 mg tablet Take 1 Tab by mouth two (2) times a day. 180 Tab 0    rosuvastatin (CRESTOR) 20 mg tablet TAKE ONE TABLET BY MOUTH ONCE NIGHTLY 90 Tab 3    lisinopriL (PRINIVIL, ZESTRIL) 5 mg tablet TAKE ONE TABLET BY MOUTH DAILY 90 Tab 3    isosorbide dinitrate (ISORDIL) 5 mg tablet TAKE ONE TABLET BY MOUTH TWICE A  Tab 3    gabapentin (NEURONTIN) 300 mg capsule TAKE ONE CAPSULE BY MOUTH ONCE NIGHTLY 90 Cap 1    carvediloL (COREG) 12.5 mg tablet TAKE ONE TABLET BY MOUTH TWICE A  Tab 3    bumetanide (BUMEX) 2 mg tablet Take 2 tabs in am and 1 in pm 270 Tab 1    Eliquis 5 mg tablet Take 5 mg by mouth two (2) times a day.  Cholecalciferol, Vitamin D3, (VITAMIN D3) 1,000 unit Cap Take 1 Cap by mouth daily.  aspirin delayed-release 81 mg tablet Take 81 mg by mouth daily.  sodium bicarb and citrate 1,940-1,000 mg tbef TAKE ONE TABLET BY MOUTH TWICE A DAY       No current facility-administered medications on file prior to visit.          Objective:     Vitals:    11/04/20 1020 11/04/20 1106   BP: 123/62    Pulse: (!) 31 67   Resp: 16    Temp: 97.5 °F (36.4 °C)    TempSrc: Temporal    SpO2: 100%    Weight: 203 lb 12.8 oz (92.4 kg)        Physical Examination:  General appearance - alert, well appearing, and in no distress  Eyes -sclera anicteric  Neck - supple, no significant adenopathy, no thyromegaly  Chest - clear to auscultation, no wheezes, rales or rhonchi, symmetric air entry  Heart - normal rate, regular rhythm, normal S1, S2, no murmurs, rubs, clicks or gallops  Neurological - alert, oriented, no focal findings or movement disorder noted  Extremitiesno edema  Psychnormal mood and affect    Assessment/ Plan:   Diagnoses and all orders for this visit:    1. Medicare annual wellness visit, subsequent    2. CHF NYHA class I (no symptoms from ordinary activities), chronic, systolic ([facility] [address]  3. Viral cardiomyopathy ([facility] [address]  4. S/P ablation of atrial flutter  Seems to be stable, following with Dr. Yuriy De Los Santos    5. Pulmonary hypertension (HCC)stable, following with Dr. Tay Chaparro    6. Bradycardiaabnormal on initial evaluation but pulse in 50s upon exam and repeat in 60s    7. Prediabetesrechecking labs  -     HEMOGLOBIN A1C WITH EAG; Future    8. Encounter for long-term (current) use of medications  -     HEMOGLOBIN A1C WITH EAG; Future  -     METABOLIC PANEL, BASIC; Future    9. Stage 3a chronic kidney diseasestable, rechecking labs  -     HEMOGLOBIN A1C WITH EAG; Future  -     METABOLIC PANEL, BASIC; Future    10. Varicella vaccination status unknown  -     VZV AB, IGG    11. Colon cancer screening  -     COLOGUARD TEST (FECAL DNA COLORECTAL CANCER SCREENING)     I have discussed the diagnosis with the patient and the intended plan as seen in the above orders. The patient has received an after-visit summary and questions were answered concerning future plans. I have discussed medication side effects and warnings with the patient as well.   The patient verbalizes understanding and agreement with the plan. Follow-up and Dispositions    · Return in about 3 months (around 2/4/2021), or if symptoms worsen or fail to improve. This is the Subsequent Medicare Annual Wellness Exam, performed 12 months or more after the Initial AWV or the last Subsequent AWV    I have reviewed the patient's medical history in detail and updated the computerized patient record. History     Patient Active Problem List   Diagnosis Code    High cholesterol E78.00    Eczema L30.9    HTN (hypertension) I10    Encounter for long-term (current) use of medications Z79.899    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    Prediabetes R73.03    Arrhythmia I49.9    Cardiomyopathy ([address] I42.9    CHF NYHA class I (no symptoms from ordinary activities), chronic, systolic (HCC) Y60.03    S/P ablation of atrial flutter Z98.890, Z86.79    Advance care planning Z71.89    Pulmonary hypertension ([facility] [address] I27.20     Past Medical History:   Diagnosis Date    Arrhythmia 11/11/2014    ED (erectile dysfunction) of organic origin     High cholesterol 3/31/2010    HTN (hypertension) 3/31/2010    Prediabetes 11/11/2014    Pulmonary hypertension ([address] 8/8/2017    S/P ablation of atrial flutter 0/73/1159    Systolic CHF, acute ([address] 3/22/2016      Past Surgical History:   Procedure Laterality Date    HX ORTHOPAEDIC      fx wrist    HX TONSILLECTOMY       Current Outpatient Medications   Medication Sig Dispense Refill    testosterone 12.5 mg/ 1.25 gram (1 %) glpm APPLY 4 PUMPS TO SHOULDERS AND UPPER ARMS ONCE DAILY IN THE MORNING 225 g 2    sodium bicarbonate 650 mg tablet Take 1 Tab by mouth two (2) times a day.  180 Tab 0    rosuvastatin (CRESTOR) 20 mg tablet TAKE ONE TABLET BY MOUTH ONCE NIGHTLY 90 Tab 3    lisinopriL (PRINIVIL, ZESTRIL) 5 mg tablet TAKE ONE TABLET BY MOUTH DAILY 90 Tab 3    isosorbide dinitrate (ISORDIL) 5 mg tablet TAKE ONE TABLET BY MOUTH TWICE A  Tab 3    gabapentin (NEURONTIN) 300 mg capsule TAKE ONE CAPSULE BY MOUTH ONCE NIGHTLY 90 Cap 1    carvediloL (COREG) 12.5 mg tablet TAKE ONE TABLET BY MOUTH TWICE A  Tab 3    bumetanide (BUMEX) 2 mg tablet Take 2 tabs in am and 1 in pm 270 Tab 1    Eliquis 5 mg tablet Take 5 mg by mouth two (2) times a day.  Cholecalciferol, Vitamin D3, (VITAMIN D3) 1,000 unit Cap Take 1 Cap by mouth daily.  aspirin delayed-release 81 mg tablet Take 81 mg by mouth daily.  sodium bicarb and citrate 1,940-1,000 mg tbef TAKE ONE TABLET BY MOUTH TWICE A DAY       No Known Allergies    Family History   Problem Relation Age of Onset    Heart Disease Father     Hypertension Father     Hypertension Mother     Heart Disease Mother      Social History     Tobacco Use    Smoking status: Former Smoker     Last attempt to quit: 1995     Years since quittin.6    Smokeless tobacco: Never Used   Substance Use Topics    Alcohol use: Yes     Alcohol/week: 1.0 standard drinks     Types: 1 Glasses of wine per week     Comment: 1 drink a month       Depression Risk Factor Screening:     3 most recent PHQ Screens 2020   Little interest or pleasure in doing things Not at all   Feeling down, depressed, irritable, or hopeless Not at all   Total Score PHQ 2 0       Alcohol Risk Screen   Do you average more than 1 drink per night or more than 7 drinks a week: No    In the past three months have you have had more than 4 drinks containing alcohol on one occasion: No        Functional Ability and Level of Safety:   Hearing: Hearing is good. Activities of Daily Living: The home contains: no safety equipment. Patient does total self care     Ambulation: with no difficulty     Fall Risk:  Fall Risk Assessment, last 12 mths 2020   Able to walk? Yes   Fall in past 12 months?  No     Abuse Screen:  Patient is not abused       Cognitive Screening   Has your family/caregiver stated any concerns about your memory: no     Cognitive Screening: Normal - Verbal Fluency Test    Patient Care Team   Patient Care Team:  Barbara Mistry MD as PCP - General (Family Medicine)  Barbara Mistry MD as PCP - [facility]  Kristyn Shetty MD (Cardiology)  Garret Clark MD (Pulmonary Disease)    Assessment/Plan   Education and counseling provided:  Are appropriate based on today's review and evaluation    Diagnoses and all orders for this visit:    1. Medicare annual wellness visit, subsequent    2. CHF NYHA class I (no symptoms from ordinary activities), chronic, systolic ([facility] [address]    3. Viral cardiomyopathy ([facility] [address]    4. S/P ablation of atrial flutter    5. Pulmonary hypertension ([facility] [address]    6. Bradycardia    7. Prediabetes  -     HEMOGLOBIN A1C WITH EAG; Future    8. Encounter for long-term (current) use of medications  -     HEMOGLOBIN A1C WITH EAG; Future  -     METABOLIC PANEL, BASIC; Future    9. Stage 3a chronic kidney disease  -     HEMOGLOBIN A1C WITH EAG; Future  -     METABOLIC PANEL, BASIC; Future    10.  Varicella vaccination status unknown  -     VZV AB, IGG    11. Colon cancer screening  -     COLOGUARD TEST (FECAL DNA COLORECTAL CANCER SCREENING)        Health Maintenance Due   Topic Date Due    Shingrix Vaccine Age 49> (1 of 2) 09/01/1996    GLAUCOMA SCREENING Q2Y  09/01/2011    Flu Vaccine (1) 09/01/2020    A1C test (Diabetic or Prediabetic)  10/24/2020    Medicare Yearly Exam  10/22/2020    Colorectal Cancer Screening Combo  10/24/2020

## 2020-11-04 NOTE — PATIENT INSTRUCTIONS
Medicare Wellness Visit, Male The best way to live healthy is to have a lifestyle where you eat a well-balanced diet, exercise regularly, limit alcohol use, and quit all forms of tobacco/nicotine, if applicable. Regular preventive services are another way to keep healthy. Preventive services (vaccines, screening tests, monitoring & exams) can help personalize your care plan, which helps you manage your own care. Screening tests can find health problems at the earliest stages, when they are easiest to treat. [facility] follows the current, evidence-based guidelines published by the [facility] [employer] [facility] Cherelle ([facility]) when recommending preventive services for our patients. Because we follow these guidelines, sometimes recommendations change over time as research supports it. (For example, a prostate screening blood test is no longer routinely recommended for men with no symptoms). Of course, you and your doctor may decide to screen more often for some diseases, based on your risk and co-morbidities (chronic disease you are already diagnosed with). Preventive services for you include: - Medicare offers their members a free annual wellness visit, which is time for you and your primary care provider to discuss and plan for your preventive service needs. Take advantage of this benefit every year! 
-All adults over age 72 should receive the recommended pneumonia vaccines. Current USPSTF guidelines recommend a series of two vaccines for the best pneumonia protection.  
-All adults should have a flu vaccine yearly and tetanus vaccine every 10 years. 
-All adults age 48 and older should receive the shingles vaccines (series of two vaccines).       
-All adults age 38-68 who are overweight should have a diabetes screening test once every three years.  
-Other screening tests & preventive services for persons with diabetes include: an eye exam to screen for diabetic retinopathy, a kidney function test, a foot exam, and stricter control over your cholesterol.  
-Cardiovascular screening for adults with routine risk involves an electrocardiogram (ECG) at intervals determined by the provider.  
-Colorectal cancer screening should be done for adults age 54-65 with no increased risk factors for colorectal cancer. There are a number of acceptable methods of screening for this type of cancer. Each test has its own benefits and drawbacks. Discuss with your provider what is most appropriate for you during your annual wellness visit. The different tests include: colonoscopy (considered the best screening method), a fecal occult blood test, a fecal DNA test, and sigmoidoscopy. 
-All adults born between [facility] should be screened once for Hepatitis C. 
-An Abdominal Aortic Aneurysm (AAA) Screening is recommended for men age 73-68 who has ever smoked in their lifetime. Here is a list of your current Health Maintenance items (your personalized list of preventive services) with a due date: 
Health Maintenance Due Topic Date Due  Shingles Vaccine (1 of 2) 09/01/1996  Glaucoma Screening   09/01/2011  Yearly Flu Vaccine (1) 09/01/2020  Hemoglobin A1C    10/24/2020 [address] [place] [address] Annual Well Visit  10/22/2020  Colorectal Screening  10/24/2020

## 2020-11-04 NOTE — PROGRESS NOTES
Chief Complaint   Patient presents with    Hypertension     follow up       1. Have you been to the ER, urgent care clinic since your last visit? Hospitalized since your last visit? No    2. Have you seen or consulted any other health care providers outside of the [address] [place] [address] since your last visit? Include any pap smears or colon screening.  No

## 2020-11-05 DIAGNOSIS — Z79.899 ENCOUNTER FOR LONG-TERM (CURRENT) USE OF MEDICATIONS: ICD-10-CM

## 2020-11-05 DIAGNOSIS — N18.31 STAGE 3A CHRONIC KIDNEY DISEASE (HCC): ICD-10-CM

## 2020-11-05 DIAGNOSIS — R73.03 PREDIABETES: ICD-10-CM

## 2020-11-11 ENCOUNTER — HOSPITAL ENCOUNTER (OUTPATIENT)
Dept: LAB | Age: 75
Discharge: HOME | End: 2020-11-11
Payer: MEDICARE

## 2020-11-11 PROCEDURE — 86787 VARICELLA-ZOSTER ANTIBODY: CPT

## 2020-11-11 PROCEDURE — 36415 COLL VENOUS BLD VENIPUNCTURE: CPT

## 2020-11-12 ENCOUNTER — ANESTHESIA EVENT (OUTPATIENT)
Dept: CARDIAC CATH/INVASIVE PROCEDURES | Age: 75
End: 2020-11-12
Payer: MEDICARE

## 2020-11-12 ENCOUNTER — HOSPITAL ENCOUNTER (OUTPATIENT)
Age: 75
Setting detail: OUTPATIENT SURGERY
Discharge: HOME | End: 2020-11-12
Attending: INTERNAL MEDICINE | Admitting: INTERNAL MEDICINE
Payer: MEDICARE

## 2020-11-12 ENCOUNTER — ANESTHESIA (OUTPATIENT)
Dept: CARDIAC CATH/INVASIVE PROCEDURES | Age: 75
End: 2020-11-12
Payer: MEDICARE

## 2020-11-12 DIAGNOSIS — I48.91 ATRIAL FIBRILLATION, UNSPECIFIED TYPE (HCC): ICD-10-CM

## 2020-11-12 LAB — VZV IGG SER IA-ACNC: >4000 INDEX

## 2020-11-12 PROCEDURE — 93005 ELECTROCARDIOGRAM TRACING: CPT

## 2020-11-12 NOTE — PROGRESS NOTES
Patient brought back to cardiac cath lab recovery room [facility]. Verified patient's name and date of birth and planned cardioversion procedure. Patient changed into gown and placed on cardiac monitor. Patient noted to be in normal sinus rhythm. EKG obtained per protocol and Dr. Desire Collier notified. Dr. Desire Collier came to see patient and reviewed EKG. Procedure cancelled due to patient being in normal sinus rhythm. Patient instructed to follow up with Dr. Desire Collier. Patient changed into clothing from home and left cath lab ambulatory.

## 2020-11-12 NOTE — PROGRESS NOTES
Pls call - the chicken pox labs came back showing that you did have the chicken pox in the past so it would be worthwhile to get the shingles shot at your convenience to help prevent you from having the shingles.  Thanks

## 2020-11-13 LAB
ATRIAL RATE: 71 BPM
CALCULATED P AXIS, ECG09: 94 DEGREES
CALCULATED R AXIS, ECG10: 80 DEGREES
CALCULATED T AXIS, ECG11: -5 DEGREES
DIAGNOSIS, 93000: NORMAL
P-R INTERVAL, ECG05: 216 MS
Q-T INTERVAL, ECG07: 470 MS
QRS DURATION, ECG06: 134 MS
QTC CALCULATION (BEZET), ECG08: 510 MS
VENTRICULAR RATE, ECG03: 71 BPM

## 2020-12-08 ENCOUNTER — TRANSCRIBE ORDER (OUTPATIENT)
Dept: SCHEDULING | Age: 75
End: 2020-12-08

## 2020-12-08 DIAGNOSIS — I35.0 NON-RHEUMATIC AORTIC STENOSIS: Primary | ICD-10-CM

## 2021-01-22 ENCOUNTER — HOSPITAL ENCOUNTER (OUTPATIENT)
Dept: PREADMISSION TESTING | Age: 76
Discharge: HOME | End: 2021-01-22
Payer: MEDICARE

## 2021-01-22 LAB — SARS-COV-2, COV2: NORMAL

## 2021-01-22 PROCEDURE — U0003 INFECTIOUS AGENT DETECTION BY NUCLEIC ACID (DNA OR RNA); SEVERE ACUTE RESPIRATORY SYNDROME CORONAVIRUS 2 (SARS-COV-2) (CORONAVIRUS DISEASE [COVID-19]), AMPLIFIED PROBE TECHNIQUE, MAKING USE OF HIGH THROUGHPUT TECHNOLOGIES AS DESCRIBED BY CMS-2020-01-R: HCPCS

## 2021-01-23 LAB — SARS-COV-2, COV2NT: NOT DETECTED

## 2021-01-26 ENCOUNTER — ANESTHESIA (OUTPATIENT)
Dept: CARDIAC CATH/INVASIVE PROCEDURES | Age: 76
End: 2021-01-26
Payer: MEDICARE

## 2021-01-26 ENCOUNTER — HOSPITAL ENCOUNTER (OUTPATIENT)
Age: 76
Discharge: HOME | End: 2021-01-26
Attending: INTERNAL MEDICINE | Admitting: INTERNAL MEDICINE
Payer: MEDICARE

## 2021-01-26 ENCOUNTER — APPOINTMENT (OUTPATIENT)
Dept: GENERAL RADIOLOGY | Age: 76
End: 2021-01-26
Attending: INTERNAL MEDICINE
Payer: MEDICARE

## 2021-01-26 ENCOUNTER — ANESTHESIA EVENT (OUTPATIENT)
Dept: CARDIAC CATH/INVASIVE PROCEDURES | Age: 76
End: 2021-01-26
Payer: MEDICARE

## 2021-01-26 VITALS
HEART RATE: 80 BPM | SYSTOLIC BLOOD PRESSURE: 128 MMHG | RESPIRATION RATE: 16 BRPM | OXYGEN SATURATION: 98 % | TEMPERATURE: 97.7 F | WEIGHT: 203 LBS | DIASTOLIC BLOOD PRESSURE: 85 MMHG | HEIGHT: 72 IN | BODY MASS INDEX: 27.5 KG/M2

## 2021-01-26 DIAGNOSIS — I42.9 CARDIOMYOPATHY, UNSPECIFIED TYPE (HCC): ICD-10-CM

## 2021-01-26 PROBLEM — Z95.0 S/P BIVENTRICULAR CARDIAC PACEMAKER PROCEDURE: Status: ACTIVE | Noted: 2021-01-26

## 2021-01-26 LAB
ATRIAL RATE: 68 BPM
CALCULATED P AXIS, ECG09: 94 DEGREES
CALCULATED R AXIS, ECG10: 78 DEGREES
CALCULATED T AXIS, ECG11: 20 DEGREES
DIAGNOSIS, 93000: NORMAL
P-R INTERVAL, ECG05: 206 MS
Q-T INTERVAL, ECG07: 466 MS
QRS DURATION, ECG06: 136 MS
QTC CALCULATION (BEZET), ECG08: 495 MS
VENTRICULAR RATE, ECG03: 68 BPM

## 2021-01-26 PROCEDURE — C1777 LEAD, AICD, ENDO SINGLE COIL: HCPCS | Performed by: INTERNAL MEDICINE

## 2021-01-26 PROCEDURE — 77030010507 HC ADH SKN DERMBND J&J -B: Performed by: INTERNAL MEDICINE

## 2021-01-26 PROCEDURE — 74011000250 HC RX REV CODE- 250: Performed by: INTERNAL MEDICINE

## 2021-01-26 PROCEDURE — C1898 LEAD, PMKR, OTHER THAN TRANS: HCPCS | Performed by: INTERNAL MEDICINE

## 2021-01-26 PROCEDURE — 76060000035 HC ANESTHESIA 2 TO 2.5 HR: Performed by: INTERNAL MEDICINE

## 2021-01-26 PROCEDURE — C1769 GUIDE WIRE: HCPCS | Performed by: INTERNAL MEDICINE

## 2021-01-26 PROCEDURE — 74011000258 HC RX REV CODE- 258: Performed by: NURSE ANESTHETIST, CERTIFIED REGISTERED

## 2021-01-26 PROCEDURE — 74011250636 HC RX REV CODE- 250/636: Performed by: NURSE ANESTHETIST, CERTIFIED REGISTERED

## 2021-01-26 PROCEDURE — 74011000250 HC RX REV CODE- 250: Performed by: NURSE ANESTHETIST, CERTIFIED REGISTERED

## 2021-01-26 PROCEDURE — 77030031139 HC SUT VCRL2 J&J -A: Performed by: INTERNAL MEDICINE

## 2021-01-26 PROCEDURE — C1751 CATH, INF, PER/CENT/MIDLINE: HCPCS | Performed by: INTERNAL MEDICINE

## 2021-01-26 PROCEDURE — C1900 LEAD, CORONARY VENOUS: HCPCS | Performed by: INTERNAL MEDICINE

## 2021-01-26 PROCEDURE — 71045 X-RAY EXAM CHEST 1 VIEW: CPT

## 2021-01-26 PROCEDURE — C1893 INTRO/SHEATH, FIXED,NON-PEEL: HCPCS | Performed by: INTERNAL MEDICINE

## 2021-01-26 PROCEDURE — 77030002996 HC SUT SLK J&J -A: Performed by: INTERNAL MEDICINE

## 2021-01-26 PROCEDURE — 77030018729 HC ELECTRD DEFIB PAD CARD -B: Performed by: INTERNAL MEDICINE

## 2021-01-26 PROCEDURE — 93005 ELECTROCARDIOGRAM TRACING: CPT

## 2021-01-26 PROCEDURE — C1894 INTRO/SHEATH, NON-LASER: HCPCS | Performed by: INTERNAL MEDICINE

## 2021-01-26 PROCEDURE — C1882 AICD, OTHER THAN SING/DUAL: HCPCS | Performed by: INTERNAL MEDICINE

## 2021-01-26 PROCEDURE — 74011000636 HC RX REV CODE- 636: Performed by: INTERNAL MEDICINE

## 2021-01-26 PROCEDURE — 2709999900 HC NON-CHARGEABLE SUPPLY: Performed by: INTERNAL MEDICINE

## 2021-01-26 PROCEDURE — 74011000272 HC RX REV CODE- 272: Performed by: INTERNAL MEDICINE

## 2021-01-26 PROCEDURE — 33225 L VENTRIC PACING LEAD ADD-ON: CPT

## 2021-01-26 PROCEDURE — 74011250636 HC RX REV CODE- 250/636: Performed by: INTERNAL MEDICINE

## 2021-01-26 PROCEDURE — 33249 INSJ/RPLCMT DEFIB W/LEAD(S): CPT | Performed by: INTERNAL MEDICINE

## 2021-01-26 DEVICE — LEFT HEART LEAD
Type: IMPLANTABLE DEVICE | Status: FUNCTIONAL
Brand: QUARTET™

## 2021-01-26 DEVICE — CARDIAC RESYNCHRONIZATION DEVICE, TIERED-THERAPY CARDIOVERTER/DEFIBRILLATOR VVED DDDRV
Type: IMPLANTABLE DEVICE | Status: FUNCTIONAL
Brand: QUADRA ASSURA MP™

## 2021-01-26 DEVICE — LEAD DEFIB 8FR L58CM OPTIM EXT RET HELIX SGL COIL TRUE BPLR: Type: IMPLANTABLE DEVICE | Status: FUNCTIONAL

## 2021-01-26 DEVICE — LEAD PCMKR TENDRIL 52CM --: Type: IMPLANTABLE DEVICE | Status: FUNCTIONAL

## 2021-01-26 RX ORDER — LIDOCAINE HYDROCHLORIDE AND EPINEPHRINE 10; 10 MG/ML; UG/ML
INJECTION, SOLUTION INFILTRATION; PERINEURAL AS NEEDED
Status: DISCONTINUED | OUTPATIENT
Start: 2021-01-26 | End: 2021-01-26 | Stop reason: HOSPADM

## 2021-01-26 RX ORDER — MIDAZOLAM HYDROCHLORIDE 1 MG/ML
INJECTION, SOLUTION INTRAMUSCULAR; INTRAVENOUS AS NEEDED
Status: DISCONTINUED | OUTPATIENT
Start: 2021-01-26 | End: 2021-01-26 | Stop reason: HOSPADM

## 2021-01-26 RX ORDER — ONDANSETRON 2 MG/ML
4 INJECTION INTRAMUSCULAR; INTRAVENOUS AS NEEDED
Status: CANCELLED | OUTPATIENT
Start: 2021-01-26

## 2021-01-26 RX ORDER — SODIUM CHLORIDE 0.9 % (FLUSH) 0.9 %
5-40 SYRINGE (ML) INJECTION AS NEEDED
Status: CANCELLED | OUTPATIENT
Start: 2021-01-26

## 2021-01-26 RX ORDER — SODIUM CHLORIDE 0.9 % (FLUSH) 0.9 %
5-40 SYRINGE (ML) INJECTION EVERY 8 HOURS
Status: CANCELLED | OUTPATIENT
Start: 2021-01-26

## 2021-01-26 RX ORDER — DIPHENHYDRAMINE HYDROCHLORIDE 50 MG/ML
12.5 INJECTION, SOLUTION INTRAMUSCULAR; INTRAVENOUS
Status: CANCELLED | OUTPATIENT
Start: 2021-01-26 | End: 2021-01-27

## 2021-01-26 RX ORDER — SODIUM CHLORIDE, SODIUM LACTATE, POTASSIUM CHLORIDE, CALCIUM CHLORIDE 600; 310; 30; 20 MG/100ML; MG/100ML; MG/100ML; MG/100ML
25 INJECTION, SOLUTION INTRAVENOUS CONTINUOUS
Status: CANCELLED | OUTPATIENT
Start: 2021-01-26 | End: 2021-01-27

## 2021-01-26 RX ORDER — LIDOCAINE HYDROCHLORIDE 10 MG/ML
0.1 INJECTION, SOLUTION EPIDURAL; INFILTRATION; INTRACAUDAL; PERINEURAL AS NEEDED
Status: CANCELLED | OUTPATIENT
Start: 2021-01-26

## 2021-01-26 RX ORDER — EPHEDRINE SULFATE/0.9% NACL/PF 50 MG/5 ML
SYRINGE (ML) INTRAVENOUS AS NEEDED
Status: DISCONTINUED | OUTPATIENT
Start: 2021-01-26 | End: 2021-01-26 | Stop reason: HOSPADM

## 2021-01-26 RX ORDER — DEXMEDETOMIDINE HYDROCHLORIDE 100 UG/ML
INJECTION, SOLUTION INTRAVENOUS AS NEEDED
Status: DISCONTINUED | OUTPATIENT
Start: 2021-01-26 | End: 2021-01-26 | Stop reason: HOSPADM

## 2021-01-26 RX ORDER — FENTANYL CITRATE 50 UG/ML
25 INJECTION, SOLUTION INTRAMUSCULAR; INTRAVENOUS
Status: CANCELLED | OUTPATIENT
Start: 2021-01-26

## 2021-01-26 RX ORDER — SODIUM CHLORIDE, SODIUM LACTATE, POTASSIUM CHLORIDE, CALCIUM CHLORIDE 600; 310; 30; 20 MG/100ML; MG/100ML; MG/100ML; MG/100ML
25 INJECTION, SOLUTION INTRAVENOUS CONTINUOUS
Status: CANCELLED | OUTPATIENT
Start: 2021-01-26

## 2021-01-26 RX ORDER — SODIUM CHLORIDE 9 MG/ML
INJECTION, SOLUTION INTRAVENOUS
Status: DISCONTINUED | OUTPATIENT
Start: 2021-01-26 | End: 2021-01-26 | Stop reason: HOSPADM

## 2021-01-26 RX ORDER — AMOXICILLIN AND CLAVULANATE POTASSIUM 875; 125 MG/1; MG/1
1 TABLET, FILM COATED ORAL 2 TIMES DAILY
Qty: 14 TAB | Refills: 0 | Status: SHIPPED | OUTPATIENT
Start: 2021-01-26 | End: 2021-02-02

## 2021-01-26 RX ORDER — PROPOFOL 10 MG/ML
INJECTION, EMULSION INTRAVENOUS
Status: DISCONTINUED | OUTPATIENT
Start: 2021-01-26 | End: 2021-01-26 | Stop reason: HOSPADM

## 2021-01-26 RX ORDER — HEPARIN SODIUM 200 [USP'U]/100ML
INJECTION, SOLUTION INTRAVENOUS
Status: COMPLETED | OUTPATIENT
Start: 2021-01-26 | End: 2021-01-26

## 2021-01-26 RX ORDER — ACETAMINOPHEN 325 MG/1
650 TABLET ORAL
Status: DISCONTINUED | OUTPATIENT
Start: 2021-01-26 | End: 2021-01-26 | Stop reason: HOSPADM

## 2021-01-26 RX ORDER — OXYCODONE AND ACETAMINOPHEN 5; 325 MG/1; MG/1
1 TABLET ORAL
Status: DISCONTINUED | OUTPATIENT
Start: 2021-01-26 | End: 2021-01-26 | Stop reason: HOSPADM

## 2021-01-26 RX ORDER — MORPHINE SULFATE 10 MG/ML
2 INJECTION, SOLUTION INTRAMUSCULAR; INTRAVENOUS
Status: CANCELLED | OUTPATIENT
Start: 2021-01-26

## 2021-01-26 RX ADMIN — PROPOFOL 25 MCG/KG/MIN: 10 INJECTION, EMULSION INTRAVENOUS at 08:58

## 2021-01-26 RX ADMIN — Medication 20 MG: at 09:15

## 2021-01-26 RX ADMIN — SODIUM CHLORIDE: 9 INJECTION, SOLUTION INTRAVENOUS at 08:49

## 2021-01-26 RX ADMIN — DEXMEDETOMIDINE HYDROCHLORIDE 25 MCG: 100 INJECTION, SOLUTION, CONCENTRATE INTRAVENOUS at 08:56

## 2021-01-26 RX ADMIN — MIDAZOLAM 1 MG: 1 INJECTION INTRAMUSCULAR; INTRAVENOUS at 08:54

## 2021-01-26 NOTE — PROGRESS NOTES
Patient ambulated in hallway with steady gait. Surgical site clean dry and intact. No complaints of dizziness, sob, chest pain. Discharge instructions reviewed with patient and answered questions as needed.

## 2021-01-26 NOTE — DISCHARGE INSTRUCTIONS
DISCHARGE INSTRUCTIONS FOR PATIENTS WITH ICD'S    You had a [employer] BIV-ICD implanted at the left chest on 1/26 by Dr. Edwin Danielle. 1. Remember to call for an appointment in 2-4 weeks [phone] to check healing and implant programming with Dr. Vimal Washington nurse, [facility]. 2. Medic Alert Bracelets are available from your pharmacist to wear at all times if you choose to wear one. 3. Carry your ID card for your ICD with you at all times. This card will be given to you in the hospital or mailed to you. 4. The ICD will bulge slightly under your skin. The bulge will decrease in size over the next few weeks. Please notify the doctor's office if you notice any of the following around your ICD site:   A.  A bruise that does not go away. B.  Soreness or yellow, green, or brown drainage from the site. C. Any swelling from the site. D. If you have a fever of 100 degrees or higher that lasts for a few days. INCISION CARE     1.  Leave the skin glue over your site until it dissolves on its own, usually in a few weeks. 2.  You may shower after 3 days as long as your incision isnt submerged or directly sprayed upon until well healed. 3.  For comfort, wear loose fitting clothing. 4.  Report any signs of infection, fever, pain, swelling, redness, oozing, or heat at site especially if these symptoms increase after the first 3 to 4 days. ACTIVITY PRECAUTIONS   1. Avoid rough contact with the implant site. 2. No driving for 14 days. 3. Avoid lifting your arm over your head, carrying anything on the affected side, or lifting over 10 pounds for 30 days. For the first 2 days only bend your arm at the elbow. 4. Any extreme activity such as golf, weight lifting or exercise biking should be restricted for 60 days. 5. Do not carry objects by holding them against your implant site. 6.  No shooting rifles or any type of gun with the affected shoulder permanently.   7.  Welding and chainsaws are prohibited. SPECIAL PRECAUTIONS  1. You should avoid all strong magnetic fields, such as arc welding, large transformers, large motors. Some ICD devices will beep if it detects a strong magnet. If this occurs, move out of the area. 2.  You may not have an MRI which uses a strong magnet to take pictures. 3.  Treatments or surgery that requires diathermy or electrocautery should be discussed with your doctor before scheduled. 4.  Avoid radio frequency transmitters, including radar. 5.  Advise dentist or other medical personnel you see that you have an ICD. 6.  Cell phones and microwave oven use is okay. 7.  If you plan to move or take a trip to a new area, the doctor's office will give you a name of a doctor to contact for any problems. SPECIAL INSTRUCTIONS ON SHOCKS   1. Notify your doctor for any of the following:       A. Anytime a shock is received in a 24 hour period. An office visit is not usually required for a single shock. B.  Two or more shocks in a row. If you do not feel well, call the [facility], otherwise call your doctor. This may require an office visit. C. Two or more shocks spaced apart by several hours. This may require an office visit. 2.  Keep a record of events. Include date, time, symptoms and activity at that time. ANTIBIOTIC THERAPY  During the first 8 weeks after your ICD insertion, you may need antibiotics before any dental work or certain tests or operations. Let the dentist or doctor who is caring for you know that you have had an implanted device. You will receive a prescription for a prophylactic antibiotic called Augmentin for 7 days after your implant.     Signed By: Boyd Mann MD     January 26, 2021

## 2021-01-26 NOTE — ANESTHESIA PREPROCEDURE EVALUATION
Anesthetic History   No history of anesthetic complications            Review of Systems / Medical History  Patient summary reviewed, nursing notes reviewed and pertinent labs reviewed    Pulmonary          Smoker      Comments: Former smoker, quit 1995   Neuro/Psych   Within defined limits           Cardiovascular    Hypertension: well controlled      CHF: NYHA Classification I, dyspnea on exertion  Dysrhythmias : atrial flutter, SVT and PVC  PAD and hyperlipidemia    Exercise tolerance: <4 METS  Comments: Cardiomyopathy ([address]      Cardiogenic shock -EF 10-15%, mod MR, and AS and moderate pulmonary hypertension  S/P A-Flutter ablation  Mild bilateral carotid disease   Took his beta blocker last night           GI/Hepatic/Renal         Renal disease: ARF       Endo/Other  Within defined limits          Comments: Pre-diabetes Other Findings            Physical Exam    Airway  Mallampati: III  TM Distance: < 4 cm  Neck ROM: decreased range of motion   Mouth opening: Diminished (comment)     Cardiovascular    Rhythm: irregular  Rate: normal         Dental    Dentition: Caps/crowns  Comments: Chipped upper right molar   Pulmonary  Breath sounds clear to auscultation               Abdominal  GI exam deferred       Other Findings            Anesthetic Plan    ASA: 4  Anesthesia type: MAC and total IV anesthesia    Monitoring Plan: BIS        Anesthetic plan and risks discussed with: Patient

## 2021-01-26 NOTE — Clinical Note
Sitz bath with Epsom salt brought to pt room, educated pt on using. Pt to call out when she is ready for sitz. TRANSFER - IN REPORT:     Verbal report received from: [facility]. Report consisted of patient's Situation, Background, Assessment and   Recommendations(SBAR). Opportunity for questions and clarification was provided. Assessment completed upon patient's arrival to unit and care assumed. Patient transported with a Registered Nurse.

## 2021-01-26 NOTE — ANESTHESIA POSTPROCEDURE EVALUATION
Procedure(s):  INSERT ICD BIV MULTI.    MAC, total IV anesthesia    Anesthesia Post Evaluation        Patient location during evaluation: PACU  Note status: Adequate. Level of consciousness: responsive to verbal stimuli and sleepy but conscious  Pain management: satisfactory to patient  Airway patency: patent  Anesthetic complications: no  Cardiovascular status: acceptable  Respiratory status: acceptable  Hydration status: acceptable  Comments: +Post-Anesthesia Evaluation and Assessment    Patient: King Costa MRN: [id number]  SSN: [id number]   YOB: 1945  Age: 77 y.o. Sex: male      Cardiovascular Function/Vital Signs    /88 (BP 1 Location: Left arm, BP Patient Position: At rest)   Pulse 66   Temp 36.5 °C (97.7 °F)   Resp 14   Ht 6' (1.829 m)   Wt 92.1 kg (203 lb)   SpO2 93%   BMI 27.53 kg/m²     Patient is status post Procedure(s):  INSERT ICD BIV MULTI. Nausea/Vomiting: Controlled. Postoperative hydration reviewed and adequate. Pain:  Pain Scale 1: Numeric (0 - 10) (01/26/21 1215)  Pain Intensity 1: 0 (01/26/21 1215)   Managed. Neurological Status: At baseline. Mental Status and Level of Consciousness: Arousable. Pulmonary Status:   O2 Device: Room air (01/26/21 1215)   Adequate oxygenation and airway patent. Complications related to anesthesia: None    Post-anesthesia assessment completed. No concerns. Signed By: Chuckie Murcia MD    1/26/2021  Post anesthesia nausea and vomiting:  controlled      INITIAL Post-op Vital signs:   Vitals Value Taken Time   /88 01/26/21 1215   Temp 36.5 °C (97.7 °F) 01/26/21 1055   Pulse 81 01/26/21 1236   Resp 15 01/26/21 1236   SpO2 92 % 01/26/21 1236   Vitals shown include unvalidated device data.

## 2021-01-26 NOTE — PROGRESS NOTES
TRANSFER - IN REPORT:    Verbal report received from MARIKA Malik RN  on Phyllis Mena  being received from EP LAb for routine progression of care. Report consisted of patients Situation, Background, Assessment and Recommendations(SBAR). Information from the following report(s) SBAR was reviewed with the receiving clinician. Opportunity for questions and clarification was provided. Assessment completed upon patients arrival to [address] [place] [address] and care assumed. Cardiac Cath Lab Recovery Arrival Note:    Phyllis Mena arrived to [facility] recovery area. Patient procedure= icd implant. Patient on cardiac monitor, non-invasive blood pressure, SPO2 monitor. On RA . IV  of KVO. Patient status doing well without problems. Patient is A&Ox 4. Patient reports no complaints. PROCEDURE SITE CHECK:    Procedure site:without any bleeding and no hematoma, no pain/discomfort reported at procedure site. No change in patient status. Continue to monitor patient and status.

## 2021-01-26 NOTE — PROGRESS NOTES
Cardiac Cath Lab Recovery Arrival Note:      Naomi Macias arrived to Cardiac Cath Lab, Recovery Area. Staff introduced to patient. Patient identifiers verified with NAME and DATE OF BIRTH. Procedure verified with patient. Consent forms reviewed and signed by patient or authorized representative and verified. Allergies verified. Patient and family oriented to department. Patient and family informed of procedure and plan of care. Questions answered with review. Patient prepped for procedure, per orders from physician, prior to arrival.    Patient on cardiac monitor, non-invasive blood pressure, SPO2 monitor. On RA. Patient is A&Ox 4. Patient reports no issues at this time. Patient in stretcher, in low position, with side rails up, call bell within reach, patient instructed to call if assistance as needed. Patient prep in: [address], [place] [address]. Patient family has pager #   Family in: no family with patient. Prep by: MIRZA Magaña RN

## 2021-01-26 NOTE — PROGRESS NOTES
S/p SJM L chest BIV-ICD, no complications, full note to follow. Defib testing not performed due to EF 10%, can be tested at a later time after some CRT if needed  Home later today if no issues.

## 2021-02-01 DIAGNOSIS — I50.22 CHF NYHA CLASS I (NO SYMPTOMS FROM ORDINARY ACTIVITIES), CHRONIC, SYSTOLIC (HCC): ICD-10-CM

## 2021-02-03 RX ORDER — BUMETANIDE 2 MG/1
TABLET ORAL
Qty: 270 TAB | Refills: 1 | Status: SHIPPED | OUTPATIENT
Start: 2021-02-03 | End: 2021-05-10

## 2021-02-05 ENCOUNTER — OFFICE VISIT (OUTPATIENT)
Dept: FAMILY MEDICINE CLINIC | Age: 76
End: 2021-02-05
Payer: MEDICARE

## 2021-02-05 VITALS
DIASTOLIC BLOOD PRESSURE: 66 MMHG | HEIGHT: 72 IN | WEIGHT: 207.8 LBS | BODY MASS INDEX: 28.15 KG/M2 | HEART RATE: 56 BPM | SYSTOLIC BLOOD PRESSURE: 114 MMHG | RESPIRATION RATE: 16 BRPM | TEMPERATURE: 97.6 F

## 2021-02-05 DIAGNOSIS — I35.0 AORTIC STENOSIS, MODERATE: ICD-10-CM

## 2021-02-05 DIAGNOSIS — N18.31 STAGE 3A CHRONIC KIDNEY DISEASE (HCC): ICD-10-CM

## 2021-02-05 DIAGNOSIS — I10 ESSENTIAL HYPERTENSION: ICD-10-CM

## 2021-02-05 DIAGNOSIS — Z98.890 STATUS POST ABLATION OF ATRIAL FIBRILLATION: ICD-10-CM

## 2021-02-05 DIAGNOSIS — I34.0 NONRHEUMATIC MITRAL VALVE REGURGITATION: ICD-10-CM

## 2021-02-05 DIAGNOSIS — R06.09 DOE (DYSPNEA ON EXERTION): ICD-10-CM

## 2021-02-05 DIAGNOSIS — E78.2 MIXED HYPERLIPIDEMIA: ICD-10-CM

## 2021-02-05 DIAGNOSIS — Z95.0 S/P BIVENTRICULAR CARDIAC PACEMAKER PROCEDURE: ICD-10-CM

## 2021-02-05 DIAGNOSIS — I27.20 PULMONARY HYPERTENSION (HCC): ICD-10-CM

## 2021-02-05 DIAGNOSIS — B33.24 VIRAL CARDIOMYOPATHY (HCC): Primary | ICD-10-CM

## 2021-02-05 DIAGNOSIS — Z86.79 STATUS POST ABLATION OF ATRIAL FIBRILLATION: ICD-10-CM

## 2021-02-05 DIAGNOSIS — Z79.899 ENCOUNTER FOR LONG-TERM (CURRENT) USE OF MEDICATIONS: ICD-10-CM

## 2021-02-05 DIAGNOSIS — E78.00 HIGH CHOLESTEROL: ICD-10-CM

## 2021-02-05 PROCEDURE — G8754 DIAS BP LESS 90: HCPCS | Performed by: FAMILY MEDICINE

## 2021-02-05 PROCEDURE — G0463 HOSPITAL OUTPT CLINIC VISIT: HCPCS | Performed by: FAMILY MEDICINE

## 2021-02-05 PROCEDURE — G8427 DOCREV CUR MEDS BY ELIG CLIN: HCPCS | Performed by: FAMILY MEDICINE

## 2021-02-05 PROCEDURE — 99214 OFFICE O/P EST MOD 30 MIN: CPT | Performed by: FAMILY MEDICINE

## 2021-02-05 PROCEDURE — G8752 SYS BP LESS 140: HCPCS | Performed by: FAMILY MEDICINE

## 2021-02-05 PROCEDURE — G8536 NO DOC ELDER MAL SCRN: HCPCS | Performed by: FAMILY MEDICINE

## 2021-02-05 PROCEDURE — G8419 CALC BMI OUT NRM PARAM NOF/U: HCPCS | Performed by: FAMILY MEDICINE

## 2021-02-05 PROCEDURE — 3017F COLORECTAL CA SCREEN DOC REV: CPT | Performed by: FAMILY MEDICINE

## 2021-02-05 PROCEDURE — 1101F PT FALLS ASSESS-DOCD LE1/YR: CPT | Performed by: FAMILY MEDICINE

## 2021-02-05 PROCEDURE — G8510 SCR DEP NEG, NO PLAN REQD: HCPCS | Performed by: FAMILY MEDICINE

## 2021-02-05 NOTE — PROGRESS NOTES
*ATTENTION:  This note has been created by a medical student for educational purposes only. Please do not refer to the content of this note for clinical decision-making, billing, or other purposes. Please see attending physicians note to obtain clinical information on this patient. *     Subjective:  Mr. Jill Kelly is a 80y/o man w/ medical history of hypertension, CHF, arrhythmia, pulmonary hypertension, hyperlipidemia presenting for 3 month follow-up of his chronic conditions. About 6 weeks ago he noted worsening shortness of breath and was seen by his cardiologist, Dr. Laure Churchill. It was determined that he would benefit from a defibrillator/pacemaker and underwent this operation about 1 week ago. Since then he has had worsening shortness of breath and significant soreness in the area of device. Mr. Jill Kelly experiences dyspnea on exertion, noting shortness of breath going to the mailbox or walking in the grocery store. He does not have dyspnea at rest. He also notes his legs feel tired during these episodes. He denies chest pain, palpitations, diaphoresis, or cough associated with his shortness of breath. Dyspnea resolves with rest.    Mr. Jill Kelly notes he has been constipated since his operation. He also feels more sensitive to the cold. He has been monitoring his BP at home and continues to have readings in 110s-120s. He has been eating well but has not been exercising due to his shortness of breath. He has not been sleeping well, often waking around 3am to urinate and unable to fall back to sleep afterwards. He does not smoke or use alcohol.     ROS:  Denies fever, chills, headache, vision change, rash  Denies chest pain, palpitations, cough, edema  Denies abdominal pain or urinary changes     Current Outpatient Medications on File Prior to Visit   Medication Sig Dispense Refill    bumetanide (BUMEX) 2 mg tablet TAKE TWO TABLETS BY MOUTH EVERY MORNING & TAKE ONE TABLET BY MOUTH EVERY EVENING 270 Tab 1    sodium bicarbonate 650 mg tablet Take 1 Tab by mouth two (2) times a day. 180 Tab 1    testosterone 12.5 mg/ 1.25 gram (1 %) glpm APPLY 4 PUMPS TO SHOULDERS AND UPPER ARMS ONCE DAILY IN THE MORNING 225 g 2    rosuvastatin (CRESTOR) 20 mg tablet TAKE ONE TABLET BY MOUTH ONCE NIGHTLY 90 Tab 3    lisinopriL (PRINIVIL, ZESTRIL) 5 mg tablet TAKE ONE TABLET BY MOUTH DAILY 90 Tab 3    isosorbide dinitrate (ISORDIL) 5 mg tablet TAKE ONE TABLET BY MOUTH TWICE A  Tab 3    gabapentin (NEURONTIN) 300 mg capsule TAKE ONE CAPSULE BY MOUTH ONCE NIGHTLY 90 Cap 1    carvediloL (COREG) 12.5 mg tablet TAKE ONE TABLET BY MOUTH TWICE A  Tab 3    Eliquis 5 mg tablet Take 5 mg by mouth two (2) times a day.  Cholecalciferol, Vitamin D3, (VITAMIN D3) 1,000 unit Cap Take 1 Cap by mouth daily.  aspirin delayed-release 81 mg tablet Take 81 mg by mouth daily. No current facility-administered medications on file prior to visit.         Past Medical History:   Diagnosis Date    Arrhythmia 11/11/2014    ED (erectile dysfunction) of organic origin     High cholesterol 3/31/2010    HTN (hypertension) 3/31/2010    Prediabetes 11/11/2014    Pulmonary hypertension ([facility] [address] 8/8/2017    S/P ablation of atrial flutter 3/22/2016    S/P biventricular cardiac pacemaker procedure 5/02/8847    Systolic CHF, acute ([facility] [address] 3/22/2016     Past Surgical History:   Procedure Laterality Date    HX ORTHOPAEDIC      fx wrist    HX TONSILLECTOMY      WA INSJ/RPLCMT PERM DFB W/TRNSVNS LDS 1/DUAL CHMBR N/A 1/26/2021    INSERT ICD BIV MULTI performed by Mali Bill MD at [facility] CARDIAC CATH LAB     Objective:  Visit Vitals  /66 (BP 1 Location: Left upper arm, BP Patient Position: Sitting, BP Cuff Size: Adult)   Pulse (!) 56   Temp 97.6 °F (36.4 °C) (Temporal)   Resp 16   Ht 6' (1.829 m)   Wt 207 lb 12.8 oz (94.3 kg)   BMI 28.18 kg/m²     Physical Exam:  General--alert and engaged,appropriately groomed, in no distress  HENT--anicteric sclera, no LAD  Lung--clear to ausculation bilaterally, no wheezes or crackles  CV--normal rate, regular rhythm. No murmurs, rubs, or extra sounds  Abd--bowel sounds present, no tenderness to palpation  Extremities--pulses equal bilaterally, trace edema in LE  Psych--appropriate mood and affect    Assessment and Plan:  Mr. Agata Sales is a 78 y/o man w/ history of hypertension, CHF, pulmonary hypertension, and hyperlipidemia presenting for 3 month follow-up. He continues to have dyspnea on exertion after recent defibrillator/pacemaker placement. Hypertension well managed. 1) Dyspnea on exertion  --following up with cardiology next week. Longstanding cardiac history with worsening of EF in recent months (10-15%). Likely CHF exacerbation, also consider worsening pulmonary hypertension. Current symptoms may be exacerbated due to recent procedure and recovery. Continue to monitor over the coming weeks as he recovers.    2) Hypertension  --stable, maintain current regimen  3) Constipation  --recommended trial of Miralax if symptoms do not resolve in the coming days    Sharyle Living, Medical Student

## 2021-02-05 NOTE — PROGRESS NOTES
Chief Complaint   Patient presents with    Follow-up     4 month follow-up   1. Have you been to the ER, urgent care clinic since your last visit? Hospitalized since your last visit? Yes Where: ED [facility] Pacemaker and Defibrillator    2. Have you seen or consulted any other health care providers outside of the [address] [place] [address] since your last visit? Include any pap smears or colon screening.  Yes Where: Dr Nora Urrutia ,Dr Karina Morrow of breath and fatigue

## 2021-02-05 NOTE — PROGRESS NOTES
Blair Eubanks (: 1945) is a 77 y.o. male, established patient, here for evaluation of the following chief complaint(s):  Follow-up (4 month follow-up)       ASSESSMENT/PLAN:  Diagnoses and all orders for this visit:    1. Viral cardiomyopathy ([facility] [address]  2. S/P biventricular cardiac pacemaker procedure  3. S/P ablation of atrial fibrillation  4. Pulmonary hypertension ([address]  5. Aortic stenosis, moderate  6. Nonrheumatic mitral valve regurgitation  7. FERRER (dyspnea on exertion)  Continue working with Dr. Lissette Hooks and Dr. Zack Peña on this. Recovering well from ICD    8. Essential hypertensionwell-controlled    9. High cholesterolhas been controlled, awaiting new labs    10. Mixed hyperlipidemiaas above    11. Stage 3a chronic kidney diseaseawaiting new labs      Return in about 3 months (around 2021), or if symptoms worsen or fail to improve. SUBJECTIVE/OBJECTIVE:  Had BIV ICD placed on 21 with Dr. Zack Peña. Was getting dyspneic and had cardiac eval with Dr. Lissette Hooks. Ended up being sent to Dr. Zack Peña after this. Feeling better now. Some chest discomfort in area of procedure but mild. Notes reviewed from Dr. Lissette Hooks and Dr. Zack Peña. Most recent echo with EF of 10 to 15%. Has known mild to moderate aortic stenosis, moderate mitral regurgitation, moderate pulmonary hypertension, and asymptomatic bilateral carotid disease with 1-49% stenosis bilaterally. Patient reports dyspnea is worse and feels limited with any moderate activity. Still no orthopnea or PND    Blood pressure 114/66, pulse (!) 56, temperature 97.6 °F (36.4 °C), temperature source Temporal, resp. rate 16, height 6' (1.829 m), weight 207 lb 12.8 oz (94.3 kg).     PE  General appearance - alert, well appearing, and in no distress  Eyes -sclera anicteric  Neck - supple, no significant adenopathy, no thyromegaly  Chest - clear to auscultation, no wheezes, rales or rhonchi, symmetric air entry  Heart - normal rate, regular rhythm, normal S1, S2, no murmurs, rubs, clicks or gallops  Neurological - alert, oriented, normal speech, no focal findings or movement disorder noted  Extr - no edema  Psych - normal mood and affect    On this date 02/05/21 I have spent 30 minutes reviewing previous notes, test results and face to face with the patient discussing the diagnosis and importance of compliance with the treatment plan as well as documenting on the day of the visit. An electronic signature was used to authenticate this note.   -- Deven Dobbs MD

## 2021-02-14 DIAGNOSIS — E29.1 HYPOGONADISM IN MALE: ICD-10-CM

## 2021-02-14 DIAGNOSIS — N52.9 ED (ERECTILE DYSFUNCTION) OF ORGANIC ORIGIN: ICD-10-CM

## 2021-02-15 RX ORDER — TESTOSTERONE 10 MG/G
GEL TOPICAL
Qty: 225 G | Refills: 2 | Status: SHIPPED | OUTPATIENT
Start: 2021-02-15 | End: 2021-08-27

## 2021-02-17 ENCOUNTER — TRANSCRIBE ORDER (OUTPATIENT)
Dept: REGISTRATION | Age: 76
End: 2021-02-17

## 2021-02-17 ENCOUNTER — HOSPITAL ENCOUNTER (OUTPATIENT)
Dept: GENERAL RADIOLOGY | Age: 76
Discharge: HOME | End: 2021-02-17
Payer: MEDICARE

## 2021-02-17 DIAGNOSIS — R06.00 DYSPNEA AND RESPIRATORY ABNORMALITY: ICD-10-CM

## 2021-02-17 DIAGNOSIS — R06.89 DYSPNEA AND RESPIRATORY ABNORMALITY: Primary | ICD-10-CM

## 2021-02-17 DIAGNOSIS — R06.00 DYSPNEA AND RESPIRATORY ABNORMALITY: Primary | ICD-10-CM

## 2021-02-17 DIAGNOSIS — R06.89 DYSPNEA AND RESPIRATORY ABNORMALITY: ICD-10-CM

## 2021-02-17 PROCEDURE — 71046 X-RAY EXAM CHEST 2 VIEWS: CPT

## 2021-02-23 ENCOUNTER — HOSPITAL ENCOUNTER (OUTPATIENT)
Dept: LAB | Age: 76
Discharge: HOME | End: 2021-02-23
Payer: MEDICARE

## 2021-02-23 PROCEDURE — 80053 COMPREHEN METABOLIC PANEL: CPT

## 2021-02-23 PROCEDURE — 83036 HEMOGLOBIN GLYCOSYLATED A1C: CPT

## 2021-02-23 PROCEDURE — 36415 COLL VENOUS BLD VENIPUNCTURE: CPT

## 2021-02-23 PROCEDURE — 85027 COMPLETE CBC AUTOMATED: CPT

## 2021-02-23 PROCEDURE — 80061 LIPID PANEL: CPT

## 2021-02-24 LAB
ALBUMIN SERPL-MCNC: 4.5 G/DL (ref 3.7–4.7)
ALBUMIN/GLOB SERPL: 2 {RATIO} (ref 1.2–2.2)
ALP SERPL-CCNC: 57 IU/L (ref 39–117)
ALT SERPL-CCNC: 19 IU/L (ref 0–44)
AST SERPL-CCNC: 27 IU/L (ref 0–40)
BILIRUB SERPL-MCNC: 0.9 MG/DL (ref 0–1.2)
BUN SERPL-MCNC: 22 MG/DL (ref 8–27)
BUN/CREAT SERPL: 16 (ref 10–24)
CALCIUM SERPL-MCNC: 9.7 MG/DL (ref 8.6–10.2)
CHLORIDE SERPL-SCNC: 96 MMOL/L (ref 96–106)
CHOLEST SERPL-MCNC: 121 MG/DL (ref 100–199)
CO2 SERPL-SCNC: 30 MMOL/L (ref 20–29)
CREAT SERPL-MCNC: 1.41 MG/DL (ref 0.76–1.27)
ERYTHROCYTE [DISTWIDTH] IN BLOOD BY AUTOMATED COUNT: 13.9 % (ref 11.6–15.4)
EST. AVERAGE GLUCOSE BLD GHB EST-MCNC: 123 MG/DL
GLOBULIN SER CALC-MCNC: 2.3 G/DL (ref 1.5–4.5)
GLUCOSE SERPL-MCNC: 99 MG/DL (ref 65–99)
HBA1C MFR BLD: 5.9 % (ref 4.8–5.6)
HCT VFR BLD AUTO: 39.6 % (ref 37.5–51)
HDLC SERPL-MCNC: 34 MG/DL
HGB BLD-MCNC: 13.4 G/DL (ref 13–17.7)
INTERPRETATION: NORMAL
LDLC SERPL CALC-MCNC: 61 MG/DL (ref 0–99)
MCH RBC QN AUTO: 33.3 PG (ref 26.6–33)
MCHC RBC AUTO-ENTMCNC: 33.8 G/DL (ref 31.5–35.7)
MCV RBC AUTO: 98 FL (ref 79–97)
MORPHOLOGY BLD-IMP: ABNORMAL
PLATELET # BLD AUTO: 94 X10E3/UL (ref 150–450)
POTASSIUM SERPL-SCNC: 3.5 MMOL/L (ref 3.5–5.2)
PROT SERPL-MCNC: 6.8 G/DL (ref 6–8.5)
RBC # BLD AUTO: 4.03 X10E6/UL (ref 4.14–5.8)
SODIUM SERPL-SCNC: 142 MMOL/L (ref 134–144)
TRIGL SERPL-MCNC: 150 MG/DL (ref 0–149)
VLDLC SERPL CALC-MCNC: 26 MG/DL (ref 5–40)
WBC # BLD AUTO: 7.2 X10E3/UL (ref 3.4–10.8)

## 2021-03-24 ENCOUNTER — TRANSCRIBE ORDER (OUTPATIENT)
Dept: SCHEDULING | Age: 76
End: 2021-03-24

## 2021-03-24 DIAGNOSIS — I35.0 NON-RHEUMATIC AORTIC STENOSIS: Primary | ICD-10-CM

## 2021-03-25 DIAGNOSIS — G60.9 IDIOPATHIC PERIPHERAL NEUROPATHY: ICD-10-CM

## 2021-03-25 RX ORDER — GABAPENTIN 300 MG/1
CAPSULE ORAL
Qty: 90 CAP | Refills: 1 | Status: SHIPPED | OUTPATIENT
Start: 2021-03-25 | End: 2021-09-27

## 2021-03-30 ENCOUNTER — HOSPITAL ENCOUNTER (OUTPATIENT)
Dept: NON INVASIVE DIAGNOSTICS | Age: 76
Discharge: HOME | End: 2021-03-30
Attending: INTERNAL MEDICINE
Payer: MEDICARE

## 2021-03-30 VITALS
HEART RATE: 62 BPM | WEIGHT: 190 LBS | HEIGHT: 72 IN | DIASTOLIC BLOOD PRESSURE: 73 MMHG | SYSTOLIC BLOOD PRESSURE: 121 MMHG | BODY MASS INDEX: 25.73 KG/M2

## 2021-03-30 DIAGNOSIS — I35.0 NON-RHEUMATIC AORTIC STENOSIS: ICD-10-CM

## 2021-03-30 LAB
ECHO AV AREA PEAK VELOCITY: 0.61 CM2
ECHO AV AREA VTI: 0.67 CM2
ECHO AV AREA/BSA PEAK VELOCITY: 0.3 CM2/M2
ECHO AV AREA/BSA VTI: 0.3 CM2/M2
ECHO AV MEAN GRADIENT: 33.28 MMHG
ECHO AV PEAK GRADIENT: 72.16 MMHG
ECHO AV PEAK VELOCITY: 424.74 CM/S
ECHO AV VTI: 81.19 CM
ECHO LVOT DIAM: 2.2 CM
ECHO LVOT PEAK GRADIENT: 1.83 MMHG
ECHO LVOT PEAK VELOCITY: 67.64 CM/S
ECHO LVOT SV: 54.7 ML
ECHO LVOT VTI: 14.4 CM
STRESS BASELINE DIAS BP: 71 MMHG
STRESS BASELINE HR: 61 BPM
STRESS BASELINE SYS BP: 107 MMHG
STRESS ESTIMATED WORKLOAD: 1 METS
STRESS EXERCISE DUR MIN: NORMAL
STRESS PEAK DIAS BP: 104 MMHG
STRESS PEAK SYS BP: 121 MMHG
STRESS PERCENT HR ACHIEVED: 63 %
STRESS POST PEAK HR: 92 BPM
STRESS RATE PRESSURE PRODUCT: NORMAL BPM*MMHG
STRESS STAGE 1 BP: NORMAL MMHG
STRESS STAGE 1 HR: 62 BPM
STRESS STAGE 2 BP: NORMAL MMHG
STRESS STAGE 2 HR: 67 BPM
STRESS STAGE 3 BP: NORMAL MMHG
STRESS STAGE 3 HR: 70 BPM
STRESS STAGE 4 HR: 78 BPM
STRESS STAGE RECOVERY 1 BP: NORMAL MMHG
STRESS STAGE RECOVERY 1 HR: 67 BPM
STRESS STAGE RECOVERY 2 BP: NORMAL MMHG
STRESS STAGE RECOVERY 2 HR: 63 BPM
STRESS TARGET HR: 146 BPM

## 2021-03-30 PROCEDURE — 74011250636 HC RX REV CODE- 250/636: Performed by: INTERNAL MEDICINE

## 2021-03-30 PROCEDURE — 93351 STRESS TTE COMPLETE: CPT

## 2021-03-30 RX ORDER — ATROPINE SULFATE 0.1 MG/ML
1 INJECTION INTRAVENOUS
Status: ACTIVE | OUTPATIENT
Start: 2021-03-30 | End: 2021-03-30

## 2021-03-30 RX ORDER — DOBUTAMINE HYDROCHLORIDE 200 MG/100ML
10-40 INJECTION INTRAVENOUS
Status: COMPLETED | OUTPATIENT
Start: 2021-03-30 | End: 2021-03-30

## 2021-03-30 RX ORDER — ESMOLOL HYDROCHLORIDE 10 MG/ML
10 INJECTION INTRAVENOUS
Status: ACTIVE | OUTPATIENT
Start: 2021-03-30 | End: 2021-03-30

## 2021-03-30 RX ADMIN — DOBUTAMINE HYDROCHLORIDE 7.97 MCG/KG/MIN: 200 INJECTION INTRAVENOUS at 12:05

## 2021-04-25 DIAGNOSIS — E87.29 HYPERCHLOREMIC METABOLIC ACIDOSIS: ICD-10-CM

## 2021-04-25 RX ORDER — SODIUM BICARBONATE 650 MG/1
TABLET ORAL
Qty: 180 TAB | Refills: 1 | Status: SHIPPED | OUTPATIENT
Start: 2021-04-25 | End: 2021-07-26

## 2021-05-10 ENCOUNTER — HOSPITAL ENCOUNTER (OUTPATIENT)
Age: 76
Setting detail: OUTPATIENT SURGERY
Discharge: HOME | End: 2021-05-10
Attending: INTERNAL MEDICINE | Admitting: INTERNAL MEDICINE
Payer: MEDICARE

## 2021-05-10 VITALS
DIASTOLIC BLOOD PRESSURE: 76 MMHG | SYSTOLIC BLOOD PRESSURE: 129 MMHG | BODY MASS INDEX: 25.87 KG/M2 | RESPIRATION RATE: 18 BRPM | HEIGHT: 72 IN | HEART RATE: 72 BPM | OXYGEN SATURATION: 95 % | TEMPERATURE: 97.7 F | WEIGHT: 191 LBS

## 2021-05-10 DIAGNOSIS — I35.0 AORTIC VALVE STENOSIS, ETIOLOGY OF CARDIAC VALVE DISEASE UNSPECIFIED: ICD-10-CM

## 2021-05-10 PROCEDURE — 99153 MOD SED SAME PHYS/QHP EA: CPT | Performed by: INTERNAL MEDICINE

## 2021-05-10 PROCEDURE — 77030008543 HC TBNG MON PRSS MRTM -A: Performed by: INTERNAL MEDICINE

## 2021-05-10 PROCEDURE — 77030013744: Performed by: INTERNAL MEDICINE

## 2021-05-10 PROCEDURE — 74011000250 HC RX REV CODE- 250: Performed by: INTERNAL MEDICINE

## 2021-05-10 PROCEDURE — 77030013797 HC KT TRNSDUC PRSSR EDWD -A: Performed by: INTERNAL MEDICINE

## 2021-05-10 PROCEDURE — C1887 CATHETER, GUIDING: HCPCS | Performed by: INTERNAL MEDICINE

## 2021-05-10 PROCEDURE — C1769 GUIDE WIRE: HCPCS | Performed by: INTERNAL MEDICINE

## 2021-05-10 PROCEDURE — 77030004532 HC CATH ANGI DX IMP BSC -A: Performed by: INTERNAL MEDICINE

## 2021-05-10 PROCEDURE — 77030019569 HC BND COMPR RAD TERU -B: Performed by: INTERNAL MEDICINE

## 2021-05-10 PROCEDURE — 99152 MOD SED SAME PHYS/QHP 5/>YRS: CPT | Performed by: INTERNAL MEDICINE

## 2021-05-10 PROCEDURE — 93453 R&L HRT CATH W/VENTRICLGRPHY: CPT | Performed by: INTERNAL MEDICINE

## 2021-05-10 PROCEDURE — 74011250636 HC RX REV CODE- 250/636: Performed by: INTERNAL MEDICINE

## 2021-05-10 PROCEDURE — 77030010221 HC SPLNT WR POS TELE -B: Performed by: INTERNAL MEDICINE

## 2021-05-10 PROCEDURE — C1751 CATH, INF, PER/CENT/MIDLINE: HCPCS | Performed by: INTERNAL MEDICINE

## 2021-05-10 PROCEDURE — 74011000636 HC RX REV CODE- 636: Performed by: INTERNAL MEDICINE

## 2021-05-10 RX ORDER — NALOXONE HYDROCHLORIDE 1 MG/ML
0.4 INJECTION INTRAMUSCULAR; INTRAVENOUS; SUBCUTANEOUS AS NEEDED
Status: DISCONTINUED | OUTPATIENT
Start: 2021-05-10 | End: 2021-05-10 | Stop reason: HOSPADM

## 2021-05-10 RX ORDER — LIDOCAINE HYDROCHLORIDE 10 MG/ML
INJECTION INFILTRATION; PERINEURAL AS NEEDED
Status: DISCONTINUED | OUTPATIENT
Start: 2021-05-10 | End: 2021-05-10 | Stop reason: HOSPADM

## 2021-05-10 RX ORDER — TORSEMIDE 20 MG/1
60 TABLET ORAL
Qty: 180 TAB | Refills: 6 | Status: SHIPPED | OUTPATIENT
Start: 2021-05-10

## 2021-05-10 RX ORDER — MIDAZOLAM HYDROCHLORIDE 1 MG/ML
INJECTION, SOLUTION INTRAMUSCULAR; INTRAVENOUS AS NEEDED
Status: DISCONTINUED | OUTPATIENT
Start: 2021-05-10 | End: 2021-05-10 | Stop reason: HOSPADM

## 2021-05-10 RX ORDER — SODIUM CHLORIDE 0.9 % (FLUSH) 0.9 %
5-40 SYRINGE (ML) INJECTION AS NEEDED
Status: DISCONTINUED | OUTPATIENT
Start: 2021-05-10 | End: 2021-05-10 | Stop reason: HOSPADM

## 2021-05-10 RX ORDER — SODIUM CHLORIDE 9 MG/ML
500 INJECTION, SOLUTION INTRAVENOUS CONTINUOUS
Status: DISCONTINUED | OUTPATIENT
Start: 2021-05-10 | End: 2021-05-10 | Stop reason: HOSPADM

## 2021-05-10 RX ORDER — HEPARIN SODIUM 1000 [USP'U]/ML
INJECTION, SOLUTION INTRAVENOUS; SUBCUTANEOUS AS NEEDED
Status: DISCONTINUED | OUTPATIENT
Start: 2021-05-10 | End: 2021-05-10 | Stop reason: HOSPADM

## 2021-05-10 RX ORDER — DOBUTAMINE HYDROCHLORIDE 200 MG/100ML
INJECTION INTRAVENOUS
Status: DISCONTINUED | OUTPATIENT
Start: 2021-05-10 | End: 2021-05-10 | Stop reason: HOSPADM

## 2021-05-10 RX ORDER — FENTANYL CITRATE 50 UG/ML
INJECTION, SOLUTION INTRAMUSCULAR; INTRAVENOUS AS NEEDED
Status: DISCONTINUED | OUTPATIENT
Start: 2021-05-10 | End: 2021-05-10 | Stop reason: HOSPADM

## 2021-05-10 RX ORDER — VERAPAMIL HYDROCHLORIDE 2.5 MG/ML
INJECTION, SOLUTION INTRAVENOUS AS NEEDED
Status: DISCONTINUED | OUTPATIENT
Start: 2021-05-10 | End: 2021-05-10 | Stop reason: HOSPADM

## 2021-05-10 RX ORDER — DIPHENHYDRAMINE HYDROCHLORIDE 50 MG/ML
25 INJECTION, SOLUTION INTRAMUSCULAR; INTRAVENOUS
Status: DISCONTINUED | OUTPATIENT
Start: 2021-05-10 | End: 2021-05-10 | Stop reason: HOSPADM

## 2021-05-10 RX ORDER — BUDESONIDE AND FORMOTEROL FUMARATE DIHYDRATE 160; 4.5 UG/1; UG/1
2 AEROSOL RESPIRATORY (INHALATION) 2 TIMES DAILY
COMMUNITY
End: 2021-07-26 | Stop reason: ALTCHOICE

## 2021-05-10 RX ORDER — SODIUM CHLORIDE 0.9 % (FLUSH) 0.9 %
5-40 SYRINGE (ML) INJECTION EVERY 8 HOURS
Status: DISCONTINUED | OUTPATIENT
Start: 2021-05-10 | End: 2021-05-10 | Stop reason: HOSPADM

## 2021-05-10 RX ADMIN — SODIUM CHLORIDE 500 ML: 9 INJECTION, SOLUTION INTRAVENOUS at 09:49

## 2021-05-10 NOTE — PROGRESS NOTES
Cardiac Cath Lab Recovery Arrival Note:      Rj Vazquez arrived to Cardiac Cath Lab, Recovery Area. Staff introduced to patient. Patient identifiers verified with NAME and DATE OF BIRTH. Procedure verified with patient. Consent forms reviewed and signed by patient or authorized representative and verified. Allergies verified. Patient and family oriented to department. Patient and family informed of procedure and plan of care. Questions answered with review. Patient prepped for procedure, per orders from physician, prior to arrival.    Patient on cardiac monitor, non-invasive blood pressure, SPO2 monitor. On Room Air. Patient is A&Ox 4. Patient reports No Pain. Patient in stretcher, in low position, with side rails up, call bell within reach, patient instructed to call if assistance as needed. Patient prep in: [address], [place] [address]. Family in: Waiting Room .    Prep by: Tamika Muller RN

## 2021-05-10 NOTE — H&P
Outpatient Cath Lab History and Physical     5/10/2021  Patient: Maryjane Schroeder 77 y.o. 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 Progressive FERRER, now FERRER with walking across the room c/w NYHA class III symptoms. Here for L/RHC/coronaries/AV study to determine severity of AS. Recent  stress echo showed increase in MG from 16 to 33 mmHg with MARQUEZ remaining less than 1. He took carvedilol prior to this study so the gradient was unable to be augmented more. CRT-D placed in January with no significant improvement in symptoms. History:(for details see office notes)  Past Medical History:   Diagnosis Date    Arrhythmia 11/11/2014    ED (erectile dysfunction) of organic origin     High cholesterol 3/31/2010    HTN (hypertension) 3/31/2010    Prediabetes 11/11/2014    Pulmonary hypertension ([facility] [address] 8/8/2017    S/P ablation of atrial flutter 3/22/2016    S/P biventricular cardiac pacemaker procedure 2/66/4783    Systolic CHF, acute ([address] 3/22/2016       Review of Systems  Except as noted in HPI, patient denies recent fever or chills, nausea, vomiting, diarrhea, hemoptysis, hematemesis, dysuria, myalgias, focal neurologic symptoms, ecchymosis, angioedema, odynophagia, dysphagia, sore throat, earache,rash, melena, hematochezia, depression, GERD, cold intolerance, petechia, bleeding gums, or significant weight loss. A comprehensive review of systems was negative except for that written in the HPI.       Family History   Problem Relation Age of Onset    Heart Disease Father     Hypertension Father     Hypertension Mother     Heart Disease Mother    (see office notes for details)  Social History     Tobacco Use    Smoking status: Former Smoker     Packs/day: 2.00     Years: 35.00     Pack years: 70.00     Types: Cigarettes     Start date: 1963     Quit date: 1995     Years since quittin.1    Smokeless tobacco: Never Used   Substance Use Topics    Alcohol use: Yes     Alcohol/week: 1.0 standard drinks     Types: 1 Glasses of wine per week     Comment: 1 drink a month   (see office notes for details)    Allergies: No Known Allergies    Prior to Admission Medications (details in office records):  Prior to Admission medications    Medication Sig Start Date End Date Taking? Authorizing Provider   budesonide-formoteroL (SYMBICORT) 160-4.5 mcg/actuation HFAA Take 2 Puffs by inhalation two (2) times a day. Yes Provider, Historical   sodium bicarbonate 650 mg tablet TAKE ONE TABLET BY MOUTH TWICE A DAY 21  Yes Jeevan Wheat MD   testosterone 12.5 mg/ 1.25 gram (1 %) glpm APPLY 4 PUMPS TO SHOULDERS AND UPPER ARMS ONCE DAILY IN THE MORNING 2/15/21  Yes Jeevan Wheat MD   bumetanide (BUMEX) 2 mg tablet TAKE TWO TABLETS BY MOUTH EVERY MORNING & TAKE ONE TABLET BY MOUTH EVERY EVENING 2/3/21  Yes Jeevan Wheat MD   rosuvastatin (CRESTOR) 20 mg tablet TAKE ONE TABLET BY MOUTH ONCE NIGHTLY 20  Yes Jeevan Wheat MD   lisinopriL (PRINIVIL, ZESTRIL) 5 mg tablet TAKE ONE TABLET BY MOUTH DAILY 20  Yes Jeevan Wheat MD   isosorbide dinitrate (ISORDIL) 5 mg tablet TAKE ONE TABLET BY MOUTH TWICE A DAY 20  Yes Jeevan Wheat MD   carvediloL (COREG) 12.5 mg tablet TAKE ONE TABLET BY MOUTH TWICE A DAY 20  Yes Jeevan Wheat MD   Cholecalciferol, Vitamin D3, (VITAMIN D3) 1,000 unit Cap Take 1 Cap by mouth daily. Yes Provider, Historical   aspirin delayed-release 81 mg tablet Take 81 mg by mouth daily. Yes Provider, Historical   gabapentin (NEURONTIN) 300 mg capsule TAKE ONE CAPSULE BY MOUTH EVERY EVENING 3/25/21   Jeevan Wheat MD   Eliquis 5 mg tablet Take 5 mg by mouth two (2) times a day.  20   Provider, Historical         Physical Exam:  Overall VSSAF;    Visit Vitals  /72 (BP 1 Location: Left upper arm, BP Patient Position: At rest)   Pulse 64   Temp 98.4 °F (36.9 °C)   Ht 6' (1.829 m)   Wt 86.6 kg (191 lb)   SpO2 94%   BMI 25.90 kg/m²     Temp (24hrs), Av.4 °F (36.9 °C), Min:98.4 °F (36.9 °C), Max:98.4 °F (36.9 °C)      Physical Exam  GEN: NAD, appears stated age  HEENT: EOMI, MMM, OP clear  NECK: Normal JVP  CV: RRR, normal S1, soft S2, II/VI mid-to-late peaking systolic murmur at LUSB, no rubs or gallops  LUNGS: CTAB, no W/R/R  ABD: NABS, soft, NT/ND  EXT: No edema, 2+ and symmetrical radial pulses and pedal pulses b/l  PSYCH: Mood and affect normal  NEURO: AAO, MAEW, face symmetrical, speech intact    Labs: per outpatient records  CXR: per outpatient records    Plan of Care/Planned Procedure:  Risks, benefits, and alternatives reviewed with patient and he agrees to proceed with the procedure. For other plans, see orders. L/RHC/AV study/coronaries to evaluate severity of AS. Pardeep Ferreira, [address] [place] [address] / [address] [place] [address] [facility]  05/10/21

## 2021-05-10 NOTE — PROGRESS NOTES
Transfer to [facility] RR from Procedure Area    Verbal report given to jane nevarez on Anna Steele being transferred to Cardiac Cath Lab RR for routine progression of care   Patient is post r/lhc   procedure. Patient stable upon transfer to . Report consisted of patients Situation, Background, Assessment and   Recommendations(SBAR). Information from the following report(s) SBAR was reviewed with the receiving nurse. Opportunity for questions and clarification was provided. Patient medicated during procedure with orders obtained and verified by Dr. Ladarius Naylor. Refer to patient PROCEDURE REPORT for vital signs, assessment, status, and response during procedure.

## 2021-05-10 NOTE — PROGRESS NOTES
Pt verbalized understanding of discharge instructions PIV removed and guaze with tape placed; no bleeding or redness. Pt escorted to car via wheelchair with belongings.

## 2021-05-10 NOTE — PROGRESS NOTES
Pt verbalized understanding of discharge instructions . PIV removed and guaze with tape placed; no bleeding or redness. Pt escorted to car via wheelchair with belongings . Pt's friend is driving.

## 2021-05-10 NOTE — PROGRESS NOTES
TRANSFER - IN REPORT:    Verbal report received from Delonte Weeks CVT on Colonel Room  being received from latonya sousa for routine progression of care. Report consisted of patients Situation, Background, Assessment and Recommendations(SBAR). Information from the following report(s) SBAR, Procedure Summary, MAR, Recent Results and Cardiac Rhythm Paced was reviewed with the receiving clinician. Opportunity for questions and clarification was provided. Assessment completed upon patients arrival to [address] [place] [address] and care assumed. Cardiac Cath Lab Recovery Arrival Note:    Colonel Room arrived to [facility] recovery area. Patient procedure= R/LHC. Patient on cardiac monitor, non-invasive blood pressure, SPO2 monitor. On Room Air . IV  of NS on pump at 75 ml/hr. Patient status doing well without problems. Patient is A&Ox 4. Patient reports No PAin. PROCEDURE SITE CHECK:    Procedure site:without any bleeding and No Hematoma, No pain/discomfort reported at procedure site. No change in patient status. Continue to monitor patient and status.

## 2021-05-10 NOTE — PROGRESS NOTES
Cardiac Cath Lab Procedure Area Arrival Note:    Gracie Mccann arrived to Cardiac Cath Lab, Procedure Area. Patient identifiers verified with NAME and DATE OF BIRTH. Procedure verified with patient. Consent forms verified. Allergies verified. Patient informed of procedure and plan of care. Questions answered with review. Patient voiced understanding of procedure and plan of care. Patient on cardiac monitor, non-invasive blood pressure, SPO2 monitor. On ra . IV of ns on pump at 25 ml/hr. Patient status doing well without problems. Patient is A&Ox 4. Patient reports no cp or sob. Patient medicated during procedure with orders obtained and verified by Dr. Alvarado Jozef. Refer to patients Cardiac Cath Lab PROCEDURE REPORT for vital signs, assessment, status, and response during procedure, printed at end of case. Printed report on chart or scanned into chart.

## 2021-05-10 NOTE — DISCHARGE INSTRUCTIONS
**Your aortic valve is severely narrowed. We will discuss your case at our valve treatment conference this Wednesday (you do NOT need to be present). The valve clinic at [facility] will call you with treatment recommendations. **You do NOT have any significant blockages in your heart arteries. **Please keep your previously scheduled follow-up with Dr. Vero Valentino. **You will be scheduled to meet a cardiothoracic surgeon, Dr. Jing Gilmore, to evaluate your candidacy for TAVR (Transcatheter Aortic Valve Replacement) -- this is the catheter-based, minimally invasive way to replace your aortic valve. **Please call Dr. Eulogio Hameed office if you have any questions. **The only medication change was to STOP BUMEX and START TORSEMIDE -- see below. This is a diuretic that will hopefully make you urinate more. YOUR CURRENT MEDICATIONS  Current Discharge Medication List      START taking these medications    Details   torsemide (DEMADEX) 20 mg tablet Take 3 Tabs by mouth Before breakfast and dinner. Indications: Congestive Heart Failure (this replaces bumetanide or Bumex)  Qty: 180 Tab, Refills: 6  Start date: 5/10/2021         CONTINUE these medications which have NOT CHANGED    Details   budesonide-formoteroL (SYMBICORT) 160-4.5 mcg/actuation HFAA Take 2 Puffs by inhalation two (2) times a day. sodium bicarbonate 650 mg tablet TAKE ONE TABLET BY MOUTH TWICE A DAY  Qty: 180 Tab, Refills: 1    Associated Diagnoses: Hyperchloremic metabolic acidosis      testosterone 12.5 mg/ 1.25 gram (1 %) glpm APPLY 4 PUMPS TO SHOULDERS AND UPPER ARMS ONCE DAILY IN THE MORNING  Qty: 225 g, Refills: 2    Associated Diagnoses: ED (erectile dysfunction) of organic origin;  Hypogonadism in male      rosuvastatin (CRESTOR) 20 mg tablet TAKE ONE TABLET BY MOUTH ONCE NIGHTLY  Qty: 90 Tab, Refills: 3    Associated Diagnoses: Mixed hyperlipidemia      lisinopriL (PRINIVIL, ZESTRIL) 5 mg tablet TAKE ONE TABLET BY MOUTH DAILY  Qty: 90 Tab, Refills: 3    Associated Diagnoses: CHF NYHA class I (no symptoms from ordinary activities), chronic, systolic ([address] Essential hypertension      isosorbide dinitrate (ISORDIL) 5 mg tablet TAKE ONE TABLET BY MOUTH TWICE A DAY  Qty: 180 Tab, Refills: 3    Associated Diagnoses: Essential hypertension      carvediloL (COREG) 12.5 mg tablet TAKE ONE TABLET BY MOUTH TWICE A DAY  Qty: 180 Tab, Refills: 3    Associated Diagnoses: CHF NYHA class I (no symptoms from ordinary activities), chronic, systolic ([address] Essential hypertension      Cholecalciferol, Vitamin D3, (VITAMIN D3) 1,000 unit Cap Take 1 Cap by mouth daily. aspirin delayed-release 81 mg tablet Take 81 mg by mouth daily. gabapentin (NEURONTIN) 300 mg capsule TAKE ONE CAPSULE BY MOUTH EVERY EVENING  Qty: 90 Cap, Refills: 1    Associated Diagnoses: Idiopathic peripheral neuropathy      Eliquis 5 mg tablet Take 5 mg by mouth two (2) times a day. STOP taking these medications       bumetanide (BUMEX) 2 mg tablet Comments:   Reason for Stopping:                After Your Cardiac Catheterization    Cardiac catheterization (also called cardiac cath) is an invasive imaging procedure that allows your doctor to look at your coronary arteries to diagnose coronary artery disease. It can also be used to measure pressures in your chambers, and evaluate the function of your heart. Instructions for going home after Cardiac Catheterization    Care for the Catheter Insertion Site  Procedures may be performed in the femoral artery in the groin (in the area at the top of your thigh) or in the radial artery in your arm. When you go home, there will be a bandage (dressing) over the catheter insertion site (also called the wound site).  The morning after your procedure, you may take the dressing off. The easiest way to do this is when you are showering, get the tape and dressing wet and remove it.     After the bandage is removed, cover the area with a small adhesive bandage. It is normal for the catheter insertion site to be black and blue for a couple of days. The site may also be slightly swollen and pink, and there may be a small lump (about the size of a quarter) at the site.  Wash the catheter insertion site at least once daily with soap and water. Place soapy water on your hand or washcloth and gently wash the insertion site; do not rub.  Keep the area clean and dry when you are not showering.  Do not use powders, creams, lotions or ointment on the wound site.  Wear loose clothes and loose underwear.  Do not take a bath, tub soak, go in a Jacuzzi, or swim in a pool or lake for one week after the procedure.  You may notice a small lump at the site. This is normal and may last up to 6 weeks. CHECK THE CATHETER INSERTION SITE DAILY:    If bleeding at the cath site occurs, take a clean washcloth and apply direct pressure just above the puncture site for at least 15 minutes. Call 911 immediately if the bleeding is not controlled. Continue to apply direct pressure until an ambulance gets to your location. Do not try to drive yourself or have someone else drive you to the hospital.  Have the ambulance bring you to the emergency room. Activity Guidelines  Your doctor will tell you when you can resume activities. In general, you will need to take it easy for the first two days after you get home. You can expect to feel tired and weak the day after the procedure. Take walks around your house and plan to rest during the day. For femoral cardiac cath   Do not strain during bowel movements for the first 3 to 4 days after the procedure to prevent bleeding from the catheter insertion site.  Avoid heavy lifting (more than 10 pounds) and pushing or pulling heavy objects for the first 5 to 7 days after the procedure.  Do not participate in strenuous activities for 5 days after the procedure.  This includes most sports - jogging, golfing, play tennis, and bowling.  You may climb stairs if needed, but walk up and down the stairs more slowly than usual.    Gradually increase your activities until you reach your normal activity level within one week after the procedure. For radial cardiac cath   Do not participate in strenuous activities for 2 days after the procedure. This includes most sports - jogging, golfing, play tennis, and bowling.  Gradually increase your activities until you reach your normal activity level within two days after the procedure. Ask your doctor when it is safe to   Return to work.  Resume sexual activity.  Resume driving. Most people are able to resume driving within 24 hours after going home. Medications   Please review your medications with your doctor before you go home. Ask your doctor if you should continue taking the medications you were taking before the procedure.  If you have diabetes, your doctor may adjust your diabetes medications for one to two days after your procedure. Please be sure to ask for specific directions about taking your diabetes medication after the procedure.  Depending on the results of your procedure, your doctor may prescribe new medication. Please make sure you understand what medications you should be taking after the procedure and how often to take them.  You may use Tylenol 325mg 1-2 tablets every 6 hours as needed for pain or discomfort, unless otherwise instructed. If you have significant         discomfort more than 48 hours after your procedure, please call your physicians office.  If you take METFORMIN, do NOT take this for the next 2 days after your procedure. Fluid Guidelines  Be sure to drink eight to ten glasses of clear fluids (water is preferred) for the 24 hours to flush the contrast material from your system. Importance of a Heart-Healthy Lifestyle  It is important for you to be committed to leading a heart-healthy lifestyle.  Your health care team can help you achieve your goals, but it is up to you to take your medications as prescribed, make dietary changes, quit smoking, exercise regularly, keep your follow-up appointments and be an active member of the treatment team.    Follow Up  Please call your cardiologist to schedule a follow-up appointment in the next 1-2 weeks if possible. Ask your primary care doctor when you should return for follow-up. Please ask your doctor if you have any questions about cardiac catheterization, angioplasty or stenting. If possible, have someone stay with you for the first night. It is normal to feel tired for the first couple of days. Take it easy and follow your physicians instructions on activity. CALL THE PHYSICIAN:  ? If the site becomes red, swollen, or feels warm to the touch, or is healing poorly    ? If you note any large/extending bruise, fever >101.0 or chills  ? If your extremity has numbness, tingling, discoloration, abnormal swelling, tightness or pain   ? If you have difficulty with urination or develop nausea, vomiting, or severe abdominal pain    SIGNS AND SYMPTOMS:  ? Notify your physician for new or recurrent symptoms of chest discomfort, unusual shortness of breath or fatigue. These could be signs of a problem and should be discussed with your physician. ? For significant chest pain or symptoms of angina not relieved with rest:  if you have been prescribed Nitroglycerin, take as directed (taken under the tongue, one at a time 5 minutes apart for a total of 3 doses, sitting down). If the discomfort is not relieved after the 3rd Nitroglycerin, call 911. If you have not been prescribed Nitroglycerin and your chest discomfort is significant, call 911. Take the ambulance, do not try to drive yourself or have someone else drive you to the hospital.     AFTER CARE:  ? Follow up with your physician as instructed  ?  Follow a heart healthy diet with proper portion control, daily stress management, daily exercise, blood pressure and cholesterol control, and smoking cessation. The success of your stent, if you had one placed, and the prevention of future catheterizations heavily depends on your lifestyle changes you make now! ? You may start walking short distances the day of your procedure. Gradually increase as tolerated each day. Current activity recommendations are 30 minutes of exercise at least 5 days a week. Work up to this as you recover. Walk, ride a bike, or choose any other activity you enjoy to reach this activity goal.   ? Avoid walking outside in extremes of heat or cold. Walk inside (at home, at the mall, or at a large store) when it is cold and windy or hot and humid. ? If you had a stent placed, consider [facility] as a resource to help you make the needed lifestyle changes to live a heart healthy lifestyle. Discuss your candidacy with your physician. If you have questions, call your physicians office or the Cardiac Cath Lab at [phone]. The Cath Lab is operational from 6:30 a.m. to 5:00 p.m., Monday through Friday. After hours, notify your physician. [address] [place] [address] can be reached at [phone]. [facility] is operational Monday-Thursday 8:30 a.m. to 5:00 p.m. and Friday 8:30 a.m. to 12:00 p.m.     Remember:  IN CASE OF BLEEDING: KEEP FIRM PRESSURE ON THE PROCEDURE SITE AND CALL [phone] IF NOT CONTROLLED

## 2021-05-11 ENCOUNTER — OFFICE VISIT (OUTPATIENT)
Dept: FAMILY MEDICINE CLINIC | Age: 76
End: 2021-05-11
Payer: MEDICARE

## 2021-05-11 VITALS
BODY MASS INDEX: 26.36 KG/M2 | SYSTOLIC BLOOD PRESSURE: 96 MMHG | WEIGHT: 194.6 LBS | DIASTOLIC BLOOD PRESSURE: 57 MMHG | RESPIRATION RATE: 16 BRPM | HEIGHT: 72 IN | TEMPERATURE: 97 F | HEART RATE: 60 BPM | OXYGEN SATURATION: 98 %

## 2021-05-11 DIAGNOSIS — I35.0 SEVERE AORTIC STENOSIS: Primary | ICD-10-CM

## 2021-05-11 DIAGNOSIS — I50.22 CHF NYHA CLASS I (NO SYMPTOMS FROM ORDINARY ACTIVITIES), CHRONIC, SYSTOLIC (HCC): ICD-10-CM

## 2021-05-11 DIAGNOSIS — Z98.890 STATUS POST ABLATION OF ATRIAL FIBRILLATION: ICD-10-CM

## 2021-05-11 DIAGNOSIS — I48.91 ATRIAL FIBRILLATION, UNSPECIFIED TYPE (HCC): ICD-10-CM

## 2021-05-11 DIAGNOSIS — I27.20 PULMONARY HYPERTENSION (HCC): ICD-10-CM

## 2021-05-11 DIAGNOSIS — G60.9 IDIOPATHIC PERIPHERAL NEUROPATHY: ICD-10-CM

## 2021-05-11 DIAGNOSIS — Z95.0 S/P BIVENTRICULAR CARDIAC PACEMAKER PROCEDURE: ICD-10-CM

## 2021-05-11 DIAGNOSIS — N18.31 STAGE 3A CHRONIC KIDNEY DISEASE (HCC): ICD-10-CM

## 2021-05-11 DIAGNOSIS — Z86.79 STATUS POST ABLATION OF ATRIAL FIBRILLATION: ICD-10-CM

## 2021-05-11 DIAGNOSIS — I10 ESSENTIAL HYPERTENSION: ICD-10-CM

## 2021-05-11 DIAGNOSIS — D69.6 THROMBOCYTOPENIA (HCC): ICD-10-CM

## 2021-05-11 PROCEDURE — G8752 SYS BP LESS 140: HCPCS | Performed by: FAMILY MEDICINE

## 2021-05-11 PROCEDURE — G0463 HOSPITAL OUTPT CLINIC VISIT: HCPCS | Performed by: FAMILY MEDICINE

## 2021-05-11 PROCEDURE — G8427 DOCREV CUR MEDS BY ELIG CLIN: HCPCS | Performed by: FAMILY MEDICINE

## 2021-05-11 PROCEDURE — G8419 CALC BMI OUT NRM PARAM NOF/U: HCPCS | Performed by: FAMILY MEDICINE

## 2021-05-11 PROCEDURE — 1101F PT FALLS ASSESS-DOCD LE1/YR: CPT | Performed by: FAMILY MEDICINE

## 2021-05-11 PROCEDURE — G8536 NO DOC ELDER MAL SCRN: HCPCS | Performed by: FAMILY MEDICINE

## 2021-05-11 PROCEDURE — 3017F COLORECTAL CA SCREEN DOC REV: CPT | Performed by: FAMILY MEDICINE

## 2021-05-11 PROCEDURE — G8432 DEP SCR NOT DOC, RNG: HCPCS | Performed by: FAMILY MEDICINE

## 2021-05-11 PROCEDURE — 99213 OFFICE O/P EST LOW 20 MIN: CPT | Performed by: FAMILY MEDICINE

## 2021-05-11 PROCEDURE — G8754 DIAS BP LESS 90: HCPCS | Performed by: FAMILY MEDICINE

## 2021-05-11 RX ORDER — INHALER,ASSIST DEVICE,LG MASK
SPACER (EA) MISCELLANEOUS
COMMUNITY
Start: 2021-05-06 | End: 2021-07-26

## 2021-05-11 NOTE — PROGRESS NOTES
Chief Complaint   Patient presents with    Follow-up     3m f/u        1. Have you been to the ER, urgent care clinic since your last visit? Hospitalized since your last visit? No    2. Have you seen or consulted any other health care providers outside of the [address] [place] [address] since your last visit? Include any pap smears or colon screening.  Dr Braden Sahu cardiology cath angiography 5/10/2021     Discuss TAVR procedure cardiologist wants to perform

## 2021-05-11 NOTE — PROGRESS NOTES
Amandeep Ball (: 1945) is a 77 y.o. male, established patient, here for evaluation of the following chief complaint(s):  Follow-up (3m f/u )       ASSESSMENT/PLAN: ongoing concerns with AS and working with Dr. Ami Espinosa on TAVR. CKD stable and BP slightly low but no sx. Mild stable thrombocytopenia but underestimated d/t plt aggregation. May send for Hematology eval if worsening. Below is the assessment and plan developed based on review of pertinent history, physical exam, labs, studies, and medications. 1. Severe aortic stenosis  2. S/P biventricular cardiac pacemaker procedure  3. Atrial fibrillation, unspecified type ([address]  4. Idiopathic peripheral neuropathy  5. Pulmonary hypertension (HCC)  6. Status post ablation of atrial fibrillation  7. CHF NYHA class I (no symptoms from ordinary activities), chronic, systolic (HCC)  -     METABOLIC PANEL, COMPREHENSIVE; Future  8. Essential hypertension  -     METABOLIC PANEL, COMPREHENSIVE; Future  9. Stage 3a chronic kidney disease (HCC)  -     METABOLIC PANEL, COMPREHENSIVE; Future  10. Thrombocytopenia (HCC)  -     CBC WITH AUTOMATED DIFF; Future      No follow-ups on file. SUBJECTIVE/OBJECTIVE:  Doing well today. Ct following with Cardiology regularly - seeing Dr. Madonna Suero and Dr. Ami Espinosa. With progressive FERRER. Had [facility]&R HC on 5/10/21 to eval AS further with Dr. Ami Espinosa. Planning for TAVR. Having trouble being able to do tasks at home that he wants to do. ROS  Gen - no fever/chills  Resp - no coughing or wheezing  CV - no chest pain, + FERRER as above  Rest per HPI    Blood pressure (!) 96/57, pulse 60, temperature 97 °F (36.1 °C), temperature source Oral, resp. rate 16, height 6' (1.829 m), weight 194 lb 9.6 oz (88.3 kg), SpO2 98 %.     PE  General appearance - alert, well appearing, and in no distress  Eyes -sclera anicteric  Neck - supple, no significant adenopathy, no thyromegaly  Chest - clear to auscultation, no wheezes, rales or rhonchi, symmetric air entry  Heart - normal rate, regular rhythm, 4/6 JON and +pacemaker  Neurological - alert, oriented, normal speech, no focal findings or movement disorder noted  Extr - no edema  Psych - normal mood and affect    On this date 05/11/2021 I have spent 20 minutes reviewing previous notes, test results and face to face with the patient discussing the diagnosis and importance of compliance with the treatment plan as well as documenting on the day of the visit. An electronic signature was used to authenticate this note.   -- Macie Galindo MD

## 2021-05-14 ENCOUNTER — HOSPITAL ENCOUNTER (OUTPATIENT)
Dept: LAB | Age: 76
Discharge: HOME | End: 2021-05-14
Payer: MEDICARE

## 2021-05-14 PROCEDURE — 85025 COMPLETE CBC W/AUTO DIFF WBC: CPT

## 2021-05-14 PROCEDURE — 80053 COMPREHEN METABOLIC PANEL: CPT

## 2021-05-15 LAB
ALBUMIN SERPL-MCNC: 4.4 G/DL (ref 3.7–4.7)
ALBUMIN/GLOB SERPL: 1.6 {RATIO} (ref 1.2–2.2)
ALP SERPL-CCNC: 63 IU/L (ref 39–117)
ALT SERPL-CCNC: 26 IU/L (ref 0–44)
AST SERPL-CCNC: 33 IU/L (ref 0–40)
BASOPHILS # BLD AUTO: 0 X10E3/UL (ref 0–0.2)
BASOPHILS NFR BLD AUTO: 1 %
BILIRUB SERPL-MCNC: 0.9 MG/DL (ref 0–1.2)
BUN SERPL-MCNC: 23 MG/DL (ref 8–27)
BUN/CREAT SERPL: 18 (ref 10–24)
CALCIUM SERPL-MCNC: 9.6 MG/DL (ref 8.6–10.2)
CHLORIDE SERPL-SCNC: 98 MMOL/L (ref 96–106)
CO2 SERPL-SCNC: 24 MMOL/L (ref 20–29)
CREAT SERPL-MCNC: 1.27 MG/DL (ref 0.76–1.27)
EOSINOPHIL # BLD AUTO: 0.2 X10E3/UL (ref 0–0.4)
EOSINOPHIL NFR BLD AUTO: 2 %
ERYTHROCYTE [DISTWIDTH] IN BLOOD BY AUTOMATED COUNT: 14.2 % (ref 11.6–15.4)
GLOBULIN SER CALC-MCNC: 2.7 G/DL (ref 1.5–4.5)
GLUCOSE SERPL-MCNC: 154 MG/DL (ref 65–99)
HCT VFR BLD AUTO: 41.3 % (ref 37.5–51)
HGB BLD-MCNC: 13.7 G/DL (ref 13–17.7)
IMM GRANULOCYTES # BLD AUTO: 0 X10E3/UL (ref 0–0.1)
IMM GRANULOCYTES NFR BLD AUTO: 1 %
INTERPRETATION: NORMAL
LYMPHOCYTES # BLD AUTO: 1.2 X10E3/UL (ref 0.7–3.1)
LYMPHOCYTES NFR BLD AUTO: 17 %
MCH RBC QN AUTO: 31.1 PG (ref 26.6–33)
MCHC RBC AUTO-ENTMCNC: 33.2 G/DL (ref 31.5–35.7)
MCV RBC AUTO: 94 FL (ref 79–97)
MONOCYTES # BLD AUTO: 0.6 X10E3/UL (ref 0.1–0.9)
MONOCYTES NFR BLD AUTO: 9 %
MORPHOLOGY BLD-IMP: ABNORMAL
NEUTROPHILS # BLD AUTO: 4.9 X10E3/UL (ref 1.4–7)
NEUTROPHILS NFR BLD AUTO: 70 %
PLATELET # BLD AUTO: 99 X10E3/UL (ref 150–450)
POTASSIUM SERPL-SCNC: 3.7 MMOL/L (ref 3.5–5.2)
PROT SERPL-MCNC: 7.1 G/DL (ref 6–8.5)
RBC # BLD AUTO: 4.4 X10E6/UL (ref 4.14–5.8)
SODIUM SERPL-SCNC: 141 MMOL/L (ref 134–144)
WBC # BLD AUTO: 7 X10E3/UL (ref 3.4–10.8)

## 2021-05-15 NOTE — PROGRESS NOTES
Prev reviewed results and reviewed with pt again during appt. Plts low but clumped so likely lower than true reading. GRF stable, TG slightly up, has already had repeat labs.   Rechecking again

## 2021-07-06 ENCOUNTER — VIRTUAL VISIT (OUTPATIENT)
Dept: FAMILY MEDICINE CLINIC | Age: 76
End: 2021-07-06
Payer: MEDICARE

## 2021-07-06 DIAGNOSIS — D69.6 THROMBOCYTOPENIA (HCC): ICD-10-CM

## 2021-07-06 DIAGNOSIS — R91.1 INCIDENTAL PULMONARY NODULE: ICD-10-CM

## 2021-07-06 DIAGNOSIS — E04.1 THYROID NODULE GREATER THAN OR EQUAL TO 1 CM IN DIAMETER INCIDENTALLY NOTED ON IMAGING STUDY: Primary | ICD-10-CM

## 2021-07-06 PROCEDURE — 99442 PR PHYS/QHP TELEPHONE EVALUATION 11-20 MIN: CPT | Performed by: FAMILY MEDICINE

## 2021-07-06 NOTE — PROGRESS NOTES
Kevin Mondragon (: 1945) is a 77 y.o. male, established patient, here for evaluation of the following chief complaint(s):   Results (Discuss Cardiology visit)       ASSESSMENT/PLAN: Sending for ultrasound of thyroid nodule and likely will need FNA since was greater than 1 cm. Pulmonary nodule small enough but significant smoking history in the past so we will plan to repeat chest CT in 12 months to ensure no further concerns. Also sending to hematology for newer issue with thrombocytopenia over the past few months. Below is the assessment and plan developed based on review of pertinent labs, studies, and medications. 1. Thyroid nodule greater than or equal to 1 cm in diameter incidentally noted on imaging study  -     US THYROID/PARATHYROID/SOFT TISS; Future  2. Incidental pulmonary nodule  -     CT CHEST WO CONT; Future  3. Thrombocytopenia ([address]  -     REFERRAL TO HEMATOLOGY ONCOLOGY      Return in about 3 months (around 10/6/2021), or if symptoms worsen or fail to improve. SUBJECTIVE/OBJECTIVE:  Patient doing well today. Following up after incidentally during TAVR CT to treat aortic stenosis with Dr. Sarbjit Bowers. Reviewed concerns and the left thyroid lobe caudally with 2.3 x 3 x 3.5 cm complex probably solid and cystic mass noted. Also noted a 4 x 5 mm nodule in the medial right lung base. Also reviewed thrombocytopenia which has been a new issue in . Most recent platelets down to 22,974. ROS  Gen - no fever/chills  Resp - no dyspnea or cough  CV - no chest pain or FERRER  Rest per HPI    There were no vitals taken for this visit. No PE d/t Audio visit    On this date 2021 I have spent 11 minutes reviewing previous notes, test results and audio visit with the patient discussing the diagnosis and importance of compliance with the treatment plan as well as documenting on the day of the visit. Kevin Mondragon, was evaluated through an audio encounter.  The patient (or guardian if applicable) is aware that this is a billable service. Verbal consent to proceed has been obtained within the past 12 months. The visit was conducted pursuant to the emergency declaration under the [address] [place] [address] authority and the [employer] General Act. Patient identification was verified, and a caregiver was present when appropriate. The patient was located in a state where the provider was credentialed to provide care. An electronic signature was used to authenticate this note.   -- Jana Jaramillo MD   Thyroid nodule and lung nodule during work-up for TAVR

## 2021-07-07 ENCOUNTER — HOSPITAL ENCOUNTER (EMERGENCY)
Age: 76
Discharge: HOME | End: 2021-07-07
Attending: EMERGENCY MEDICINE
Payer: MEDICARE

## 2021-07-07 VITALS
DIASTOLIC BLOOD PRESSURE: 51 MMHG | HEART RATE: 62 BPM | TEMPERATURE: 98.4 F | BODY MASS INDEX: 26.45 KG/M2 | OXYGEN SATURATION: 98 % | SYSTOLIC BLOOD PRESSURE: 117 MMHG | WEIGHT: 195 LBS | RESPIRATION RATE: 18 BRPM

## 2021-07-07 DIAGNOSIS — R04.0 EPISTAXIS: Primary | ICD-10-CM

## 2021-07-07 PROCEDURE — 99283 EMERGENCY DEPT VISIT LOW MDM: CPT

## 2021-07-07 PROCEDURE — 75810000284 HC CNTRL NASAL HEMORHRAGE SIMPLE

## 2021-07-07 PROCEDURE — 74011250637 HC RX REV CODE- 250/637: Performed by: PHYSICIAN ASSISTANT

## 2021-07-07 RX ORDER — AMOXICILLIN AND CLAVULANATE POTASSIUM 875; 125 MG/1; MG/1
1 TABLET, FILM COATED ORAL 2 TIMES DAILY
Qty: 6 TABLET | Refills: 0 | Status: SHIPPED | OUTPATIENT
Start: 2021-07-07 | End: 2021-07-10

## 2021-07-07 RX ORDER — OXYMETAZOLINE HCL 0.05 %
2 SPRAY, NON-AEROSOL (ML) NASAL
Status: COMPLETED | OUTPATIENT
Start: 2021-07-07 | End: 2021-07-07

## 2021-07-07 RX ORDER — AMOXICILLIN AND CLAVULANATE POTASSIUM 875; 125 MG/1; MG/1
1 TABLET, FILM COATED ORAL
Status: COMPLETED | OUTPATIENT
Start: 2021-07-07 | End: 2021-07-07

## 2021-07-07 RX ADMIN — OXYMETAZOLINE HCL 2 SPRAY: 0.05 SPRAY NASAL at 19:02

## 2021-07-07 RX ADMIN — AMOXICILLIN AND CLAVULANATE POTASSIUM 1 TABLET: 875; 125 TABLET, FILM COATED ORAL at 21:45

## 2021-07-07 NOTE — ED TRIAGE NOTES
He started to have bleeding from his left nare this am that will not stop. He has it \"plugged\" currently. He takes Eliquis for an irregular heart beat.

## 2021-07-07 NOTE — ED PROVIDER NOTES
61-year-old male on Eliquis history of high cholesterol, hypertension, prediabetes, pulmonary hypertension, pacemaker, CHF, scheduled for a TAVR next week presenting to the ED for epistaxis. Patient reports that this morning he woke with a small amount of blood from the left naris that has been persistent throughout the day. Patient reports that he has repeatedly been placing \"plugs\" of paper towel into the nostril and they have been slowly getting soaked with blood. No trauma. No easy bruising. Patient notes that his PCP told him that his platelets were low after his last blood draw. Chart review indicates a history of thrombocytopenia. No other concerns. Past medical history: As above  Social history: Non-smoker    The history is provided by the patient.    Epistaxis          Past Medical History:   Diagnosis Date    Arrhythmia 11/11/2014    ED (erectile dysfunction) of organic origin     High cholesterol 3/31/2010    HTN (hypertension) 3/31/2010    Prediabetes 11/11/2014    Pulmonary hypertension ([facility] 8/8/2017    S/P ablation of atrial flutter 3/22/2016    S/P biventricular cardiac pacemaker procedure 8/40/8730    Systolic CHF, acute ([address] 3/22/2016       Past Surgical History:   Procedure Laterality Date    HX ORTHOPAEDIC      fx wrist    HX TONSILLECTOMY      IN INSJ/RPLCMT PERM DFB W/TRNSVNS LDS 1/DUAL CHMBR N/A 1/26/2021    INSERT ICD BIV MULTI performed by Brad Mack MD at [facility] CARDIAC CATH LAB         Family History:   Problem Relation Age of Onset    Heart Disease Father     Hypertension Father     Hypertension Mother     Heart Disease Mother        Social History     Socioeconomic History    Marital status: SINGLE     Spouse name: Not on file    Number of children: Not on file    Years of education: Not on file    Highest education level: Not on file   Occupational History    Not on file   Tobacco Use    Smoking status: Former Smoker     Packs/day: 2.00     Years: 35.00     Pack years: 70.00     Types: Cigarettes     Start date: 1963     Quit date: 1995     Years since quittin.2    Smokeless tobacco: Never Used   Vaping Use    Vaping Use: Never used   Substance and Sexual Activity    Alcohol use: Yes     Alcohol/week: 1.0 standard drinks     Types: 1 Glasses of wine per week     Comment: 1 drink a month    Drug use: No    Sexual activity: Yes     Partners: Female   Other Topics Concern    Not on file   Social History Narrative    Not on file     Social Determinants of Health     Financial Resource Strain:     Difficulty of Paying Living Expenses:    Food Insecurity:     Worried About Running Out of Food in the Last Year:     Ran Out of Food in the Last Year:    Transportation Needs:     Lack of Transportation (Medical):  Lack of Transportation (Non-Medical):    Physical Activity:     Days of Exercise per Week:     Minutes of Exercise per Session:    Stress:     Feeling of Stress :    Social Connections:     Frequency of Communication with Friends and Family:     Frequency of Social Gatherings with Friends and Family:     Attends [employer] Services:     Active Member of Clubs or Organizations:     Attends Club or Organization Meetings:     Marital Status:    Intimate Partner Violence:     Fear of Current or Ex-Partner:     Emotionally Abused:     Physically Abused:     Sexually Abused: ALLERGIES: Patient has no known allergies. Review of Systems   Constitutional: Negative for fever. HENT: Positive for nosebleeds. Negative for facial swelling. Respiratory: Negative for shortness of breath. Cardiovascular: Negative for chest pain. Gastrointestinal: Negative for vomiting. Skin: Negative for wound. Neurological: Negative for syncope. All other systems reviewed and are negative.       Vitals:    21 1659 21 2150   BP: 126/77 (!) 117/51   Pulse: 67 62   Resp: 16 18   Temp: 97.8 °F (36.6 °C) 98.4 °F (36.9 °C)   SpO2: 97% 98%   Weight: 88.5 kg (195 lb)             Physical Exam  Vitals and nursing note reviewed. Constitutional:       Appearance: He is well-developed. Comments: Pleasant, well-appearing elderly white male   HENT:      Head: Normocephalic. Nose:      Comments: Area of friability and very slow bleeding noted along the septum in the left nostril. Eyes:      Conjunctiva/sclera: Conjunctivae normal.   Cardiovascular:      Rate and Rhythm: Normal rate. Pulmonary:      Effort: Pulmonary effort is normal. No respiratory distress. Musculoskeletal:         General: Normal range of motion. Cervical back: Neck supple. Skin:     General: Skin is warm and dry. Neurological:      Mental Status: He is alert and oriented to person, place, and time. MDM  Number of Diagnoses or Management Options  Epistaxis  Diagnosis management comments: 50-year-old male on Eliquis presenting to the ED for epistaxis since this morning. Patient has tried plugging the nostril with paper towels which has likely been disrupting any clot formation and inhibiting cessation. Will start with Afrin, nasal clamp, reassess the need for possible nasal packing. Eventually achieved hemostasis with use of rapid Rhino, patient given first dose of Augmentin in the ED, discussed need for close ENT follow-up, timeframe for removal, prophylactic antibiotics, holding a couple of doses of Eliquis. Amount and/or Complexity of Data Reviewed  Clinical lab tests: reviewed  Discuss the patient with other providers: yes (Dr. Richy Patel, ED attending, who saw the patient)           Epistaxis Management    Date/Time: 7/7/2021 7:05 PM  Performed by: HIRA Sandoval  Authorized by:  Hayder Sandovalma     Consent:     Consent obtained:  Verbal    Consent given by:  Patient  Comments:      - Afrin instilled in the L nostril, clamp applied - 7:05 PM  - Reassessed after Afrin, nasal clamp, still bleeding, will place nasal packing  - rapid rhino packing placed, will reassess 7:53 PM   -Patient reassessed 30 minutes after placement of packing, no further bleeding noted, patient notes that it feels like it has stopped

## 2021-07-08 ENCOUNTER — OFFICE VISIT (OUTPATIENT)
Dept: CARDIOLOGY CLINIC | Age: 76
End: 2021-07-08
Payer: MEDICARE

## 2021-07-08 VITALS
DIASTOLIC BLOOD PRESSURE: 58 MMHG | SYSTOLIC BLOOD PRESSURE: 110 MMHG | HEART RATE: 68 BPM | OXYGEN SATURATION: 95 % | RESPIRATION RATE: 18 BRPM

## 2021-07-08 DIAGNOSIS — I35.0 NONRHEUMATIC AORTIC VALVE STENOSIS: Primary | ICD-10-CM

## 2021-07-08 PROCEDURE — G8754 DIAS BP LESS 90: HCPCS | Performed by: THORACIC SURGERY (CARDIOTHORACIC VASCULAR SURGERY)

## 2021-07-08 PROCEDURE — 3017F COLORECTAL CA SCREEN DOC REV: CPT | Performed by: THORACIC SURGERY (CARDIOTHORACIC VASCULAR SURGERY)

## 2021-07-08 PROCEDURE — G8536 NO DOC ELDER MAL SCRN: HCPCS | Performed by: THORACIC SURGERY (CARDIOTHORACIC VASCULAR SURGERY)

## 2021-07-08 PROCEDURE — G8752 SYS BP LESS 140: HCPCS | Performed by: THORACIC SURGERY (CARDIOTHORACIC VASCULAR SURGERY)

## 2021-07-08 PROCEDURE — 99205 OFFICE O/P NEW HI 60 MIN: CPT | Performed by: THORACIC SURGERY (CARDIOTHORACIC VASCULAR SURGERY)

## 2021-07-08 PROCEDURE — G8432 DEP SCR NOT DOC, RNG: HCPCS | Performed by: THORACIC SURGERY (CARDIOTHORACIC VASCULAR SURGERY)

## 2021-07-08 PROCEDURE — G8427 DOCREV CUR MEDS BY ELIG CLIN: HCPCS | Performed by: THORACIC SURGERY (CARDIOTHORACIC VASCULAR SURGERY)

## 2021-07-08 PROCEDURE — 1101F PT FALLS ASSESS-DOCD LE1/YR: CPT | Performed by: THORACIC SURGERY (CARDIOTHORACIC VASCULAR SURGERY)

## 2021-07-08 PROCEDURE — G8419 CALC BMI OUT NRM PARAM NOF/U: HCPCS | Performed by: THORACIC SURGERY (CARDIOTHORACIC VASCULAR SURGERY)

## 2021-07-08 NOTE — PROGRESS NOTES
Patient: Ray Arreguin   Age: 77 y.o. Patient Care Team:  Ana Currie MD as PCP - General (Family Medicine)  Ana Currie MD as PCP - [facility]  Louis Ceja MD (Cardiology)  Flakita Anton MD (Pulmonary Disease)  Ariadne Nava MD (Cardiology)  Cami Woo MD (Cardiothoracic Surgery)    PCP: Ana Currie MD    Cardiologist: Dr. Vaishali Bernstein    Diagnosis/Reason for Consultation: The encounter diagnosis was Nonrheumatic aortic valve stenosis. Problem List:   Patient Active Problem List   Diagnosis Code    High cholesterol E78.00    Eczema L30.9    HTN (hypertension) I10    Encounter for long-term (current) use of medications Z79.899    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    Prediabetes R73.03    Arrhythmia I49.9    Cardiomyopathy ([facility] I42.9    CHF NYHA class I (no symptoms from ordinary activities), chronic, systolic (HCC) Y60.67    S/P ablation of atrial flutter Z98.890, Z86.79    Advance care planning Z71.89    Pulmonary hypertension ([facility] I27.20    S/P biventricular cardiac pacemaker procedure Z95.0    Nonrheumatic aortic valve stenosis I35.0         HPI: 77 y.o. male with PMHx of AS, HTN, HLD, Cardiomyopathy s/p Bi-V ICD, Aflutter s/p ablation, Pre-diabetes, that is referred to the [address] [place] [address] by Dr. Vaishali Bernstein for interventional evaluation of  AS. Ray Arreguin feels that his symptoms have gotten worse over the last few weeks. 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 He sometimes has to stop on the way to or from his mailbox which is about 300 ft away. 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 He did have a mechanical fall over memorial day weekend when he was balancing on a chair to reach above his head. He does have issue with LE edema. It seems to improve overnight but never resolves.  Denies palpitations, CP, dizziness, syncope, orthopnea, PND, GIB, or HF admission in the last year. Has been off of his Eliquis and ASA since his nose started bleeding yesterday. The nosebleed continued at home for about 10 hours before going to the ER yesterday. He now has nasal packing and is scheduled to see the ENT on Monday. SOB/FERRER: Yes   Fatigue: Yes   Palpitations: No:    Chest pain: No:    Chest tightness with activity: No:    Lightheadedness: No:    Syncope: No:    Falls: Yes mechanical fall on Memorial day when trying to stand on a chair. Orthopnea: No:    PND: No:    LE edema: Yes   Medication changes in past 3 months: Yes   Blood/Blackness/Tarriness of stools: No:    Heart Failure Admission w/in past year:  No:    Heart Failure Admission w/in past 2 weeks: No:     NYHA Classification: IIIB   Class I (Mild): No limitation of physical activity. Ordinary physical activity does not cause undue fatigue, palpitation, or dyspnea. Class II (Mild): Slight limitation of physical activity. Comfortable at rest, but ordinary physical activity results in fatigue, palpitation, or dyspnea. Class III (Moderate): Marked limitation of physical activity. Comfortable at rest, but less than ordinary activity causes fatigue, palpitation, or dyspnea   Class IV (Severe): Unable to carry out any physical activity without discomfort. Symptoms  of cardiac insufficiency at rest.  If any physical activity is undertaken, discomfort is increased.     Angina Classification: 0   Class 0: No symptoms   Class 1: Angina with strenuous exercise   Class 2: Angina with moderate exercise   Class 3: Angina with mild exertion   Walking 1-2 level blocks at normal pace; Climbing 1 flight of stairs at normal pace  Class 4: Angina at any level of physical exertion       Past Medical History:   Diagnosis Date    Arrhythmia 11/11/2014    ED (erectile dysfunction) of organic origin     High cholesterol 3/31/2010    HTN (hypertension) 3/31/2010    Prediabetes 11/11/2014    [facility] 2017    S/P ablation of atrial flutter 3/22/2016    S/P biventricular cardiac pacemaker procedure     Systolic CHF, acute ([facility] 3/22/2016     Endocarditis: No:    Pulmonary HTN:  Yes Greater than 2/3 systemic: No:   Immunocompromised/Steroids: No:   Liver Dz/Cirrhosis: No:    If yes, MELD score:  n/a  Last Dental Visit:  1 year ago   Any dental pain/concerns: none    Past Surgical History:   Procedure Laterality Date    HX ORTHOPAEDIC      fx wrist    HX TONSILLECTOMY      PA INSJ/RPLCMT PERM DFB W/TRNSVNS LDS 1/DUAL CHMBR N/A 2021    INSERT ICD BIV MULTI performed by Raz Jose MD at [facility]      Social History     Tobacco Use    Smoking status: Former Smoker     Packs/day: 2.00     Years: 35.00     Pack years: 70.00     Types: Cigarettes     Start date: 1963     Quit date: 1995     Years since quittin.2    Smokeless tobacco: Never Used   Substance Use Topics    Alcohol use: Yes     Alcohol/week: 1.0 standard drinks     Types: 1 Glasses of wine per week     Comment: 1 drink a month      Family History   Problem Relation Age of Onset    Heart Disease Father     Hypertension Father     Hypertension Mother     Heart Disease Mother      Prior to Admission medications    Medication Sig Start Date End Date Taking? Authorizing Provider   amoxicillin-clavulanate (Augmentin) 875-125 mg per tablet Take 1 Tablet by mouth two (2) times a day for 3 days. 7/7/21 7/10/21 Yes HIRA Velez   torsemide [facility]) 20 mg tablet Take 3 Tabs by mouth Before breakfast and dinner.  Indications: Congestive Heart Failure (this replaces bumetanide or Bumex) 5/10/21  Yes Marilee Cummings MD   sodium bicarbonate 650 mg tablet TAKE ONE TABLET BY MOUTH TWICE A DAY 21  Yes Darin Trujillo MD   gabapentin (NEURONTIN) 300 mg capsule TAKE ONE CAPSULE BY MOUTH EVERY EVENING 3/25/21  Yes Darin Trujilol MD   testosterone 12.5 mg/ 1.25 gram (1 %) glpm APPLY 4 PUMPS TO SHOULDERS AND UPPER ARMS ONCE DAILY IN THE MORNING 2/15/21  Yes Steven Malik MD   rosuvastatin (CRESTOR) 20 mg tablet TAKE ONE TABLET BY MOUTH ONCE NIGHTLY 8/5/20  Yes Steven Malik MD   lisinopriL (PRINIVIL, ZESTRIL) 5 mg tablet TAKE ONE TABLET BY MOUTH DAILY 8/5/20  Yes Steven Malik MD   isosorbide dinitrate (ISORDIL) 5 mg tablet TAKE ONE TABLET BY MOUTH TWICE A DAY 8/5/20  Yes Steven Malik MD   carvediloL (COREG) 12.5 mg tablet TAKE ONE TABLET BY MOUTH TWICE A DAY 8/5/20  Yes Steven Malik MD   Eliquis 5 mg tablet Take 5 mg by mouth two (2) times a day. 4/9/20  Yes Provider, Historical   Cholecalciferol, Vitamin D3, (VITAMIN D3) 1,000 unit Cap Take 1 Cap by mouth daily. Yes Provider, Historical   aspirin delayed-release 81 mg tablet Take 81 mg by mouth daily. Yes Provider, Historical   Aerochamber Plus Flow-Vu,L Msk spcr  5/6/21   Provider, Historical   budesonide-formoteroL (SYMBICORT) 160-4.5 mcg/actuation HFAA Take 2 Puffs by inhalation two (2) times a day. Patient not taking: Reported on 7/8/2021    Provider, Historical       No Known Allergies    Current Medications:   Current Outpatient Medications   Medication Sig Dispense Refill    amoxicillin-clavulanate (Augmentin) 875-125 mg per tablet Take 1 Tablet by mouth two (2) times a day for 3 days. 6 Tablet 0    torsemide (DEMADEX) 20 mg tablet Take 3 Tabs by mouth Before breakfast and dinner.  Indications: Congestive Heart Failure (this replaces bumetanide or Bumex) 180 Tab 6    sodium bicarbonate 650 mg tablet TAKE ONE TABLET BY MOUTH TWICE A  Tab 1    gabapentin (NEURONTIN) 300 mg capsule TAKE ONE CAPSULE BY MOUTH EVERY EVENING 90 Cap 1    testosterone 12.5 mg/ 1.25 gram (1 %) glpm APPLY 4 PUMPS TO SHOULDERS AND UPPER ARMS ONCE DAILY IN THE MORNING 225 g 2    rosuvastatin (CRESTOR) 20 mg tablet TAKE ONE TABLET BY MOUTH ONCE NIGHTLY 90 Tab 3    lisinopriL (PRINIVIL, ZESTRIL) 5 mg tablet TAKE ONE TABLET BY MOUTH DAILY 90 Tab 3    isosorbide dinitrate (ISORDIL) 5 mg tablet TAKE ONE TABLET BY MOUTH TWICE A  Tab 3    carvediloL (COREG) 12.5 mg tablet TAKE ONE TABLET BY MOUTH TWICE A  Tab 3    Eliquis 5 mg tablet Take 5 mg by mouth two (2) times a day.  Cholecalciferol, Vitamin D3, (VITAMIN D3) 1,000 unit Cap Take 1 Cap by mouth daily.  aspirin delayed-release 81 mg tablet Take 81 mg by mouth daily.  Aerochamber Plus Flow-Vu,L Msk spcr       budesonide-formoteroL (SYMBICORT) 160-4.5 mcg/actuation HFAA Take 2 Puffs by inhalation two (2) times a day. (Patient not taking: Reported on 2021)         Vitals: Blood pressure (!) 110/58, pulse 68, resp. rate 18, SpO2 95 %. Allergies: has No Known Allergies.     Review of Systems: Pertinent Positives per HPI   [] Unable to obtain  ROS due to  []mental status change  []sedated   []intubated   [x]Total of 13 systems reviewed as follows:  Constitutional: Negative fever, negative chills  Eyes:   Negative for amauroses fugax  ENT:   Negative sore throat,oral abscess   Endocrine Negative for thyroid replacement Rx; goiter; DM  Respiratory:  Negative chronic cough,sputum production  Cards:   Negative for palpitations, varicosities, claudication, lower extremity edema  GI:   Negative for dysphagia, bleeding, nausea, vomiting, diarrhea, and abdominal pain  Genitourinary: Negative for frequency, dysuria, BPH   Integument:  Negative for rash and pruritus  Hematologic:  Negative for easy bruising; bleeding dyscrasia   Musculoskel: Negative for muscle weakness inhibiting ambulation  Neurological:  Negative for stroke, TIA, syncope, dizziness  Behavl/Psych: Negative for feelings of anxiety, depression     Cardiovascular Testing:     EK2021  8:46 AM - [place], Card Result In    Component Value Ref Range & Units Status   Ventricular Rate 68  BPM Final   Atrial Rate 68  BPM Final   P-R Interval 206  ms Final   QRS Duration 136 ms Final   Q-T Interval 466  ms Final   QTC Calculation (Bezet) 495  ms Final   Calculated P Axis 94  degrees Final   Calculated R Axis 78  degrees Final   Calculated T Axis 20  degrees Final   Diagnosis   Final   Normal sinus rhythm   Nonspecific intraventricular block   Nonspecific ST and T wave abnormality          TTE:  Completed at [facility] on 2/26/21 and scanned in  MARQUEZ 0.7 cm2  Mean Gradient: 27 mmHg  Peak Gradient: 55 mmHg  Peak Velocity: 3.7 m/s  LVEF 15%    Mild AI; Mod MR; Mod TR. Mod Pulm HTN. Biatrial enlargement        Cardiac catheterization: 5/10/21  Conclusion       · Catheters used: 5 Fr JL4, JR4 diagnostic catheters for coronary angiography; 6 Fr JR4 guide with SH and 4 Fr long angled glide catheter for simultaneous aortic and LV pressure measurement  · Uncomplicated ultrasound guided access of the right IJ vein and left radial artery  · Successful hemostasis of the 6 Fr slender R IJ vein access site using manual compression and successful hemostasis of the 6 Fr slender left radial artery access site using a TR band  · Heparin was used for procedural anticoagulation     1. Mildly elevated right-sided filling pressures (RA 15/13/12 mmHg) with severely elevated left-sided filling pressures (PCWP 37/60/34 mmHg, LVEDP 28-32 mmHg). Large V-wave in the PCWP tracing suggestive of significant mitral regurgitation and/or decreased LA compliance.     2. Severe pulmonary hypertension (PA 79/31/50 mmHg) with mildly elevated PVR (3.92 Rodas), TPG 16 mmHg.     3. Depressed cardiac output (Alek 4.08 L/min) and cardiac index (Alek 1.95 L/min/m2).     4. Mildly elevated SVR (1784 dynes/sec/cm-5).    5. Symptomatic classical low-flow, low-gradient severe aortic stenosis with significant contractile reserve (see below).     6. Minimal non-obstructive CAD in a right dominant coronary circulation.     7. Findings most consistent with a non-ischemic cardiomyopathy.     8. Augment diuresis.   Stop bumetanide 4 mg qAM and 2 mg qPM and start torsemide 60 mg BIDAC. Check BMP, NT pro-BNP this Friday. Continue evaluation for TAVR. Follow-up with Dr. Nallely Hernandez next week.     9. Results discussed with the patient and the referring physician, Dr. Ike Santillan 4.08 L/min  CI 1.95 L/min/m2  MARQUEZ 0.89 cm2  AV MG 29.92 mmHg     DOBUTAMINE AT 10 MCG/KG/MIN after 5 minutes  CO 4.23 L/min  CI 2.02 L/min/m2  MARQUEZ 1.02 cm2  MARQUEZ MG 24.43 mmHg     DOBUTAMINE AT 20 MCG/KG/MIN after 5 minutes  CO 4.29 L/min  CI 2.05 L/min/m2  MARQUEZ 1.00 cm2  MARQUEZ MG 34.86 mmHg     DOBUTAMINE AT 20 MCG/KG/MIN after 10 minutes  CO 5.10 L/min  CI 2.44 L/min/m2  MARQUEZ 0.81 cm2  MARQUEZ MG 20.56 mmHg      Complications    Complications documented before study signed (5/10/2021  6:28 PM)     No complications were associated with this study. Documented by Dion Sanchez MD - 5/10/2021  8:55 PM            Carotid Dopplers: 6/25/21 Done at [facility] and scanned in  BICA 1-49% stenosis    PFTs: FEV1/Predicted:  None  Normal FEV1 > 1 Liter, Predicted = 100%, DLCO > 80%    Mild Lung Disease (60-70% Predicted)    Moderate Lung Disease (50-55% Predicted)    Severe Lung Disease (< 50% Predicted or PO2 < 60 or pCO2>50 on RA)    Gated C/A/P CTA: Done 6/24/21    Physical Exam:  General: Well nourished well groomed male appearing stated age  Neuro: A&OX3. BEY. PERRL. Steady unassisted gait  Head:Normocephalic. Atraumatic. Symmetrical  Neck: Trachea Midline  Resp: CTA B. No Adv BS/cough/sputum/tachypnea with seated conversation  CV: S1S2 RRR. JON III/VI. No JVD/carotid bruits. Pink/warm/dry extremities. +1 BLE peripheral edema  GI:Benign ab. Soft. NT/ND. Active BS  : Voids  Integ: No obvious s/s of infection or breakdown  Musculo/Skeletal: FROM in all major joints.  Good muscle tone    Clinic Evaluation:   CHRISTOFERCQ-12: scanned into EMR    5 meter gait: 6.8 seconds    Frality Survey:  Jackqueline Pleasure Index ADL - 6/6  scanned into EMR     STS 4.2 Risk Score / Predicted 30 day mortality: - calculations scanned into EMR  AVR : 3.766/22.946%    Assessment/Plan:     1. Aortic Stenosis LFLG: severe and symptomatic. High operative risk. Patient is agreeable to proceed with TAVR. Plan for 7/21 LTF 26 S3 +2ml and RCA protection. 2. HTN: On Coreg, Lisinopril, Isordil , Torsemide    3. HLD: On Statin    4. Atrial Flutter s/p Ablation: On BB, Eliquis    5. Cardiomyopathy with LVEF of 15%: Has Bi-V ICD. On ACEi, BB, Torsemide    6. CKD stage 3: Does not currently follow with a Nephrologist. Avoid hypotension and nephrotoxic agents    7. Thrombocytopenia: Platelets of 60J in May 2021. On ASA. Was in ER 7/7 for Epistaxis. Follows up with ENT on Monday. 8. Pre-diabetes: A1c of 5.9 on 2/24/21. Diet controlled.     I have reviewed the findings with the patient and family  And discussed the interventions and alternatives to TAVR they are aware of the need for protection of the right coronary artery because of his anatomy  They understand and agree to proceed

## 2021-07-08 NOTE — DISCHARGE INSTRUCTIONS
If your nose begins to bleed again, called the ENT office, Dr. Jose Sheffield. You should call them first thing in the morning, explained to them that you were seen in the ER for a nosebleed, we placed nasal packing that should be removed on Friday. It must be removed by Saturday, if ENT cannot see you you can see your primary care, go to an urgent care, or return here. Hold your Eliquis dose tonight and tomorrow morning, call your PCP tomorrow to see when they would like you to resume the medication. Jaxson Gonzalez

## 2021-07-15 ENCOUNTER — HOSPITAL ENCOUNTER (OUTPATIENT)
Dept: PREADMISSION TESTING | Age: 76
Discharge: HOME | End: 2021-07-15
Payer: MEDICARE

## 2021-07-15 ENCOUNTER — HOSPITAL ENCOUNTER (OUTPATIENT)
Dept: GENERAL RADIOLOGY | Age: 76
Discharge: HOME | End: 2021-07-15
Attending: NURSE PRACTITIONER
Payer: MEDICARE

## 2021-07-15 VITALS
HEIGHT: 72 IN | DIASTOLIC BLOOD PRESSURE: 65 MMHG | TEMPERATURE: 97.9 F | BODY MASS INDEX: 27.05 KG/M2 | WEIGHT: 199.7 LBS | SYSTOLIC BLOOD PRESSURE: 109 MMHG | RESPIRATION RATE: 18 BRPM | HEART RATE: 60 BPM | OXYGEN SATURATION: 99 %

## 2021-07-15 LAB
ALBUMIN SERPL-MCNC: 3.9 G/DL (ref 3.5–5)
ALBUMIN/GLOB SERPL: 1.1 {RATIO} (ref 1.1–2.2)
ALP SERPL-CCNC: 68 U/L (ref 45–117)
ALT SERPL-CCNC: 51 U/L (ref 12–78)
ANION GAP SERPL CALC-SCNC: 6 MMOL/L (ref 5–15)
APPEARANCE UR: CLEAR
APTT PPP: 28.9 SEC (ref 22.1–31)
ARTERIAL PATENCY WRIST A: POSITIVE
AST SERPL-CCNC: 40 U/L (ref 15–37)
BACTERIA URNS QL MICRO: NEGATIVE /HPF
BASE EXCESS BLD CALC-SCNC: 3.8 MMOL/L
BASOPHILS # BLD: 0 K/UL (ref 0–0.1)
BASOPHILS NFR BLD: 0 % (ref 0–1)
BDY SITE: ABNORMAL
BILIRUB SERPL-MCNC: 0.8 MG/DL (ref 0.2–1)
BILIRUB UR QL: NEGATIVE
BNP SERPL-MCNC: 6070 PG/ML
BUN SERPL-MCNC: 21 MG/DL (ref 6–20)
BUN/CREAT SERPL: 17 (ref 12–20)
CALCIUM SERPL-MCNC: 9 MG/DL (ref 8.5–10.1)
CHLORIDE SERPL-SCNC: 101 MMOL/L (ref 97–108)
CO2 SERPL-SCNC: 31 MMOL/L (ref 21–32)
COLOR UR: NORMAL
CREAT SERPL-MCNC: 1.27 MG/DL (ref 0.7–1.3)
DIFFERENTIAL METHOD BLD: ABNORMAL
EOSINOPHIL # BLD: 0.2 K/UL (ref 0–0.4)
EOSINOPHIL NFR BLD: 3 % (ref 0–7)
EPITH CASTS URNS QL MICRO: NORMAL /LPF
ERYTHROCYTE [DISTWIDTH] IN BLOOD BY AUTOMATED COUNT: 15.6 % (ref 11.5–14.5)
EST. AVERAGE GLUCOSE BLD GHB EST-MCNC: 126 MG/DL
GAS FLOW.O2 O2 DELIVERY SYS: ABNORMAL L/MIN
GLOBULIN SER CALC-MCNC: 3.5 G/DL (ref 2–4)
GLUCOSE SERPL-MCNC: 114 MG/DL (ref 65–100)
GLUCOSE UR STRIP.AUTO-MCNC: NEGATIVE MG/DL
HBA1C MFR BLD: 6 % (ref 4–5.6)
HCO3 BLD-SCNC: 28.4 MMOL/L (ref 22–26)
HCT VFR BLD AUTO: 36 % (ref 36.6–50.3)
HGB BLD-MCNC: 12.1 G/DL (ref 12.1–17)
HGB UR QL STRIP: NEGATIVE
HISTORY CHECKED?,CKHIST: NORMAL
HYALINE CASTS URNS QL MICRO: NORMAL /LPF (ref 0–5)
IMM GRANULOCYTES # BLD AUTO: 0 K/UL (ref 0–0.04)
IMM GRANULOCYTES NFR BLD AUTO: 0 % (ref 0–0.5)
INR PPP: 1.2 (ref 0.9–1.1)
KETONES UR QL STRIP.AUTO: NEGATIVE MG/DL
LEUKOCYTE ESTERASE UR QL STRIP.AUTO: NEGATIVE
LYMPHOCYTES # BLD: 1.2 K/UL (ref 0.8–3.5)
LYMPHOCYTES NFR BLD: 16 % (ref 12–49)
MAGNESIUM SERPL-MCNC: 2.5 MG/DL (ref 1.6–2.4)
MCH RBC QN AUTO: 31.8 PG (ref 26–34)
MCHC RBC AUTO-ENTMCNC: 33.6 G/DL (ref 30–36.5)
MCV RBC AUTO: 94.5 FL (ref 80–99)
MONOCYTES # BLD: 0.6 K/UL (ref 0–1)
MONOCYTES NFR BLD: 8 % (ref 5–13)
NEUTS SEG # BLD: 5.7 K/UL (ref 1.8–8)
NEUTS SEG NFR BLD: 73 % (ref 32–75)
NITRITE UR QL STRIP.AUTO: NEGATIVE
NRBC # BLD: 0 K/UL (ref 0–0.01)
NRBC BLD-RTO: 0 PER 100 WBC
O2/TOTAL GAS SETTING VFR VENT: 21 %
PCO2 BLD: 41.9 MMHG (ref 35–45)
PH BLD: 7.44 [PH] (ref 7.35–7.45)
PH UR STRIP: 7.5 [PH] (ref 5–8)
PLATELET # BLD AUTO: 130 K/UL (ref 150–400)
PMV BLD AUTO: 11.9 FL (ref 8.9–12.9)
PO2 BLD: 84 MMHG (ref 80–100)
POTASSIUM SERPL-SCNC: 3.7 MMOL/L (ref 3.5–5.1)
PROT SERPL-MCNC: 7.4 G/DL (ref 6.4–8.2)
PROT UR STRIP-MCNC: NEGATIVE MG/DL
PROTHROMBIN TIME: 12.3 SEC (ref 9–11.1)
RBC # BLD AUTO: 3.81 M/UL (ref 4.1–5.7)
RBC #/AREA URNS HPF: NORMAL /HPF (ref 0–5)
SAO2 % BLD: 96.6 % (ref 92–97)
SODIUM SERPL-SCNC: 138 MMOL/L (ref 136–145)
SP GR UR REFRACTOMETRY: 1.01 (ref 1–1.03)
SPECIMEN TYPE: ABNORMAL
THERAPEUTIC RANGE,PTTT: NORMAL SECS (ref 58–77)
TSH SERPL DL<=0.05 MIU/L-ACNC: 1.11 UIU/ML (ref 0.36–3.74)
UA: UC IF INDICATED,UAUC: NORMAL
UROBILINOGEN UR QL STRIP.AUTO: 0.2 EU/DL (ref 0.2–1)
WBC # BLD AUTO: 7.8 K/UL (ref 4.1–11.1)
WBC URNS QL MICRO: NORMAL /HPF (ref 0–4)

## 2021-07-15 PROCEDURE — 83880 ASSAY OF NATRIURETIC PEPTIDE: CPT

## 2021-07-15 PROCEDURE — 80053 COMPREHEN METABOLIC PANEL: CPT

## 2021-07-15 PROCEDURE — 36600 WITHDRAWAL OF ARTERIAL BLOOD: CPT

## 2021-07-15 PROCEDURE — 85730 THROMBOPLASTIN TIME PARTIAL: CPT

## 2021-07-15 PROCEDURE — 93005 ELECTROCARDIOGRAM TRACING: CPT

## 2021-07-15 PROCEDURE — 82803 BLOOD GASES ANY COMBINATION: CPT

## 2021-07-15 PROCEDURE — 83036 HEMOGLOBIN GLYCOSYLATED A1C: CPT

## 2021-07-15 PROCEDURE — 83735 ASSAY OF MAGNESIUM: CPT

## 2021-07-15 PROCEDURE — 81001 URINALYSIS AUTO W/SCOPE: CPT

## 2021-07-15 PROCEDURE — 85025 COMPLETE CBC W/AUTO DIFF WBC: CPT

## 2021-07-15 PROCEDURE — 36415 COLL VENOUS BLD VENIPUNCTURE: CPT

## 2021-07-15 PROCEDURE — 85610 PROTHROMBIN TIME: CPT

## 2021-07-15 PROCEDURE — 86901 BLOOD TYPING SEROLOGIC RH(D): CPT

## 2021-07-15 PROCEDURE — 84443 ASSAY THYROID STIM HORMONE: CPT

## 2021-07-15 PROCEDURE — 71046 X-RAY EXAM CHEST 2 VIEWS: CPT

## 2021-07-15 PROCEDURE — 86923 COMPATIBILITY TEST ELECTRIC: CPT

## 2021-07-15 NOTE — H&P
CSS   History and Physical    Subjective:      Fabiana Ferreira is a 77 y.o. male with PMHx of AS, HTN, HLD, Cardiomyopathy s/p Bi-V ICD, Aflutter s/p ablation, Pre-diabetes, that is referred to the [address] [place] [address] by Dr. Nima London for interventional evaluation of  AS.     Fabiana Ferreira feels that his symptoms have gotten worse over the last few weeks. 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 He sometimes has to stop on the way to or from his mailbox which is about 300 ft away. 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 He did have a mechanical fall over memorial day weekend when he was balancing on a chair to reach above his head. He does have issue with LE edema. It seems to improve overnight but never resolves. Denies palpitations, CP, dizziness, syncope, orthopnea, PND, GIB, or HF admission in the last year.      He had a nosebleed on 7/8 and had to be seen in the ER for nasal packing. He later removed that himself at home. He restarted his Eliquis at half dose and ASA. No additional issues with epistaxis or other bleeding. Cardiac Testing    TTE:  Completed at [facility] on 2/26/21 and scanned in  MARQUEZ 0.7 cm2  Mean Gradient: 27 mmHg  Peak Gradient: 55 mmHg  Peak Velocity: 3.7 m/s  LVEF 15%     Mild AI; Mod MR; Mod TR. Mod Pulm HTN. Biatrial enlargement           Cardiac catheterization: 5/10/21  Conclusion        · Catheters used: 5 Fr JL4, JR4 diagnostic catheters for coronary angiography; 6 Fr JR4 guide with SH and 4 Fr long angled glide catheter for simultaneous aortic and LV pressure measurement  · Uncomplicated ultrasound guided access of the right IJ vein and left radial artery  · Successful hemostasis of the 6 Fr slender R IJ vein access site using manual compression and successful hemostasis of the 6 Fr slender left radial artery access site using a TR band  · Heparin was used for procedural anticoagulation     1. Mildly elevated right-sided filling pressures (RA 15/13/12 mmHg) with severely elevated left-sided filling pressures (PCWP 37/60/34 mmHg, LVEDP 28-32 mmHg).  Large V-wave in the PCWP tracing suggestive of significant mitral regurgitation and/or decreased LA compliance.     2. Severe pulmonary hypertension (PA 79/31/50 mmHg) with mildly elevated PVR (3.92 Rodas), TPG 16 mmHg.     3. Depressed cardiac output (Alek 4.08 L/min) and cardiac index (Alek 1.95 L/min/m2).     4. Mildly elevated SVR (1784 dynes/sec/cm-5).    5. Symptomatic classical low-flow, low-gradient severe aortic stenosis with significant contractile reserve (see below).     6. Minimal non-obstructive CAD in a right dominant coronary circulation.     7. Findings most consistent with a non-ischemic cardiomyopathy.     8. Augment diuresis.  Stop bumetanide 4 mg qAM and 2 mg qPM and start torsemide 60 mg BIDAC.  Check BMP, NT pro-BNP this Friday.  Continue evaluation for TAVR.  Follow-up with Dr. Jerry Can next week.     9. Results discussed with the patient and the referring physician, Dr. Petty Heads 4.08 L/min  CI 1.95 L/min/m2  MARQUEZ 0.89 cm2  AV MG 29.92 mmHg     DOBUTAMINE AT 10 MCG/KG/MIN after 5 minutes  CO 4.23 L/min  CI 2.02 L/min/m2  MARQUEZ 1.02 cm2  MARQUEZ MG 24.43 mmHg     DOBUTAMINE AT 20 MCG/KG/MIN after 5 minutes  CO 4.29 L/min  CI 2.05 L/min/m2  MARQUEZ 1.00 cm2  MARQUEZ MG 34.86 mmHg     DOBUTAMINE AT 20 MCG/KG/MIN after 10 minutes  CO 5.10 L/min  CI 2.44 L/min/m2  MARQUEZ 0.81 cm2  MARQUEZ MG 04.79 mmHg      Complications     Complications documented before study signed (5/10/2021  9:29 PM)      No complications were associated with this study.    Documented by Abdelrahman Mccracken MD - 5/10/2021  8:55 PM             Past Medical History:   Diagnosis Date    Arrhythmia 11/11/2014    ED (erectile dysfunction) of organic origin     High cholesterol 3/31/2010    HTN (hypertension) 3/31/2010    Prediabetes 11/11/2014    [facility]    S/P ablation of atrial flutter 3/22/2016    S/P biventricular cardiac pacemaker procedure     Systolic CHF, acute ([facility] 3/22/2016     Past Surgical History:   Procedure Laterality Date    HX ORTHOPAEDIC      fx wrist    HX TONSILLECTOMY      MN INSJ/RPLCMT PERM DFB W/TRNSVNS LDS 1/DUAL CHMBR N/A 2021    INSERT ICD BIV MULTI performed by Christian Matos MD at [facility]ATH [facility]      Social History     Tobacco Use    Smoking status: Former Smoker     Packs/day: 2.00     Years: 35.00     Pack years: 70.00     Types: Cigarettes     Start date: 1963     Quit date: 1995     Years since quittin.3    Smokeless tobacco: Never Used   Substance Use Topics    Alcohol use: Yes     Alcohol/week: 1.0 standard drinks     Types: 1 Glasses of wine per week     Comment: 1 drink a month      Family History   Problem Relation Age of Onset    Heart Disease Father     Hypertension Father     Hypertension Mother     Heart Disease Mother      Prior to Admission medications    Medication Sig Start Date End Date Taking? Authorizing Provider   Aerochamber Plus Flow-Vu,L Msk spcr  21   Provider, Historical   budesonide-formoteroL (SYMBICORT) 160-4.5 mcg/actuation HFAA Take 2 Puffs by inhalation two (2) times a day. Patient not taking: Reported on 2021    Provider, Historical   torsemide (DEMADEX) 20 mg tablet Take 3 Tabs by mouth Before breakfast and dinner.  Indications: Congestive Heart Failure (this replaces bumetanide or Bumex) 5/10/21   Alyx Santamaria MD   sodium bicarbonate 650 mg tablet TAKE ONE TABLET BY MOUTH TWICE A DAY 21   Yudy Raza MD   gabapentin (NEURONTIN) 300 mg capsule TAKE ONE CAPSULE BY MOUTH EVERY EVENING 3/25/21   Yudy Raza MD   testosterone 12.5 mg/ 1.25 gram (1 %) glpm APPLY 4 PUMPS TO SHOULDERS AND UPPER ARMS ONCE DAILY IN THE MORNING 2/15/21   Yudy Raza MD   rosuvastatin (CRESTOR) 20 mg tablet TAKE ONE TABLET BY MOUTH ONCE NIGHTLY 8/5/20   Darin Trujillo MD   lisinopriL (PRINIVIL, ZESTRIL) 5 mg tablet TAKE ONE TABLET BY MOUTH DAILY 8/5/20   Darin Trujillo MD   isosorbide dinitrate (ISORDIL) 5 mg tablet TAKE ONE TABLET BY MOUTH TWICE A DAY 8/5/20   Darin Trujillo MD   carvediloL (COREG) 12.5 mg tablet TAKE ONE TABLET BY MOUTH TWICE A DAY 8/5/20   Darin Trujillo MD   Eliquis 5 mg tablet Take 5 mg by mouth two (2) times a day. 4/9/20   Provider, Historical   Cholecalciferol, Vitamin D3, (VITAMIN D3) 1,000 unit Cap Take 1 Cap by mouth daily. Provider, Historical   aspirin delayed-release 81 mg tablet Take 81 mg by mouth daily. Provider, Historical       No Known Allergies      Review of Systems:   Consititutional: Denies fever or chills. +fatigue  Eyes:  Denies vision problems(cataracts). +glasses  ENT:  Denies hearing or swallowing difficulty. +epistaxis  CV: Denies CP, claudication, +HTN. Resp: Denies productive cough. +dyspnea on exertion  : Denies dialysis or kidney problems. GI: Denies ulcers, esophageal strictures, liver problems. M/S: Denies joint or bone problems, or implanted artificial hardware. Skin: Denies varicose veins, edema. Neuro: Denies strokes, or TIAs. Psych: Denies anxiety or depression. Endocrine: Denies thyroid problems. +Pre-diabetes. Heme/Lymphatic: Denies easy bruising or lymphedema. Objective:     VS:   Height: 6'  Weight: 199 lbs  Temp: 97.9 F  BP: 109/65  HR: 60  RR: 18  O2: 99% Room air    Physical Exam:    General: Well nourished well groomed male appearing stated age  Neuro: A&OX3. BEY. PERRL. Steady unassisted gait  Head:Normocephalic. Atraumatic. Symmetrical  Neck: Trachea Midline  Resp: CTA B. No Adv BS/cough/sputum/tachypnea with seated conversation  CV: S1S2 RRR. JON III/VI. No JVD/carotid bruits. Pink/warm/dry extremities. +1 BLE peripheral edema  GI:Benign ab. Soft. NT/ND.  Active BS  : Voids  Integ: No obvious s/s of infection or breakdown  Musculo/Skeletal: FROM in all major joints. Good muscle tone    Labs:   Recent Labs     07/15/21  1000   WBC 7.8   HGB 12.1   HCT 36.0*   *      K 3.7   BUN 21*   CREA 1.27   *   INR 1.2*       Diagnostics:   PA and lateral:   CXR Results  (Last 48 hours)               07/15/21 1053  XR CHEST PA LAT Final result    Impression:      No acute pulmonary process. Narrative:  history: Preop cardiac       COMPARISON: 2021       FINDINGS:       Frontal and lateral chest radiograph submitted for review. The heart is not enlarged. Left chest pacing device is again seen. No acute pulmonary process. No pleural effusion. No evidence for pneumothorax. Carotid doppler:  21 Done at [facility] and scanned in  BICA 1-49% stenosis    PFTS-FEV1: None    EK/15/2021 11:25 AM - London, Card Result In    Component Value Ref Range & Units Status   Ventricular Rate 67  BPM Preliminary   Atrial Rate 67  BPM Preliminary   P-R Interval 238  ms Preliminary   QRS Duration 126  ms Preliminary   Q-T Interval 474  ms Preliminary   QTC Calculation (Bezet) 500  ms Preliminary   Calculated P Axis 56  degrees Preliminary   Calculated R Axis 86  degrees Preliminary   Calculated T Axis -83  degrees Preliminary   Diagnosis   Preliminary   Sinus rhythm with 1st degree AV block with occasional premature ventricular   complexes and fusion complexes   Nonspecific intraventricular block   T wave abnormality, consider lateral ischemia   Abnormal ECG        Assessment:     Active Problems:    Nonrheumatic aortic valve stenosis (2021)        Plan:   The risk and benefit of surgery were reviewed with patient and family and all questions answered and the patient wishes to proceed. Risk include infection, bleeding, stroke, heart attack, irregular heart rhythm, kidney failure and death. The patient was given instructions. The patient was instructed to stop Eliquis.     Surgery is scheduled for 7/21/21. STS 4.2 Risk Score / Predicted 30 day mortality: - calculations scanned into EMR  AVR : 3.766/22.946%    Treatment Plan:      1. Aortic Stenosis LFLG: severe and symptomatic. High operative risk. Patient is agreeable to proceed with TAVR. Plan for 7/21 LTF 26 S3 +2ml and RCA protection.      2. HTN: On Coreg, Lisinopril, Isordil , Torsemide     3. HLD: On Statin     4. Atrial Flutter s/p Ablation: On BB, Eliquis-Stop after today's doses     5. Cardiomyopathy with LVEF of 15%: Has Bi-V ICD. On ACEi, BB, Torsemide     6. CKD stage 3: Creatinine today of 1.27-stable. Does not currently follow with a Nephrologist. Avoid hypotension and nephrotoxic agents     7. Thrombocytopenia: Platelets of 578D today. On ASA. Was in ER 7/7 for Epistaxis-no further issues with bleeding.     8. Pre-diabetes: A1c of 5.9 on 2/24/21. Diet controlled.       Signed By: Karyle Cyphers, NP     July 15, 2021

## 2021-07-16 ENCOUNTER — TELEPHONE (OUTPATIENT)
Dept: CARDIOLOGY CLINIC | Age: 76
End: 2021-07-16

## 2021-07-18 LAB
ATRIAL RATE: 67 BPM
CALCULATED P AXIS, ECG09: 56 DEGREES
CALCULATED R AXIS, ECG10: 86 DEGREES
CALCULATED T AXIS, ECG11: -83 DEGREES
DIAGNOSIS, 93000: NORMAL
P-R INTERVAL, ECG05: 238 MS
Q-T INTERVAL, ECG07: 474 MS
QRS DURATION, ECG06: 126 MS
QTC CALCULATION (BEZET), ECG08: 500 MS
VENTRICULAR RATE, ECG03: 67 BPM

## 2021-07-19 DIAGNOSIS — E87.29 HYPERCHLOREMIC METABOLIC ACIDOSIS: ICD-10-CM

## 2021-07-20 ENCOUNTER — TELEPHONE (OUTPATIENT)
Dept: CASE MANAGEMENT | Age: 76
End: 2021-07-20

## 2021-07-20 ENCOUNTER — ANESTHESIA EVENT (OUTPATIENT)
Dept: CARDIOTHORACIC SURGERY | Age: 76
DRG: 266 | End: 2021-07-20
Payer: MEDICARE

## 2021-07-20 RX ORDER — SODIUM CHLORIDE 9 MG/ML
1.5-3 INJECTION, SOLUTION INTRAVENOUS
Status: DISCONTINUED | OUTPATIENT
Start: 2021-07-21 | End: 2021-07-21

## 2021-07-20 RX ORDER — DEXMEDETOMIDINE HYDROCHLORIDE 4 UG/ML
.1-1.5 INJECTION, SOLUTION INTRAVENOUS
Status: DISCONTINUED | OUTPATIENT
Start: 2021-07-21 | End: 2021-07-21

## 2021-07-20 NOTE — TELEPHONE ENCOUNTER
Cardiac Surgery Care Coordinator-  Called Ross Yarbrough to review plan of care and day of surgery expectations. No answer, left voicemail and contact information.  Neymar Teixeira RN

## 2021-07-21 ENCOUNTER — APPOINTMENT (OUTPATIENT)
Dept: GENERAL RADIOLOGY | Age: 76
DRG: 266 | End: 2021-07-21
Attending: NURSE PRACTITIONER
Payer: MEDICARE

## 2021-07-21 ENCOUNTER — HOSPITAL ENCOUNTER (OUTPATIENT)
Dept: NON INVASIVE DIAGNOSTICS | Age: 76
Discharge: HOME | End: 2021-07-21
Attending: THORACIC SURGERY (CARDIOTHORACIC VASCULAR SURGERY)

## 2021-07-21 ENCOUNTER — HOSPITAL ENCOUNTER (INPATIENT)
Age: 76
LOS: 2 days | Discharge: HOME | DRG: 266 | End: 2021-07-23
Attending: THORACIC SURGERY (CARDIOTHORACIC VASCULAR SURGERY) | Admitting: THORACIC SURGERY (CARDIOTHORACIC VASCULAR SURGERY)
Payer: MEDICARE

## 2021-07-21 ENCOUNTER — APPOINTMENT (OUTPATIENT)
Dept: NON INVASIVE DIAGNOSTICS | Age: 76
DRG: 266 | End: 2021-07-21
Attending: THORACIC SURGERY (CARDIOTHORACIC VASCULAR SURGERY)
Payer: MEDICARE

## 2021-07-21 ENCOUNTER — APPOINTMENT (OUTPATIENT)
Dept: GENERAL RADIOLOGY | Age: 76
DRG: 266 | End: 2021-07-21
Attending: THORACIC SURGERY (CARDIOTHORACIC VASCULAR SURGERY)
Payer: MEDICARE

## 2021-07-21 ENCOUNTER — ANESTHESIA (OUTPATIENT)
Dept: CARDIOTHORACIC SURGERY | Age: 76
DRG: 266 | End: 2021-07-21
Payer: MEDICARE

## 2021-07-21 DIAGNOSIS — I50.22 CHF NYHA CLASS I (NO SYMPTOMS FROM ORDINARY ACTIVITIES), CHRONIC, SYSTOLIC (HCC): ICD-10-CM

## 2021-07-21 DIAGNOSIS — I10 ESSENTIAL HYPERTENSION: ICD-10-CM

## 2021-07-21 DIAGNOSIS — I35.0 NONRHEUMATIC AORTIC VALVE STENOSIS: ICD-10-CM

## 2021-07-21 LAB
ABO + RH BLD: NORMAL
ADMINISTERED INITIALS, ADMINIT: NORMAL
ALBUMIN SERPL-MCNC: 3.3 G/DL (ref 3.5–5)
ALBUMIN/GLOB SERPL: 1 {RATIO} (ref 1.1–2.2)
ALP SERPL-CCNC: 60 U/L (ref 45–117)
ALT SERPL-CCNC: 52 U/L (ref 12–78)
ANION GAP SERPL CALC-SCNC: 7 MMOL/L (ref 5–15)
APTT PPP: >130 SEC (ref 22.1–31)
ARTERIAL PATENCY WRIST A: ABNORMAL
ARTERIAL PATENCY WRIST A: ABNORMAL
AST SERPL-CCNC: 45 U/L (ref 15–37)
ATRIAL RATE: 0 BPM
BASE EXCESS BLD CALC-SCNC: 1.9 MMOL/L
BASE EXCESS BLD CALC-SCNC: 2.8 MMOL/L
BASE EXCESS BLDV CALC-SCNC: 2.4 MMOL/L
BASOPHILS # BLD: 0 K/UL (ref 0–0.1)
BASOPHILS NFR BLD: 0 % (ref 0–1)
BDY SITE: ABNORMAL
BILIRUB SERPL-MCNC: 0.9 MG/DL (ref 0.2–1)
BLD PROD TYP BPU: NORMAL
BLOOD GROUP ANTIBODIES SERPL: NORMAL
BPU ID: NORMAL
BUN SERPL-MCNC: 24 MG/DL (ref 6–20)
BUN/CREAT SERPL: 17 (ref 12–20)
CA-I BLD-SCNC: 1.28 MMOL/L (ref 1.12–1.32)
CALCIUM SERPL-MCNC: 9.6 MG/DL (ref 8.5–10.1)
CALCULATED R AXIS, ECG10: 6 DEGREES
CALCULATED T AXIS, ECG11: -11 DEGREES
CHLORIDE SERPL-SCNC: 106 MMOL/L (ref 97–108)
CO2 SERPL-SCNC: 28 MMOL/L (ref 21–32)
CREAT SERPL-MCNC: 1.42 MG/DL (ref 0.7–1.3)
CROSSMATCH RESULT,%XM: NORMAL
D50 ADMINISTERED, D50ADM: 0 ML
D50 ADMINISTERED, D50ADM: 10 ML
D50 ADMINISTERED, D50ADM: 10 ML
D50 ADMINISTERED, D50ADM: 8 ML
D50 ORDER, D50ORD: 0 ML
D50 ORDER, D50ORD: 10 ML
D50 ORDER, D50ORD: 10 ML
D50 ORDER, D50ORD: 8 ML
DIAGNOSIS, 93000: NORMAL
DIFFERENTIAL METHOD BLD: ABNORMAL
EOSINOPHIL # BLD: 0.1 K/UL (ref 0–0.4)
EOSINOPHIL NFR BLD: 1 % (ref 0–7)
ERYTHROCYTE [DISTWIDTH] IN BLOOD BY AUTOMATED COUNT: 15 % (ref 11.5–14.5)
GAS FLOW.O2 O2 DELIVERY SYS: ABNORMAL L/MIN
GAS FLOW.O2 O2 DELIVERY SYS: ABNORMAL L/MIN
GAS FLOW.O2 SETTING OXYMISER: 16 BPM
GLOBULIN SER CALC-MCNC: 3.2 G/DL (ref 2–4)
GLSCOM COMMENTS: NORMAL
GLUCOSE BLD STRIP.AUTO-MCNC: 109 MG/DL (ref 65–117)
GLUCOSE BLD STRIP.AUTO-MCNC: 112 MG/DL (ref 65–117)
GLUCOSE BLD STRIP.AUTO-MCNC: 114 MG/DL (ref 65–117)
GLUCOSE BLD STRIP.AUTO-MCNC: 122 MG/DL (ref 65–117)
GLUCOSE BLD STRIP.AUTO-MCNC: 74 MG/DL (ref 65–117)
GLUCOSE BLD STRIP.AUTO-MCNC: 76 MG/DL (ref 65–117)
GLUCOSE BLD STRIP.AUTO-MCNC: 80 MG/DL (ref 65–117)
GLUCOSE BLD STRIP.AUTO-MCNC: 83 MG/DL (ref 65–117)
GLUCOSE BLD STRIP.AUTO-MCNC: 85 MG/DL (ref 65–117)
GLUCOSE BLD STRIP.AUTO-MCNC: 87 MG/DL (ref 65–117)
GLUCOSE BLD STRIP.AUTO-MCNC: 96 MG/DL (ref 65–117)
GLUCOSE BLD STRIP.AUTO-MCNC: 99 MG/DL (ref 65–117)
GLUCOSE SERPL-MCNC: 132 MG/DL (ref 65–100)
GLUCOSE, GLC: 107 MG/DL
GLUCOSE, GLC: 109 MG/DL
GLUCOSE, GLC: 112 MG/DL
GLUCOSE, GLC: 114 MG/DL
GLUCOSE, GLC: 122 MG/DL
GLUCOSE, GLC: 125 MG/DL
GLUCOSE, GLC: 162 MG/DL
GLUCOSE, GLC: 179 MG/DL
GLUCOSE, GLC: 189 MG/DL
GLUCOSE, GLC: 216 MG/DL
GLUCOSE, GLC: 269 MG/DL
GLUCOSE, GLC: 74 MG/DL
GLUCOSE, GLC: 76 MG/DL
GLUCOSE, GLC: 80 MG/DL
GLUCOSE, GLC: 83 MG/DL
GLUCOSE, GLC: 85 MG/DL
GLUCOSE, GLC: 87 MG/DL
GLUCOSE, GLC: 96 MG/DL
GLUCOSE, GLC: 99 MG/DL
HCO3 BLD-SCNC: 25.8 MMOL/L (ref 22–26)
HCO3 BLD-SCNC: 28.2 MMOL/L (ref 22–26)
HCO3 BLDV-SCNC: 28 MMOL/L (ref 23–28)
HCT VFR BLD AUTO: 36 % (ref 36.6–50.3)
HGB BLD-MCNC: 11.9 G/DL (ref 12.1–17)
HIGH TARGET, HITG: 130 MG/DL
HIGH TARGET, HITG: 140 MG/DL
IMM GRANULOCYTES # BLD AUTO: 0.1 K/UL (ref 0–0.04)
IMM GRANULOCYTES NFR BLD AUTO: 1 % (ref 0–0.5)
INR PPP: 1.3 (ref 0.9–1.1)
INSULIN ADMINSTERED, INSADM: 0 UNITS/HOUR
INSULIN ADMINSTERED, INSADM: 0.2 UNITS/HOUR
INSULIN ADMINSTERED, INSADM: 0.3 UNITS/HOUR
INSULIN ADMINSTERED, INSADM: 0.6 UNITS/HOUR
INSULIN ADMINSTERED, INSADM: 1.4 UNITS/HOUR
INSULIN ADMINSTERED, INSADM: 1.5 UNITS/HOUR
INSULIN ADMINSTERED, INSADM: 1.5 UNITS/HOUR
INSULIN ADMINSTERED, INSADM: 10.5 UNITS/HOUR
INSULIN ADMINSTERED, INSADM: 2 UNITS/HOUR
INSULIN ADMINSTERED, INSADM: 2.5 UNITS/HOUR
INSULIN ADMINSTERED, INSADM: 3.6 UNITS/HOUR
INSULIN ADMINSTERED, INSADM: 4.8 UNITS/HOUR
INSULIN ADMINSTERED, INSADM: 7.1 UNITS/HOUR
INSULIN ADMINSTERED, INSADM: 7.7 UNITS/HOUR
INSULIN ADMINSTERED, INSADM: 7.8 UNITS/HOUR
INSULIN ORDER, INSORD: 0 UNITS/HOUR
INSULIN ORDER, INSORD: 0.2 UNITS/HOUR
INSULIN ORDER, INSORD: 0.3 UNITS/HOUR
INSULIN ORDER, INSORD: 0.6 UNITS/HOUR
INSULIN ORDER, INSORD: 1.4 UNITS/HOUR
INSULIN ORDER, INSORD: 1.5 UNITS/HOUR
INSULIN ORDER, INSORD: 1.5 UNITS/HOUR
INSULIN ORDER, INSORD: 10.5 UNITS/HOUR
INSULIN ORDER, INSORD: 2 UNITS/HOUR
INSULIN ORDER, INSORD: 2.5 UNITS/HOUR
INSULIN ORDER, INSORD: 3.6 UNITS/HOUR
INSULIN ORDER, INSORD: 4.8 UNITS/HOUR
INSULIN ORDER, INSORD: 7.1 UNITS/HOUR
INSULIN ORDER, INSORD: 7.7 UNITS/HOUR
INSULIN ORDER, INSORD: 7.8 UNITS/HOUR
LOW TARGET, LOT: 100 MG/DL
LOW TARGET, LOT: 95 MG/DL
LYMPHOCYTES # BLD: 0.9 K/UL (ref 0.8–3.5)
LYMPHOCYTES NFR BLD: 9 % (ref 12–49)
MAGNESIUM SERPL-MCNC: 2.5 MG/DL (ref 1.6–2.4)
MAGNESIUM SERPL-MCNC: 2.5 MG/DL (ref 1.6–2.4)
MCH RBC QN AUTO: 31.4 PG (ref 26–34)
MCHC RBC AUTO-ENTMCNC: 33.1 G/DL (ref 30–36.5)
MCV RBC AUTO: 95 FL (ref 80–99)
MINUTES UNTIL NEXT BG, NBG: 15 MIN
MINUTES UNTIL NEXT BG, NBG: 60 MIN
MONOCYTES # BLD: 0.5 K/UL (ref 0–1)
MONOCYTES NFR BLD: 5 % (ref 5–13)
MULTIPLIER, MUL: 0.01
MULTIPLIER, MUL: 0.03
MULTIPLIER, MUL: 0.04
MULTIPLIER, MUL: 0.05
MULTIPLIER, MUL: 0.05
MULTIPLIER, MUL: 0.06
MULTIPLIER, MUL: 0.06
MULTIPLIER, MUL: 0.07
NEUTS SEG # BLD: 8.5 K/UL (ref 1.8–8)
NEUTS SEG NFR BLD: 84 % (ref 32–75)
NRBC # BLD: 0 K/UL (ref 0–0.01)
NRBC BLD-RTO: 0 PER 100 WBC
O2/TOTAL GAS SETTING VFR VENT: 40 %
O2/TOTAL GAS SETTING VFR VENT: 80 %
ORDER INITIALS, ORDINIT: NORMAL
PAW @ MEAN EXP FLOW ON VENT: 7.7 CMH2O
PCO2 BLD: 36.7 MMHG (ref 35–45)
PCO2 BLD: 45.9 MMHG (ref 35–45)
PCO2 BLDV: 49.8 MMHG (ref 41–51)
PEEP RESPIRATORY: 5 CMH2O
PEEP RESPIRATORY: 5 CMH2O
PH BLD: 7.4 [PH] (ref 7.35–7.45)
PH BLD: 7.45 [PH] (ref 7.35–7.45)
PH BLDV: 7.37 [PH] (ref 7.32–7.42)
PLATELET # BLD AUTO: 107 K/UL (ref 150–400)
PMV BLD AUTO: 11.9 FL (ref 8.9–12.9)
PO2 BLD: 150 MMHG (ref 80–100)
PO2 BLD: 280 MMHG (ref 80–100)
PO2 BLDV: 43 MMHG (ref 25–40)
POTASSIUM SERPL-SCNC: 3.7 MMOL/L (ref 3.5–5.1)
POTASSIUM SERPL-SCNC: 4.2 MMOL/L (ref 3.5–5.1)
PRESSURE SUPPORT SETTING VENT: 5 CMH2O
PRESSURE SUPPORT SETTING VENT: 5 CMH2O
PROT SERPL-MCNC: 6.5 G/DL (ref 6.4–8.2)
PROTHROMBIN TIME: 13.5 SEC (ref 9–11.1)
Q-T INTERVAL, ECG07: 582 MS
QRS DURATION, ECG06: 180 MS
QTC CALCULATION (BEZET), ECG08: 582 MS
RBC # BLD AUTO: 3.79 M/UL (ref 4.1–5.7)
SAO2 % BLD: 99.4 % (ref 92–97)
SAO2 % BLD: 99.9 % (ref 92–97)
SAO2% DEVICE SAO2% SENSOR NAME: ABNORMAL
SERVICE CMNT-IMP: ABNORMAL
SERVICE CMNT-IMP: NORMAL
SODIUM SERPL-SCNC: 141 MMOL/L (ref 136–145)
SPECIMEN EXP DATE BLD: NORMAL
SPECIMEN SITE: ABNORMAL
SPECIMEN TYPE: ABNORMAL
SPECIMEN TYPE: ABNORMAL
STATUS OF UNIT,%ST: NORMAL
THERAPEUTIC RANGE,PTTT: ABNORMAL SECS (ref 58–77)
UNIT DIVISION, %UDIV: 0
VENTILATION MODE VENT: ABNORMAL
VENTILATION MODE VENT: ABNORMAL
VENTRICULAR RATE, ECG03: 60 BPM
VT SETTING VENT: 500 ML
WBC # BLD AUTO: 10.1 K/UL (ref 4.1–11.1)

## 2021-07-21 PROCEDURE — 74011250636 HC RX REV CODE- 250/636: Performed by: NURSE PRACTITIONER

## 2021-07-21 PROCEDURE — 027035Z DILATION OF CORONARY ARTERY, ONE ARTERY WITH TWO DRUG-ELUTING INTRALUMINAL DEVICES, PERCUTANEOUS APPROACH: ICD-10-PCS | Performed by: INTERNAL MEDICINE

## 2021-07-21 PROCEDURE — 83735 ASSAY OF MAGNESIUM: CPT

## 2021-07-21 PROCEDURE — 77030005401 HC CATH RAD ARRO -A: Performed by: ANESTHESIOLOGY

## 2021-07-21 PROCEDURE — 74011000636 HC RX REV CODE- 636: Performed by: THORACIC SURGERY (CARDIOTHORACIC VASCULAR SURGERY)

## 2021-07-21 PROCEDURE — C1769 GUIDE WIRE: HCPCS | Performed by: INTERNAL MEDICINE

## 2021-07-21 PROCEDURE — C1894 INTRO/SHEATH, NON-LASER: HCPCS | Performed by: INTERNAL MEDICINE

## 2021-07-21 PROCEDURE — C1874 STENT, COATED/COV W/DEL SYS: HCPCS | Performed by: INTERNAL MEDICINE

## 2021-07-21 PROCEDURE — 74011000250 HC RX REV CODE- 250: Performed by: NURSE PRACTITIONER

## 2021-07-21 PROCEDURE — 84132 ASSAY OF SERUM POTASSIUM: CPT

## 2021-07-21 PROCEDURE — 74011636637 HC RX REV CODE- 636/637: Performed by: THORACIC SURGERY (CARDIOTHORACIC VASCULAR SURGERY)

## 2021-07-21 PROCEDURE — 76060000041 HC ANESTHESIA 5 TO 5.5 HR: Performed by: INTERNAL MEDICINE

## 2021-07-21 PROCEDURE — 74011250636 HC RX REV CODE- 250/636: Performed by: NURSE ANESTHETIST, CERTIFIED REGISTERED

## 2021-07-21 PROCEDURE — 74011000250 HC RX REV CODE- 250: Performed by: THORACIC SURGERY (CARDIOTHORACIC VASCULAR SURGERY)

## 2021-07-21 PROCEDURE — 74011250636 HC RX REV CODE- 250/636: Performed by: THORACIC SURGERY (CARDIOTHORACIC VASCULAR SURGERY)

## 2021-07-21 PROCEDURE — 74011000250 HC RX REV CODE- 250: Performed by: NURSE ANESTHETIST, CERTIFIED REGISTERED

## 2021-07-21 PROCEDURE — 74011000258 HC RX REV CODE- 258: Performed by: THORACIC SURGERY (CARDIOTHORACIC VASCULAR SURGERY)

## 2021-07-21 PROCEDURE — 77030039825 HC MSK NSL PAP SUPERNO2VA VYRM -B: Performed by: ANESTHESIOLOGY

## 2021-07-21 PROCEDURE — C1892 INTRO/SHEATH,FIXED,PEEL-AWAY: HCPCS | Performed by: INTERNAL MEDICINE

## 2021-07-21 PROCEDURE — C1887 CATHETER, GUIDING: HCPCS | Performed by: INTERNAL MEDICINE

## 2021-07-21 PROCEDURE — 36415 COLL VENOUS BLD VENIPUNCTURE: CPT

## 2021-07-21 PROCEDURE — 77030013797 HC KT TRNSDUC PRSSR EDWD -A: Performed by: ANESTHESIOLOGY

## 2021-07-21 PROCEDURE — 74011250637 HC RX REV CODE- 250/637: Performed by: THORACIC SURGERY (CARDIOTHORACIC VASCULAR SURGERY)

## 2021-07-21 PROCEDURE — B4101ZZ FLUOROSCOPY OF ABDOMINAL AORTA USING LOW OSMOLAR CONTRAST: ICD-10-PCS | Performed by: INTERNAL MEDICINE

## 2021-07-21 PROCEDURE — 93355 ECHO TRANSESOPHAGEAL (TEE): CPT | Performed by: INTERNAL MEDICINE

## 2021-07-21 PROCEDURE — 74011000250 HC RX REV CODE- 250: Performed by: INTERNAL MEDICINE

## 2021-07-21 PROCEDURE — C1760 CLOSURE DEV, VASC: HCPCS | Performed by: INTERNAL MEDICINE

## 2021-07-21 PROCEDURE — 93321 DOPPLER ECHO F-UP/LMTD STD: CPT

## 2021-07-21 PROCEDURE — 77030004549 HC CATH ANGI DX PRF MRTM -A: Performed by: INTERNAL MEDICINE

## 2021-07-21 PROCEDURE — 82803 BLOOD GASES ANY COMBINATION: CPT

## 2021-07-21 PROCEDURE — 77030019702 HC WRP THER MENM -C: Performed by: INTERNAL MEDICINE

## 2021-07-21 PROCEDURE — 92928 PRQ TCAT PLMT NTRAC ST 1 LES: CPT | Performed by: INTERNAL MEDICINE

## 2021-07-21 PROCEDURE — 77030008684 HC TU ET CUF COVD -B: Performed by: ANESTHESIOLOGY

## 2021-07-21 PROCEDURE — 74011000250 HC RX REV CODE- 250

## 2021-07-21 PROCEDURE — 77030041244 HC CBL PACE EXT TEMP REMG -B: Performed by: INTERNAL MEDICINE

## 2021-07-21 PROCEDURE — 02HK3JZ INSERTION OF PACEMAKER LEAD INTO RIGHT VENTRICLE, PERCUTANEOUS APPROACH: ICD-10-PCS | Performed by: INTERNAL MEDICINE

## 2021-07-21 PROCEDURE — B24BZZ4 ULTRASONOGRAPHY OF HEART WITH AORTA, TRANSESOPHAGEAL: ICD-10-PCS | Performed by: ANESTHESIOLOGY

## 2021-07-21 PROCEDURE — 77030004532 HC CATH ANGI DX IMP BSC -A: Performed by: INTERNAL MEDICINE

## 2021-07-21 PROCEDURE — 85730 THROMBOPLASTIN TIME PARTIAL: CPT

## 2021-07-21 PROCEDURE — B41C1ZZ FLUOROSCOPY OF PELVIC ARTERIES USING LOW OSMOLAR CONTRAST: ICD-10-PCS | Performed by: INTERNAL MEDICINE

## 2021-07-21 PROCEDURE — B240ZZ3 ULTRASONOGRAPHY OF SINGLE CORONARY ARTERY, INTRAVASCULAR: ICD-10-PCS | Performed by: INTERNAL MEDICINE

## 2021-07-21 PROCEDURE — 5A1223Z PERFORMANCE OF CARDIAC PACING, CONTINUOUS: ICD-10-PCS | Performed by: INTERNAL MEDICINE

## 2021-07-21 PROCEDURE — 02RF38Z REPLACEMENT OF AORTIC VALVE WITH ZOOPLASTIC TISSUE, PERCUTANEOUS APPROACH: ICD-10-PCS | Performed by: THORACIC SURGERY (CARDIOTHORACIC VASCULAR SURGERY)

## 2021-07-21 PROCEDURE — 92978 ENDOLUMINL IVUS OCT C 1ST: CPT | Performed by: INTERNAL MEDICINE

## 2021-07-21 PROCEDURE — 77030026438 HC STYL ET INTUB CARD -A: Performed by: ANESTHESIOLOGY

## 2021-07-21 PROCEDURE — C1751 CATH, INF, PER/CENT/MIDLINE: HCPCS | Performed by: ANESTHESIOLOGY

## 2021-07-21 PROCEDURE — 65610000003 HC RM ICU SURGICAL

## 2021-07-21 PROCEDURE — 77010033678 HC OXYGEN DAILY

## 2021-07-21 PROCEDURE — 5A2204Z RESTORATION OF CARDIAC RHYTHM, SINGLE: ICD-10-PCS | Performed by: THORACIC SURGERY (CARDIOTHORACIC VASCULAR SURGERY)

## 2021-07-21 PROCEDURE — 94002 VENT MGMT INPAT INIT DAY: CPT

## 2021-07-21 PROCEDURE — 85610 PROTHROMBIN TIME: CPT

## 2021-07-21 PROCEDURE — 82962 GLUCOSE BLOOD TEST: CPT

## 2021-07-21 PROCEDURE — 93005 ELECTROCARDIOGRAM TRACING: CPT

## 2021-07-21 PROCEDURE — 74011000258 HC RX REV CODE- 258: Performed by: NURSE ANESTHETIST, CERTIFIED REGISTERED

## 2021-07-21 PROCEDURE — 77030037496 HC VLV AORT TRNSCTH -L: Performed by: INTERNAL MEDICINE

## 2021-07-21 PROCEDURE — 80053 COMPREHEN METABOLIC PANEL: CPT

## 2021-07-21 PROCEDURE — 74011250637 HC RX REV CODE- 250/637: Performed by: NURSE PRACTITIONER

## 2021-07-21 PROCEDURE — 33361 REPLACE AORTIC VALVE PERQ: CPT | Performed by: THORACIC SURGERY (CARDIOTHORACIC VASCULAR SURGERY)

## 2021-07-21 PROCEDURE — 77030038692 HC WND DEB SYS IRMX -B: Performed by: INTERNAL MEDICINE

## 2021-07-21 PROCEDURE — 85025 COMPLETE CBC W/AUTO DIFF WBC: CPT

## 2021-07-21 PROCEDURE — C1725 CATH, TRANSLUMIN NON-LASER: HCPCS | Performed by: INTERNAL MEDICINE

## 2021-07-21 PROCEDURE — B2111ZZ FLUOROSCOPY OF MULTIPLE CORONARY ARTERIES USING LOW OSMOLAR CONTRAST: ICD-10-PCS | Performed by: INTERNAL MEDICINE

## 2021-07-21 PROCEDURE — 74011250636 HC RX REV CODE- 250/636: Performed by: ANESTHESIOLOGY

## 2021-07-21 PROCEDURE — 77030013715 HC INFL SYS MRTM -B: Performed by: INTERNAL MEDICINE

## 2021-07-21 PROCEDURE — 2709999900 HC NON-CHARGEABLE SUPPLY: Performed by: INTERNAL MEDICINE

## 2021-07-21 PROCEDURE — 71045 X-RAY EXAM CHEST 1 VIEW: CPT

## 2021-07-21 PROCEDURE — 77030019569 HC BND COMPR RAD TERU -B: Performed by: INTERNAL MEDICINE

## 2021-07-21 PROCEDURE — 77030037878 HC DRSG MEPILEX >48IN BORD MOLN -B

## 2021-07-21 PROCEDURE — 77030005402 HC CATH RAD ART LN KT TELE -B

## 2021-07-21 PROCEDURE — C1753 CATH, INTRAVAS ULTRASOUND: HCPCS | Performed by: INTERNAL MEDICINE

## 2021-07-21 PROCEDURE — 76010000116 HC CV SURG 5 TO 5.5 HR: Performed by: INTERNAL MEDICINE

## 2021-07-21 DEVICE — XIENCE SIERRA™ EVEROLIMUS ELUTING CORONARY STENT SYSTEM 3.50 MM X 18 MM / RAPID-EXCHANGE
Type: IMPLANTABLE DEVICE | Site: HEART | Status: FUNCTIONAL
Brand: XIENCE SIERRA™

## 2021-07-21 DEVICE — XIENCE SIERRA™ EVEROLIMUS ELUTING CORONARY STENT SYSTEM 3.50 MM X 23 MM / RAPID-EXCHANGE
Type: IMPLANTABLE DEVICE | Site: HEART | Status: FUNCTIONAL
Brand: XIENCE SIERRA™

## 2021-07-21 DEVICE — VALVE HRT TRANSCATH 26MM SAPIEN 3 ULTRA: Type: IMPLANTABLE DEVICE | Site: AORTIC VALVE | Status: FUNCTIONAL

## 2021-07-21 RX ORDER — PROPOFOL 10 MG/ML
INJECTION, EMULSION INTRAVENOUS AS NEEDED
Status: DISCONTINUED | OUTPATIENT
Start: 2021-07-21 | End: 2021-07-21 | Stop reason: HOSPADM

## 2021-07-21 RX ORDER — MORPHINE SULFATE 2 MG/ML
2 INJECTION, SOLUTION INTRAMUSCULAR; INTRAVENOUS
Status: DISCONTINUED | OUTPATIENT
Start: 2021-07-21 | End: 2021-07-23 | Stop reason: HOSPADM

## 2021-07-21 RX ORDER — POTASSIUM CHLORIDE 29.8 MG/ML
INJECTION INTRAVENOUS AS NEEDED
Status: DISCONTINUED | OUTPATIENT
Start: 2021-07-21 | End: 2021-07-21 | Stop reason: HOSPADM

## 2021-07-21 RX ORDER — GLYCOPYRROLATE 0.2 MG/ML
INJECTION INTRAMUSCULAR; INTRAVENOUS AS NEEDED
Status: DISCONTINUED | OUTPATIENT
Start: 2021-07-21 | End: 2021-07-21 | Stop reason: HOSPADM

## 2021-07-21 RX ORDER — GABAPENTIN 300 MG/1
300 CAPSULE ORAL
Status: DISCONTINUED | OUTPATIENT
Start: 2021-07-21 | End: 2021-07-23 | Stop reason: HOSPADM

## 2021-07-21 RX ORDER — ROSUVASTATIN CALCIUM 10 MG/1
20 TABLET, COATED ORAL
Status: DISCONTINUED | OUTPATIENT
Start: 2021-07-21 | End: 2021-07-23 | Stop reason: HOSPADM

## 2021-07-21 RX ORDER — TRAMADOL HYDROCHLORIDE 50 MG/1
50-100 TABLET ORAL
Status: DISCONTINUED | OUTPATIENT
Start: 2021-07-21 | End: 2021-07-23 | Stop reason: HOSPADM

## 2021-07-21 RX ORDER — FACIAL-BODY WIPES
10 EACH TOPICAL DAILY PRN
Status: DISCONTINUED | OUTPATIENT
Start: 2021-07-21 | End: 2021-07-23 | Stop reason: HOSPADM

## 2021-07-21 RX ORDER — ACETAMINOPHEN 325 MG/1
650 TABLET ORAL
Status: DISCONTINUED | OUTPATIENT
Start: 2021-07-21 | End: 2021-07-23 | Stop reason: HOSPADM

## 2021-07-21 RX ORDER — DEXTROSE 50 % IN WATER (D50W) INTRAVENOUS SYRINGE
Status: COMPLETED
Start: 2021-07-21 | End: 2021-07-21

## 2021-07-21 RX ORDER — SODIUM CHLORIDE 0.9 % (FLUSH) 0.9 %
5-40 SYRINGE (ML) INJECTION EVERY 8 HOURS
Status: DISCONTINUED | OUTPATIENT
Start: 2021-07-21 | End: 2021-07-21 | Stop reason: HOSPADM

## 2021-07-21 RX ORDER — CALCIUM CHLORIDE INJECTION 100 MG/ML
INJECTION, SOLUTION INTRAVENOUS AS NEEDED
Status: DISCONTINUED | OUTPATIENT
Start: 2021-07-21 | End: 2021-07-21 | Stop reason: HOSPADM

## 2021-07-21 RX ORDER — MILRINONE LACTATE 0.2 MG/ML
0.38 INJECTION, SOLUTION INTRAVENOUS CONTINUOUS
Status: DISCONTINUED | OUTPATIENT
Start: 2021-07-21 | End: 2021-07-23

## 2021-07-21 RX ORDER — MIDAZOLAM HYDROCHLORIDE 1 MG/ML
1 INJECTION, SOLUTION INTRAMUSCULAR; INTRAVENOUS AS NEEDED
Status: DISCONTINUED | OUTPATIENT
Start: 2021-07-21 | End: 2021-07-21 | Stop reason: HOSPADM

## 2021-07-21 RX ORDER — FENTANYL CITRATE 50 UG/ML
50 INJECTION, SOLUTION INTRAMUSCULAR; INTRAVENOUS AS NEEDED
Status: DISCONTINUED | OUTPATIENT
Start: 2021-07-21 | End: 2021-07-21 | Stop reason: HOSPADM

## 2021-07-21 RX ORDER — MORPHINE SULFATE 2 MG/ML
2 INJECTION, SOLUTION INTRAMUSCULAR; INTRAVENOUS
Status: DISCONTINUED | OUTPATIENT
Start: 2021-07-21 | End: 2021-07-21 | Stop reason: HOSPADM

## 2021-07-21 RX ORDER — PROPOFOL 10 MG/ML
0-50 VIAL (ML) INTRAVENOUS
Status: DISCONTINUED | OUTPATIENT
Start: 2021-07-21 | End: 2021-07-23

## 2021-07-21 RX ORDER — ROCURONIUM BROMIDE 10 MG/ML
INJECTION, SOLUTION INTRAVENOUS AS NEEDED
Status: DISCONTINUED | OUTPATIENT
Start: 2021-07-21 | End: 2021-07-21 | Stop reason: HOSPADM

## 2021-07-21 RX ORDER — DIPHENHYDRAMINE HYDROCHLORIDE 50 MG/ML
12.5 INJECTION, SOLUTION INTRAMUSCULAR; INTRAVENOUS AS NEEDED
Status: DISCONTINUED | OUTPATIENT
Start: 2021-07-21 | End: 2021-07-21 | Stop reason: HOSPADM

## 2021-07-21 RX ORDER — FENTANYL CITRATE 50 UG/ML
25 INJECTION, SOLUTION INTRAMUSCULAR; INTRAVENOUS
Status: DISCONTINUED | OUTPATIENT
Start: 2021-07-21 | End: 2021-07-21 | Stop reason: HOSPADM

## 2021-07-21 RX ORDER — SODIUM CHLORIDE, SODIUM LACTATE, POTASSIUM CHLORIDE, CALCIUM CHLORIDE 600; 310; 30; 20 MG/100ML; MG/100ML; MG/100ML; MG/100ML
125 INJECTION, SOLUTION INTRAVENOUS CONTINUOUS
Status: DISCONTINUED | OUTPATIENT
Start: 2021-07-21 | End: 2021-07-21 | Stop reason: HOSPADM

## 2021-07-21 RX ORDER — GUAIFENESIN 100 MG/5ML
81 LIQUID (ML) ORAL ONCE
Status: COMPLETED | OUTPATIENT
Start: 2021-07-21 | End: 2021-07-21

## 2021-07-21 RX ORDER — AMOXICILLIN 250 MG
1 CAPSULE ORAL 2 TIMES DAILY
Status: DISCONTINUED | OUTPATIENT
Start: 2021-07-22 | End: 2021-07-23 | Stop reason: HOSPADM

## 2021-07-21 RX ORDER — HYDROMORPHONE HYDROCHLORIDE 1 MG/ML
0.2 INJECTION, SOLUTION INTRAMUSCULAR; INTRAVENOUS; SUBCUTANEOUS
Status: DISCONTINUED | OUTPATIENT
Start: 2021-07-21 | End: 2021-07-21 | Stop reason: HOSPADM

## 2021-07-21 RX ORDER — SODIUM CHLORIDE 0.9 % (FLUSH) 0.9 %
5-40 SYRINGE (ML) INJECTION AS NEEDED
Status: DISCONTINUED | OUTPATIENT
Start: 2021-07-21 | End: 2021-07-21 | Stop reason: HOSPADM

## 2021-07-21 RX ORDER — MUPIROCIN 20 MG/G
OINTMENT TOPICAL DAILY
Status: DISCONTINUED | OUTPATIENT
Start: 2021-07-22 | End: 2021-07-23 | Stop reason: HOSPADM

## 2021-07-21 RX ORDER — GUAIFENESIN 100 MG/5ML
81 LIQUID (ML) ORAL DAILY
Status: DISCONTINUED | OUTPATIENT
Start: 2021-07-22 | End: 2021-07-23

## 2021-07-21 RX ORDER — LANOLIN ALCOHOL/MO/W.PET/CERES
400 CREAM (GRAM) TOPICAL 2 TIMES DAILY
Status: DISCONTINUED | OUTPATIENT
Start: 2021-07-22 | End: 2021-07-23 | Stop reason: HOSPADM

## 2021-07-21 RX ORDER — MIDAZOLAM HYDROCHLORIDE 1 MG/ML
0.5 INJECTION, SOLUTION INTRAMUSCULAR; INTRAVENOUS
Status: DISCONTINUED | OUTPATIENT
Start: 2021-07-21 | End: 2021-07-21 | Stop reason: HOSPADM

## 2021-07-21 RX ORDER — DEXMEDETOMIDINE HYDROCHLORIDE 4 UG/ML
.1-1.5 INJECTION, SOLUTION INTRAVENOUS
Status: DISCONTINUED | OUTPATIENT
Start: 2021-07-21 | End: 2021-07-23

## 2021-07-21 RX ORDER — LIDOCAINE HYDROCHLORIDE 10 MG/ML
0.1 INJECTION, SOLUTION EPIDURAL; INFILTRATION; INTRACAUDAL; PERINEURAL AS NEEDED
Status: DISCONTINUED | OUTPATIENT
Start: 2021-07-21 | End: 2021-07-21 | Stop reason: HOSPADM

## 2021-07-21 RX ORDER — DOBUTAMINE HYDROCHLORIDE 200 MG/100ML
0-10 INJECTION INTRAVENOUS
Status: DISCONTINUED | OUTPATIENT
Start: 2021-07-21 | End: 2021-07-23

## 2021-07-21 RX ORDER — ONDANSETRON 2 MG/ML
4 INJECTION INTRAMUSCULAR; INTRAVENOUS AS NEEDED
Status: DISCONTINUED | OUTPATIENT
Start: 2021-07-21 | End: 2021-07-21 | Stop reason: HOSPADM

## 2021-07-21 RX ORDER — FAMOTIDINE 20 MG/1
20 TABLET, FILM COATED ORAL EVERY 12 HOURS
Status: DISCONTINUED | OUTPATIENT
Start: 2021-07-22 | End: 2021-07-22

## 2021-07-21 RX ORDER — OXYCODONE HYDROCHLORIDE 5 MG/1
5 TABLET ORAL AS NEEDED
Status: DISCONTINUED | OUTPATIENT
Start: 2021-07-21 | End: 2021-07-21 | Stop reason: HOSPADM

## 2021-07-21 RX ORDER — ISOSORBIDE DINITRATE 10 MG/1
5 TABLET ORAL 2 TIMES DAILY
Status: DISCONTINUED | OUTPATIENT
Start: 2021-07-21 | End: 2021-07-21

## 2021-07-21 RX ORDER — SODIUM CHLORIDE 9 MG/ML
INJECTION, SOLUTION INTRAVENOUS
Status: DISCONTINUED | OUTPATIENT
Start: 2021-07-21 | End: 2021-07-21 | Stop reason: HOSPADM

## 2021-07-21 RX ORDER — SODIUM CHLORIDE 0.9 % (FLUSH) 0.9 %
5-40 SYRINGE (ML) INJECTION AS NEEDED
Status: DISCONTINUED | OUTPATIENT
Start: 2021-07-21 | End: 2021-07-23 | Stop reason: HOSPADM

## 2021-07-21 RX ORDER — ALBUTEROL SULFATE 0.83 MG/ML
2.5 SOLUTION RESPIRATORY (INHALATION)
Status: DISCONTINUED | OUTPATIENT
Start: 2021-07-21 | End: 2021-07-23 | Stop reason: HOSPADM

## 2021-07-21 RX ORDER — SODIUM BICARBONATE 1 MEQ/ML
SYRINGE (ML) INTRAVENOUS AS NEEDED
Status: DISCONTINUED | OUTPATIENT
Start: 2021-07-21 | End: 2021-07-21 | Stop reason: HOSPADM

## 2021-07-21 RX ORDER — ONDANSETRON 2 MG/ML
INJECTION INTRAMUSCULAR; INTRAVENOUS AS NEEDED
Status: DISCONTINUED | OUTPATIENT
Start: 2021-07-21 | End: 2021-07-21 | Stop reason: HOSPADM

## 2021-07-21 RX ORDER — DEXMEDETOMIDINE HYDROCHLORIDE 100 UG/ML
INJECTION, SOLUTION INTRAVENOUS AS NEEDED
Status: DISCONTINUED | OUTPATIENT
Start: 2021-07-21 | End: 2021-07-21 | Stop reason: HOSPADM

## 2021-07-21 RX ORDER — NALOXONE HYDROCHLORIDE 0.4 MG/ML
0.4 INJECTION, SOLUTION INTRAMUSCULAR; INTRAVENOUS; SUBCUTANEOUS AS NEEDED
Status: DISCONTINUED | OUTPATIENT
Start: 2021-07-21 | End: 2021-07-23 | Stop reason: HOSPADM

## 2021-07-21 RX ORDER — BUPIVACAINE HYDROCHLORIDE 5 MG/ML
INJECTION, SOLUTION EPIDURAL; INTRACAUDAL AS NEEDED
Status: DISCONTINUED | OUTPATIENT
Start: 2021-07-21 | End: 2021-07-21 | Stop reason: HOSPADM

## 2021-07-21 RX ORDER — DEXTROSE 50 % IN WATER (D50W) INTRAVENOUS SYRINGE
25 AS NEEDED
Status: DISCONTINUED | OUTPATIENT
Start: 2021-07-21 | End: 2021-07-23 | Stop reason: HOSPADM

## 2021-07-21 RX ORDER — SODIUM CHLORIDE 450 MG/100ML
10 INJECTION, SOLUTION INTRAVENOUS CONTINUOUS
Status: DISCONTINUED | OUTPATIENT
Start: 2021-07-21 | End: 2021-07-23

## 2021-07-21 RX ORDER — POTASSIUM CHLORIDE 29.8 MG/ML
20 INJECTION INTRAVENOUS ONCE
Status: COMPLETED | OUTPATIENT
Start: 2021-07-21 | End: 2021-07-21

## 2021-07-21 RX ORDER — SODIUM CHLORIDE 9 MG/ML
9 INJECTION, SOLUTION INTRAVENOUS CONTINUOUS
Status: DISCONTINUED | OUTPATIENT
Start: 2021-07-21 | End: 2021-07-23

## 2021-07-21 RX ORDER — VERAPAMIL HYDROCHLORIDE 2.5 MG/ML
INJECTION, SOLUTION INTRAVENOUS AS NEEDED
Status: DISCONTINUED | OUTPATIENT
Start: 2021-07-21 | End: 2021-07-21 | Stop reason: HOSPADM

## 2021-07-21 RX ORDER — HEPARIN SODIUM 1000 [USP'U]/ML
INJECTION, SOLUTION INTRAVENOUS; SUBCUTANEOUS AS NEEDED
Status: DISCONTINUED | OUTPATIENT
Start: 2021-07-21 | End: 2021-07-21 | Stop reason: HOSPADM

## 2021-07-21 RX ORDER — CARVEDILOL 6.25 MG/1
12.5 TABLET ORAL 2 TIMES DAILY WITH MEALS
Status: DISCONTINUED | OUTPATIENT
Start: 2021-07-21 | End: 2021-07-21

## 2021-07-21 RX ORDER — ACETAMINOPHEN 325 MG/1
650 TABLET ORAL ONCE
Status: DISCONTINUED | OUTPATIENT
Start: 2021-07-21 | End: 2021-07-21 | Stop reason: HOSPADM

## 2021-07-21 RX ORDER — PROPOFOL 10 MG/ML
INJECTION, EMULSION INTRAVENOUS
Status: DISCONTINUED | OUTPATIENT
Start: 2021-07-21 | End: 2021-07-21 | Stop reason: HOSPADM

## 2021-07-21 RX ORDER — SODIUM CHLORIDE, SODIUM LACTATE, POTASSIUM CHLORIDE, CALCIUM CHLORIDE 600; 310; 30; 20 MG/100ML; MG/100ML; MG/100ML; MG/100ML
25 INJECTION, SOLUTION INTRAVENOUS CONTINUOUS
Status: DISCONTINUED | OUTPATIENT
Start: 2021-07-21 | End: 2021-07-21 | Stop reason: HOSPADM

## 2021-07-21 RX ORDER — TORSEMIDE 20 MG/1
60 TABLET ORAL
Status: DISCONTINUED | OUTPATIENT
Start: 2021-07-22 | End: 2021-07-21

## 2021-07-21 RX ORDER — CHLORHEXIDINE GLUCONATE 1.2 MG/ML
15 RINSE ORAL EVERY 12 HOURS
Status: DISCONTINUED | OUTPATIENT
Start: 2021-07-21 | End: 2021-07-23 | Stop reason: HOSPADM

## 2021-07-21 RX ORDER — OXYCODONE AND ACETAMINOPHEN 5; 325 MG/1; MG/1
1-2 TABLET ORAL
Status: DISCONTINUED | OUTPATIENT
Start: 2021-07-21 | End: 2021-07-23 | Stop reason: HOSPADM

## 2021-07-21 RX ORDER — SODIUM CHLORIDE 9 MG/ML
25 INJECTION, SOLUTION INTRAVENOUS CONTINUOUS
Status: DISCONTINUED | OUTPATIENT
Start: 2021-07-21 | End: 2021-07-21 | Stop reason: HOSPADM

## 2021-07-21 RX ORDER — LIDOCAINE HYDROCHLORIDE 20 MG/ML
INJECTION, SOLUTION EPIDURAL; INFILTRATION; INTRACAUDAL; PERINEURAL AS NEEDED
Status: DISCONTINUED | OUTPATIENT
Start: 2021-07-21 | End: 2021-07-21 | Stop reason: HOSPADM

## 2021-07-21 RX ORDER — KETAMINE HYDROCHLORIDE 10 MG/ML
INJECTION, SOLUTION INTRAMUSCULAR; INTRAVENOUS AS NEEDED
Status: DISCONTINUED | OUTPATIENT
Start: 2021-07-21 | End: 2021-07-21 | Stop reason: HOSPADM

## 2021-07-21 RX ORDER — VASOPRESSIN 20 U/ML
INJECTION PARENTERAL AS NEEDED
Status: DISCONTINUED | OUTPATIENT
Start: 2021-07-21 | End: 2021-07-21 | Stop reason: HOSPADM

## 2021-07-21 RX ORDER — SODIUM CHLORIDE 0.9 % (FLUSH) 0.9 %
5-40 SYRINGE (ML) INJECTION EVERY 8 HOURS
Status: DISCONTINUED | OUTPATIENT
Start: 2021-07-21 | End: 2021-07-23 | Stop reason: HOSPADM

## 2021-07-21 RX ORDER — ONDANSETRON 2 MG/ML
4 INJECTION INTRAMUSCULAR; INTRAVENOUS
Status: DISCONTINUED | OUTPATIENT
Start: 2021-07-21 | End: 2021-07-23 | Stop reason: HOSPADM

## 2021-07-21 RX ADMIN — WATER 2 G: 1 INJECTION INTRAMUSCULAR; INTRAVENOUS; SUBCUTANEOUS at 08:00

## 2021-07-21 RX ADMIN — PROPOFOL 50 MG: 10 INJECTION, EMULSION INTRAVENOUS at 07:35

## 2021-07-21 RX ADMIN — POTASSIUM CHLORIDE 20 MEQ: 29.8 INJECTION, SOLUTION INTRAVENOUS at 14:19

## 2021-07-21 RX ADMIN — PHENYLEPHRINE HYDROCHLORIDE 40 MCG/MIN: 10 INJECTION INTRAVENOUS at 11:47

## 2021-07-21 RX ADMIN — DEXMEDETOMIDINE HYDROCHLORIDE 12 MCG: 100 INJECTION, SOLUTION, CONCENTRATE INTRAVENOUS at 08:00

## 2021-07-21 RX ADMIN — PROPOFOL 25 MG: 10 INJECTION, EMULSION INTRAVENOUS at 08:11

## 2021-07-21 RX ADMIN — DEXTROSE MONOHYDRATE 10 ML: 25 INJECTION, SOLUTION INTRAVENOUS at 16:42

## 2021-07-21 RX ADMIN — DEXMEDETOMIDINE HYDROCHLORIDE 0.6 MCG/KG/HR: 100 INJECTION, SOLUTION, CONCENTRATE INTRAVENOUS at 07:45

## 2021-07-21 RX ADMIN — DEXMEDETOMIDINE HYDROCHLORIDE 0.6 MCG/KG/HR: 4 INJECTION, SOLUTION INTRAVENOUS at 13:38

## 2021-07-21 RX ADMIN — Medication 10 ML: at 22:30

## 2021-07-21 RX ADMIN — PROPOFOL 25 MG: 10 INJECTION, EMULSION INTRAVENOUS at 07:38

## 2021-07-21 RX ADMIN — HEPARIN SODIUM 2000 UNITS: 1000 INJECTION, SOLUTION INTRAVENOUS; SUBCUTANEOUS at 11:07

## 2021-07-21 RX ADMIN — SODIUM CHLORIDE 5 MG/HR: 9 INJECTION, SOLUTION INTRAVENOUS at 10:03

## 2021-07-21 RX ADMIN — LIDOCAINE HYDROCHLORIDE 100 MG: 20 INJECTION, SOLUTION EPIDURAL; INFILTRATION; INTRACAUDAL; PERINEURAL at 07:39

## 2021-07-21 RX ADMIN — PROPOFOL 25 MCG/KG/MIN: 10 INJECTION, EMULSION INTRAVENOUS at 07:35

## 2021-07-21 RX ADMIN — CHLORHEXIDINE GLUCONATE 15 ML: 1.2 RINSE ORAL at 23:13

## 2021-07-21 RX ADMIN — EPINEPHRINE 2 MCG/MIN: 1 INJECTION INTRAMUSCULAR; INTRAVENOUS; SUBCUTANEOUS at 10:09

## 2021-07-21 RX ADMIN — CHLORHEXIDINE GLUCONATE 15 ML: 1.2 RINSE ORAL at 15:07

## 2021-07-21 RX ADMIN — MILRINONE LACTATE 0.38 MCG/KG/MIN: 200 INJECTION, SOLUTION INTRAVENOUS at 18:11

## 2021-07-21 RX ADMIN — GLYCOPYRROLATE 0.1 MG: 0.2 INJECTION INTRAMUSCULAR; INTRAVENOUS at 08:00

## 2021-07-21 RX ADMIN — DEXMEDETOMIDINE HYDROCHLORIDE 0.2 MCG/KG/HR: 4 INJECTION, SOLUTION INTRAVENOUS at 22:21

## 2021-07-21 RX ADMIN — PHENYLEPHRINE HYDROCHLORIDE 20 MCG/MIN: 10 INJECTION INTRAVENOUS at 09:00

## 2021-07-21 RX ADMIN — SODIUM CHLORIDE, POTASSIUM CHLORIDE, SODIUM LACTATE AND CALCIUM CHLORIDE: 600; 310; 30; 20 INJECTION, SOLUTION INTRAVENOUS at 07:31

## 2021-07-21 RX ADMIN — DEXTROSE MONOHYDRATE 8 ML: 25 INJECTION, SOLUTION INTRAVENOUS at 18:06

## 2021-07-21 RX ADMIN — ROCURONIUM BROMIDE 40 MG: 10 SOLUTION INTRAVENOUS at 10:23

## 2021-07-21 RX ADMIN — WATER 2 G: 1 INJECTION INTRAMUSCULAR; INTRAVENOUS; SUBCUTANEOUS at 11:00

## 2021-07-21 RX ADMIN — VASOPRESSIN 3 UNITS: 20 INJECTION INTRAVENOUS at 10:14

## 2021-07-21 RX ADMIN — ONDANSETRON HYDROCHLORIDE 4 MG: 2 INJECTION, SOLUTION INTRAMUSCULAR; INTRAVENOUS at 11:00

## 2021-07-21 RX ADMIN — ASPIRIN 81 MG: 81 TABLET, CHEWABLE ORAL at 15:07

## 2021-07-21 RX ADMIN — EPINEPHRINE 2 MCG/MIN: 1 INJECTION INTRAMUSCULAR; INTRAVENOUS; SUBCUTANEOUS at 13:39

## 2021-07-21 RX ADMIN — KETAMINE HYDROCHLORIDE 20 MG: 10 INJECTION, SOLUTION INTRAMUSCULAR; INTRAVENOUS at 07:45

## 2021-07-21 RX ADMIN — TICAGRELOR 90 MG: 90 TABLET ORAL at 15:07

## 2021-07-21 RX ADMIN — MORPHINE SULFATE 2 MG: 2 INJECTION, SOLUTION INTRAMUSCULAR; INTRAVENOUS at 16:34

## 2021-07-21 RX ADMIN — SODIUM CHLORIDE 10 ML/HR: 4.5 INJECTION, SOLUTION INTRAVENOUS at 12:45

## 2021-07-21 RX ADMIN — GLYCOPYRROLATE 0.2 MG: 0.2 INJECTION INTRAMUSCULAR; INTRAVENOUS at 08:14

## 2021-07-21 RX ADMIN — DEXMEDETOMIDINE HYDROCHLORIDE 8 MCG: 100 INJECTION, SOLUTION, CONCENTRATE INTRAVENOUS at 08:08

## 2021-07-21 RX ADMIN — HEPARIN SODIUM 1000 UNITS: 1000 INJECTION, SOLUTION INTRAVENOUS; SUBCUTANEOUS at 11:24

## 2021-07-21 RX ADMIN — HEPARIN SODIUM 2000 UNITS: 1000 INJECTION, SOLUTION INTRAVENOUS; SUBCUTANEOUS at 10:53

## 2021-07-21 RX ADMIN — KETAMINE HYDROCHLORIDE 10 MG: 10 INJECTION, SOLUTION INTRAMUSCULAR; INTRAVENOUS at 08:14

## 2021-07-21 RX ADMIN — CALCIUM CHLORIDE 1 G: 100 INJECTION, SOLUTION INTRAVENOUS; INTRAVENTRICULAR at 10:14

## 2021-07-21 RX ADMIN — SODIUM CHLORIDE: 900 INJECTION, SOLUTION INTRAVENOUS at 07:35

## 2021-07-21 RX ADMIN — SODIUM CHLORIDE 9 ML/HR: 9 INJECTION, SOLUTION INTRAVENOUS at 12:45

## 2021-07-21 RX ADMIN — PROPOFOL 30 MCG/KG/MIN: 10 INJECTION, EMULSION INTRAVENOUS at 14:10

## 2021-07-21 RX ADMIN — POTASSIUM CHLORIDE 20 MEQ: 400 INJECTION, SOLUTION INTRAVENOUS at 09:25

## 2021-07-21 RX ADMIN — SODIUM CHLORIDE 2 UNITS/HR: 9 INJECTION, SOLUTION INTRAVENOUS at 09:46

## 2021-07-21 RX ADMIN — HEPARIN SODIUM 10000 UNITS: 1000 INJECTION, SOLUTION INTRAVENOUS; SUBCUTANEOUS at 09:04

## 2021-07-21 RX ADMIN — SODIUM CHLORIDE 3 ML/KG/HR: 9 INJECTION, SOLUTION INTRAVENOUS at 06:42

## 2021-07-21 RX ADMIN — SODIUM BICARBONATE 50 MEQ: 84 INJECTION, SOLUTION INTRAVENOUS at 10:14

## 2021-07-21 RX ADMIN — DOBUTAMINE HYDROCHLORIDE 2.5 MCG/KG/MIN: 200 INJECTION INTRAVENOUS at 15:54

## 2021-07-21 RX ADMIN — VASOPRESSIN 2 UNITS: 20 INJECTION INTRAVENOUS at 10:11

## 2021-07-21 RX ADMIN — PROPOFOL 25 MG: 10 INJECTION, EMULSION INTRAVENOUS at 07:37

## 2021-07-21 RX ADMIN — CEFAZOLIN SODIUM 2 G: 1 INJECTION, POWDER, FOR SOLUTION INTRAMUSCULAR; INTRAVENOUS at 19:43

## 2021-07-21 RX ADMIN — Medication 10 ML: at 15:07

## 2021-07-21 NOTE — H&P
Date of Surgery Update:  Grace Marrufo was seen and examined. History and physical has been reviewed. The patient has been examined. There have been no significant clinical changes since the completion of the originally dated History and Physical on 7/15/21.     Signed By: Yolanda Goodwin NP     July 21, 2021 6:39 AM

## 2021-07-21 NOTE — PROGRESS NOTES
1240- arrived from [facility] on vent, monitor, and IV medications. Report from Dr. Carito Mccain, CRNA, and cath lab RN. Plans to wean epi and sedation for extubation. Right TR band at 16 ml.    1242- left groin site below site has small hematoma forming. Dr. Carito Mccain aware, no intervention needed. 1245- pulmonary temp 93.7' bare hugger placed on patient, blankets wrapped on feet and around head. 1333- ABG performed; FiO2 decreased to 50% from 80%. Attempted to decrease TR band by 3 ml; immediate bleeding at site, increased back to 16 ml.     1405Ane Bakari, NP called for update; informed her of CI 1.3-1.5, CO 2.7-3.4, temp 93.4-94. 8. Pre-op CI 1.9, she just wants to watch for now and may possibly order medications later if no improvement. 26- radiologist called and stated changes in left lung; informed Claudia Laurent, NP.    [address] now 95.2. Attempted again to decrease TR band with same immediate bleed at site. 1542- temp 96.8; starting propofol wean. 8 [facility], NP aware of inability to decrease TR band, temp 96.8, and CI 1.3; She is placing dobutamine orders. 1554- dobutamine started at 2.5 mcg per order; CI 1.3    1609- dobutamine increased to 3.5; CI 1.3. Propofol weaned off. Bare hugger off; temp 97.1    1623- epi weaned off for .    1635- patient waking up and not following commands. RR 22-26, high peak pressures, -150's, PAP 70's. Medicated with morphine for comfort. Patient finally calming down with calm voice and medication. [address] Dr. Carito Mccain at bedside for update. Discussed labile CI; orders to restart epi at 1 mcg and leave it overnight. 1800- TR band decreased by 3 ml; now at 13 ml.    1804- Dr. Joseline Loza at bedside; update given on hemodynamics. Orders for milrinone. Stated to keep dobutamine at 4.5 mcg and epi 1 mcg and do not wean.     1810- TR band decreased by 3 ml; now at 10 ml.    1835- patient is starting to wake up and following simple commands; will open eyes, squeeze hands, and move feet. Precedex weaning. 1855- TR band decreased by 3 ml; now at 7 ml.    2000- Bedside and Verbal shift change report given to Crystal Lozano (oncoming nurse) by Leonardo Nelson RN (offgoing nurse). Report included the following information SBAR, Kardex, Recent Results and Cardiac Rhythm paced.

## 2021-07-21 NOTE — ANESTHESIA PROCEDURE NOTES
Arterial Line Placement    Start time: 7/21/2021 6:51 AM  End time: 7/21/2021 6:56 AM  Performed by: Gabino Thacker MD  Authorized by: Gabino Thacker MD     Pre-Procedure  Indications:  Arterial pressure monitoring and blood sampling  Preanesthetic Checklist: patient identified, risks and benefits discussed, anesthesia consent, patient being monitored, timeout performed and patient being monitored      Procedure:   Prep:  Chlorhexidine  Seldinger Technique?: Yes    Orientation:  Left  Location:  Radial artery  Catheter size:  20 G  Number of attempts:  1  Cont Cardiac Output Sensor: No      Assessment:   Post-procedure:  Line secured and sterile dressing applied  Patient Tolerance:  Patient tolerated the procedure well with no immediate complications

## 2021-07-21 NOTE — PERIOP NOTES
Patient stated his full name, date of birth and procedure in pre-op holding; Pt to OR via stretcher. With all wheels locked, pt transferred himself to OR table without difficulty.   Left radial arterial line placed in pre-op holding and intact.

## 2021-07-21 NOTE — ANESTHESIA PREPROCEDURE EVALUATION
Anesthetic History   No history of anesthetic complications            Review of Systems / Medical History  Patient summary reviewed, nursing notes reviewed and pertinent labs reviewed    Pulmonary          Smoker      Comments: Former smoker, quit 1995   Neuro/Psych   Within defined limits           Cardiovascular    Hypertension: well controlled      CHF: NYHA Classification I, dyspnea on exertion  Dysrhythmias : atrial flutter, SVT and PVC  Pacemaker, PAD and hyperlipidemia    Exercise tolerance: <4 METS  Comments: Cardiomyopathy ([address]      Cardiogenic shock -EF 10-15%, mod MR, and AS and moderate pulmonary hypertension  S/P A-Flutter ablation  Mild bilateral carotid disease   Took his beta blocker last night           GI/Hepatic/Renal                Endo/Other  Within defined limits          Comments: Pre-diabetes Other Findings              Physical Exam    Airway  Mallampati: III  TM Distance: < 4 cm  Neck ROM: decreased range of motion   Mouth opening: Diminished (comment)     Cardiovascular    Rhythm: irregular  Rate: normal         Dental    Dentition: Caps/crowns  Comments: Chipped upper right molar   Pulmonary  Breath sounds clear to auscultation               Abdominal  GI exam deferred       Other Findings            Anesthetic Plan    ASA: 4  Anesthesia type: MAC    Monitoring Plan: Arterial line        Anesthetic plan and risks discussed with: Patient

## 2021-07-21 NOTE — PROGRESS NOTES
Clinical Update:      Jessee Bill arrived to CVICU from OR s/p transcatheter aortic valve replacement with 26 S3 and stent to the RCA by Opal Moreau. he  had General anesthesia. He arrived on an Epi and Insulin drip. During the procedure and after deployment of the valve, the patient developed an occlusion of his RCA and had Cardiac Arrest requiring CPR and defibrillation. He received a 2nd stent to his RCA and was emergently intubated. Jessee Bill is sedated and intubated. His left groin site is ecchymotic and has a small hematoma inferior to the stick site. Bilateral groin sites with gauze dressing which are clean, dry and intact. Anticoagulation plan for ASA, Brilinta tonight. Will discuss plans to resume Eliquis tomorrow if appropriate. Plan for postoperative TTE and EKG.     Visit Vitals  /60   Pulse 60   Temp 97.1 °F (36.2 °C)   Resp 16   Ht 6' (1.829 m)   Wt 199 lb (90.3 kg)   SpO2 100%   BMI 26.99 kg/m²         Signed:  Jeevan Templeton NP  7/21/2021  4:22 PM

## 2021-07-21 NOTE — ANESTHESIA PROCEDURE NOTES
SUSANNA        Procedure Details: probe placement, image aquisition & interpretation    Risks and benefits discussed with the patient and plans are to proceed    Procedure Note    Performed by: Jinny Espinosa MD  Authorized by: Jinny Espinosa MD       Indications: assessment of ascending aorta  Modalities: 2D, CF, CWD, PWD  Probe Type: multiplane  Insertion: atraumatic  Patient Status: intubated and sedated    Echocardiographic and Doppler Measurements   Aorta  Size  Diam(cm)  Dissection PlaqueThick(mm)  Plaque Mobile    Ascending normal  No 0-3 No    Arch normal  No 0-3 No    Descending normal  No 0-3 No          Valves  Annulus  Stenosis  Area/Grad  Regurg  Leaflet   Morph  Leaflet   Motion    Aortic calcified moderate   calcified restricted    Mitral dilated none  3+ normal restricted    Tricuspid dilated none  3+ normal restricted          Atria  Size  SEC (smoke)  Thrombus  Tumor  Device    Rt Atrium dilated No No No Yes    Lt Atrium dilated Yes No No No     Interatrial Septum Morphology: normal      Ventricle  Cavity Size  Cavity Dimension Hypertrophy  Thrombus  Gloal FXN  EF    RV dilated  No no moderately impaired     LV dilated   No severely impaired 10-15%       Regional Function  (1 = normal, 2 = mildly hypokinetic, 3 = severely hypokinetic, 4 = akinetic, 5 = dyskinetic) LAV - [place]   ME LAV = 0  ME LAV = 90  ME LAV = 130   Basal Sept:3 Basal Ant:3 Basal Post:3   Mid Sept:3 Mid Ant:3 Mid Post:2   Apical Sept:3 Apical Ant:3 Basal Ant Sept:3   Basal Lat:3 Basal Inf:3 Mid Ant Sept:3   Mid Lat:3 Mid Inf:3    Apical Lat:3 Apical Inf:3        Pericardium: normal    Post Intervention Follow-up Study  Ventricular Global Function: unchanged  Ventricular Regional Function: unchanged     Valve  Function  Regurgitation  Area    Aortic improved      Mitral       Tricuspid       Prosthetic normal       Complications: None  Comments: AV well seated with mild paravalvular leak best seen in 4 chamber view under anterior leaflet. All findings were discussed with Opal Betancur

## 2021-07-21 NOTE — PROGRESS NOTES
Cardiac Surgery Care Coordinator- Unable to locate Mr Throckmorton's friend, placed update call and left message. 1000- Placed second call to Mr Jack Vale, listed as friend in chart. No answer, left voicemail and contact information. 1045- Received return call from Mr Jack Vale, provided update from the OR and discussed planned recovery in CVICU. He stated he is the life partner of Mr Loida Laguerre and [facility] that he will provide tomorrow. He is available by phone for a physician update. 1345- Placed update call to Mr Jack Vale, he is without questions at this time. Bedside nurse to call when Mr Loida Laguerre is extubated. Exchanged contact information and offered emotional support. He will not be visiting today.  Nimco Lopez RN

## 2021-07-21 NOTE — Clinical Note
Lesion: Located in the [place] RCA. Stent inserted.  Post dil in the coronary with TAVR balloon inflated to post dil the TAVR

## 2021-07-21 NOTE — Clinical Note
Temporary pacemaker inserted. Inserted through the left groin. Temporary Pacer pacing in the right ventricle. Device secured using: tegaderm.    Rate = 100 bpm. 0.4

## 2021-07-21 NOTE — ANESTHESIA POSTPROCEDURE EVALUATION
Post-Anesthesia Evaluation and Assessment    Patient: Prince Zuniga MRN: [id number]  SSN: [id number]    YOB: 1945  Age: 77 y.o. Sex: male       Cardiovascular Function/Vital Signs  Visit Vitals  /60   Pulse 60   Temp (!) 35.3 °C (95.6 °F)   Resp 16   Ht 6' (1.829 m)   Wt 90.3 kg (199 lb)   SpO2 100%   BMI 26.99 kg/m²       Patient is status post General anesthesia for Procedure(s):  TRANSCATHETER AORTIC VALVE REPLACEMENT, LEFT TRANSFEMORAL 26 S3, 2ML RCA PROTECTION,  RIGHT RADIAL FOR PIGTAIL, RIGHT FEMORAL RCA PROTECTION; POST BAV; SUSANNA AND EPIAORTIC BY DR. Kevin Duke  High Risk Percutaneous Coronary Intervention. Nausea/Vomiting: None    Postoperative hydration reviewed and adequate. Pain:  Pain Scale 1: Adult Nonverbal Pain Scale (07/21/21 1244)  Pain Intensity 1: 0 (07/21/21 0600)   Managed    Neurological Status:   Neuro (WDL): Within Defined Limits (07/21/21 0615)  Neuro  Neurologic State: Other (Comment); Pharmacologically induced (comment) (intubated and sedated; precedex, propofol) (07/21/21 1245)  Orientation Level: Unable to verbalize (07/21/21 1245)  Cognition: Unable to assess (comment) (intubated and sedated) (07/21/21 1245)  Speech: Intubated (07/21/21 1245)  Assessment L Pupil: Brisk;Round (07/21/21 1245)  Size L Pupil (mm): 2 (07/21/21 1245)  Assessment R Pupil: Brisk;Round (07/21/21 1245)  Size R Pupil (mm): 2 (07/21/21 1245)  LUE Motor Response: No movement to any stimulus (07/21/21 1245)  LLE Motor Response: No movement to any stimulus (07/21/21 1245)  RUE Motor Response: No movement to any stimulus (07/21/21 1245)  RLE Motor Response: No movement to any stimulus (07/21/21 1245)   At baseline    Mental Status and Level of Consciousness: Alert and oriented to person, place, and time    Pulmonary Status:   O2 Device: Endotracheal tube;Ventilator (07/21/21 1250)   Adequate oxygenation and airway patent    Complications related to anesthesia: None    Post-anesthesia assessment completed. No concerns    Signed By: Kirk Duke MD     July 21, 2021              Procedure(s):  TRANSCATHETER AORTIC VALVE REPLACEMENT, LEFT TRANSFEMORAL 26 S3, 2ML RCA PROTECTION,  RIGHT RADIAL FOR PIGTAIL, RIGHT FEMORAL RCA PROTECTION; POST BAV; SUSANNA AND EPIAORTIC BY DR. Fadia Denton  High Risk Percutaneous Coronary Intervention. MAC    <BSHSIANPOST>    INITIAL Post-op Vital signs:   Vitals Value Taken Time   /60 07/21/21 1248   Temp 35.3 °C (95.6 °F) 07/21/21 1430   Pulse 60 07/21/21 1447   Resp 0 07/21/21 1447   SpO2 100 % 07/21/21 1447   Vitals shown include unvalidated device data.

## 2021-07-21 NOTE — ANESTHESIA PROCEDURE NOTES
[facility] and [place] catheter Placement    Start time: 7/21/2021 12:02 PM  End time: 7/21/2021 12:15 PM  Performed by: Parish Tovar MD  Authorized by: Parish Tovar MD     Indications: vascular access, central pressure monitoring and need for vasopressors  Preanesthetic Checklist: patient identified, patient being monitored and timeout performed      Pre-procedure: All elements of maximal sterile barrier technique followed?  Yes    2% Chlorhexidine for cutaneous antisepsis, Hand hygiene performed prior to catheter insertion and Ultrasound guidance    Sterile Ultrasound Technique followed?: Yes            Procedure:   Prep:  Chlorhexidine    Orientation:  Right  Patient position:  Trendelenburg  Catheter type:  Double lumen  Catheter size:  9 Fr  Catheter length:  16 cm  Number of attempts:  1  Successful placement: Yes      Assessment:   Post-procedure:  Catheter secured and sterile dressing applied  Assessment:  Blood return through all ports  Insertion:  Uncomplicated  Patient tolerance:  Patient tolerated the procedure well with no immediate complications  8fr CCO PAC

## 2021-07-22 ENCOUNTER — APPOINTMENT (OUTPATIENT)
Dept: NON INVASIVE DIAGNOSTICS | Age: 76
DRG: 266 | End: 2021-07-22
Attending: NURSE PRACTITIONER
Payer: MEDICARE

## 2021-07-22 LAB
ADMINISTERED INITIALS, ADMINIT: NORMAL
ANION GAP SERPL CALC-SCNC: 12 MMOL/L (ref 5–15)
ARTERIAL PATENCY WRIST A: ABNORMAL
ATRIAL RATE: 82 BPM
BASE DEFICIT BLDV-SCNC: 1 MMOL/L
BDY SITE: ABNORMAL
BUN SERPL-MCNC: 22 MG/DL (ref 6–20)
BUN/CREAT SERPL: 16 (ref 12–20)
CALCIUM SERPL-MCNC: 9.4 MG/DL (ref 8.5–10.1)
CALCULATED P AXIS, ECG09: 91 DEGREES
CALCULATED R AXIS, ECG10: 24 DEGREES
CALCULATED T AXIS, ECG11: -103 DEGREES
CHLORIDE SERPL-SCNC: 107 MMOL/L (ref 97–108)
CO2 SERPL-SCNC: 23 MMOL/L (ref 21–32)
CREAT SERPL-MCNC: 1.4 MG/DL (ref 0.7–1.3)
D50 ADMINISTERED, D50ADM: 0 ML
D50 ORDER, D50ORD: 0 ML
DIAGNOSIS, 93000: NORMAL
ECHO AO ROOT DIAM: 3.24 CM
ECHO AV AREA PEAK VELOCITY: 1.54 CM2
ECHO AV AREA PEAK VELOCITY: 1.78 CM2
ECHO AV AREA PEAK VELOCITY: 1.82 CM2
ECHO AV AREA PEAK VELOCITY: 2.1 CM2
ECHO AV AREA VTI: 1.98 CM2
ECHO AV AREA/BSA VTI: 0.9 CM2/M2
ECHO AV MEAN GRADIENT: 19.91 MMHG
ECHO AV PEAK GRADIENT: 25.65 MMHG
ECHO AV PEAK GRADIENT: 34.3 MMHG
ECHO AV PEAK VELOCITY: 253.23 CM/S
ECHO AV PEAK VELOCITY: 292.83 CM/S
ECHO AV VTI: 46.18 CM
ECHO EST RA PRESSURE: 3 MMHG
ECHO LA AREA 4C: 32.01 CM2
ECHO LA VOL 2C: 99.22 ML (ref 18–58)
ECHO LA VOL 4C: 115.79 ML (ref 18–58)
ECHO LA VOL BP: 122.7 ML (ref 18–58)
ECHO LA VOL/BSA BIPLANE: 57.61 ML/M2 (ref 16–28)
ECHO LA VOLUME INDEX A2C: 46.58 ML/M2 (ref 16–28)
ECHO LA VOLUME INDEX A4C: 54.36 ML/M2 (ref 16–28)
ECHO LV EDV A2C: 275.12 ML
ECHO LV EDV A4C: 320.66 ML
ECHO LV EDV BP: 305.16 ML (ref 67–155)
ECHO LV EDV INDEX A4C: 150.5 ML/M2
ECHO LV EDV INDEX BP: 143.3 ML/M2
ECHO LV EDV NDEX A2C: 129.2 ML/M2
ECHO LV EJECTION FRACTION A2C: 32 PERCENT
ECHO LV EJECTION FRACTION A4C: 35 PERCENT
ECHO LV EJECTION FRACTION BIPLANE: 35.6 PERCENT (ref 55–100)
ECHO LV ESV A2C: 186.01 ML
ECHO LV ESV A4C: 207.22 ML
ECHO LV ESV BP: 196.62 ML (ref 22–58)
ECHO LV ESV INDEX A2C: 87.3 ML/M2
ECHO LV ESV INDEX A4C: 97.3 ML/M2
ECHO LV ESV INDEX BP: 92.3 ML/M2
ECHO LV INTERNAL DIMENSION DIASTOLIC: 6.85 CM (ref 4.2–5.9)
ECHO LV INTERNAL DIMENSION DIASTOLIC: 7.26 CM (ref 4.2–5.9)
ECHO LV INTERNAL DIMENSION SYSTOLIC: 5.98 CM
ECHO LV INTERNAL DIMENSION SYSTOLIC: 6.77 CM
ECHO LV IVSD: 0.88 CM (ref 0.6–1)
ECHO LV POSTERIOR WALL DIASTOLIC: 0.94 CM (ref 0.6–1)
ECHO LVOT DIAM: 2.06 CM
ECHO LVOT PEAK GRADIENT: 10.28 MMHG
ECHO LVOT PEAK GRADIENT: 7.39 MMHG
ECHO LVOT PEAK VELOCITY: 135.96 CM/S
ECHO LVOT PEAK VELOCITY: 160.29 CM/S
ECHO LVOT SV: 91.4 ML
ECHO LVOT VTI: 27.54 CM
ECHO MV A VELOCITY: 75.69 CM/S
ECHO MV AREA PHT: 3.9 CM2
ECHO MV E DECELERATION TIME (DT): 194.55 MS
ECHO MV E VELOCITY: 104.62 CM/S
ECHO MV E/A RATIO: 1.38
ECHO MV PRESSURE HALF TIME (PHT): 56.42 MS
ECHO PV PEAK INSTANTANEOUS GRADIENT SYSTOLIC: 7.87 MMHG
ECHO PV REGURGITANT MAX VELOCITY: 116.66 CM/S
ECHO RIGHT VENTRICULAR SYSTOLIC PRESSURE (RVSP): 31.1 MMHG
ECHO RV INTERNAL DIMENSION: 5.99 CM
ECHO RV TAPSE: 2.81 CM (ref 1.5–2)
ECHO TV REGURGITANT MAX VELOCITY: 265.03 CM/S
ECHO TV REGURGITANT PEAK GRADIENT: 28.1 MMHG
ERYTHROCYTE [DISTWIDTH] IN BLOOD BY AUTOMATED COUNT: 15.2 % (ref 11.5–14.5)
GAS FLOW.O2 O2 DELIVERY SYS: ABNORMAL L/MIN
GLSCOM COMMENTS: NORMAL
GLUCOSE BLD STRIP.AUTO-MCNC: 100 MG/DL (ref 65–117)
GLUCOSE BLD STRIP.AUTO-MCNC: 116 MG/DL (ref 65–117)
GLUCOSE BLD STRIP.AUTO-MCNC: 117 MG/DL (ref 65–117)
GLUCOSE BLD STRIP.AUTO-MCNC: 121 MG/DL (ref 65–117)
GLUCOSE BLD STRIP.AUTO-MCNC: 123 MG/DL (ref 65–117)
GLUCOSE BLD STRIP.AUTO-MCNC: 123 MG/DL (ref 65–117)
GLUCOSE BLD STRIP.AUTO-MCNC: 129 MG/DL (ref 65–117)
GLUCOSE BLD STRIP.AUTO-MCNC: 141 MG/DL (ref 65–117)
GLUCOSE BLD STRIP.AUTO-MCNC: 144 MG/DL (ref 65–117)
GLUCOSE BLD STRIP.AUTO-MCNC: 146 MG/DL (ref 65–117)
GLUCOSE BLD STRIP.AUTO-MCNC: 159 MG/DL (ref 65–117)
GLUCOSE BLD STRIP.AUTO-MCNC: 162 MG/DL (ref 65–117)
GLUCOSE BLD STRIP.AUTO-MCNC: 163 MG/DL (ref 65–117)
GLUCOSE BLD STRIP.AUTO-MCNC: 167 MG/DL (ref 65–117)
GLUCOSE BLD STRIP.AUTO-MCNC: 176 MG/DL (ref 65–117)
GLUCOSE BLD STRIP.AUTO-MCNC: 93 MG/DL (ref 65–117)
GLUCOSE SERPL-MCNC: 136 MG/DL (ref 65–100)
GLUCOSE, GLC: 100 MG/DL
GLUCOSE, GLC: 116 MG/DL
GLUCOSE, GLC: 117 MG/DL
GLUCOSE, GLC: 121 MG/DL
GLUCOSE, GLC: 123 MG/DL
GLUCOSE, GLC: 129 MG/DL
GLUCOSE, GLC: 141 MG/DL
GLUCOSE, GLC: 144 MG/DL
GLUCOSE, GLC: 146 MG/DL
GLUCOSE, GLC: 159 MG/DL
GLUCOSE, GLC: 162 MG/DL
GLUCOSE, GLC: 163 MG/DL
GLUCOSE, GLC: 167 MG/DL
GLUCOSE, GLC: 176 MG/DL
HCO3 BLDV-SCNC: 23 MMOL/L (ref 23–28)
HCT VFR BLD AUTO: 35.4 % (ref 36.6–50.3)
HGB BLD-MCNC: 11.7 G/DL (ref 12.1–17)
HIGH TARGET, HITG: 130 MG/DL
INSULIN ADMINSTERED, INSADM: 0.3 UNITS/HOUR
INSULIN ADMINSTERED, INSADM: 0.4 UNITS/HOUR
INSULIN ADMINSTERED, INSADM: 1.5 UNITS/HOUR
INSULIN ADMINSTERED, INSADM: 2.6 UNITS/HOUR
INSULIN ADMINSTERED, INSADM: 3 UNITS/HOUR
INSULIN ADMINSTERED, INSADM: 3.8 UNITS/HOUR
INSULIN ADMINSTERED, INSADM: 4.1 UNITS/HOUR
INSULIN ADMINSTERED, INSADM: 4.2 UNITS/HOUR
INSULIN ADMINSTERED, INSADM: 4.3 UNITS/HOUR
INSULIN ADMINSTERED, INSADM: 5.4 UNITS/HOUR
INSULIN ADMINSTERED, INSADM: 6.5 UNITS/HOUR
INSULIN ADMINSTERED, INSADM: 7 UNITS/HOUR
INSULIN ORDER, INSORD: 0.3 UNITS/HOUR
INSULIN ORDER, INSORD: 0.4 UNITS/HOUR
INSULIN ORDER, INSORD: 1.5 UNITS/HOUR
INSULIN ORDER, INSORD: 2.6 UNITS/HOUR
INSULIN ORDER, INSORD: 3 UNITS/HOUR
INSULIN ORDER, INSORD: 3.8 UNITS/HOUR
INSULIN ORDER, INSORD: 4.1 UNITS/HOUR
INSULIN ORDER, INSORD: 4.2 UNITS/HOUR
INSULIN ORDER, INSORD: 4.3 UNITS/HOUR
INSULIN ORDER, INSORD: 5.4 UNITS/HOUR
INSULIN ORDER, INSORD: 6.5 UNITS/HOUR
INSULIN ORDER, INSORD: 7 UNITS/HOUR
LOW TARGET, LOT: 95 MG/DL
MAGNESIUM SERPL-MCNC: 2.2 MG/DL (ref 1.6–2.4)
MCH RBC QN AUTO: 31.2 PG (ref 26–34)
MCHC RBC AUTO-ENTMCNC: 33.1 G/DL (ref 30–36.5)
MCV RBC AUTO: 94.4 FL (ref 80–99)
MINUTES UNTIL NEXT BG, NBG: 120 MIN
MINUTES UNTIL NEXT BG, NBG: 60 MIN
MULTIPLIER, MUL: 0.01
MULTIPLIER, MUL: 0.03
MULTIPLIER, MUL: 0.04
MULTIPLIER, MUL: 0.04
MULTIPLIER, MUL: 0.06
MULTIPLIER, MUL: 0.07
NRBC # BLD: 0 K/UL (ref 0–0.01)
NRBC BLD-RTO: 0 PER 100 WBC
O2/TOTAL GAS SETTING VFR VENT: 3 %
ORDER INITIALS, ORDINIT: NORMAL
P-R INTERVAL, ECG05: 220 MS
PCO2 BLDV: 34.7 MMHG (ref 41–51)
PH BLDV: 7.43 [PH] (ref 7.32–7.42)
PLATELET # BLD AUTO: 109 K/UL (ref 150–400)
PMV BLD AUTO: 12 FL (ref 8.9–12.9)
PO2 BLDV: 31 MMHG (ref 25–40)
POTASSIUM SERPL-SCNC: 3.8 MMOL/L (ref 3.5–5.1)
Q-T INTERVAL, ECG07: 430 MS
QRS DURATION, ECG06: 122 MS
QTC CALCULATION (BEZET), ECG08: 502 MS
RBC # BLD AUTO: 3.75 M/UL (ref 4.1–5.7)
SAO2 % BLDV: 61.8 % (ref 65–88)
SERVICE CMNT-IMP: ABNORMAL
SERVICE CMNT-IMP: NORMAL
SODIUM SERPL-SCNC: 142 MMOL/L (ref 136–145)
SPECIMEN TYPE: ABNORMAL
VENTRICULAR RATE, ECG03: 82 BPM
WBC # BLD AUTO: 12.1 K/UL (ref 4.1–11.1)

## 2021-07-22 PROCEDURE — 85027 COMPLETE CBC AUTOMATED: CPT

## 2021-07-22 PROCEDURE — 65610000003 HC RM ICU SURGICAL

## 2021-07-22 PROCEDURE — 74011000258 HC RX REV CODE- 258: Performed by: THORACIC SURGERY (CARDIOTHORACIC VASCULAR SURGERY)

## 2021-07-22 PROCEDURE — 80048 BASIC METABOLIC PNL TOTAL CA: CPT

## 2021-07-22 PROCEDURE — 74011636637 HC RX REV CODE- 636/637: Performed by: THORACIC SURGERY (CARDIOTHORACIC VASCULAR SURGERY)

## 2021-07-22 PROCEDURE — 74011250636 HC RX REV CODE- 250/636: Performed by: THORACIC SURGERY (CARDIOTHORACIC VASCULAR SURGERY)

## 2021-07-22 PROCEDURE — 99291 CRITICAL CARE FIRST HOUR: CPT | Performed by: THORACIC SURGERY (CARDIOTHORACIC VASCULAR SURGERY)

## 2021-07-22 PROCEDURE — 74011250637 HC RX REV CODE- 250/637: Performed by: NURSE PRACTITIONER

## 2021-07-22 PROCEDURE — 93306 TTE W/DOPPLER COMPLETE: CPT

## 2021-07-22 PROCEDURE — C1751 CATH, INF, PER/CENT/MIDLINE: HCPCS

## 2021-07-22 PROCEDURE — 77030040361 HC SLV COMPR DVT MDII -B

## 2021-07-22 PROCEDURE — 77010033678 HC OXYGEN DAILY

## 2021-07-22 PROCEDURE — 82803 BLOOD GASES ANY COMBINATION: CPT

## 2021-07-22 PROCEDURE — 82962 GLUCOSE BLOOD TEST: CPT

## 2021-07-22 PROCEDURE — 93005 ELECTROCARDIOGRAM TRACING: CPT

## 2021-07-22 PROCEDURE — 74011250637 HC RX REV CODE- 250/637: Performed by: INTERNAL MEDICINE

## 2021-07-22 PROCEDURE — 36415 COLL VENOUS BLD VENIPUNCTURE: CPT

## 2021-07-22 PROCEDURE — 74011250636 HC RX REV CODE- 250/636: Performed by: NURSE PRACTITIONER

## 2021-07-22 PROCEDURE — 74011000250 HC RX REV CODE- 250: Performed by: THORACIC SURGERY (CARDIOTHORACIC VASCULAR SURGERY)

## 2021-07-22 PROCEDURE — 94760 N-INVAS EAR/PLS OXIMETRY 1: CPT

## 2021-07-22 PROCEDURE — 74011000250 HC RX REV CODE- 250: Performed by: NURSE PRACTITIONER

## 2021-07-22 PROCEDURE — 83735 ASSAY OF MAGNESIUM: CPT

## 2021-07-22 RX ORDER — INSULIN GLARGINE 100 [IU]/ML
31 INJECTION, SOLUTION SUBCUTANEOUS ONCE
Status: COMPLETED | OUTPATIENT
Start: 2021-07-22 | End: 2021-07-22

## 2021-07-22 RX ORDER — BACITRACIN 500 UNIT/G
1 PACKET (EA) TOPICAL
Status: COMPLETED | OUTPATIENT
Start: 2021-07-22 | End: 2021-07-22

## 2021-07-22 RX ORDER — CLOPIDOGREL BISULFATE 75 MG/1
75 TABLET ORAL DAILY
Status: DISCONTINUED | OUTPATIENT
Start: 2021-07-23 | End: 2021-07-23 | Stop reason: HOSPADM

## 2021-07-22 RX ORDER — DEXTROSE 50 % IN WATER (D50W) INTRAVENOUS SYRINGE
12.5-25 AS NEEDED
Status: DISCONTINUED | OUTPATIENT
Start: 2021-07-22 | End: 2021-07-23 | Stop reason: HOSPADM

## 2021-07-22 RX ORDER — INSULIN LISPRO 100 [IU]/ML
INJECTION, SOLUTION INTRAVENOUS; SUBCUTANEOUS
Status: DISCONTINUED | OUTPATIENT
Start: 2021-07-22 | End: 2021-07-23 | Stop reason: HOSPADM

## 2021-07-22 RX ORDER — MAGNESIUM SULFATE 100 %
4 CRYSTALS MISCELLANEOUS AS NEEDED
Status: DISCONTINUED | OUTPATIENT
Start: 2021-07-22 | End: 2021-07-23 | Stop reason: HOSPADM

## 2021-07-22 RX ORDER — PANTOPRAZOLE SODIUM 40 MG/1
40 TABLET, DELAYED RELEASE ORAL
Status: DISCONTINUED | OUTPATIENT
Start: 2021-07-22 | End: 2021-07-23 | Stop reason: HOSPADM

## 2021-07-22 RX ORDER — POTASSIUM CHLORIDE 29.8 MG/ML
20 INJECTION INTRAVENOUS ONCE
Status: COMPLETED | OUTPATIENT
Start: 2021-07-22 | End: 2021-07-22

## 2021-07-22 RX ADMIN — DOCUSATE SODIUM 50 MG AND SENNOSIDES 8.6 MG 1 TABLET: 8.6; 5 TABLET, FILM COATED ORAL at 09:11

## 2021-07-22 RX ADMIN — CEFAZOLIN SODIUM 2 G: 1 INJECTION, POWDER, FOR SOLUTION INTRAMUSCULAR; INTRAVENOUS at 03:05

## 2021-07-22 RX ADMIN — SODIUM CHLORIDE 1.5 UNITS/HR: 9 INJECTION, SOLUTION INTRAVENOUS at 05:41

## 2021-07-22 RX ADMIN — PANTOPRAZOLE SODIUM 40 MG: 40 TABLET, DELAYED RELEASE ORAL at 20:27

## 2021-07-22 RX ADMIN — BACITRACIN 1 PACKET: 500 OINTMENT TOPICAL at 15:53

## 2021-07-22 RX ADMIN — SODIUM CHLORIDE 10 ML/HR: 4.5 INJECTION, SOLUTION INTRAVENOUS at 03:03

## 2021-07-22 RX ADMIN — ACETAMINOPHEN 650 MG: 325 TABLET ORAL at 17:19

## 2021-07-22 RX ADMIN — ONDANSETRON 4 MG: 2 INJECTION INTRAMUSCULAR; INTRAVENOUS at 03:59

## 2021-07-22 RX ADMIN — TICAGRELOR 90 MG: 90 TABLET ORAL at 17:19

## 2021-07-22 RX ADMIN — CHLORHEXIDINE GLUCONATE 15 ML: 1.2 RINSE ORAL at 09:11

## 2021-07-22 RX ADMIN — ACETAMINOPHEN 650 MG: 325 TABLET ORAL at 20:30

## 2021-07-22 RX ADMIN — MUPIROCIN: 20 OINTMENT TOPICAL at 09:20

## 2021-07-22 RX ADMIN — ASPIRIN 81 MG: 81 TABLET, CHEWABLE ORAL at 09:11

## 2021-07-22 RX ADMIN — DOCUSATE SODIUM 50 MG AND SENNOSIDES 8.6 MG 1 TABLET: 8.6; 5 TABLET, FILM COATED ORAL at 17:19

## 2021-07-22 RX ADMIN — Medication 10 ML: at 06:48

## 2021-07-22 RX ADMIN — CHLORHEXIDINE GLUCONATE 15 ML: 1.2 RINSE ORAL at 21:49

## 2021-07-22 RX ADMIN — MILRINONE LACTATE 0.38 MCG/KG/MIN: 200 INJECTION, SOLUTION INTRAVENOUS at 02:12

## 2021-07-22 RX ADMIN — Medication 10 ML: at 21:48

## 2021-07-22 RX ADMIN — INSULIN GLARGINE 31 UNITS: 100 INJECTION, SOLUTION SUBCUTANEOUS at 13:38

## 2021-07-22 RX ADMIN — MILRINONE LACTATE 0.38 MCG/KG/MIN: 200 INJECTION, SOLUTION INTRAVENOUS at 12:19

## 2021-07-22 RX ADMIN — Medication 10 ML: at 13:17

## 2021-07-22 RX ADMIN — TICAGRELOR 90 MG: 90 TABLET ORAL at 06:47

## 2021-07-22 RX ADMIN — Medication 400 MG: at 17:19

## 2021-07-22 RX ADMIN — Medication 400 MG: at 09:11

## 2021-07-22 RX ADMIN — POTASSIUM CHLORIDE 20 MEQ: 400 INJECTION, SOLUTION INTRAVENOUS at 03:01

## 2021-07-22 RX ADMIN — ROSUVASTATIN 20 MG: 10 TABLET, FILM COATED ORAL at 21:48

## 2021-07-22 RX ADMIN — APIXABAN 5 MG: 5 TABLET, FILM COATED ORAL at 17:19

## 2021-07-22 RX ADMIN — GABAPENTIN 300 MG: 300 CAPSULE ORAL at 21:48

## 2021-07-22 RX ADMIN — CEFAZOLIN SODIUM 2 G: 1 INJECTION, POWDER, FOR SOLUTION INTRAMUSCULAR; INTRAVENOUS at 10:11

## 2021-07-22 RX ADMIN — BACITRACIN 1 PACKET: 500 OINTMENT TOPICAL at 16:27

## 2021-07-22 RX ADMIN — FAMOTIDINE 20 MG: 20 TABLET, FILM COATED ORAL at 09:10

## 2021-07-22 NOTE — PROGRESS NOTES
Bedside and Verbal shift change report given to   Arsalan Umana (oncoming nurse) by Krista Ayon (offgoing nurse). Report included the following information SBAR, Kardex, OR Summary, Procedure Summary, Intake/Output, MAR, Accordion, Recent Results, Med Rec Status and Cardiac Rhythm paced with PVC. 2049 BS 74 Held insulin 10ml of Dextrose per Glucostabilizer. 2100 Removed 2 ml out of TR band. Now at 5    2107 Restarted Insulin for a BS of 109    2103 Patient awake and following commands. Cut FiO2 to 40%    2110 Patient breaking over the vent rate, cut rate in half. 2115 Per RT she placed patient on SBT, patient alert following command. 2135 ABG:    Lab Results   Component Value Date/Time    PHI 7.45 07/21/2021 09:36 PM    PCO2I 36.7 07/21/2021 09:36 PM    PO2I 150 (H) 07/21/2021 09:36 PM    HCO3I 25.8 07/21/2021 09:36 PM    FIO2I 40 07/21/2021 09:36 PM     2140 Informed Dr. Elva Mejias plan to extubate. 2142 Patient extubated to 3 LNC    2200 Removed 2 ml from TR band; now at 3    2300 Removed 3 mol out of TR band. Now at 0 ml, TR band remains on.    0100 Patient with FREQUENT PVC's. AM labs sent. 0300 Potassium 3.85 per Lab. MG++ pending. One IVPB given. 0325 Decreased Dobutamine to 2.5 from 4.5  For PA  Pressures of 71/    0355 Patient projective vomiting dark greenish brown vomit. 0400 Zofran 4 mg IV    0500 Patients ectopy has decreased. But has an increased MO to 0.26. Preop 0.24     0640 Removed O2. Patient continuing to sat at 94%. 0730 Urrutia removed. 0800 Bedside and Verbal shift change report given to   Riya WEST (oncoming nurse) by Iman Aquino (offgoing nurse). Report included the following information SBAR, Kardex, OR Summary, Procedure Summary, Intake/Output, MAR, Accordion, Recent Results, Med Rec Status and Cardiac Rhythm paced with PVC.

## 2021-07-22 NOTE — PROGRESS NOTES
[facility] ICU Progress Note    Admit Date: 2021  POD:  1 Day Post-Op    Procedure:  Procedure(s):  TRANSCATHETER AORTIC VALVE REPLACEMENT, LEFT TRANSFEMORAL 26 S3, 2ML RCA PROTECTION,  RIGHT RADIAL FOR PIGTAIL, RIGHT FEMORAL RCA PROTECTION; POST BAV; SUSANNA AND EPIAORTIC BY DR. Velasquez Bravo  High Risk Percutaneous Coronary Intervention        Subjective:   Pt seen with Dr. Tato Gomez Tmax 100.4, room air. Nausea and vomiting overnight but feeling better now. Objective:   Vitals:  Blood pressure 121/60, pulse 77, temperature 98.2 °F (36.8 °C), resp. rate 14, height 6' (1.829 m), weight 199 lb (90.3 kg), SpO2 95 %. Temp (24hrs), Av.4 °F (35.8 °C), Min:93.6 °F (34.2 °C), Max:100.4 °F (38 °C)    Hemodynamics:   CO: CO (l/min): 7.3 l/min   CI: CI (l/min/m2): 3.4 l/min/m2   CVP: CVP (mmHg): 7 mmHg (21 0700)   SVR: SVR (dyne*sec)/cm5: 663 (dyne*sec)/cm5 (21 5092)   PAP Systolic: PAP Systolic: 58 (95/22/46 6528)   PAP Diastolic: PAP Diastolic: 21 (83/58/91 2456)   PVR:     SV02: SVO2 (%): 73 % (21 0600)   SCV02:      EKG/Rhythm:  SR/Paced    Extubation Date / Time: 21 at 2142    Oxygen Therapy: Room air    CXR:   CXR Results  (Last 48 hours)               21 1336  XR CHEST PORT Final result    Impression:  ET tube is at the thoracic inlet. There is slight perihilar haziness suspicious for mild changes of edema. .   Results called to the ICU by myself. Narrative:  EXAM:  XR CHEST PORT       INDICATION:  postop heart       COMPARISON:  7/15/2021       FINDINGS: A portable AP radiograph of the chest was obtained at 1332 hours. ET   tube is at the thoracic inlet. [place]-Yess[place] catheter overlies right pulmonary   artery. AICD is unchanged. NG tube extends into the upper abdomen. Patient   status post TAVR. There has been interval development of bilateral perihilar   haziness suspicious for edema. .  Mild cardiomegaly is stable. . .                     Admission Weight: Last Weight Weight: 199 lb 11.2 oz (90.6 kg) Weight: 199 lb (90.3 kg)     Intake / Output / Drain:  Current Shift: No intake/output data recorded. Last 24 hrs.:     Intake/Output Summary (Last 24 hours) at 2021 0745  Last data filed at 2021 0700  Gross per 24 hour   Intake 898.92 ml   Output 2140 ml   Net -1241.08 ml       EXAM:  General:   No acute distress                           Lungs:   Clear to auscultation bilaterally. B groin sites:  Left groin ecchymotic, dressing clean dry and intact   Heart:  Regular rate and rhythm, S1, S2 normal, no murmur, click, rub or gallop. Abdomen:   Soft, non-tender. Bowel sounds normal. No masses,  No organomegaly. Extremities:  No edema. PPP. Neurologic:  Gross motor and sensory apparatus intact. Labs:   Recent Labs     21  0645 21  0135 21  0101 21  1328 21  1259   WBC  --   --  12.1*   < > 10.1   HGB  --   --  11.7*   < > 11.9*   HCT  --   --  35.4*   < > 36.0*   PLT  --   --  109*   < > 107*   NA  --   --  142   < > 141   K  --   --  3.8   < > 3.7   BUN  --   --  22*   < > 24*   CREA  --   --  1.40*   < > 1.42*   GLU  --   --  136*   < > 132*   GLUCPOC 162*   < >  --    < >  --    INR  --   --   --   --  1.3*    < > = values in this interval not displayed. Assessment:     Active Problems:    Nonrheumatic aortic valve stenosis (2021)      Aortic stenosis (2021)         Plan/Recommendations/Medical Decision Makin. Aortic Stenosis LFLG: s/p TAVR with LTF 26 S3 +2ml and RCA protection. Echo today-completed report pending. ASA (for 1 month then stop), Plavix, Eliquis     2. HTN: Currently on Epi, Milrinone. Will add back antihypertensives when appropriate after vasoactive drips off.      3. HLD: On Statin     4. Atrial Flutter s/p Ablation: Eliquis. Resume BB when appropriate. (Also to be on ASA (for 1 month then stop), Plavix for stents)     5. Cardiomyopathy with LVEF of 15%: Has Bi-V ICD. GDMT as able. Currently on Milrinone.     6. CKD stage 3: Creatinine today of 1.4-stable. Does not currently follow with a Nephrologist. Avoid hypotension and nephrotoxic agents     7. Thrombocytopenia: Platelets of 324U today. On ASA, Brilinta. 8. Pre-diabetes: A1c of 5.9 on 2/24/21. Diet controlled. AC and HS Accucheck with SSI    9. Acute obstruction of RCA following TAVR deployment (anticipated high risk) s/p successful placement of 2 RAUDEL: On ASA, Plavix, Statin. 10. Ventricular fibrillation arrest during TAVR related to #9 s/p defibrillation X 1 and CPR X approximately 2 minutes. 11. Atelectasis:  IS and TCDB. Increase activity. Wean oxygen to keep sat >92%. 12. Anemia: Postoperative secondary to acute blood loss and hemodilution. Currently above transfusion threshold. Monitor daily CBC. Dispo: PT/OT/Cardiac Rehab. Case Management for discharge planning. Wean off Dobutamine-done. Wean Epi off then DC swan. Work to wean Milrinone starting later today. Anticipate home with family in 1-2 days.          Signed By: Mayra Herring NP

## 2021-07-22 NOTE — OP NOTES
PREOPERATIVE DIAGNOSIS:  Symptomatic severe aortic valve stenosis    POSTOPERATIVE DIAGNOSIS:  Symptomatic severe aortic valve stenosis    PROCEDURES PERFORMED:   1. Implantation of catheter-delivered prosthetic aortic heart valve (26 mm Sandy 3 Ultra with 1 mL extra volume); percutaneous right femoral artery approach (CPT 30687). 2. Percutaneous left femoral artery access under fluoroscopic and ultrasound guidance. 3. Percutaneous right femoral artery access (CPT W7804843) under fluoroscopic and ultrasound guidance (CPT 29966). 4. Introduction of catheter aorta, bilateral (CPT 43703-53). 5. Aortoiliac arteriogram (CPT 86131). 6. Percutaneous right and left femoral venous access under fluoroscopic guidance. 7. Insertion of temporary transvenous pacemaker (CPT 82485)  8. Percutaneous right radial artery access. 9. Placement of 2 drug eluting stents to the ostial/proximal right coronary artery. 10. Intravascular ultrasound of the right coronary artery. 11. Angiography of the right coronary artery.     CASE SUMMARY:  1. Successful percutaneous right transfemoral TAVR using a 26 mm Sandy 3 Ultra THV with 1 mL extra volume  2. No in native conduction intra-operatively and immediately post-TAVR  3. Mild paravalvular leak  4. No vascular complications  5. Acute obstruction of the right coronary artery after TAVR deployment (anticipated high risk) successfully treated with placement of 2 drug eluting stents to the ostial/proximal right coronary artery  6. Brief ventricular fibrillation arrest requiring defibrillation and approximately 2 minutes of CPR    CO-SURGEONS:   Dr. Teodoro Slade    ASSISTING:  Dr. Myrna Yuan    ANESTHESIA:   MAC --> converted to GETA after ventricular fibrillation arrest    CLINICAL HISTORY:   Katrine Catholic Throckmorton is a 77 y. o. male with severe, symptomatic aortic stenosis.      He was evaluated and determined to be high risk for conventional aortic valve replacement surgery. As such, he has been consented for FDA-approved commercial TAVR use, and is now taken to the operating room for endovascular transcatheter aortic valve replacement. The risks of the procedure including death, stroke, vascular injury, the need for conversion to open surgery, transfusion and the need for permanent pacemaker were discussed with the patient and his family in detail. [facility]. [facility] informed consent. Surgical priority is elective. DESCRIPTION OF PROCEDURE:   After informed consent was obtained, the patient was brought to the operating room and positioned supine on the hybrid OR table. Prophylactic antibiotics were administered preoperatively. Invasive monitoring lines were placed by the cardiothoracic anesthesia team. The patient was prepped and draped from the chin to mid-thighs. Surgical time-out was performed. Under ultrasound guidance, the right common femoral artery is accessed percutaneously and a #6-English sheath is placed. A J-tipped wire is placed in the descending thoracic aorta. Two separate Perclose devices are deployed for pre-closure. Over an Amplatz super stiff wire with a hand-fashioned J-tip the #6-English sheath is exchanged for a #14-English E-sheath. The sheath is flushed and then sewn into place. The left common femoral artery is accessed via micropuncture technique with radiographic and ultrasound guidance and a #6-English sheath is placed there.      The right radial artery is accessed via micropuncture technique and a #6-English slender sheath is placed there. The left and right femoral veins are accessed percutaneously, and a #6-English venous sheath is placed under fluoroscopic guidance on both sides.  A stiff wire is placed from the right femoral venous sheath into the SVC (in the event that CPB is necessary due to the patient's severely depressed EF with depressed cardiac output, severe pulmonary hypertension and multi-valvular disease).  The patient is systemically heparinized with 100 units per kg of IV heparin to a goal ACT greater than 250. A transvenous pacing lead is advanced from the left femoral venous sheath up to the right ventricle. The pacemaker is tested and has good pacing capture at <1 mA.      A #6-[place] JR4 guiding catheter is advanced via the left femoral artery sheath and used to engage the right coronary artery.  A coronary angiogram is taken.  A short 0.014\" Runthrough wire is advanced to the RPDA without difficulty.  Over this wire a 3.5 mm x 23 mm Xience Anna drug-eluting stent is positioned in the proximal RCA with a #6-[place] Guidezilla II Guide Extension catheter positioned in the proximal RCA.  The drug-eluting stent is positioned such that half of the stent is within the [place] II Guide Extension catheter. A pigtail catheter is advanced via the right radial artery sheath over a J-tipped wire and positioned in the right-coronary sinus of Valsalva. Several thoracic aortograms are obtained to determine the coplanar angle for valve implantation. Calcification of the aortic valve leaflets is evident. The aortic valve is then crossed using an AL-1 catheter and an 0.035\" straight tip guidewire. Simultaneous LV and ascending aortic pressures are recorded. A Confida 0.035\" guidewire is positioned in the LV apex. Care is taken to avoid contact with the ventricular wall by the transition point of the wire to avoid LV perforation. A balloon aortic valvuloplasty is not performed. Next, a 26 mm Sandy 3 Ultra device is advanced via the #14-[place] E-sheath and positioned in the descending aorta. The valve is mounted onto the balloon. The camera is then placed SOUTH and the valve is carefully advanced across the aortic arch while applying flexion to the delivery system to prevent trauma to the aortic arch.  The camera is then placed back into the coplanar deployment angle and the valve is positioned across the aortic valve. A thoracic aortogram is obtained to correctly position the valve across the native aortic valve.     The #6-Israeli JR4 guiding catheter is pulled back into the proximal ascending aorta with the [address] II Guide Extension catheter and Xience Anna drug-eluting stent left in the proximal right coronary artery. Under rapid ventricular pacing at 180 beats per minute, the valve is carefully and deliberately deployed with 1 mL extra volume and seats in a good position at a depth of 85 aortic / 15 ventricular.  The plunger is not forcefully fully pushed down due to significant resistance felt with only a mL left in the Atrion. Transthoracic echocardiography shows excellent valve position with no significant paravalvular or valvular aortic insufficiency. After immediate valve deployment, the patient has hypotension that is slow to recover.  Due to this, 1 mL of diluted epinephrine is injected via the pigtail that is located in the aortic root.  This leads to a marked improvement in the blood pressure.  A thoracic aortogram is taken and shows no annular disruption or other injury to the aortic root.  There is no filling of the right coronary artery visualized.  The patient subsequent develops recurrent hypotension and is noted to have significant ECG changes with ST elevation and widening of the QRS.  Acute right coronary artery obstruction is suspected and the 3.5 mm x 23 mm Xience Anna drug-eluting stent that was positioned in the proximal right coronary artery is promptly retracted to the ostium of the RCA and positioned such that it hanging out of the ostium and above the level of the THV frame.  After deployment of this stent, the patient goes into ventricular fibrillation arrest and is defibrillated.   CPR is begun. Sherman Yoo appears that there may be and edge dissection at the end of the initially deployed stent resulting in no flow in the right coronary artery.  A second 3.5 mm x 18 mm Xience [address] drug-eluting stent is quickly advanced to the proximal right coronary artery, overlapping the first deployed stent and then deployed.  This results in restoration of flow to the right coronary artery and the patient returns to a paced rhythm with frequent ectopy (paced at baseline).    During the cardiac arrest, the patient is emergently intubated to protect his airway.  Subsequent thoracic aortography shows mild to moderate paravalvular leak.  This appears to be mild by SUSANNA.  Ttransvalvular gradient is measured and is minimal. The left ventricular end-diastolic pressure is similar from predeployment, with a well preserved end-diastolic pressure gradient comparing the central aortic diastolic pressure simultaneously with the LVEDP, suggesting physiologically insignificant aortic insufficiency.      However, given the appearance of mild to moderate paravalvular leak and the patient's severely depressed EF with inability to tolerate significant aortic regurgitation, the decision is made to post-dilate the valve.       The Lujan deployment balloon for the 26 mm THV with 1 mL extra volume is advanced back into the ascending aorta and across the THV.  A 4.0 mm x 20 mm NC balloon is inflated to 12 OLIVIA in the ostial RCA stent, hanging in the aorta by a few millimeters.  Then under rapid ventricular pacing at 180 bpm, the Lujan deployment balloon for the 26 mm THV with 1 mL extra volume is inflated completely with good expansion in the THV frame. Thoracic aortography is performed and shows improvement in the paravalvular leak to mild.  This appears to be trace to mild by SUSANNA.  There is no evidence for significant aortic insufficiency by simultaneous LV and Ao pressure measurement.     The ostial to proximal RCA stent is post-dilated to 18 OLIVIA using a 4.0 mm x 20 mm NC balloon.  Tammy Snow shows there is no edge-dissection and the stents are well expanded and well apposed.  The stents protrude less than 1 mm into the aorta and there is no material seen adjacent to the proximal edge of the RCA ostium. Satisfied with the valve position and function, we turn our attention to the access sites. The #14-Malagasy E-sheath on the right is removed and the two Perclose devices deployed. Next, the pigtail catheter is pulled down into the abdominal aorta and an iliofemoral pelvic arteriogram is taken. This demonstrates good flow bilaterally without dissection. There is an excellent pulse in the artery after repair completion. The #6-Malagasy left femoral arterial sheath is removed and successful hemostasis is obtained with a #6-Malagasy AngioSeal.    The #6-Malagasy right and left femoral venous sheaths are removed and successful hemostasis is obtained with manual compression in the hybrid OR per standard protocol.     The #6-Malagasy slender right radial artery sheath is removed and successful hemostasis is obtained with a TR band.     Protamine is not administered due to coronary stent implantation.     Crushed aspirin and ticagrelor are given via an OG tube. The intraoperative ECG monitoring at the end of the case shows an atrial paced, ventricular sensed rhythm (unchanged from baseline). Estimated blood loss: < 61 cc     Complications: Brief ventricular fibrillation arrest due to acute right coronary artery occlusion after THV deployment (anticipated high risk) rescued with successful implantation of 2 Xience Anna RAUDEL (3.5 mm x 23 mm and 3.5 mm x 18 mm), post-dilated proximally to 18 OLIVIA using a 4.0 mm NC balloon; using a right coronary artery protection technique. Disposition: CVICU    All sponge, needle, and instrument counts are reported correct at the end of the case. The patient is taken to the CVICU in stable condition at the end of procedure.

## 2021-07-22 NOTE — CARDIO/PULMONARY
Cardiac Rehab:Trans-catheter education folder given to Ross Yarbrough. Introduced self and cardiac rehab program. Discussed program, format, and encouraged enrollment. Patient states \"I don't have time for anymore doctors or appointments and \"I am pretty active\". Upon further discussion, he stated he recently had a \"heart attack and stents\". Further discussed the benefits and encouraged for lifestyle modifications. He will consider it and discuss with the clinicians. [facility] cardiac rehab contact information placed on the AVS and we will continue to follow.  Jyoti Jain RN

## 2021-07-22 NOTE — PROGRESS NOTES
[facility] Cardiology Progress Note    Date of Service: 7/22/2021    Subjective:  Extubated yesterday evening. This morning, he reports mild chest soreness (from CPR) and denies dyspnea.       Objective:    Visit Vitals  /69   Pulse 84   Temp 99.2 °F (37.3 °C)   Resp 21   Ht 6' (1.829 m)   Wt 91.2 kg (201 lb)   SpO2 92%   BMI 27.26 kg/m²         Intake/Output Summary (Last 24 hours) at 7/22/2021 1747  Last data filed at 7/22/2021 1502  Gross per 24 hour   Intake 1416.84 ml   Output 1660 ml   Net -243.16 ml        Physical Exam  GEN: NAD, appears stated age  HEENT: EOMI, MMM, OP clear  NECK: Normal JVP  CV: Irregular, normal S1 and S2, I/VI systolic flow murmur at LUSB  LUNGS: CTAB, no W/R/R  ABD: NABS, soft, NT/ND  EXT: No edema, 2+ distal pulses  PSYCH: Mood and affect normal  NEURO: AAO, MAEW, face symmetrical, speech intact    Current Facility-Administered Medications   Medication Dose Route Frequency    insulin lispro (HUMALOG) injection   SubCUTAneous AC&HS    glucose chewable tablet 16 g  4 Tablet Oral PRN    dextrose (D50W) injection syrg 12.5-25 g  12.5-25 g IntraVENous PRN    glucagon (GLUCAGEN) injection 1 mg  1 mg IntraMUSCular PRN    [START ON 7/23/2021] clopidogreL (PLAVIX) tablet 75 mg  75 mg Oral DAILY    apixaban (ELIQUIS) tablet 5 mg  5 mg Oral BID    gabapentin (NEURONTIN) capsule 300 mg  300 mg Oral QHS    rosuvastatin (CRESTOR) tablet 20 mg  20 mg Oral QHS    sodium chloride (NS) flush 5-40 mL  5-40 mL IntraVENous Q8H    sodium chloride (NS) flush 5-40 mL  5-40 mL IntraVENous PRN    0.45% sodium chloride infusion  10 mL/hr IntraVENous CONTINUOUS    0.9% sodium chloride infusion  9 mL/hr IntraVENous CONTINUOUS    acetaminophen (TYLENOL) tablet 650 mg  650 mg Oral Q4H PRN    traMADoL (ULTRAM) tablet  mg   mg Oral Q6H PRN    oxyCODONE-acetaminophen (PERCOCET) 5-325 mg per tablet 1-2 Tablet  1-2 Tablet Oral Q4H PRN    morphine injection 2 mg  2 mg IntraVENous Q2H PRN    naloxone (NARCAN) injection 0.4 mg  0.4 mg IntraVENous PRN    ondansetron (ZOFRAN) injection 4 mg  4 mg IntraVENous Q4H PRN    albuterol (PROVENTIL VENTOLIN) nebulizer solution 2.5 mg  2.5 mg Nebulization Q4H PRN    aspirin chewable tablet 81 mg  81 mg Oral DAILY    famotidine (PEPCID) tablet 20 mg  20 mg Oral Q12H    magnesium oxide (MAG-OX) tablet 400 mg  400 mg Oral BID    calcium chloride 1 g in 0.9% sodium chloride 100 mL IVPB  1 g IntraVENous PRN    bisacodyL (DULCOLAX) suppository 10 mg  10 mg Rectal DAILY PRN    senna-docusate (PERICOLACE) 8.6-50 mg per tablet 1 Tablet  1 Tablet Oral BID    ELECTROLYTE REPLACEMENT NOTE: Nurse to review Serum Potassium and Magnesuim levels and Initiate Electrolyte Replacement Protocol as needed  1 Each Other PRN    dexmedeTOMidine in 0.9 % NaCl (PRECEDEX) 400 mcg/100 mL (4 mcg/mL) infusion soln  0.1-1.5 mcg/kg/hr IntraVENous TITRATE    EPINEPHrine (ADRENALIN) 5 mg in 0.9% sodium chloride 250 mL infusion  0-10 mcg/min IntraVENous TITRATE    insulin regular (NOVOLIN R, HUMULIN R) 100 Units in 0.9% sodium chloride 100 mL infusion  1-10 Units/hr IntraVENous TITRATE    propofol (DIPRIVAN) 10 mg/mL infusion  0-50 mcg/kg/min IntraVENous TITRATE    chlorhexidine (PERIDEX) 0.12 % mouthwash 15 mL  15 mL Oral Q12H    mupirocin (BACTROBAN) 2 % ointment   Topical DAILY    DOBUTamine (DOBUTREX) 500 mg/250 mL (2,000 mcg/mL) infusion  0-10 mcg/kg/min IntraVENous TITRATE    dextrose (D50W) injection syrg 25 g  25 g IntraVENous PRN    niCARdipine (CARDENE) 25 mg in 0.9% sodium chloride 250 mL infusion  0-15 mg/hr IntraVENous TITRATE    milrinone (PRIMACOR) 20 MG/100 ML D5W infusion  0.375 mcg/kg/min IntraVENous CONTINUOUS       Data Reviewed:  Recent Labs     07/22/21  0115 07/22/21  0101 07/21/21  1649 07/21/21  1259   NA  --  142  --  141   K  --  3.8 4.2 3.7   CL  --  107  --  106   CO2  --  23  --  28   GLU  --  136*  --  132*   BUN  --  22*  --  24*   CREA  --  1.40* --  1.42*   CA  --  9.4  --  9.6   MG 2.2  --  2.5* 2.5*   ALB  --   --   --  3.3*   ALT  --   --   --  52   INR  --   --   --  1.3*     Recent Labs     07/22/21  0101 07/21/21  1259   WBC 12.1* 10.1   HGB 11.7* 11.9*   HCT 35.4* 36.0*   * 107*     Lab Results   Component Value Date/Time    Specimen Description: WRIST 06/20/2013 12:23 PM    Specimen Description: WRIST 06/20/2013 12:23 PM     Lab Results   Component Value Date/Time    Culture result:  03/12/2016 05:07 AM     NO ROUTINE ENTERIC PATHOGENS ISOLATED INCLUDING SALMONELLA, SHIGELLA, YERSINIA, VIBRIO OR SHIGA TOXIN PRODUCING E. COLI    Culture result: NO GROWTH 1 DAY 03/11/2016 11:35 PM    Culture result:  03/11/2016 10:31 PM     NO ROUTINE ENTERIC PATHOGENS ISOLATED INCLUDING SALMONELLA, SHIGELLA, YERSINIA, VIBRIO OR SHIGA TOXIN PRODUCING E. COLI       All Cardiac Markers in the last 24 hours:  No results found for: CPK, CK, CKMMB, CKMB, RCK3, CKMBT, CKMBPOC, CKNDX, CKND1, SCOUT, TROPT, TROIQ, PEARL, TROPT, TNIPOC, BNP, BNPP, BNPNT    Telemetry (personally reviewed): sinus rhythm, occasional paced rhythm    Echocardiogram: Reviewed    Assessment:  1. Symptomatic, severe aortic stenosis s/p successful percutaneous right transfemoral TAVR using a 26 mm Sandy 3 Ultra THV with 1 mL extra volume  2. Acute obstruction of the right coronary artery after TAVR deployment (anticipated high risk) successfully treated with placement of 2 drug eluting stents to the ostial/proximal right coronary artery  3. Brief ventricular fibrillation arrest requiring defibrillation and approximately 2 minutes of CPR  4. Severe pulmonary hypertension  5. 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 Moderate mitral regurgitation  7. Moderate tricuspid regurgitation  8. AFL s/p ablation  9. HTN  10.  HL    Plan:  - PT/OT, incentive spirometry  - Continue aspirin 81 mg daily indefinitely   - Change to clopidogrel 75 mg daily; stop ticagrelor  - Plan for triple therapy with apixaban, aspirin and clopidogrel for 1 month and then drop aspirin  - PPI while on triple therapy  - Wean milrinone; may help with ectopy  - Transfer to [facility] today vs. tomorrow  - Hopeful discharge in the next 1-2 days    Total critical care time - 30 minutes (CPT 87223)    MDM:  High  Risk of decompensation:  High    I personally spent the above critical care time. This is time spent at this critically ill patient's bedside / unit / floor actively involved in patient care as well as the coordination of care and discussions with the patient's family. This does not include any procedural time which has been billed separately. Thank you for the opportunity to participate in the care of Gracy Miguel and please do not hesitate to contact us should you have any questions. Signed:  Sean Stallworth, [address] [place] [address] [facility]  07/22/21

## 2021-07-22 NOTE — PROGRESS NOTES
Cardiac Surgery Care Coordinator-  Met with Eric David. Reviewed plan of care and discussed goals for the day. Eric David has a good understanding of his plan for the day. Encouraged Eric David to verbalize. Will continue to follow for educational and emotional needs.  Dane Jaimes RN

## 2021-07-22 NOTE — PROGRESS NOTES
Critical Care Note     ACTIVE MEDICAL PROBLEMS    Acute systolic CHF  S/P VF arrest  Acute respiratory failure    IMPRESSION     Awake alert  Hemodynamics stable on milrinone and dobutamine  Good urine output   Extubated     Patient Vitals for the past 4 hrs:   Temp Pulse Resp BP SpO2   07/22/21 1300 -- 95 19 -- 94 %   07/22/21 1200 99.2 °F (37.3 °C) 79 19 -- 97 %   07/22/21 1100 -- 73 21 -- 97 %   07/22/21 1000 99.3 °F (37.4 °C) 72 19 -- 97 %   07/22/21 0932 -- -- -- 121/60 --         Intake/Output Summary (Last 24 hours) at 7/22/2021 1328  Last data filed at 7/22/2021 1323  Gross per 24 hour   Intake 1636.63 ml   Output 1540 ml   Net 96.63 ml     Hemodynamics:   CO: CO (l/min): 6.2 l/min   CI: CI (l/min/m2): 2.9 l/min/m2   CVP: CVP (mmHg): 9 mmHg (07/22/21 1200)   SVR: SVR (dyne*sec)/cm5: 800 (dyne*sec)/cm5 (07/22/21 2714)   PAP Systolic: PAP Systolic: 69 (70/59/81 9726)   PAP Diastolic: PAP Diastolic: 20 (79/16/93 6175)   PVR:     SV02: SVO2 (%): 56 % (07/22/21 1200)   SCV02:      Vent settings     Ventilator Volumes  Vt Set (ml): 500 ml (07/21/21 2103)  Vt Exhaled (Machine Breath) (ml): 535 ml (07/21/21 2108)  Vt Spont (ml): 708 ml (07/21/21 2111)  Ve Observed (l/min): 7.38 l/min (07/21/21 2108)          Chest xrays reviewed         Labs reviewed for last 24 hours and trends noted in evaluation         Plan   Wean off pressors  TTE today  DC PA line if continues to improve  I discussed intra procedure complication with him     I personally spent 35 minutes of critical care time. This is time spent directly involved in patient care in the critical care unit as well as the coordination of care and discussions,. This does not include any procedural time which has been billed separately. During this entire length of time I was immediately available to the patient.  The reason for providing this level of medical care for this critically ill patient was due a critical illness that impaired one or more vital organ systems such that there was a high probability of imminent or life threatening deterioration in the patients condition.  This care involved high complexity decision making to assess, manipulate, and support vital system functions, to treat this degree of vital organ system failure and to prevent further life threatening deterioration of the patients condition    Medical decision making- High complexity   Risk of morbidity and mortality - high

## 2021-07-22 NOTE — PROGRESS NOTES
0745: Bedside and Verbal shift change report given to Riya WEST (oncoming nurse) by Bharath Keita (offgoing nurse). Report included the following information SBAR and Cardiac Rhythm 1st degree AVB w/ PVCs. 26: Dr Ryan Marvin at bedside. Dobut off, plan to wean Epi to off and de-line. 1015: Weaned Epi to 0.5 - CI 3.3, MAP 67    1115: Epi stopped. CI 3.4, MAP 67    1230: Updated ALISA Ogden NP on pt's hemodynamic status. Telephone orders to d/c MAC, swan, arterial line and wean Milrinone by 0.125 Q4    1245: Ruston d/c    1312: Milrinone decreased to 0.25 .    1338: 31u Lantus given - transition dose. 1512: MAC d/c    1538: Insulin gtt stopped    1543: Arterial BP vs Automatic BP assessment: 128/61 (79) art line; 134/56 (77) right upper arm BP    1640: Art line d/c    1712: Milrinone decreased to 0.125    2000: Bedside and Verbal shift change report given to [address] [place] [address] (oncoming nurse) by Ayla Montano (offgoing nurse). Report included the following information SBAR and Cardiac Rhythm 1st degree AVB w/ frequent multifocal PVCs.

## 2021-07-22 NOTE — OP NOTES
INTERVENTIONAL CARDIOLOGY OPERATIVE NOTE: TRANSFEMORAL SANDY 3 ULTRA TAVR    PREOPERATIVE DIAGNOSIS:  Symptomatic severe aortic valve stenosis    POSTOPERATIVE DIAGNOSIS:  Symptomatic severe aortic valve stenosis    PROCEDURES PERFORMED:   1. Implantation of catheter-delivered prosthetic aortic heart valve (26 mm Sandy 3 Ultra with 1 mL extra volume); percutaneous right femoral artery approach (CPT 13363). 2. Percutaneous left femoral artery access under fluoroscopic and ultrasound guidance. 3. Percutaneous right femoral artery access (CPT X841641) under fluoroscopic and ultrasound guidance (CPT 67427). 4. Introduction of catheter aorta, bilateral (CPT 73834-41). 5. Aortoiliac arteriogram (CPT 65896). 6. Percutaneous right and left femoral venous access under fluoroscopic guidance. 7. Insertion of temporary transvenous pacemaker (CPT 51860)  8. Percutaneous right radial artery access. 9. Placement of 2 drug eluting stents to the ostial/proximal right coronary artery. 10. Intravascular ultrasound of the right coronary artery. 11. Angiography of the right coronary artery.     CASE SUMMARY:  1. Successful percutaneous right transfemoral TAVR using a 26 mm Sandy 3 Ultra THV with 1 mL extra volume  2. No in native conduction intra-operatively and immediately post-TAVR  3. Mild paravalvular leak  4. No vascular complications  5. Acute obstruction of the right coronary artery after TAVR deployment (anticipated high risk) successfully treated with placement of 2 drug eluting stents to the ostial/proximal right coronary artery  6. Brief ventricular fibrillation arrest requiring defibrillation and approximately 2 minutes of CPR    CO-SURGEONS:   Dr. Arpita Segal. Kimber Ferguson    ASSISTING:  Dr. Aravind Betancourt [facility]    ANESTHESIA:   MAC --> converted to [address] [place] [address] after ventricular fibrillation arrest    CLINICAL HISTORY:   Tay Kumar is a 77 y.o. male with severe, symptomatic aortic stenosis. He was evaluated and determined to be high risk for conventional aortic valve replacement surgery. As such, he has been consented for FDA-approved commercial TAVR use, and is now taken to the operating room for endovascular transcatheter aortic valve replacement. The risks of the procedure including death, stroke, vascular injury, the need for conversion to open surgery, transfusion and the need for permanent pacemaker were discussed with the patient and his family in detail. He signed informed consent. Surgical priority is elective. DESCRIPTION OF PROCEDURE:   After informed consent was obtained, the patient was brought to the operating room and positioned supine on the hybrid OR table. Prophylactic antibiotics were administered preoperatively. Invasive monitoring lines were placed by the cardiothoracic anesthesia team. The patient was prepped and draped from the chin to mid-thighs. Surgical time-out was performed. Under ultrasound guidance, the right common femoral artery is accessed percutaneously and a #6-Sierra Leonean sheath is placed. A J-tipped wire is placed in the descending thoracic aorta. Two separate Perclose devices are deployed for pre-closure. Over an Amplatz super stiff wire with a hand-fashioned J-tip the #6-Sierra Leonean sheath is exchanged for a #14-Sierra Leonean E-sheath. The sheath is flushed and then sewn into place. The left common femoral artery is accessed via micropuncture technique with radiographic and ultrasound guidance and a #6-Sierra Leonean sheath is placed there.      The right radial artery is accessed via micropuncture technique and a #6-Sierra Leonean slender sheath is placed there. The left and right femoral veins are accessed percutaneously, and a #6-Sierra Leonean venous sheath is placed under fluoroscopic guidance on both sides.  A stiff wire is placed from the right femoral venous sheath into the SVC (in the event that CPB is necessary due to the patient's severely depressed EF with depressed cardiac output, severe pulmonary hypertension and multi-valvular disease). The patient is systemically heparinized with 100 units per kg of IV heparin to a goal ACT greater than 250. A transvenous pacing lead is advanced from the left femoral venous sheath up to the right ventricle. The pacemaker is tested and has good pacing capture at <1 mA.      A #6-Anguillan JR4 guiding catheter is advanced via the left femoral artery sheath and used to engage the right coronary artery. A coronary angiogram is taken. A short 0.014\" Runthrough wire is advanced to the RPDA without difficulty. Over this wire a 3.5 mm x 23 mm Xience Anna drug-eluting stent is positioned in the proximal RCA with a #6-Anguillan Guidezilla II Guide Extension catheter positioned in the proximal RCA. The drug-eluting stent is positioned such that half of the stent is within the [address] [place] [address] catheter. A pigtail catheter is advanced via the right radial artery sheath over a J-tipped wire and positioned in the right-coronary sinus of Valsalva. Several thoracic aortograms are obtained to determine the coplanar angle for valve implantation. Calcification of the aortic valve leaflets is evident. The aortic valve is then crossed using an AL-1 catheter and an 0.035\" straight tip guidewire. Simultaneous LV and ascending aortic pressures are recorded. A Confida 0.035\" guidewire is positioned in the LV apex. Care is taken to avoid contact with the ventricular wall by the transition point of the wire to avoid LV perforation. A balloon aortic valvuloplasty is not performed. Next, a 26 mm Sandy 3 Ultra device is advanced via the #14-Anguillan E-sheath and positioned in the descending aorta. The valve is mounted onto the balloon. The camera is then placed SOUTH and the valve is carefully advanced across the aortic arch while applying flexion to the delivery system to prevent trauma to the aortic arch.  The camera is then placed back into the coplanar deployment angle and the valve is positioned across the aortic valve. A thoracic aortogram is obtained to correctly position the valve across the native aortic valve.     The #6-Turkmen JR4 guiding catheter is pulled back into the proximal ascending aorta with the [address] II Guide Extension catheter and Xience Anna drug-eluting stent left in the proximal right coronary artery. Under rapid ventricular pacing at 180 beats per minute, the valve is carefully and deliberately deployed with 1 mL extra volume and seats in a good position at a depth of 85 aortic / 15 ventricular. The plunger is not forcefully fully pushed down due to significant resistance felt with only a mL left in the Atrion. Transthoracic echocardiography shows excellent valve position with no significant paravalvular or valvular aortic insufficiency. After immediate valve deployment, the patient has hypotension that is slow to recover. Due to this, 1 mL of diluted epinephrine is injected via the pigtail that is located in the aortic root. This leads to a marked improvement in the blood pressure. A thoracic aortogram is taken and shows no annular disruption or other injury to the aortic root. There is no filling of the right coronary artery visualized. The patient subsequent develops recurrent hypotension and is noted to have significant ECG changes with ST elevation and widening of the QRS. Acute right coronary artery obstruction is suspected and the 3.5 mm x 23 mm Xience Anna drug-eluting stent that was positioned in the proximal right coronary artery is promptly retracted to the ostium of the RCA and positioned such that it hanging out of the ostium and above the level of the THV frame. After deployment of this stent, the patient goes into ventricular fibrillation arrest and is defibrillated. CPR is begun.   It appears that there may be and edge dissection at the end of the initially deployed stent resulting in no flow in the right coronary artery. A second 3.5 mm x 18 mm Xience Anna drug-eluting stent is quickly advanced to the proximal right coronary artery, overlapping the first deployed stent and then deployed. This results in restoration of flow to the right coronary artery and the patient returns to a paced rhythm with frequent ectopy (paced at baseline).    During the cardiac arrest, the patient is emergently intubated to protect his airway. Subsequent thoracic aortography shows mild to moderate paravalvular leak. This appears to be mild by SUSANNA. Ttransvalvular gradient is measured and is minimal. The left ventricular end-diastolic pressure is similar from predeployment, with a well preserved end-diastolic pressure gradient comparing the central aortic diastolic pressure simultaneously with the LVEDP, suggesting physiologically insignificant aortic insufficiency.      However, given the appearance of mild to moderate paravalvular leak and the patient's severely depressed EF with inability to tolerate significant aortic regurgitation, the decision is made to post-dilate the valve.       The Lujan deployment balloon for the 26 mm THV with 1 mL extra volume is advanced back into the ascending aorta and across the THV. A 4.0 mm x 20 mm NC balloon is inflated to 12 OLIVIA in the ostial RCA stent, hanging in the aorta by a few millimeters. Then under rapid ventricular pacing at 180 bpm, the Lujan deployment balloon for the 26 mm THV with 1 mL extra volume is inflated completely with good expansion in the THV frame. Thoracic aortography is performed and shows improvement in the paravalvular leak to mild. This appears to be trace to mild by SUSANNA. There is no evidence for significant aortic insufficiency by simultaneous LV and Ao pressure measurement.     The ostial to proximal RCA stent is post-dilated to 18 OLIVIA using a 4.0 mm x 20 mm NC balloon.   IVUS shows there is no edge-dissection and the stents are well expanded and well apposed. The stents protrude less than 1 mm into the aorta and there is no material seen adjacent to the proximal edge of the RCA ostium. Satisfied with the valve position and function, we turn our attention to the access sites. The #14-Macanese E-sheath on the right is removed and the two Perclose devices deployed. Next, the pigtail catheter is pulled down into the abdominal aorta and an iliofemoral pelvic arteriogram is taken. This demonstrates good flow bilaterally without dissection. There is an excellent pulse in the artery after repair completion. The #6-Macanese left femoral arterial sheath is removed and successful hemostasis is obtained with a #6-Macanese AngioSeal.    The #6-Macanese right and left femoral venous sheaths are removed and successful hemostasis is obtained with manual compression in the hybrid OR per standard protocol.     The #6-Macanese slender right radial artery sheath is removed and successful hemostasis is obtained with a TR band.     Protamine is not administered due to coronary stent implantation.     Crushed aspirin and ticagrelor are given via an OG tube. The intraoperative ECG monitoring at the end of the case shows an atrial paced, ventricular sensed rhythm (unchanged from baseline). Estimated blood loss: < 30 cc     Complications: Brief ventricular fibrillation arrest due to acute right coronary artery occlusion after THV deployment (anticipated high risk) rescued with successful implantation of 2 Xience Anna RAUDEL (3.5 mm x 23 mm and 3.5 mm x 18 mm), post-dilated proximally to 18 OLIVIA using a 4.0 mm NC balloon; using a right coronary artery protection technique. Disposition: CVICU    All sponge, needle, and instrument counts are reported correct at the end of the case. The patient is taken to the CVICU in stable condition at the end of procedure. Dr. Mario Ugalde and I jointly performed the procedure and all of the critical components.    Jacquelin Matthews was assisting with the case due to the complexity of the procedure and need for coronary artery protection with an additional  skilled in percutaneous coronary intervention needed. The immediate results of the valve surgery were discussed with the patient's family. I, as co-surgeon, was scrubbed and present for the entire procedure.      Lan Reed.  Jigna Maldonado, [address] [place] [address] [facility]  07/21/21

## 2021-07-23 VITALS
HEIGHT: 72 IN | BODY MASS INDEX: 27.14 KG/M2 | RESPIRATION RATE: 18 BRPM | TEMPERATURE: 97 F | WEIGHT: 200.4 LBS | DIASTOLIC BLOOD PRESSURE: 83 MMHG | SYSTOLIC BLOOD PRESSURE: 127 MMHG | OXYGEN SATURATION: 96 % | HEART RATE: 70 BPM

## 2021-07-23 LAB
ERYTHROCYTE [DISTWIDTH] IN BLOOD BY AUTOMATED COUNT: 15.9 % (ref 11.5–14.5)
GLUCOSE BLD STRIP.AUTO-MCNC: 133 MG/DL (ref 65–117)
GLUCOSE BLD STRIP.AUTO-MCNC: 83 MG/DL (ref 65–117)
GLUCOSE BLD STRIP.AUTO-MCNC: 95 MG/DL (ref 65–117)
HCT VFR BLD AUTO: 32.6 % (ref 36.6–50.3)
HGB BLD-MCNC: 10.8 G/DL (ref 12.1–17)
MCH RBC QN AUTO: 31.9 PG (ref 26–34)
MCHC RBC AUTO-ENTMCNC: 33.1 G/DL (ref 30–36.5)
MCV RBC AUTO: 96.2 FL (ref 80–99)
NRBC # BLD: 0 K/UL (ref 0–0.01)
NRBC BLD-RTO: 0 PER 100 WBC
PLATELET # BLD AUTO: 88 K/UL (ref 150–400)
PMV BLD AUTO: 12.4 FL (ref 8.9–12.9)
RBC # BLD AUTO: 3.39 M/UL (ref 4.1–5.7)
SERVICE CMNT-IMP: ABNORMAL
SERVICE CMNT-IMP: NORMAL
SERVICE CMNT-IMP: NORMAL
WBC # BLD AUTO: 11.1 K/UL (ref 4.1–11.1)

## 2021-07-23 PROCEDURE — 74011250637 HC RX REV CODE- 250/637: Performed by: THORACIC SURGERY (CARDIOTHORACIC VASCULAR SURGERY)

## 2021-07-23 PROCEDURE — 74011250637 HC RX REV CODE- 250/637: Performed by: NURSE PRACTITIONER

## 2021-07-23 PROCEDURE — 77030027138 HC INCENT SPIROMETER -A

## 2021-07-23 PROCEDURE — 36415 COLL VENOUS BLD VENIPUNCTURE: CPT

## 2021-07-23 PROCEDURE — 99231 SBSQ HOSP IP/OBS SF/LOW 25: CPT | Performed by: THORACIC SURGERY (CARDIOTHORACIC VASCULAR SURGERY)

## 2021-07-23 PROCEDURE — 85027 COMPLETE CBC AUTOMATED: CPT

## 2021-07-23 PROCEDURE — 82962 GLUCOSE BLOOD TEST: CPT

## 2021-07-23 RX ORDER — CLOPIDOGREL BISULFATE 75 MG/1
75 TABLET ORAL DAILY
Qty: 90 TABLET | Refills: 1 | Status: SHIPPED | OUTPATIENT
Start: 2021-07-24 | End: 2022-01-20

## 2021-07-23 RX ORDER — LANOLIN ALCOHOL/MO/W.PET/CERES
3 CREAM (GRAM) TOPICAL
Status: DISCONTINUED | OUTPATIENT
Start: 2021-07-23 | End: 2021-07-23 | Stop reason: HOSPADM

## 2021-07-23 RX ORDER — PANTOPRAZOLE SODIUM 40 MG/1
40 TABLET, DELAYED RELEASE ORAL
Qty: 90 TABLET | Refills: 1 | Status: SHIPPED | OUTPATIENT
Start: 2021-07-23 | End: 2021-12-27

## 2021-07-23 RX ORDER — CARVEDILOL 6.25 MG/1
6.25 TABLET ORAL 2 TIMES DAILY WITH MEALS
Status: DISCONTINUED | OUTPATIENT
Start: 2021-07-23 | End: 2021-07-23 | Stop reason: HOSPADM

## 2021-07-23 RX ORDER — CARVEDILOL 12.5 MG/1
6.25 TABLET ORAL 2 TIMES DAILY WITH MEALS
Qty: 180 TABLET | Refills: 3 | Status: SHIPPED | OUTPATIENT
Start: 2021-07-23 | End: 2022-08-23 | Stop reason: ALTCHOICE

## 2021-07-23 RX ORDER — ACETAMINOPHEN 325 MG/1
650 TABLET ORAL
Status: SHIPPED | COMMUNITY
Start: 2021-07-23

## 2021-07-23 RX ADMIN — Medication 400 MG: at 08:36

## 2021-07-23 RX ADMIN — CLOPIDOGREL BISULFATE 75 MG: 75 TABLET ORAL at 08:36

## 2021-07-23 RX ADMIN — Medication 10 ML: at 07:08

## 2021-07-23 RX ADMIN — MUPIROCIN: 20 OINTMENT TOPICAL at 08:41

## 2021-07-23 RX ADMIN — ACETAMINOPHEN 650 MG: 325 TABLET ORAL at 12:28

## 2021-07-23 RX ADMIN — CHLORHEXIDINE GLUCONATE 15 ML: 1.2 RINSE ORAL at 08:41

## 2021-07-23 RX ADMIN — CARVEDILOL 6.25 MG: 6.25 TABLET, FILM COATED ORAL at 12:05

## 2021-07-23 RX ADMIN — Medication 3 MG: at 01:08

## 2021-07-23 RX ADMIN — APIXABAN 5 MG: 5 TABLET, FILM COATED ORAL at 08:36

## 2021-07-23 RX ADMIN — ACETAMINOPHEN 650 MG: 325 TABLET ORAL at 07:08

## 2021-07-23 NOTE — PROGRESS NOTES
0730: Bedside and Verbal shift change report given to [address], Tita Post RN (oncoming nurse) by Tyrone Phan (offgoing nurse). Report included the following information SBAR, Kardex, Intake/Output, Recent Results, Med Rec Status, Cardiac Rhythm 1st degree AVB, NSR, PVC, PAC, Paced beats- BiVICD. and Alarm Parameters . 6834: Patient ambulated with RN within unit. Tolerated fairly, patient became dyspneic and fatigued. Cardiac rhythm stable with activity and no chest pain stated. Oxygen saturation 95% at rest. Patient states dyspnea is slightly worse than pre-op. Patient recovered quickly with rest.     1130: Bedside and Verbal shift change report given to [address] [facility], Suha Mckay RN (oncoming nurse) by Chely Hemphill RN (offgoing nurse). Report included the following information SBAR, Kardex, Intake/Output, Recent Results, Med Rec Status, Cardiac Rhythm NSR, 1st degree AVB, PVC, PAC and Alarm Parameters .

## 2021-07-23 NOTE — PROGRESS NOTES
[facility] ICU Progress Note    Admit Date: 2021  POD:  2 Day Post-Op    Procedure:  Procedure(s):  TRANSCATHETER AORTIC VALVE REPLACEMENT, LEFT TRANSFEMORAL 26 S3, 2ML RCA PROTECTION,  RIGHT RADIAL FOR PIGTAIL, RIGHT FEMORAL RCA PROTECTION; POST BAV; SUSANNA AND EPIAORTIC BY DR. Ramses Jorge  High Risk Percutaneous Coronary Intervention        Subjective:   Pt seen with Dr. Mario Ugalde Tmax 99.3, room air. Chest a little sore but relieved by Tylenol. Nose with some minor bleeding this morning. Would like to go home   Objective:   Vitals:  Blood pressure (!) 117/99, pulse 75, temperature 98 °F (36.7 °C), resp. rate 27, height 6' (1.829 m), weight 200 lb 6.4 oz (90.9 kg), SpO2 98 %. Temp (24hrs), Av.7 °F (37.1 °C), Min:97.8 °F (36.6 °C), Max:99.3 °F (37.4 °C)    EKG/Rhythm:  SR/Paced    Oxygen Therapy: Room air    CXR:   CXR Results  (Last 48 hours)               21 1336  XR CHEST PORT Final result    Impression:  ET tube is at the thoracic inlet. There is slight perihilar haziness suspicious for mild changes of edema. .   Results called to the ICU by myself. Narrative:  EXAM:  XR CHEST PORT       INDICATION:  postop heart       COMPARISON:  7/15/2021       FINDINGS: A portable AP radiograph of the chest was obtained at 1332 hours. ET   tube is at the thoracic inlet. [place]-Yessenia catheter overlies right pulmonary   artery. AICD is unchanged. NG tube extends into the upper abdomen. Patient   status post TAVR. There has been interval development of bilateral perihilar   haziness suspicious for edema. .  Mild cardiomegaly is stable. . . Echo: 21  Interpretation Summary       · LV: Estimated LVEF is 25 - 30%. Normal wall thickness. Severely dilated left ventricle. Moderately reduced systolic function. Left ventricular diastolic dysfunction. · MV: Mitral valve increased E-point septal separation. Moderate mitral annular dilatation. Mild mitral valve regurgitation is present.   · RV: Mildly dilated right ventricle. Borderline low systolic function. Pacer/ICD present. · LA: Mildly dilated left atrium. · AV: Prosthetic aortic valve. Aortic valve mean gradient is 16 mmHg. Aortic valve area is 2.3 cm2. There is a Lujan 26 mm Sandy 3 Ultra with 1 mL extra volume prosthetic aortic valve from prior TAVR procedure. Prosthetic valve insufficiency is present. There is mild paravalvular leaking. LVOTd 2.3 cm  LVOT VTI 26.5 cm  AV VTI 47.3 cm  DI 0.56  EOA 2.33 cm2  EOAi 1.08 cm2/m2  BSA 2.15 m2      Echo Findings    Left Ventricle Normal wall thickness. Severely dilated left ventricle. The estimated EF is 25 - 30%. Moderately reduced systolic function. There is left ventricular diastolic dysfunction. Left Atrium Mildly dilated left atrium. Right Ventricle Mildly dilated right ventricle. Borderline low systolic function. Pacer/ICD present. Right Atrium Normal cavity size. Pacemaker wire present in the right atrium and appears normal.   Aortic Valve No stenosis. Prosthetic aortic valve. Aortic valve peak gradient is 33 mmHg. Aortic valve mean gradient is 16 mmHg. Aortic valve area is 2.3 cm2. Aortic valve peak velocity is 2.9 cm/s. Trace aortic valve regurgitation. There is a Lujan 26 mm Sandy 3 Ultra with 1 mL extra volume TAVR present. Mild paravalvular, prosthetic valve insufficiency present. LVOTd 2.3 cm  LVOT VTI 26.5 cm  AV VTI 47.3 cm  DI 0.56  EOA 2.33 cm2  EOAi 1.08 cm2/m2  BSA 2.15 m2. Mitral Valve No stenosis. Mitral valve increased E-point septal separation. Moderate mitral annular dilatation. Mild regurgitation. Tricuspid Valve Normal valve structure and no stenosis. Trace regurgitation. Pulmonic Valve Normal valve structure and no stenosis. Trace regurgitation. Aorta Normal aortic root. IVC/Hepatic Veins Normal central venous pressure (3 mmHg); IVC diameter is less than 21 mm and collapses more than 50% with respiration.    Pericardium No evidence of pericardial effusion. [id number]  8:23 AM - [place], Card Result In    Component Value Ref Range & Units Status   Ventricular Rate 82  BPM Final   Atrial Rate 82  BPM Final   P-R Interval 220  ms Final   QRS Duration 122  ms Final   Q-T Interval 430  ms Final   QTC Calculation (Bezet) 502  ms Final   Calculated P Axis 91  degrees Final   Calculated R Axis 24  degrees Final   Calculated T Axis -103  degrees Final   Diagnosis   Final   Sinus rhythm with 1st degree AV block with Possible          Admission Weight: Last Weight   Weight: 199 lb 11.2 oz (90.6 kg) Weight: 200 lb 6.4 oz (90.9 kg)     Intake / Output / Drain:  Current Shift: No intake/output data recorded. Last 24 hrs.:     Intake/Output Summary (Last 24 hours) at 2021 0936  Last data filed at 2021 0500  Gross per 24 hour   Intake 736.77 ml   Output 1650 ml   Net -913.23 ml       EXAM:  General:   No acute distress                           Lungs:   Clear to auscultation bilaterally. B groin sites:  B groin ecchymotic, dressings removed. Heart:  Regular rate and rhythm, S1, S2 normal, no murmur, click, rub or gallop. Abdomen:   Soft, non-tender. Bowel sounds normal. No masses,  No organomegaly. Extremities:  No edema. PPP. Neurologic:  Gross motor and sensory apparatus intact. Labs:   Recent Labs     21  0823 21  0710 21  0441 21  0135 21  0101 21  1328 21  1259   WBC  --   --  11.1   < > 12.1*   < > 10.1   HGB  --   --  10.8*   < > 11.7*   < > 11.9*   HCT  --   --  32.6*   < > 35.4*   < > 36.0*   PLT  --   --  88*   < > 109*   < > 107*   NA  --   --   --   --  142   < > 141   K  --   --   --   --  3.8   < > 3.7   BUN  --   --   --   --  22*   < > 24*   CREA  --   --   --   --  1.40*   < > 1.42*   GLU  --   --   --   --  136*   < > 132*   GLUCPOC 83   < >  --    < >  --    < >  --    INR  --   --   --   --   --   --  1.3*    < > = values in this interval not displayed.         Assessment:     Active Problems:    Nonrheumatic aortic valve stenosis (2021)      Aortic stenosis (2021)         Plan/Recommendations/Medical Decision Makin. Aortic Stenosis LFLG: s/p TAVR with LTF 26 S3 +2ml and RCA protection. Echo completed. Plavix, Eliquis. Stop ASA per Dr. Debo Sánchez      2. HTN:  Will add back low dose Coreg. Ask him to monitor BP at home and will discuss on Monday. Plan to titrate as needed.     3. HLD: On Statin     4. Atrial Flutter s/p Ablation: Eliquis. Resume coreg.     5. Cardiomyopathy with LVEF of 15%: Has Bi-V ICD. Restart BB. Will review BP log on Monday and titrate meds as able.      6. CKD stage 3: Creatinine of 1.4-stable. Does not currently follow with a Nephrologist. Avoid hypotension and nephrotoxic agents     7. Thrombocytopenia: Platelets of 88B today. Will stop ASA. Continue Plavix. Stop H2 blocker. Add PPI. 8. Pre-diabetes: A1c of 5.9 on 21. Diet controlled. AC and HS Accucheck with SSI    9. Acute obstruction of RCA following TAVR deployment (anticipated high risk) s/p successful placement of 2 RAUDEL: On ASA, Plavix, Statin. 10. Ventricular fibrillation arrest during TAVR related to #9 s/p defibrillation X 1 and CPR X approximately 2 minutes. 11. Atelectasis:  IS and TCDB. Increase activity. Wean oxygen to keep sat >92%. 12. Anemia: Postoperative secondary to acute blood loss and hemodilution. Currently above transfusion threshold. Monitor daily CBC. Dispo: Cardiac Rehab. Case Management for discharge planning. Walk in halls. Home later today.          Signed By: Kaila Roberts NP       Pt seen and examined  Plans for DC reviewed

## 2021-07-23 NOTE — DISCHARGE INSTRUCTIONS
Cardiac Surgery Specialist    [address] [place] [address]                                       [address], [address] [facility]  Office- [phone]  Fax- [phone]       Office- [phone]  Fax- [phone]  _____________________________________________________________  Dr. Sy Guo, Carmina Urrutia, NP  Dr. Jigna Paulino, NP          Doug Redd, NP  Dr. Mau Delgado, NP          Casandra Summers, NAVID Freeman, PA-C Claudene Primas, PA-C  ____________________________________________________________     Name:Eric OVALLE Throckmorton     Surgery & Date: Procedure(s):  TRANSCATHETER AORTIC VALVE REPLACEMENT, LEFT TRANSFEMORAL 26 S3, 2ML RCA PROTECTION,  RIGHT RADIAL FOR PIGTAIL, RIGHT FEMORAL RCA PROTECTION; POST BAV; SUSANNA AND EPIAORTIC BY DR. Velasquez Bravo  High Risk Percutaneous Coronary Intervention    Discharge Date:  07/23/21     MEDICATIONS:  Please refer to your After Visit Summary for your medication list.       DO NOT TAKE ANY MEDICATIONS THAT ARE NOT ON THIS LIST    INSTRUCTIONS:  1. NO SMOKING OR TOBACCO PRODUCTS  2. Do not follow the activity/exercise instructions in your discharge book given to you as an inpatient. You have no activity restrictions. 3. You may shower. Wash all incisions twice daily with mild soap and water. No lotions, ointments or powder. 4. Call the office immediately for any redness, swelling, or drainage from your incision. 5. Take your temperature daily and call for a temperature of 101 degrees or higher or for any symptoms that make you think you have and infection. 6. Weigh yourself each morning. Call if you gain more than 5 pounds in 48 hours. 7. Use the incentive spirometer 6-8 times a day-10 breaths each time.     8. Walk several hundred feet several times daily. DIET  Eat an American Heart Association diet. If you are having trouble with your appetite, eat what you can. Try eating small, frequent meals throughout the day. ACTIVITY  1. You have no activity restrictions. You may resume your daily activities at home, based on your comfort level. You may also drive. FOLLOW UP  1. Your first follow up appointment will be on 7/26/21 at 1030 by phone. Our office is located in [address] [place] [address]. Your second follow up appointment will be in four weeks, on 9/8/21 at 1030 in the clinic. You will need to have an ECHO prior to your appointment time. Our office will set that up for you. Please call our office at [phone] if you are unable to make either one of these appointments. 2. You will be receiving a call before your 3 day follow up appointment to begin cardiac rehab. They are programs located at the [address] [place] [address].  The contact information is located in your Cardiac Surgery booklet. Please call if you have not been contacted 2-3 weeks after discharge from the hospital.  3. We will make an appointment for you with your cardiologist in 4-5 weeks. 4. Consult you primary care physician regarding your influenza &   pneumovax vaccines. 5.   Please bring all medications with you to your appointment.     Signature:___________________________________________________

## 2021-07-23 NOTE — PROGRESS NOTES
1130: Bedside and Verbal shift change report given to Peggy Durant RN (oncoming nurse) by Clara Mixon RN (offgoing nurse). Report included the following information SBAR, ED Summary, OR Summary, Procedure Summary, Intake/Output, MAR, Recent Results and Cardiac Rhythm 1 degree AVB.

## 2021-07-23 NOTE — PROGRESS NOTES
Cardiac Surgery Care Coordinator-  Met with Rebeca Salgado, reviewed plan of care and discharge instructions. Encouraged continued use of the incentive spirometer. Reviewed the importance of daily temp and weight monitoring, discussed groin site care and reviewed signs and symptoms of infection. Red reminder bracelet on right wrist, reviewed purpose of the bracelet and when to call the MD. Provided Mr Paresh Godinez with his valve and stent information along with the dental prophylaxis card. Using the teach back method reviewed new medications to include the purpose and possible side effects of acetaminophen, clopidogrel and protonix. Reminded pt of appts and encouraged participation in the Cardiac Wellness and rehab program after discharge. Encouraged Rebeca Salgado to verbalize and emotional support given. Rebeca Salgado is without questions or concerns at this time.  Dot Martínez RN

## 2021-07-23 NOTE — PROGRESS NOTES
I have read and agree with Yared Martinez RN documentation and care. I have reviewed the MAR and all flow sheets associated with Patient's care today.

## 2021-07-23 NOTE — PROGRESS NOTES
2000: Report received from Michael Levin, BRETT, drips dual RN rate verified and care assumed of patient. 2130: Milrinone weaned off. Uneventful shift. Patient OOB to chair at 0430, labs drawn. Patient up to toilet x1. ++flatus, no BM. Scant pink drainage when nose is wiped. 0800: Bedside shift change report given to Tom Dixon RN (oncoming nurse) by Jennifer Tobar RN (offgoing nurse). Report included the following information SBAR, Kardex, Procedure Summary, MAR, Recent Results and Cardiac Rhythm 1st degree AVB and mulitfocal PVCs. Occasional paced beats. Problem: Pressure Injury - Risk of  Goal: *Prevention of pressure injury  Description: Document Dirk Scale and appropriate interventions in the flowsheet.   Outcome: Progressing Towards Goal  Note: Pressure Injury Interventions:  Sensory Interventions: Assess changes in LOC, Minimize linen layers         Activity Interventions: Assess need for specialty bed, Increase time out of bed, Pressure redistribution bed/mattress(bed type)    Mobility Interventions: Chair cushion    Nutrition Interventions: Document food/fluid/supplement intake    Friction and Shear Interventions: Minimize layers

## 2021-07-23 NOTE — DISCHARGE SUMMARY
[facility] Discharge Summary     Patient ID:  Madina Roberts  [id number]  31 y.o.  1945    Admit date: 7/21/2021    Discharge date: 7/23/2021     Admitting Physician: Marylene Self, MD     Referring Cardiologist:  Dr. Sabina Velasco    PCP:  Julia Meyer MD     Admitting Diagnoses: AS    Discharge Diagnoses: AS s/p TAVR    Hospital Problems  Date Reviewed: 7/20/2021        Codes Class Noted POA    Aortic stenosis ICD-10-CM: I35.0  ICD-9-CM: 424.1  7/21/2021 Unknown        Nonrheumatic aortic valve stenosis ICD-10-CM: I35.0  ICD-9-CM: 424.1  7/8/2021 Yes              Discharged Condition: stable    Disposition: home, see patient instructions for treatment and plan    Procedures for this admission:  Procedure(s):  TRANSCATHETER AORTIC VALVE REPLACEMENT, LEFT TRANSFEMORAL 26 S3, 2ML RCA PROTECTION,  RIGHT RADIAL FOR PIGTAIL, RIGHT FEMORAL RCA PROTECTION; POST BAV; SUSANNA AND EPIAORTIC BY DR. Roscoe Ferro  High Risk Percutaneous Coronary Intervention    Discharge Medications:      My Medications      START taking these medications      Instructions Each Dose to Equal Morning Noon Evening Bedtime   acetaminophen 325 mg tablet  Commonly known as: TYLENOL    Your last dose was: Your next dose is: Take 2 Tablets by mouth every four (4) hours as needed for Pain. 650 mg                 clopidogreL 75 mg Tab  Commonly known as: PLAVIX  Start taking on: July 24, 2021    Your last dose was: Your next dose is: Take 1 Tablet by mouth daily for 180 days. 75 mg                 pantoprazole 40 mg tablet  Commonly known as: PROTONIX    Your last dose was: Your next dose is: Take 1 Tablet by mouth daily (with dinner).    40 mg                    CHANGE how you take these medications      Instructions Each Dose to Equal Morning Noon Evening Bedtime   carvediloL 12.5 mg tablet  Commonly known as: COREG  What changed:   · how much to take  · how to take this  · when to take this    Your last dose was: Your next dose is: Take 0.5 Tablets by mouth two (2) times daily (with meals). TAKE ONE TABLET BY MOUTH TWICE A DAY   6.25 mg                    CONTINUE taking these medications      Instructions Each Dose to Equal Morning Noon Evening Bedtime   Aerochamber Plus Flow-Vu,L Msk Spcr  Generic drug: inhalat. spacing dev,large mask    Your last dose was: Your next dose is:                         budesonide-formoteroL 160-4.5 mcg/actuation Hfaa  Commonly known as: SYMBICORT    Your last dose was: Your next dose is: Take 2 Puffs by inhalation two (2) times a day. 2 Puff                 Eliquis 5 mg tablet  Generic drug: apixaban    Your last dose was: Your next dose is: Take 5 mg by mouth two (2) times a day. 5 mg                 gabapentin 300 mg capsule  Commonly known as: NEURONTIN    Your last dose was: Your next dose is:         TAKE ONE CAPSULE BY MOUTH EVERY EVENING                  rosuvastatin 20 mg tablet  Commonly known as: CRESTOR    Your last dose was: Your next dose is:         TAKE ONE TABLET BY MOUTH ONCE NIGHTLY                  sodium bicarbonate 650 mg tablet    Your last dose was: Your next dose is:         TAKE ONE TABLET BY MOUTH TWICE A DAY                  testosterone 12.5 mg/ 1.25 gram (1 %) Glpm    Your last dose was: Your next dose is:         APPLY 4 PUMPS TO SHOULDERS AND UPPER ARMS ONCE DAILY IN THE MORNING                  torsemide 20 mg tablet  Commonly known as: DEMADEX    Your last dose was: Your next dose is: Take 3 Tabs by mouth Before breakfast and dinner. Indications: Congestive Heart Failure (this replaces bumetanide or Bumex)   60 mg                 Vitamin D3 25 mcg (1,000 unit) Cap  Generic drug: cholecalciferol    Your last dose was: Your next dose is: Take 1 Cap by mouth daily.    1 Capsule                    STOP taking these medications    aspirin delayed-release 81 mg tablet        isosorbide dinitrate 5 mg tablet  Commonly known as: ISORDIL        lisinopriL 5 mg tablet  Commonly known as: Antonio Nobles              Where to Get Your Medications      These medications were sent to [id number] [facility], [address], [facility]    Phone: [phone]   · carvediloL 12.5 mg tablet  · clopidogreL 75 mg Tab  · pantoprazole 40 mg tablet           HPI: Copied from H&P dated 7/15/21    Alexis Burch is a 77 y.o. male with PMHx of AS, HTN, HLD, Cardiomyopathy s/p Bi-V ICD, Aflutter s/p ablation, Pre-diabetes, that is referred to the [address] [place] [address] by Dr. Jimmy Modi interventional evaluation of [address] [place] [address] that his symptoms have gotten worse over the last few weeks. 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 He sometimes has to stop on the way to or from his mailbox which is about 300 ft away. 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 He did have a mechanical fall over memorial day weekend when he was balancing on a chair to reach above his head. He does have issue with LE edema. It seems to improve overnight but never resolves. Denies palpitations, CP, dizziness, syncope, orthopnea, PND, GIB, or HF admission in the last year.      He had a nosebleed on 7/8 and had to be seen in the ER for nasal packing. He later removed that himself at home. He restarted his Eliquis at half dose and ASA. No additional issues with epistaxis or other bleeding. Hospital Course: Alexis Burch was taken to the OR on 7/21/21 for a transcatheter aortic valve replacement with 26 S3 and stent to the [facility] Opal Archer. he  had General anesthesia. He arrived on an Epi and Insulin drip.  During the procedure and after deployment of the valve, the patient developed an occlusion of his RCA and had Cardiac Arrest requiring CPR and defibrillation. He received a 2nd stent to his RCA and was emergently intubated. He was taken from the OR to the CVICU in stable condition on the following drips: Epinephrine and Dobutamine gtts. He was weaned off of vasoactive drips. Dr. Sylvester Paredes reviewed his postoperative Echo and EKG and he was felt stable for discharge on POD #2 with the following assessment and plan:    1. Aortic Stenosis LFLG: s/p TAVR with LTF 26 S3 +2ml and RCA protection. Echo completed. Plavix, Eliquis. Stop ASA per Dr. Sylvester Paredes      2. HTN:  Will add back low dose Coreg. Ask him to monitor BP at home and will discuss on Monday. Plan to titrate as needed.     3. HLD: On Statin     4. Atrial Flutter s/p Ablation: Eliquis. Resume coreg.     5. Cardiomyopathy with LVEF of 15%: Has Bi-V ICD. Restart BB. Will review BP log on Monday and titrate meds as able.      6. CKD stage 3: Creatinine of 1.4-stable. Does not currently follow with a Nephrologist. Avoid hypotension and nephrotoxic agents     7. Thrombocytopenia: Platelets UJ 17P today.  Will stop ASA. Continue Plavix. Stop H2 blocker. Add PPI.      8. Pre-diabetes: A1c of 5.9 on 2/24/21. Diet controlled. AC and HS Accucheck with SSI     9. Acute obstruction of RCA following TAVR deployment (anticipated high risk) s/p successful placement of 2 RAUDEL: On ASA, Plavix, Statin.     10. Ventricular fibrillation arrest during TAVR related to #9 s/p defibrillation X 1 and CPR X approximately 2 minutes.      11. Atelectasis:  IS and TCDB. Increase activity. Wean oxygen to keep sat >92%.     12. Anemia: Postoperative secondary to acute blood loss and hemodilution. Currently above transfusion threshold. Monitor daily CBC.     Dispo: Cardiac Rehab. Case Management for discharge planning. Walk in halls. Home later today. Referral to outpatient cardiac rehab made.      Discharge Vital Signs:   Visit Vitals  /83 (BP 1 Location: Left arm, BP Patient Position: Sitting)   Pulse 70   Temp 97 °F (36.1 °C) Resp 18   Ht 6' (1.829 m)   Wt 200 lb 6.4 oz (90.9 kg)   SpO2 96%   BMI 27.18 kg/m²       Labs:   Recent Labs     07/23/21  1208 07/23/21  0710 07/23/21  0441 07/22/21  0135 07/22/21  0101 07/21/21  1328 07/21/21  1259   WBC  --   --  11.1   < > 12.1*   < > 10.1   HGB  --   --  10.8*   < > 11.7*   < > 11.9*   HCT  --   --  32.6*   < > 35.4*   < > 36.0*   PLT  --   --  88*   < > 109*   < > 107*   NA  --   --   --   --  142   < > 141   K  --   --   --   --  3.8   < > 3.7   BUN  --   --   --   --  22*   < > 24*   CREA  --   --   --   --  1.40*   < > 1.42*   GLU  --   --   --   --  136*   < > 132*   GLUCPOC 133*   < >  --    < >  --    < >  --    INR  --   --   --   --   --   --  1.3*    < > = values in this interval not displayed. Diagnostics:   Echo: 7/22/21  Interpretation Summary        · LV: Estimated LVEF is 25 - 30%. Normal wall thickness. Severely dilated left ventricle. Moderately reduced systolic function. Left ventricular diastolic dysfunction. · MV: Mitral valve increased E-point septal separation. Moderate mitral annular dilatation. Mild mitral valve regurgitation is present. · RV: Mildly dilated right ventricle. Borderline low systolic function. Pacer/ICD present. · LA: Mildly dilated left atrium. · AV: Prosthetic aortic valve. Aortic valve mean gradient is 16 mmHg. Aortic valve area is 2.3 cm2. There is a Lujan 26 mm Sandy 3 Ultra with 1 mL extra volume prosthetic aortic valve from prior TAVR procedure. Prosthetic valve insufficiency is present. There is mild paravalvular leaking.     LVOTd 2.3 cm  LVOT VTI 26.5 cm  AV VTI 47.3 cm  DI 0.56  EOA 2.33 cm2  EOAi 1.08 cm2/m2  BSA 2.15 m2      Echo Findings     Left Ventricle Normal wall thickness. Severely dilated left ventricle. The estimated EF is 25 - 30%. Moderately reduced systolic function. There is left ventricular diastolic dysfunction. Left Atrium Mildly dilated left atrium. Right Ventricle Mildly dilated right ventricle. Borderline low systolic function. Pacer/ICD present. Right Atrium Normal cavity size. Pacemaker wire present in the right atrium and appears normal.   Aortic Valve No stenosis. Prosthetic aortic valve. Aortic valve peak gradient is 33 mmHg. Aortic valve mean gradient is 16 mmHg. Aortic valve area is 2.3 cm2. Aortic valve peak velocity is 2.9 cm/s. Trace aortic valve regurgitation. Janak Stapleton is a Lujan 26 mm Sandy 3 Ultra with 1 mL extra volume TAVR present. Mild paravalvular, prosthetic valve insufficiency present.  LVOTd 2.3 cm  LVOT VTI 26.5 cm  AV VTI 47.3 cm  DI 0.56  EOA 2.33 cm2  EOAi 1.08 cm2/m2  BSA 2.15 m2. Mitral Valve No stenosis. Mitral valve increased E-point septal separation. Moderate mitral annular dilatation. Mild regurgitation. Tricuspid Valve Normal valve structure and no stenosis. Trace regurgitation. Pulmonic Valve Normal valve structure and no stenosis. Trace regurgitation. Aorta Normal aortic root. IVC/Hepatic Veins Normal central venous pressure (3 mmHg); IVC diameter is less than 21 mm and collapses more than 50% with respiration. Pericardium No evidence of pericardial effusion.      EK2021  8:23 AM - [place], Card Result In     Component Value Ref Range & Units Status   Ventricular Rate 82  BPM Final   Atrial Rate 82  BPM Final   P-R Interval 220  ms Final   QRS Duration 122  ms Final   Q-T Interval 430  ms Final   QTC Calculation (Bezet) 502  ms Final   Calculated P Axis 91  degrees Final   Calculated R Axis 24  degrees Final   Calculated T Axis -103  degrees Final   Diagnosis     Final   Sinus rhythm with 1st degree AV block with Possible    premature atrial   complexes with aberrant conduction      Patient Instructions/Follow Up Care:  Discharge instructions were reviewed with the patient and family present. Questions were also answered at this time. Prescriptions and medications were reviewed.  The patient has a follow up appointment with the Nurse Practitioner or [phone] Assistant on 7/26/21 at 56 and with Dr. Santy Lerma on 9/8/21. The patient was also instructed to follow up with his primary care physician as needed. The patient and family were encouraged to call with any questions or concerns.        Signed:  Baylee Daniel NP  7/23/2021  11:26 AM

## 2021-07-23 NOTE — PROGRESS NOTES
[facility] Cardiology Progress Note    Date of Service: 7/23/2021    Subjective:  Doing great. No anginal chest pain, dyspnea or groin discomfort.       Objective:    Visit Vitals  BP (!) 117/99 (BP 1 Location: Left arm, BP Patient Position: Sitting)   Pulse 74   Temp 98 °F (36.7 °C)   Resp 17   Ht 6' (1.829 m)   Wt 90.9 kg (200 lb 6.4 oz)   SpO2 93%   BMI 27.18 kg/m²         Intake/Output Summary (Last 24 hours) at 7/23/2021 1223  Last data filed at 7/23/2021 0500  Gross per 24 hour   Intake 608.39 ml   Output 1650 ml   Net -1041.61 ml        Physical Exam  GEN: NAD, appears stated age  HEENT: EOMI, MMM, OP clear  NECK: Normal JVP  CV: Irregular, normal S1 and S2, I/VI systolic flow murmur at LUSB  LUNGS: CTAB, no W/R/R  ABD: NABS, soft, NT/ND  EXT: No edema, 2+ distal pulses  PSYCH: Mood and affect normal  NEURO: AAO, MAEW, face symmetrical, speech intact    Current Facility-Administered Medications   Medication Dose Route Frequency    melatonin tablet 3 mg  3 mg Oral QHS PRN    carvediloL (COREG) tablet 6.25 mg  6.25 mg Oral BID WITH MEALS    insulin lispro (HUMALOG) injection   SubCUTAneous AC&HS    glucose chewable tablet 16 g  4 Tablet Oral PRN    dextrose (D50W) injection syrg 12.5-25 g  12.5-25 g IntraVENous PRN    glucagon (GLUCAGEN) injection 1 mg  1 mg IntraMUSCular PRN    clopidogreL (PLAVIX) tablet 75 mg  75 mg Oral DAILY    apixaban (ELIQUIS) tablet 5 mg  5 mg Oral BID    pantoprazole (PROTONIX) tablet 40 mg  40 mg Oral DAILY WITH DINNER    gabapentin (NEURONTIN) capsule 300 mg  300 mg Oral QHS    rosuvastatin (CRESTOR) tablet 20 mg  20 mg Oral QHS    sodium chloride (NS) flush 5-40 mL  5-40 mL IntraVENous Q8H    sodium chloride (NS) flush 5-40 mL  5-40 mL IntraVENous PRN    acetaminophen (TYLENOL) tablet 650 mg  650 mg Oral Q4H PRN    traMADoL (ULTRAM) tablet  mg   mg Oral Q6H PRN    oxyCODONE-acetaminophen (PERCOCET) 5-325 mg per tablet 1-2 Tablet  1-2 Tablet Oral Q4H PRN    morphine injection 2 mg  2 mg IntraVENous Q2H PRN    naloxone (NARCAN) injection 0.4 mg  0.4 mg IntraVENous PRN    ondansetron (ZOFRAN) injection 4 mg  4 mg IntraVENous Q4H PRN    albuterol (PROVENTIL VENTOLIN) nebulizer solution 2.5 mg  2.5 mg Nebulization Q4H PRN    magnesium oxide (MAG-OX) tablet 400 mg  400 mg Oral BID    calcium chloride 1 g in 0.9% sodium chloride 100 mL IVPB  1 g IntraVENous PRN    bisacodyL (DULCOLAX) suppository 10 mg  10 mg Rectal DAILY PRN    senna-docusate (PERICOLACE) 8.6-50 mg per tablet 1 Tablet  1 Tablet Oral BID    ELECTROLYTE REPLACEMENT NOTE: Nurse to review Serum Potassium and Magnesuim levels and Initiate Electrolyte Replacement Protocol as needed  1 Each Other PRN    chlorhexidine (PERIDEX) 0.12 % mouthwash 15 mL  15 mL Oral Q12H    mupirocin (BACTROBAN) 2 % ointment   Topical DAILY    dextrose (D50W) injection syrg 25 g  25 g IntraVENous PRN       Data Reviewed:  Recent Labs     07/22/21  0115 07/22/21  0101 07/21/21  1649 07/21/21  1259   NA  --  142  --  141   K  --  3.8 4.2 3.7   CL  --  107  --  106   CO2  --  23  --  28   GLU  --  136*  --  132*   BUN  --  22*  --  24*   CREA  --  1.40*  --  1.42*   CA  --  9.4  --  9.6   MG 2.2  --  2.5* 2.5*   ALB  --   --   --  3.3*   ALT  --   --   --  52   INR  --   --   --  1.3*     Recent Labs     07/23/21  0441 07/22/21  0101 07/21/21  1259   WBC 11.1 12.1* 10.1   HGB 10.8* 11.7* 11.9*   HCT 32.6* 35.4* 36.0*   PLT 88* 109* 107*     Lab Results   Component Value Date/Time    Specimen Description: WRIST 06/20/2013 12:23 PM    Specimen Description: WRIST 06/20/2013 12:23 PM     Lab Results   Component Value Date/Time    Culture result:  03/12/2016 05:07 AM     NO ROUTINE ENTERIC PATHOGENS ISOLATED INCLUDING SALMONELLA, SHIGELLA, YERSINIA, VIBRIO OR SHIGA TOXIN PRODUCING E. COLI    Culture result: NO GROWTH 1 DAY 03/11/2016 11:35 PM    Culture result:  03/11/2016 10:31 PM     NO ROUTINE ENTERIC PATHOGENS ISOLATED INCLUDING SALMONELLA, SHIGELLA, YERSINIA, VIBRIO OR SHIGA TOXIN PRODUCING E. COLI       All Cardiac Markers in the last 24 hours:  No results found for: CPK, CK, CKMMB, CKMB, RCK3, CKMBT, CKMBPOC, CKNDX, CKND1, SCOUT, TROPT, TROIQ, PEARL, TROPT, TNIPOC, BNP, BNPP, BNPNT    Telemetry (personally reviewed): sinus rhythm, occasional paced rhythm    Echocardiogram: Reviewed    Assessment:  1. Symptomatic, severe aortic stenosis s/p successful percutaneous right transfemoral TAVR using a 26 mm Sandy 3 Ultra THV with 1 mL extra volume  2. Acute obstruction of the right coronary artery after TAVR deployment (anticipated high risk) successfully treated with placement of 2 drug eluting stents to the ostial/proximal right coronary artery  3. Brief ventricular fibrillation arrest requiring defibrillation and approximately 2 minutes of CPR  4. Severe pulmonary hypertension  5. 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 Moderate mitral regurgitation  7. Moderate tricuspid regurgitation  8. AFL s/p ablation  9. HTN  10. HL    Plan:  - PT/OT, incentive spirometry  - Stop aspirin due to significant epistaxis recently prior to TAVR  - Continue apixaban and clopidogrel  - We discussed risks/benefits of apixaban/aspirin/clopidogrel versus apixaban/clopidogrel and he preferred the latter which I think is reasonable  - Continue PPI  - Follow-up with CTS next week and with me in 1 month with echo  - Follow-up long term with primary cardiologist, Dr. Alexander Galaviz  - Discussed with Keo Navarrete    Medically stable for discharge to home today. Thank you for the opportunity to participate in the care of Henny Godoy and please do not hesitate to contact us should you have any questions. Signed:  Dion Seth, [address] [place] [address] [facility]  07/23/21

## 2021-07-23 NOTE — PROGRESS NOTES
Transitions of Care Plan:  RUR: 20%  Clinical Plan: s/p TAVR  Consults: None  Baseline: independent; resides with partner  Disposition: home with family    Reason for Admission:   TAVR                  RUR Score:     20%             PCP: First and Last name:   Maria L Herrera MD     Name of Practice:    Are you a current patient: Yes/No:    Approximate date of last visit:    Can you participate in a virtual visit if needed:     Do you (patient/family) have any concerns for transition/discharge? No              Plan for utilizing home health:   No    Current Advanced Directive/Advance Care Plan:  Full Code      Healthcare Decision Maker:   Click here to complete [address] including selection of the Healthcare Decision Maker Relationship (ie \"Primary\")            Darryl Padilla - [place] - [phone]    Transition of Care Plan:          Chart review assessment for TAVR patient - remains at functional baseline for ambulation. Discharge today.     Jsutin Gibbons, MPH  Care Manager l [facility] [employer] [facility]  Available via [employer] or

## 2021-07-26 ENCOUNTER — PATIENT OUTREACH (OUTPATIENT)
Dept: CASE MANAGEMENT | Age: 76
End: 2021-07-26

## 2021-07-26 ENCOUNTER — OFFICE VISIT (OUTPATIENT)
Dept: CARDIOLOGY CLINIC | Age: 76
End: 2021-07-26
Payer: MEDICARE

## 2021-07-26 DIAGNOSIS — Z95.2 S/P TAVR (TRANSCATHETER AORTIC VALVE REPLACEMENT): ICD-10-CM

## 2021-07-26 DIAGNOSIS — I35.0 NONRHEUMATIC AORTIC VALVE STENOSIS: Primary | ICD-10-CM

## 2021-07-26 PROCEDURE — 99441 PR PHYS/QHP TELEPHONE EVALUATION 5-10 MIN: CPT | Performed by: NURSE PRACTITIONER

## 2021-07-26 RX ORDER — SODIUM BICARBONATE 650 MG/1
TABLET ORAL
Qty: 180 TABLET | Refills: 1 | Status: SHIPPED | OUTPATIENT
Start: 2021-07-26 | End: 2021-10-03

## 2021-07-26 NOTE — PROGRESS NOTES
No transition of care outreach indicated due to patient being managed by [facility] Cardiothoracic Surgery Team for 30 days.

## 2021-07-26 NOTE — PROGRESS NOTES
Patient: Leslie Moore   Age: 77 y.o. Patient Care Team:  Paris Rogers MD as PCP - General (Family Medicine)  Paris Rogers MD as PCP - [facility]  Supriya Cowan MD (Cardiology)  Naty Sumner MD (Pulmonary Disease)  Nilsa Herman MD (Cardiology)  Juliette Mathew MD (Cardiothoracic Surgery)    PCP: Paris Rogers MD    Cardiologist: Dr. Jigna Maldonado    Diagnosis/Reason for Consultation: The primary encounter diagnosis was Nonrheumatic aortic valve stenosis. A diagnosis of S/P TAVR (transcatheter aortic valve replacement) was also pertinent to this visit. 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 y.o. male  S/P transcatheter aortic valve replacement with 26 S3 and stent to the RCA by Opal Rivera and Antonia on 7/21/21.  he  had General anesthesia. During the procedure and after deployment of the valve, the patient developed an occlusion of his RCA and had Cardiac Arrest requiring CPR and defibrillation. He received a 2nd stent to his RCA and was emergently intubated. Leslie Moore was discharged to Home on 7/23/21.  he  is available by telephone today for his  Initial Post Op Appointment. Still having shortness of breath and fatigue with walking but this is not worsening.  His chest still hurts at times following the CPR. He has been checking his weight, temp, HR and BP at home. Weight has been stable. He has been afebrile. His HR is 58-60s. His BP has been low 100s/50-60. He feels that he is progressing slowly. Denies fever, chills, orthopnea, PND, dizziness, syncope or recent fall, or issues with his incisions. NYHA Classification: IIB   Class I (Mild): No limitation of physical activity. Ordinary physical activity does not cause undue fatigue, palpitation, or dyspnea. Class II (Mild): Slight limitation of physical activity. Comfortable at rest, but ordinary physical activity results in fatigue, palpitation, or dyspnea. Class III (Moderate): Marked limitation of physical activity. Comfortable at rest, but less than ordinary activity causes fatigue, palpitation, or dyspnea   Class IV (Severe): Unable to carry out any physical activity without discomfort. Symptoms  of cardiac insufficiency at rest.  If any physical activity is undertaken, discomfort is increased.     Angina Classification: 0   Class 0: No symptoms   Class 1: Angina with strenuous exercise   Class 2: Angina with moderate exercise   Class 3: Angina with mild exertion   Walking 1-2 level blocks at normal pace; Climbing 1 flight of stairs at normal pace  Class 4: Angina at any level of physical exertion       Past Medical History:   Diagnosis Date    Arrhythmia 11/11/2014    ED (erectile dysfunction) of organic origin     High cholesterol 3/31/2010    HTN (hypertension) 3/31/2010    Prediabetes 11/11/2014    Pulmonary hypertension ([facility] 8/8/2017    S/P ablation of atrial flutter 3/22/2016    S/P biventricular cardiac pacemaker procedure 4/17/0636    Systolic CHF, acute ([facility] 3/22/2016         Past Surgical History:   Procedure Laterality Date    HX ORTHOPAEDIC      fx wrist    HX TONSILLECTOMY      DE INSJ/RPLCMT PERM DFB W/TRNSVNS LDS 1/DUAL CHMBR N/A 1/26/2021    INSERT ICD BIV MULTI performed by Srini Amato MD at [address] CATH LAB      Social History     Tobacco Use    Smoking status: Former Smoker     Packs/day: 2.00     Years: 35.00     Pack years: 70.00     Types: Cigarettes     Start date: 1963     Quit date: 1995     Years since quittin.3    Smokeless tobacco: Never Used   Substance Use Topics    Alcohol use: Yes     Alcohol/week: 1.0 standard drinks     Types: 1 Glasses of wine per week     Comment: 1 drink a month      Family History   Problem Relation Age of Onset    Heart Disease Father     Hypertension Father     Hypertension Mother     Heart Disease Mother      Prior to Admission medications    Medication Sig Start Date End Date Taking? Authorizing Provider   carvediloL (COREG) 12.5 mg tablet Take 0.5 Tablets by mouth two (2) times daily (with meals). TAKE ONE TABLET BY MOUTH TWICE A DAY 21   Mireya Jules NP   acetaminophen (TYLENOL) 325 mg tablet Take 2 Tablets by mouth every four (4) hours as needed for Pain. 21   Mireya Jules NP   clopidogreL (PLAVIX) 75 mg tab Take 1 Tablet by mouth daily for 180 days. 21  Mireya Jules NP   pantoprazole (PROTONIX) 40 mg tablet Take 1 Tablet by mouth daily (with dinner). 21   Mireya Jules NP   Aerochamber Plus Flow-Vu,L Msk spcr  21   Provider, Historical   budesonide-formoteroL (SYMBICORT) 160-4.5 mcg/actuation HFAA Take 2 Puffs by inhalation two (2) times a day. Patient not taking: Reported on 2021    Provider, Historical   torsemide (DEMADEX) 20 mg tablet Take 3 Tabs by mouth Before breakfast and dinner.  Indications: Congestive Heart Failure (this replaces bumetanide or Bumex) 5/10/21   Melany Pittman MD   sodium bicarbonate 650 mg tablet TAKE ONE TABLET BY MOUTH TWICE A DAY 21   Anca Hicks MD   gabapentin (NEURONTIN) 300 mg capsule TAKE ONE CAPSULE BY MOUTH EVERY EVENING  Patient not taking: Reported on 2021 3/25/21   Anca Hicks MD   testosterone 12.5 mg/ 1.25 gram (1 %) glpm APPLY 4 PUMPS TO SHOULDERS AND UPPER ARMS ONCE DAILY IN THE MORNING 2/15/21   Avis Chandler MD   rosuvastatin (CRESTOR) 20 mg tablet TAKE ONE TABLET BY MOUTH ONCE NIGHTLY 8/5/20   Avis Chandler MD   Eliquis 5 mg tablet Take 5 mg by mouth two (2) times a day. 4/9/20   Provider, Historical   Cholecalciferol, Vitamin D3, (VITAMIN D3) 1,000 unit Cap Take 1 Cap by mouth daily. Provider, Historical       No Known Allergies    Current Medications:   Current Outpatient Medications   Medication Sig Dispense Refill    carvediloL (COREG) 12.5 mg tablet Take 0.5 Tablets by mouth two (2) times daily (with meals). TAKE ONE TABLET BY MOUTH TWICE A  Tablet 3    acetaminophen (TYLENOL) 325 mg tablet Take 2 Tablets by mouth every four (4) hours as needed for Pain.  clopidogreL (PLAVIX) 75 mg tab Take 1 Tablet by mouth daily for 180 days. 90 Tablet 1    pantoprazole (PROTONIX) 40 mg tablet Take 1 Tablet by mouth daily (with dinner). 90 Tablet 1    Aerochamber Plus Flow-Vu,L Msk spcr       budesonide-formoteroL (SYMBICORT) 160-4.5 mcg/actuation HFAA Take 2 Puffs by inhalation two (2) times a day. (Patient not taking: Reported on 7/8/2021)      torsemide (DEMADEX) 20 mg tablet Take 3 Tabs by mouth Before breakfast and dinner. Indications: Congestive Heart Failure (this replaces bumetanide or Bumex) 180 Tab 6    sodium bicarbonate 650 mg tablet TAKE ONE TABLET BY MOUTH TWICE A  Tab 1    gabapentin (NEURONTIN) 300 mg capsule TAKE ONE CAPSULE BY MOUTH EVERY EVENING (Patient not taking: Reported on 7/21/2021) 90 Cap 1    testosterone 12.5 mg/ 1.25 gram (1 %) glpm APPLY 4 PUMPS TO SHOULDERS AND UPPER ARMS ONCE DAILY IN THE MORNING 225 g 2    rosuvastatin (CRESTOR) 20 mg tablet TAKE ONE TABLET BY MOUTH ONCE NIGHTLY 90 Tab 3    Eliquis 5 mg tablet Take 5 mg by mouth two (2) times a day.  Cholecalciferol, Vitamin D3, (VITAMIN D3) 1,000 unit Cap Take 1 Cap by mouth daily. Vitals:  There were no vitals taken for this visit. Allergies: has No Known Allergies. Review of Systems: Pertinent Positives per HPI   [x]Total of 13 systems reviewed as follows:  Constitutional: Negative fever, negative chills, +fatigue  Eyes:   Negative for amauroses fugax  ENT:   Negative sore throat,oral abscess   Endocrine Negative for thyroid replacement Rx; goiter; DM  Respiratory:  Negative chronic cough,sputum production, +dyspnea on exertion  Cards:   Negative for palpitations, varicosities, claudication, lower extremity edema  GI:   Negative for dysphagia, bleeding, nausea, vomiting, diarrhea, and abdominal pain  Genitourinary: Negative for frequency, dysuria  Integument:  Negative for rash and pruritus, +ecchymosis  Hematologic:  Negative for easy bruising; bleeding dyscrasia   Musculoskel: Negative for muscle weakness inhibiting ambulation  Neurological:  Negative for stroke, TIA, syncope, dizziness  Behavl/Psych: Negative for feelings of anxiety, depression     Cardiovascular Testing:     Echo: 7/22/21  Interpretation Summary        · LV: Estimated LVEF is 25 - 30%. Normal wall thickness. Severely dilated left ventricle. Moderately reduced systolic function. Left ventricular diastolic dysfunction. · MV: Mitral valve increased E-point septal separation. Moderate mitral annular dilatation. Mild mitral valve regurgitation is present. · RV: Mildly dilated right ventricle. Borderline low systolic function. Pacer/ICD present. · LA: Mildly dilated left atrium. · AV: Prosthetic aortic valve. Aortic valve mean gradient is 16 mmHg. Aortic valve area is 2.3 cm2. There is a Lujan 26 mm Sanyd 3 Ultra with 1 mL extra volume prosthetic aortic valve from prior TAVR procedure. Prosthetic valve insufficiency is present. There is mild paravalvular leaking.     LVOTd 2.3 cm  LVOT VTI 26.5 cm  AV VTI 47.3 cm  DI 0.56  EOA 2.33 cm2  EOAi 1.08 cm2/m2  BSA 2.15 m2      Echo Findings     Left Ventricle Normal wall thickness. Severely dilated left ventricle. The estimated EF is 25 - 30%. Moderately reduced systolic function. There is left ventricular diastolic dysfunction. Left Atrium Mildly dilated left atrium. Right Ventricle Mildly dilated right ventricle. Borderline low systolic function. Pacer/ICD present. Right Atrium Normal cavity size. Pacemaker wire present in the right atrium and appears normal.   Aortic Valve No stenosis. Prosthetic aortic valve. Aortic valve peak gradient is 33 mmHg. Aortic valve mean gradient is 16 mmHg. Aortic valve area is 2.3 cm2. Aortic valve peak velocity is 2.9 cm/s. Trace aortic valve regurgitation. Bob Al is a Lujan 26 mm Sandy 3 Ultra with 1 mL extra volume TAVR present. Mild paravalvular, prosthetic valve insufficiency present.  LVOTd 2.3 cm  LVOT VTI 26.5 cm  AV VTI 47.3 cm  DI 0.56  EOA 2.33 cm2  EOAi 1.08 cm2/m2  BSA 2.15 m2. Mitral Valve No stenosis. Mitral valve increased E-point septal separation. Moderate mitral annular dilatation. Mild regurgitation. Tricuspid Valve Normal valve structure and no stenosis. Trace regurgitation. Pulmonic Valve Normal valve structure and no stenosis. Trace regurgitation. Aorta Normal aortic root. IVC/Hepatic Veins Normal central venous pressure (3 mmHg); IVC diameter is less than 21 mm and collapses more than 50% with respiration.    Pericardium No evidence of pericardial effusion.      [id number]  8:23 AM - [place], Card Result In     Component Value Ref Range & Units Status   Ventricular Rate 82  BPM Final   Atrial Rate 82  BPM Final   P-R Interval 220  ms Final   QRS Duration 122  ms Final   Q-T Interval 430  ms Final   QTC Calculation (Bezet) 502  ms Final   Calculated P Axis 91  degrees Final   Calculated R Axis 24  degrees Final   Calculated T Axis -103  degrees Final   Diagnosis     Final   Sinus rhythm with 1st degree AV block with Possible    premature atrial   complexes with aberrant conduction      Physical Exam:  No physical exam performed due to telephone encounter. Assessment/Plan:     1. Aortic Stenosis LFLG: s/p TAVR with LTF 26 S3 +2ml and RCA protection. Echo as above-will repeat at a month.  Plavix, Eliquis. 2. HTN:  Low dose Coreg. BP has been about his normal. Will try him back on the previous dose of coreg 12.5 BID. Lisinopril can be added back at his 1 month if appropriate     3. HLD: On Statin     4. Atrial Flutter s/p Ablation: Eliquis, coreg.     5. Cardiomyopathy with LVEF of 15%: Has Bi-V ICD. Resume previous BB dose. Consider adding back his Lisinopril at his 1 month appt.      6. CKD stage 3: Creatinine of 1.4 on DC. Does not currently follow with a Nephrologist. Avoid hypotension and nephrotoxic agents     7. Thrombocytopenia: Platelets FJ 26A on DC.   ASA stopped. Continue Plavix. Continue PPI. Has a referral to a Hematologist     8. Pre-diabetes: A1c of 5.9 on 2/24/21. Diet controlled.      9. Acute obstruction of RCA following TAVR deployment (anticipated high risk) s/p successful placement of 2 RAUDEL: On Plavix, Statin, BB. No ASA due to Eliquis     10. Ventricular fibrillation arrest during TAVR related to #9 s/p defibrillation X 1 and CPR X approximately 2 minutes. Some chest discomfort due to CPR-controlled with Tylenol      SBE prophylax reviewed. May begin Cardiac Rehab     F/U with primary cardiologist      Time spent:  10 minutes spent on the telephone with the patient. This time is not inclusive on time spent on note preparation, diagnostic testing review, or coordination of care. This service was provided through telehealth:  location of patient(home)  and location of provider([facility]).

## 2021-07-27 ENCOUNTER — TELEPHONE (OUTPATIENT)
Dept: CARDIOLOGY CLINIC | Age: 76
End: 2021-07-27

## 2021-07-27 NOTE — TELEPHONE ENCOUNTER
Would cont current meds and monitor for now since only seeing blood when blowing nose. If increases, instructed to call our office.

## 2021-08-11 ENCOUNTER — TELEPHONE (OUTPATIENT)
Dept: CARDIAC REHAB | Age: 76
End: 2021-08-11

## 2021-08-11 ENCOUNTER — TRANSCRIBE ORDER (OUTPATIENT)
Dept: CARDIAC REHAB | Age: 76
End: 2021-08-11

## 2021-08-11 DIAGNOSIS — Z95.2 S/P TAVR (TRANSCATHETER AORTIC VALVE REPLACEMENT): ICD-10-CM

## 2021-08-11 DIAGNOSIS — Z95.4 HEART VALVE REPLACED BY OTHER MEANS: Primary | ICD-10-CM

## 2021-08-11 NOTE — TELEPHONE ENCOUNTER
8/11/2021 Cardiac Rehab: Called Mr. Rebeca Salgado to discuss participation in the Cardiac Rehab Program following TAVR on 7/21/2021. Made intake appointment for Wednesday, 8/25/2021 @ 230pm. Mr. Rebeca Salgado is without questions or concerns. Will mail paperwork 8/12/21.  Gianluca Finley

## 2021-08-13 ENCOUNTER — TELEPHONE (OUTPATIENT)
Dept: CARDIOLOGY CLINIC | Age: 76
End: 2021-08-13

## 2021-08-13 ENCOUNTER — OFFICE VISIT (OUTPATIENT)
Dept: FAMILY MEDICINE CLINIC | Age: 76
End: 2021-08-13
Payer: MEDICARE

## 2021-08-13 VITALS
WEIGHT: 192.8 LBS | HEART RATE: 49 BPM | OXYGEN SATURATION: 99 % | DIASTOLIC BLOOD PRESSURE: 59 MMHG | HEIGHT: 72 IN | BODY MASS INDEX: 26.11 KG/M2 | RESPIRATION RATE: 18 BRPM | SYSTOLIC BLOOD PRESSURE: 108 MMHG | TEMPERATURE: 96.5 F

## 2021-08-13 DIAGNOSIS — E04.1 THYROID NODULE GREATER THAN OR EQUAL TO 1 CM IN DIAMETER INCIDENTALLY NOTED ON IMAGING STUDY: ICD-10-CM

## 2021-08-13 DIAGNOSIS — Z98.890 S/P ABLATION OF ATRIAL FLUTTER: ICD-10-CM

## 2021-08-13 DIAGNOSIS — Z09 HOSPITAL DISCHARGE FOLLOW-UP: Primary | ICD-10-CM

## 2021-08-13 DIAGNOSIS — N18.31 STAGE 3A CHRONIC KIDNEY DISEASE (HCC): ICD-10-CM

## 2021-08-13 DIAGNOSIS — I50.22 CHF NYHA CLASS I (NO SYMPTOMS FROM ORDINARY ACTIVITIES), CHRONIC, SYSTOLIC (HCC): ICD-10-CM

## 2021-08-13 DIAGNOSIS — Z95.5 S/P DRUG ELUTING CORONARY STENT PLACEMENT: ICD-10-CM

## 2021-08-13 DIAGNOSIS — Z86.79 S/P ABLATION OF ATRIAL FLUTTER: ICD-10-CM

## 2021-08-13 DIAGNOSIS — D69.6 THROMBOCYTOPENIA (HCC): ICD-10-CM

## 2021-08-13 DIAGNOSIS — Z95.2 S/P TAVR (TRANSCATHETER AORTIC VALVE REPLACEMENT): ICD-10-CM

## 2021-08-13 PROCEDURE — G8419 CALC BMI OUT NRM PARAM NOF/U: HCPCS | Performed by: FAMILY MEDICINE

## 2021-08-13 PROCEDURE — 1101F PT FALLS ASSESS-DOCD LE1/YR: CPT | Performed by: FAMILY MEDICINE

## 2021-08-13 PROCEDURE — G8510 SCR DEP NEG, NO PLAN REQD: HCPCS | Performed by: FAMILY MEDICINE

## 2021-08-13 PROCEDURE — G8752 SYS BP LESS 140: HCPCS | Performed by: FAMILY MEDICINE

## 2021-08-13 PROCEDURE — 3017F COLORECTAL CA SCREEN DOC REV: CPT | Performed by: FAMILY MEDICINE

## 2021-08-13 PROCEDURE — G0463 HOSPITAL OUTPT CLINIC VISIT: HCPCS | Performed by: FAMILY MEDICINE

## 2021-08-13 PROCEDURE — 99214 OFFICE O/P EST MOD 30 MIN: CPT | Performed by: FAMILY MEDICINE

## 2021-08-13 PROCEDURE — G8754 DIAS BP LESS 90: HCPCS | Performed by: FAMILY MEDICINE

## 2021-08-13 PROCEDURE — 1111F DSCHRG MED/CURRENT MED MERGE: CPT | Performed by: FAMILY MEDICINE

## 2021-08-13 PROCEDURE — G8427 DOCREV CUR MEDS BY ELIG CLIN: HCPCS | Performed by: FAMILY MEDICINE

## 2021-08-13 PROCEDURE — G8536 NO DOC ELDER MAL SCRN: HCPCS | Performed by: FAMILY MEDICINE

## 2021-08-13 RX ORDER — ISOSORBIDE DINITRATE 5 MG/1
1 TABLET ORAL
COMMUNITY
Start: 2021-04-09 | End: 2021-08-13 | Stop reason: ALTCHOICE

## 2021-08-13 RX ORDER — BUDESONIDE AND FORMOTEROL FUMARATE DIHYDRATE 160; 4.5 UG/1; UG/1
2 AEROSOL RESPIRATORY (INHALATION)
COMMUNITY
Start: 2021-04-09 | End: 2022-08-23 | Stop reason: ALTCHOICE

## 2021-08-13 NOTE — PROGRESS NOTES
Zuleyak Mcgee (: 1945) is a 77 y.o. male, established patient, here for evaluation of the following chief complaint(s):  Follow-up (4 week)       ASSESSMENT/PLAN: Significant hospitalization with TAVR, stenting, thrombus in RCA and cardiac arrest requiring CPR and intubation. Has recovered from hospitalization but does report having some ongoing dyspnea similar to what he had prior to his hospitalization. He has already followed up with CT surgery and we discussed improvement of symptoms may take a few more weeks. He is bradycardic and mildly hypotensive. Asked him to check in with CT surgery and cardiology owen with Coreg dose. Planning for CT chest and ultrasound of thyroid in 2 weeks. Checking labs in 4 weeks. Below is the assessment and plan developed based on review of pertinent history, physical exam, labs, studies, and medications. 1. Hospital discharge follow-up  -     CBC WITH AUTOMATED DIFF; Future  -     METABOLIC PANEL, COMPREHENSIVE; Future  -     LIPID PANEL; Future  2. S/P TAVR (transcatheter aortic valve replacement)  -     CBC WITH AUTOMATED DIFF; Future  -     METABOLIC PANEL, COMPREHENSIVE; Future  -     LIPID PANEL; Future  3. S/P drug eluting coronary stent placement  -     CBC WITH AUTOMATED DIFF; Future  -     METABOLIC PANEL, COMPREHENSIVE; Future  -     LIPID PANEL; Future  4. CHF NYHA class I (no symptoms from ordinary activities), chronic, systolic (HCC)  -     CBC WITH AUTOMATED DIFF; Future  -     METABOLIC PANEL, COMPREHENSIVE; Future  -     LIPID PANEL; Future  5. Stage 3a chronic kidney disease (HCC)  -     CBC WITH AUTOMATED DIFF; Future  -     METABOLIC PANEL, COMPREHENSIVE; Future  -     LIPID PANEL; Future  6. Thyroid nodule greater than or equal to 1 cm in diameter incidentally noted on imaging study  -     CBC WITH AUTOMATED DIFF; Future  -     METABOLIC PANEL, COMPREHENSIVE; Future  -     LIPID PANEL; Future  7.  Thrombocytopenia (HCC)  -     CBC WITH AUTOMATED DIFF; Future  -     METABOLIC PANEL, COMPREHENSIVE; Future  -     LIPID PANEL; Future  8. S/P ablation of atrial flutter  -     CBC WITH AUTOMATED DIFF; Future  -     METABOLIC PANEL, COMPREHENSIVE; Future  -     LIPID PANEL; Future      Return in about 3 months (around 11/13/2021), or if symptoms worsen or fail to improve. SUBJECTIVE/OBJECTIVE:    Since last appointment, checked in with CT surgery and was having worsening symptoms of dyspnea on exertion. Was admitted on 7/21/2021 for transcatheter aortic valve replacement and stenting of RCA with Dr. Bart Hill and Dr. Harris Melgar. He ended up having an occlusion of his RCA and cardiac arrest requiring CPR and defibrillation. Had a second stent to his RCA and was intubated. Did very well after this and was able to be discharged on 7/23/2021. He does still endorse some dyspnea that was similar to his symptoms prior to hospitalization. He has followed up with CT surgery already. Sent for ultrasound of thyroid and has not been able to do this yet due to above issues. Also reviewed thrombocytopenia which has been a new issue in 2021. Most recent platelets stable at 88. Sent to Hematology for further eval.    ROS  Gen - no fever/chills  Resp - no dyspnea or cough  CV - no chest pain or FERRER  Rest per HPI    Blood pressure (!) 108/59, pulse (!) 49, temperature (!) 96.5 °F (35.8 °C), temperature source Oral, resp. rate 18, height 6' (1.829 m), weight 192 lb 12.8 oz (87.5 kg), SpO2 99 %.     Physical Exam  General appearance - alert, well appearing, and in no distress  Eyes -sclera anicteric  Neck - supple, no significant adenopathy, no thyromegaly  Chest - clear to auscultation, no wheezes, rales or rhonchi, symmetric air entry  Heart -bradycardic, regular rhythm, normal S1, S2, no murmurs, rubs, clicks or gallops, + pacemaker  Neurological - alert, oriented, normal speech, no focal findings or movement disorder noted  Extr - no edema  Psych - normal mood and affect      On this date 08/13/2021 I have spent 30 minutes reviewing previous notes, test results and face to face with the patient discussing the diagnosis and importance of compliance with the treatment plan as well as documenting on the day of the visit. An electronic signature was used to authenticate this note.   -- Anna Ayers MD

## 2021-08-13 NOTE — PROGRESS NOTES
Chief Complaint   Patient presents with    Follow-up     4 week   1. Have you been to the ER, urgent care clinic since your last visit? Hospitalized since your last visit? No    2. Have you seen or consulted any other health care providers outside of the [address] [place] [address] since your last visit? Include any pap smears or colon screening.  Yes Where: Cardiology

## 2021-08-13 NOTE — TELEPHONE ENCOUNTER
HR down into high 40s on 12.5 mg pO coreg, BP good. No dizziness/lightheadedness. Will go back to 6.25 mg PO BID,  Suggest using real pill cutter to get accurate amounts. Call next week if don't see improvement in HR>. PT stated understanding.

## 2021-08-19 ENCOUNTER — TELEPHONE (OUTPATIENT)
Dept: CARDIAC REHAB | Age: 76
End: 2021-08-19

## 2021-08-19 NOTE — TELEPHONE ENCOUNTER
8/19/2021 Cardiac Wellness: Mr. Garcia Dedrickjuan pablont called to cancel switch his intake date from 8/25/21 @230pm, to Monday 9/13/21 @1200pm. Mckenzie Sánchez

## 2021-08-23 ENCOUNTER — HOSPITAL ENCOUNTER (OUTPATIENT)
Dept: CT IMAGING | Age: 76
Discharge: HOME | End: 2021-08-23
Attending: FAMILY MEDICINE
Payer: MEDICARE

## 2021-08-23 ENCOUNTER — HOSPITAL ENCOUNTER (OUTPATIENT)
Dept: ULTRASOUND IMAGING | Age: 76
Discharge: HOME | End: 2021-08-23
Attending: FAMILY MEDICINE
Payer: MEDICARE

## 2021-08-23 DIAGNOSIS — R91.1 INCIDENTAL PULMONARY NODULE: ICD-10-CM

## 2021-08-23 DIAGNOSIS — E04.1 THYROID NODULE GREATER THAN OR EQUAL TO 1 CM IN DIAMETER INCIDENTALLY NOTED ON IMAGING STUDY: ICD-10-CM

## 2021-08-23 PROCEDURE — 76536 US EXAM OF HEAD AND NECK: CPT

## 2021-08-23 PROCEDURE — 71250 CT THORAX DX C-: CPT

## 2021-08-27 DIAGNOSIS — E29.1 HYPOGONADISM IN MALE: Primary | ICD-10-CM

## 2021-08-30 NOTE — PROGRESS NOTES
Please call-CT scan stable with tiny right mandibles lung nodules. Can plan for repeat CT scan in 1 year around 8/24/2022.   Thanks

## 2021-09-07 DIAGNOSIS — E78.2 MIXED HYPERLIPIDEMIA: ICD-10-CM

## 2021-09-07 RX ORDER — ROSUVASTATIN CALCIUM 20 MG/1
TABLET, COATED ORAL
Qty: 90 TABLET | Refills: 3 | Status: SHIPPED | OUTPATIENT
Start: 2021-09-07 | End: 2022-09-06

## 2021-09-09 ENCOUNTER — OFFICE VISIT (OUTPATIENT)
Dept: CARDIOLOGY CLINIC | Age: 76
End: 2021-09-09
Payer: MEDICARE

## 2021-09-09 VITALS
HEART RATE: 60 BPM | DIASTOLIC BLOOD PRESSURE: 70 MMHG | SYSTOLIC BLOOD PRESSURE: 132 MMHG | RESPIRATION RATE: 16 BRPM | OXYGEN SATURATION: 98 %

## 2021-09-09 DIAGNOSIS — I35.0 NONRHEUMATIC AORTIC VALVE STENOSIS: Primary | ICD-10-CM

## 2021-09-09 DIAGNOSIS — Z95.2 S/P TAVR (TRANSCATHETER AORTIC VALVE REPLACEMENT): ICD-10-CM

## 2021-09-09 PROCEDURE — G8419 CALC BMI OUT NRM PARAM NOF/U: HCPCS | Performed by: NURSE PRACTITIONER

## 2021-09-09 PROCEDURE — 99214 OFFICE O/P EST MOD 30 MIN: CPT | Performed by: NURSE PRACTITIONER

## 2021-09-09 PROCEDURE — G8752 SYS BP LESS 140: HCPCS | Performed by: NURSE PRACTITIONER

## 2021-09-09 PROCEDURE — 3017F COLORECTAL CA SCREEN DOC REV: CPT | Performed by: NURSE PRACTITIONER

## 2021-09-09 PROCEDURE — 1101F PT FALLS ASSESS-DOCD LE1/YR: CPT | Performed by: NURSE PRACTITIONER

## 2021-09-09 PROCEDURE — G8427 DOCREV CUR MEDS BY ELIG CLIN: HCPCS | Performed by: NURSE PRACTITIONER

## 2021-09-09 PROCEDURE — G8432 DEP SCR NOT DOC, RNG: HCPCS | Performed by: NURSE PRACTITIONER

## 2021-09-09 PROCEDURE — G8754 DIAS BP LESS 90: HCPCS | Performed by: NURSE PRACTITIONER

## 2021-09-09 PROCEDURE — G8536 NO DOC ELDER MAL SCRN: HCPCS | Performed by: NURSE PRACTITIONER

## 2021-09-09 NOTE — PROGRESS NOTES
Patient: Dominick Nicole   Age: 77 y.o. Patient Care Team:  Anca Hicks MD as PCP - General (Family Medicine)  Anca Hicks MD as PCP - [facility] Provider  Wellington Martines MD (Cardiology)  Ramone Bangura MD (Pulmonary Disease)  Melany Pittman MD (Cardiology)  Cyrus Cunningham MD (Cardiothoracic Surgery)    PCP: Anca Hicks MD    Cardiologist: Dr. Frank Grajeda    Diagnosis/Reason for Consultation: The primary encounter diagnosis was Nonrheumatic aortic valve stenosis. A diagnosis of S/P TAVR (transcatheter aortic valve replacement) was also pertinent to this visit. 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 y.o. male  S/P transcatheter aortic valve replacement with 26 S3 and stent to the RCA by Opal Rivera and Antonia on 7/21/21.  he  had General anesthesia. During the procedure and after deployment of the valve, the patient developed an occlusion of his RCA and had Cardiac Arrest requiring CPR and defibrillation. He received a 2nd stent to his RCA and was emergently intubated. Dominick Nicole was discharged to Home on 7/23/21.  he  is here today for his  One Month Post Op Appointment.  He completed his 1 month postoperative echo earlier this week at [facility] and this is scanned into media. Hasn't really felt much better since his procedure. He is able to use his riding  and still takes care of his animals and does housework but needs to take frequent breaks to rest while doing these. Does feel dizzy at times but no syncope of recent falls. No CP, orthopnea, PND. Some mild pedal/ankle edema which generally resolves overnight. He doesn't feel that he has made much progress since his procedure. He is scheduled to see Dr. Debo Sánchez on Monday. Last Dental Visit:  1 year ago   Any dental pain/concerns: None    NYHA Classification: IIIB   Class I (Mild): No limitation of physical activity. Ordinary physical activity does not cause undue fatigue, palpitation, or dyspnea. Class II (Mild): Slight limitation of physical activity. Comfortable at rest, but ordinary physical activity results in fatigue, palpitation, or dyspnea. Class III (Moderate): Marked limitation of physical activity. Comfortable at rest, but less than ordinary activity causes fatigue, palpitation, or dyspnea   Class IV (Severe): Unable to carry out any physical activity without discomfort. Symptoms  of cardiac insufficiency at rest.  If any physical activity is undertaken, discomfort is increased.     Angina Classification: 0   Class 0: No symptoms   Class 1: Angina with strenuous exercise   Class 2: Angina with moderate exercise   Class 3: Angina with mild exertion   Walking 1-2 level blocks at normal pace; Climbing 1 flight of stairs at normal pace  Class 4: Angina at any level of physical exertion       Past Medical History:   Diagnosis Date    Arrhythmia 11/11/2014    ED (erectile dysfunction) of organic origin     High cholesterol 3/31/2010    HTN (hypertension) 3/31/2010    Prediabetes 11/11/2014    Pulmonary hypertension ([facility] 8/8/2017    S/P ablation of atrial flutter 3/22/2016    S/P biventricular cardiac pacemaker procedure 4/40/3363    Systolic CHF, acute ([address] 3/22/2016         Past Surgical History:   Procedure Laterality Date    HX ORTHOPAEDIC      fx wrist    HX TONSILLECTOMY      CA INSJ/RPLCMT PERM DFB W/TRNSVNS LDS 1/DUAL CHMBR N/A 2021    INSERT ICD BIV MULTI performed by Kinsey Castillo MD at [facility] CARD[facility]AC CATH LAB      Social History     Tobacco Use    Smoking status: Former Smoker     Packs/day: 2.00     Years: 35.00     Pack years: 70.00     Types: Cigarettes     Start date: 1963     Quit date: 1995     Years since quittin.4    Smokeless tobacco: Never Used   Substance Use Topics    Alcohol use: Yes     Alcohol/week: 1.0 standard drinks     Types: 1 Glasses of wine per week     Comment: 1 drink a month      Family History   Problem Relation Age of Onset    Heart Disease Father     Hypertension Father     Hypertension Mother     Heart Disease Mother      Prior to Admission medications    Medication Sig Start Date End Date Taking? Authorizing Provider   rosuvastatin (CRESTOR) 20 mg tablet TAKE ONE TABLET BY MOUTH ONE TIME DAILY AT NIGHT 21  Yes Sam Mendoza MD   testosterone 12.5 mg/ 1.25 gram (1 %) glpm APPLY 4 PUMPS TO SHOULDERS AND UPPER ARMS ONCE DAILY IN MORNING 21  Yes Sam Mendoza MD   sodium bicarbonate 650 mg tablet TAKE ONE TABLET BY MOUTH TWICE A DAY 21  Yes Sam Mendoza MD   carvediloL (COREG) 12.5 mg tablet Take 0.5 Tablets by mouth two (2) times daily (with meals). TAKE ONE TABLET BY MOUTH TWICE A DAY  Patient taking differently: Take 12.5 mg by mouth two (2) times daily (with meals). TAKE ONE TABLET BY MOUTH TWICE A DAY 21  Yes Isaka Landis NP   clopidogreL (PLAVIX) 75 mg tab Take 1 Tablet by mouth daily for 180 days. 21 Yes Isaak Landis NP   torsemide (DEMADEX) 20 mg tablet Take 3 Tabs by mouth Before breakfast and dinner. Indications: Congestive Heart Failure (this replaces bumetanide or Bumex) 5/10/21  Yes Mendoza Guevara MD   Eliquis 5 mg tablet Take 5 mg by mouth two (2) times a day. 4/9/20  Yes Provider, Historical   Cholecalciferol, Vitamin D3, (VITAMIN D3) 1,000 unit Cap Take 1 Cap by mouth daily. Yes Provider, Historical   budesonide-formoteroL (SYMBICORT) 160-4.5 mcg/actuation HFAA Take 2 Puffs by inhalation. Patient not taking: Reported on 8/13/2021 4/9/21   Provider, Historical   acetaminophen (TYLENOL) 325 mg tablet Take 2 Tablets by mouth every four (4) hours as needed for Pain. Patient not taking: Reported on 9/9/2021 7/23/21   Joselin Bravo NP   pantoprazole (PROTONIX) 40 mg tablet Take 1 Tablet by mouth daily (with dinner). Patient not taking: Reported on 9/9/2021 7/23/21   Joselin Bravo NP   gabapentin (NEURONTIN) 300 mg capsule TAKE ONE CAPSULE BY MOUTH EVERY EVENING  Patient not taking: Reported on 9/9/2021 3/25/21   Maria L Herrera MD       No Known Allergies    Current Medications:   Current Outpatient Medications   Medication Sig Dispense Refill    rosuvastatin (CRESTOR) 20 mg tablet TAKE ONE TABLET BY MOUTH ONE TIME DAILY AT NIGHT 90 Tablet 3    testosterone 12.5 mg/ 1.25 gram (1 %) glpm APPLY 4 PUMPS TO SHOULDERS AND UPPER ARMS ONCE DAILY IN MORNING 225 g 1    sodium bicarbonate 650 mg tablet TAKE ONE TABLET BY MOUTH TWICE A  Tablet 1    carvediloL (COREG) 12.5 mg tablet Take 0.5 Tablets by mouth two (2) times daily (with meals). TAKE ONE TABLET BY MOUTH TWICE A DAY (Patient taking differently: Take 12.5 mg by mouth two (2) times daily (with meals). TAKE ONE TABLET BY MOUTH TWICE A DAY) 180 Tablet 3    clopidogreL (PLAVIX) 75 mg tab Take 1 Tablet by mouth daily for 180 days. 90 Tablet 1    torsemide (DEMADEX) 20 mg tablet Take 3 Tabs by mouth Before breakfast and dinner. Indications: Congestive Heart Failure (this replaces bumetanide or Bumex) 180 Tab 6    Eliquis 5 mg tablet Take 5 mg by mouth two (2) times a day.  Cholecalciferol, Vitamin D3, (VITAMIN D3) 1,000 unit Cap Take 1 Cap by mouth daily.       budesonide-formoteroL (SYMBICORT) 160-4.5 mcg/actuation HFAA Take 2 Puffs by inhalation. (Patient not taking: Reported on 2021)      acetaminophen (TYLENOL) 325 mg tablet Take 2 Tablets by mouth every four (4) hours as needed for Pain. (Patient not taking: Reported on 2021)      pantoprazole (PROTONIX) 40 mg tablet Take 1 Tablet by mouth daily (with dinner). (Patient not taking: Reported on 2021) 90 Tablet 1    gabapentin (NEURONTIN) 300 mg capsule TAKE ONE CAPSULE BY MOUTH EVERY EVENING (Patient not taking: Reported on 2021) 90 Cap 1       Vitals: Blood pressure 132/70, pulse 60, resp. rate 16, SpO2 98 %. Allergies: has No Known Allergies.     Review of Systems: Pertinent Positives per HPI   [x]Total of 13 systems reviewed as follows:  Constitutional: Negative fever, negative chills, +fatigue  Eyes:   Negative for amauroses fugax, +glasses  ENT:   Negative sore throat,oral abscess   Endocrine Negative for thyroid replacement Rx; goiter; DM  Respiratory:  Negative chronic cough,sputum production, +dyspnea  Cards:   Negative for palpitations, varicosities, claudication,+ lower extremity edema  GI:   Negative for dysphagia, bleeding, nausea, vomiting, diarrhea, and abdominal pain  Genitourinary: Negative for frequency, dysuria  Integument:  Negative for rash and pruritus  Hematologic:  Negative for easy bruising; bleeding dyscrasia   Musculoskel: Negative for muscle weakness inhibiting ambulation  Neurological:  Negative for stroke, TIA, syncope, dizziness  Behavl/Psych: Negative for feelings of anxiety, depression     Cardiovascular Testing:     EK2021  8:23 AM - [place], Card Result In    Component Value Ref Range & Units Status   Ventricular Rate 82  BPM Final   Atrial Rate 82  BPM Final   P-R Interval 220  ms Final   QRS Duration 122  ms Final   Q-T Interval 430  ms Final   QTC Calculation (Bezet) 502  ms Final   Calculated P Axis 91  degrees Final   Calculated R Axis 24  degrees Final   Calculated T Axis -103  degrees Final   Diagnosis   Final   Sinus rhythm with 1st degree AV block with Possible premature atrial   complexes with aberrant conduction   Nonspecific intraventricular conduction delay   T wave abnormality, consider lateral ischemia          TTE:  21 done at [facility] and scanned in  MARQUEZ: 1.4 cm2  M mmHg  P mmHg  PV: 1.7 m/s  LVEF 15-20%  Mild PVL; trivial AI  Moderate to severe MR    Physical Exam:  General: Well nourished well groomed male appearing stated age  Neuro: A&OX3. BEY. PERRL. Steady unassisted gait  Head:Normocephalic. Atraumatic. Symmetrical  Neck: Trachea Midline  Resp: CTA B. No Adv BS/cough/sputum/tachypnea with seated conversation  CV: S1S2 RRR. + hololsystolic murmur. No JVD/carotid bruits. Pink/warm/dry extremities. +1 BLE peripheral edema  GI:Benign ab. Soft. NT/ND. Active BS  : Voids  Integ: No obvious s/s of infection or breakdown  Musculo/Skeletal: FROM in all major joints. Fair muscle tone    Clinic Evaluation:   KCCQ-12: scanned into EMR      Assessment/Plan:     1. Aortic Stenosis LFLG: s/p TAVR with LTF 26 S3 +2ml and RCA protection. Echo as above.  Plavix, Eliquis.      2. HTN:  Coreg. He brings his BP readings and his systolic pressures are marginal with ranges of 90-110s. I was hoping to restart his Lisinopril for GDMT but he doesn't have much BP room. I will defer to Dr. Alexi Palacio at his appointment on Monday.     3. HLD: On Statin     4. Atrial Flutter s/p Ablation: Eliquis, coreg.     5. Cardiomyopathy with LVEF of 15%: Has Bi-V ICD. BB. Consider adding back his Lisinopril as an outpatient- I didn't do that today but Dr. Alexi Palacio may add that back at his appointment on Monday-will leave this up to him.     6. CKD stage 3: Creatinine of 1.4 on DC. Does not currently follow with a Nephrologist. Avoid hypotension and nephrotoxic agents     7. Thrombocytopenia: Platelets DC 41O on DC.   ASA stopped. Continue Plavix.   Has a referral to a Hematologist. Is scheduled to get an updated CBC in the next week.     8. Pre-diabetes: A1c of 5.9 on 2/24/21. Diet controlled.      9. Acute obstruction of RCA following TAVR deployment (anticipated high risk) s/p successful placement of 2 RAUDEL: On Plavix, Statin, BB. No ASA due to Eliquis     10. Ventricular fibrillation arrest during TAVR related to #9 s/p defibrillation X 1 and CPR X approximately 2 minutes. 11. MR-moderate to severe: Per 1 month Echo. The patient has his appointment with Dr. Carmen Stallworth on Monday and can discuss this more. SBE prophylax reviewed.     May begin Cardiac Rehab     F/U with primary cardiologist

## 2021-09-13 ENCOUNTER — APPOINTMENT (OUTPATIENT)
Dept: CARDIAC REHAB | Age: 76
End: 2021-09-13

## 2021-09-15 LAB
ALBUMIN SERPL-MCNC: 4.4 G/DL (ref 3.7–4.7)
ALBUMIN/GLOB SERPL: 1.8 {RATIO} (ref 1.2–2.2)
ALP SERPL-CCNC: 71 IU/L (ref 44–121)
ALT SERPL-CCNC: 22 IU/L (ref 0–44)
AST SERPL-CCNC: 26 IU/L (ref 0–40)
BASOPHILS # BLD AUTO: 0 X10E3/UL (ref 0–0.2)
BASOPHILS NFR BLD AUTO: 1 %
BILIRUB SERPL-MCNC: 0.9 MG/DL (ref 0–1.2)
BUN SERPL-MCNC: 18 MG/DL (ref 8–27)
BUN/CREAT SERPL: 13 (ref 10–24)
CALCIUM SERPL-MCNC: 9.4 MG/DL (ref 8.6–10.2)
CHLORIDE SERPL-SCNC: 101 MMOL/L (ref 96–106)
CHOLEST SERPL-MCNC: 122 MG/DL (ref 100–199)
CO2 SERPL-SCNC: 25 MMOL/L (ref 20–29)
CREAT SERPL-MCNC: 1.37 MG/DL (ref 0.76–1.27)
EOSINOPHIL # BLD AUTO: 0.2 X10E3/UL (ref 0–0.4)
EOSINOPHIL NFR BLD AUTO: 2 %
ERYTHROCYTE [DISTWIDTH] IN BLOOD BY AUTOMATED COUNT: 14.1 % (ref 11.6–15.4)
GLOBULIN SER CALC-MCNC: 2.4 G/DL (ref 1.5–4.5)
GLUCOSE SERPL-MCNC: 123 MG/DL (ref 65–99)
HCT VFR BLD AUTO: 38.5 % (ref 37.5–51)
HDLC SERPL-MCNC: 36 MG/DL
HGB BLD-MCNC: 12.5 G/DL (ref 13–17.7)
IMM GRANULOCYTES # BLD AUTO: 0 X10E3/UL (ref 0–0.1)
IMM GRANULOCYTES NFR BLD AUTO: 0 %
INTERPRETATION: NORMAL
LDLC SERPL CALC-MCNC: 68 MG/DL (ref 0–99)
LYMPHOCYTES # BLD AUTO: 1.2 X10E3/UL (ref 0.7–3.1)
LYMPHOCYTES NFR BLD AUTO: 14 %
MCH RBC QN AUTO: 31.2 PG (ref 26.6–33)
MCHC RBC AUTO-ENTMCNC: 32.5 G/DL (ref 31.5–35.7)
MCV RBC AUTO: 96 FL (ref 79–97)
MONOCYTES # BLD AUTO: 0.8 X10E3/UL (ref 0.1–0.9)
MONOCYTES NFR BLD AUTO: 9 %
MORPHOLOGY BLD-IMP: ABNORMAL
NEUTROPHILS # BLD AUTO: 6.5 X10E3/UL (ref 1.4–7)
NEUTROPHILS NFR BLD AUTO: 74 %
PLATELET # BLD AUTO: 80 X10E3/UL (ref 150–450)
POTASSIUM SERPL-SCNC: 4.2 MMOL/L (ref 3.5–5.2)
PROT SERPL-MCNC: 6.8 G/DL (ref 6–8.5)
RBC # BLD AUTO: 4.01 X10E6/UL (ref 4.14–5.8)
SODIUM SERPL-SCNC: 142 MMOL/L (ref 134–144)
TESTOST SERPL-MCNC: 231 NG/DL (ref 264–916)
TRIGL SERPL-MCNC: 93 MG/DL (ref 0–149)
VLDLC SERPL CALC-MCNC: 18 MG/DL (ref 5–40)
WBC # BLD AUTO: 8.8 X10E3/UL (ref 3.4–10.8)

## 2021-09-24 DIAGNOSIS — G60.9 IDIOPATHIC PERIPHERAL NEUROPATHY: ICD-10-CM

## 2021-09-27 RX ORDER — GABAPENTIN 300 MG/1
CAPSULE ORAL
Qty: 90 CAPSULE | Refills: 1 | Status: SHIPPED | OUTPATIENT
Start: 2021-09-27 | End: 2022-09-23

## 2021-09-29 ENCOUNTER — TELEPHONE (OUTPATIENT)
Dept: CARDIAC REHAB | Age: 76
End: 2021-09-29

## 2021-09-29 NOTE — TELEPHONE ENCOUNTER
9/29/2021 Cardiac Rehab: Called Mr. Chiqui Mccracken to ask about his participation in the Cardiac Rehab Program. Left voicemail message. Jass Le     9/2/201 CARDIAC REHAB:  Mr. Michael Michael called to cancel his intake appointment for 9/13 due to his doctor scheduling an appointment for that day. He will reschedule after that appointment.       Electronically signed by Yovany Bailon RN at 09/02/21 [id number]    8/19/2021 CARDIAC REHAB:  8/19/2021 Cardiac Wellness: Mr. Chiqui Mccracken called to cancel switch his intake date from 8/25/21 @230pm, to Monday 9/13/21 @1200pm. Jass Le      Electronically signed by Jac Inman at 08/19/21 [id number]

## 2021-10-29 DIAGNOSIS — E87.29 HYPERCHLOREMIC METABOLIC ACIDOSIS: ICD-10-CM

## 2021-10-29 RX ORDER — SODIUM BICARBONATE 650 MG/1
TABLET ORAL
Qty: 180 TABLET | Refills: 0 | Status: SHIPPED | OUTPATIENT
Start: 2021-10-29 | End: 2022-01-05

## 2021-11-02 ENCOUNTER — TRANSCRIBE ORDER (OUTPATIENT)
Dept: SCHEDULING | Age: 76
End: 2021-11-02

## 2021-11-02 DIAGNOSIS — R91.1 INCIDENTAL LUNG NODULE: Primary | ICD-10-CM

## 2021-11-23 ENCOUNTER — VIRTUAL VISIT (OUTPATIENT)
Dept: FAMILY MEDICINE CLINIC | Age: 76
End: 2021-11-23
Payer: MEDICARE

## 2021-11-23 DIAGNOSIS — I10 ESSENTIAL HYPERTENSION: Primary | ICD-10-CM

## 2021-11-23 DIAGNOSIS — Z95.2 S/P TAVR (TRANSCATHETER AORTIC VALVE REPLACEMENT): ICD-10-CM

## 2021-11-23 DIAGNOSIS — E04.1 THYROID CYST: ICD-10-CM

## 2021-11-23 DIAGNOSIS — I50.22 CHF NYHA CLASS I (NO SYMPTOMS FROM ORDINARY ACTIVITIES), CHRONIC, SYSTOLIC (HCC): ICD-10-CM

## 2021-11-23 DIAGNOSIS — Z95.5 S/P DRUG ELUTING CORONARY STENT PLACEMENT: ICD-10-CM

## 2021-11-23 DIAGNOSIS — N18.31 STAGE 3A CHRONIC KIDNEY DISEASE (HCC): ICD-10-CM

## 2021-11-23 DIAGNOSIS — Z79.899 ENCOUNTER FOR LONG-TERM (CURRENT) USE OF MEDICATIONS: ICD-10-CM

## 2021-11-23 DIAGNOSIS — D69.6 THROMBOCYTOPENIA (HCC): ICD-10-CM

## 2021-11-23 PROCEDURE — G8432 DEP SCR NOT DOC, RNG: HCPCS | Performed by: FAMILY MEDICINE

## 2021-11-23 PROCEDURE — 1101F PT FALLS ASSESS-DOCD LE1/YR: CPT | Performed by: FAMILY MEDICINE

## 2021-11-23 PROCEDURE — G0463 HOSPITAL OUTPT CLINIC VISIT: HCPCS | Performed by: FAMILY MEDICINE

## 2021-11-23 PROCEDURE — 3017F COLORECTAL CA SCREEN DOC REV: CPT | Performed by: FAMILY MEDICINE

## 2021-11-23 PROCEDURE — G8756 NO BP MEASURE DOC: HCPCS | Performed by: FAMILY MEDICINE

## 2021-11-23 PROCEDURE — 99213 OFFICE O/P EST LOW 20 MIN: CPT | Performed by: FAMILY MEDICINE

## 2021-11-23 PROCEDURE — G8427 DOCREV CUR MEDS BY ELIG CLIN: HCPCS | Performed by: FAMILY MEDICINE

## 2021-11-23 RX ORDER — LISINOPRIL 5 MG/1
2.5 TABLET ORAL 2 TIMES DAILY
COMMUNITY
Start: 2021-11-17

## 2021-11-23 RX ORDER — TIOTROPIUM BROMIDE INHALATION SPRAY 1.56 UG/1
SPRAY, METERED RESPIRATORY (INHALATION)
COMMUNITY
Start: 2021-11-11

## 2021-11-23 NOTE — PROGRESS NOTES
Chief Complaint   Patient presents with    Follow-up     3 mo check       1. Have you been to the ER, urgent care clinic since your last visit? Hospitalized since your last visit? No     2. Have you seen or consulted any other health care providers outside of the [address] [place] [address] since your last visit? Include any pap smears or colon screening.  Yes

## 2021-11-23 NOTE — PROGRESS NOTES
Jena Ayers (: 1945) is a 77 y.o. male, established patient, here for evaluation of the following chief complaint(s):   Follow-up (3 mo check)       ASSESSMENT/PLAN:  Improving overall. Dyspnea getting better back on Lisinopril and with pacemaker back on. Also with new inhaler for pulm htn. Discussed with patient findings of US thyroid - purely cystic nodules do not require FNA so will plan to repeat US in 12 months (2022) to ensure stability in size. HTN controlled. Stable with TAVR, RAUDEL, and CHF and has Cardiology f/u. CKD stage 3 stable. Below is the assessment and plan developed based on review of pertinent history, labs, studies, and medications. 1. Essential hypertension  -     METABOLIC PANEL, BASIC; Future  2. S/P TAVR (transcatheter aortic valve replacement)  3. S/P drug eluting coronary stent placement  4. CHF NYHA class I (no symptoms from ordinary activities), chronic, systolic (HCC)  5. Stage 3a chronic kidney disease (HCC)  -     METABOLIC PANEL, BASIC; Future  -     CBC W/O DIFF; Future  -     TSH 3RD GENERATION; Future  6. Thyroid cyst  -     TSH 3RD GENERATION; Future  7. Thrombocytopenia (HCC)  -     CBC W/O DIFF; Future  8. Encounter for long-term (current) use of medications  -     METABOLIC PANEL, BASIC; Future  -     CBC W/O DIFF; Future  -     TSH 3RD GENERATION; Future      Return in about 3 months (around 2022), or if symptoms worsen or fail to improve. SUBJECTIVE/OBJECTIVE:  Doing well since last appt. Saw CT surgery and cardiology. Had TAVR on 2021 and stenting of RCA with Dr. Burnard Dakin and Dr. Juju Rogers. He ended up having an occlusion of his RCA and cardiac arrest requiring CPR and defibrillation. Had a second stent to his RCA and was intubated. Did very well after this and was able to be discharged on 2021. Sx started to improve but developed some dyspnea that was similar to his symptoms prior to hospitalization.  EF on echo dropped slightly to 15-20%. Reviewed notes from Dr. Refugio Rodriguez and restarted Lisinopril. Also discussed with Dr. Nellie Méndez about turning CRT back on. Today, he reports feeling some improvement. Able to walk 6 blocks without too much dyspnea. Also given new inhaler by Dr. Winthrop Cabot. Sent for ultrasound of thyroid given concerns for nodule incidentally noted on CT. Thyroid US showed:  \"FINDINGS:  There is a septated cyst in the left thyroid lobe lower pole measuring 3.1 x 2.8 x 2.4 cm. The thyroid gland is otherwise uniform in echotexture, with no nodules.     Right lobe measures approximately 5.9 x 1.9 x 1.3 cm. Left lobe measures approximately 4.5 x 1.9 x 1.9 cm.     There is no significant anterior cervical adenopathy.      IMPRESSION  Left thyroid septated cyst. No thyroid nodules. \"    Also reviewed thrombocytopenia which has been a new issue in 2021. Most recent platelets stable at 80. Sent to Hematology for further eval and no sig concerns. Ongoing CKD stage 3a stable with last GFR 50. ROS  Gen - no fever/chills  Resp - no dyspnea or cough  CV - no chest pain or FERRER  Rest per HPI    Patient-Reported Systolic (Top): 96  Patient-Reported Diastolic (Bottom): 66  Patient-Reported Weight: 192.4lb       Physical exam:  General appearance - alert, well appearing, and in no distress  Eyes -sclera anicteric, no discharge  HEENT normocephalic, atraumatic, moist mucous membranes, no visualized neck mass  Chest -normal respiratory effort, no visualized signs of respiratory distress  Neurological - alert, awake, normal speech, no focal findings or movement disorder noted  Psych - normal mood and affect  Skin no apparent lesions    On this date 11/23/2021 I have spent 20 minutes reviewing previous notes, test results and face to face (virtual) with the patient discussing the diagnosis and importance of compliance with the treatment plan as well as documenting on the day of the visit.     Malgorzata Pabon, was evaluated through a synchronous (real-time) audio-video encounter. The patient (or guardian if applicable) is aware that this is a billable service. Verbal consent to proceed has been obtained within the past 12 months. The visit was conducted pursuant to the emergency declaration under the [address] [place] [address], [address] [place] [address] authority and the [employer] General Act. Patient identification was verified, and a caregiver was present when appropriate. The patient was located in a state where the provider was credentialed to provide care. An electronic signature was used to authenticate this note.   -- Rodrigo Waldrop MD

## 2021-12-02 DIAGNOSIS — E29.1 HYPOGONADISM IN MALE: ICD-10-CM

## 2021-12-02 DIAGNOSIS — N52.9 ED (ERECTILE DYSFUNCTION) OF ORGANIC ORIGIN: ICD-10-CM

## 2021-12-02 RX ORDER — TESTOSTERONE 10 MG/G
GEL TOPICAL
Qty: 225 G | Refills: 1 | Status: SHIPPED | OUTPATIENT
Start: 2021-12-02 | End: 2022-03-19

## 2021-12-22 LAB
BASOPHILS # BLD AUTO: 0 X10E3/UL (ref 0–0.2)
BASOPHILS NFR BLD AUTO: 0 %
BUN SERPL-MCNC: 26 MG/DL (ref 8–27)
BUN/CREAT SERPL: 16 (ref 10–24)
CALCIUM SERPL-MCNC: 9.8 MG/DL (ref 8.6–10.2)
CHLORIDE SERPL-SCNC: 97 MMOL/L (ref 96–106)
CO2 SERPL-SCNC: 27 MMOL/L (ref 20–29)
CREAT SERPL-MCNC: 1.65 MG/DL (ref 0.76–1.27)
EOSINOPHIL # BLD AUTO: 0.1 X10E3/UL (ref 0–0.4)
EOSINOPHIL NFR BLD AUTO: 1 %
ERYTHROCYTE [DISTWIDTH] IN BLOOD BY AUTOMATED COUNT: 15.5 % (ref 11.6–15.4)
GLUCOSE SERPL-MCNC: 126 MG/DL (ref 65–99)
HCT VFR BLD AUTO: 39 % (ref 37.5–51)
HGB BLD-MCNC: 12.6 G/DL (ref 13–17.7)
IMM GRANULOCYTES # BLD AUTO: 0 X10E3/UL (ref 0–0.1)
IMM GRANULOCYTES NFR BLD AUTO: 0 %
INTERPRETATION: NORMAL
LYMPHOCYTES # BLD AUTO: 0.9 X10E3/UL (ref 0.7–3.1)
LYMPHOCYTES NFR BLD AUTO: 12 %
MCH RBC QN AUTO: 29.4 PG (ref 26.6–33)
MCHC RBC AUTO-ENTMCNC: 32.3 G/DL (ref 31.5–35.7)
MCV RBC AUTO: 91 FL (ref 79–97)
MONOCYTES # BLD AUTO: 0.8 X10E3/UL (ref 0.1–0.9)
MONOCYTES NFR BLD AUTO: 10 %
MORPHOLOGY BLD-IMP: ABNORMAL
NEUTROPHILS # BLD AUTO: 5.9 X10E3/UL (ref 1.4–7)
NEUTROPHILS NFR BLD AUTO: 77 %
PLATELET # BLD AUTO: 105 X10E3/UL (ref 150–450)
POTASSIUM SERPL-SCNC: 4.3 MMOL/L (ref 3.5–5.2)
RBC # BLD AUTO: 4.28 X10E6/UL (ref 4.14–5.8)
SODIUM SERPL-SCNC: 140 MMOL/L (ref 134–144)
TSH SERPL DL<=0.005 MIU/L-ACNC: 1.64 UIU/ML (ref 0.45–4.5)
WBC # BLD AUTO: 7.7 X10E3/UL (ref 3.4–10.8)

## 2021-12-27 ENCOUNTER — HOSPITAL ENCOUNTER (OUTPATIENT)
Dept: NON INVASIVE DIAGNOSTICS | Age: 76
Discharge: HOME | End: 2021-12-27
Payer: MEDICARE

## 2021-12-27 VITALS
BODY MASS INDEX: 26.41 KG/M2 | WEIGHT: 195 LBS | HEART RATE: 68 BPM | OXYGEN SATURATION: 99 % | RESPIRATION RATE: 18 BRPM | DIASTOLIC BLOOD PRESSURE: 86 MMHG | HEIGHT: 72 IN | SYSTOLIC BLOOD PRESSURE: 110 MMHG

## 2021-12-27 DIAGNOSIS — I48.91 ATRIAL FIBRILLATION, UNSPECIFIED TYPE (HCC): ICD-10-CM

## 2021-12-27 LAB
ATRIAL RATE: 102 BPM
ATRIAL RATE: 66 BPM
CALCULATED P AXIS, ECG09: 48 DEGREES
CALCULATED R AXIS, ECG10: -151 DEGREES
CALCULATED R AXIS, ECG10: 77 DEGREES
CALCULATED T AXIS, ECG11: -89 DEGREES
CALCULATED T AXIS, ECG11: 19 DEGREES
DIAGNOSIS, 93000: NORMAL
DIAGNOSIS, 93000: NORMAL
P-R INTERVAL, ECG05: 194 MS
Q-T INTERVAL, ECG07: 344 MS
Q-T INTERVAL, ECG07: 548 MS
QRS DURATION, ECG06: 122 MS
QRS DURATION, ECG06: 186 MS
QTC CALCULATION (BEZET), ECG08: 434 MS
QTC CALCULATION (BEZET), ECG08: 574 MS
VENTRICULAR RATE, ECG03: 66 BPM
VENTRICULAR RATE, ECG03: 96 BPM

## 2021-12-27 PROCEDURE — 99152 MOD SED SAME PHYS/QHP 5/>YRS: CPT

## 2021-12-27 PROCEDURE — 92960 CARDIOVERSION ELECTRIC EXT: CPT

## 2021-12-27 PROCEDURE — 74011250636 HC RX REV CODE- 250/636: Performed by: INTERNAL MEDICINE

## 2021-12-27 PROCEDURE — 77030018729 HC ELECTRD DEFIB PAD CARD -B

## 2021-12-27 PROCEDURE — 93005 ELECTROCARDIOGRAM TRACING: CPT

## 2021-12-27 RX ORDER — MIDAZOLAM HYDROCHLORIDE 1 MG/ML
.5-2 INJECTION, SOLUTION INTRAMUSCULAR; INTRAVENOUS
Status: DISCONTINUED | OUTPATIENT
Start: 2021-12-27 | End: 2021-12-27

## 2021-12-27 RX ORDER — FENTANYL CITRATE 50 UG/ML
12.5-5 INJECTION, SOLUTION INTRAMUSCULAR; INTRAVENOUS
Status: DISCONTINUED | OUTPATIENT
Start: 2021-12-27 | End: 2021-12-27

## 2021-12-27 RX ADMIN — MIDAZOLAM HYDROCHLORIDE 2 MG: 1 INJECTION, SOLUTION INTRAMUSCULAR; INTRAVENOUS at 12:52

## 2021-12-27 RX ADMIN — FENTANYL CITRATE 50 MCG: 50 INJECTION, SOLUTION INTRAMUSCULAR; INTRAVENOUS at 12:52

## 2021-12-27 NOTE — H&P
Pending sale to [facility]  HISTORY AND PHYSICAL    Name:  Zachary Cerna  MR#:  [id number]  :  1945  ACCOUNT #:  [de-identified]  ADMIT DATE:  2021     CHIEF COMPLAINT:  Persistent atrial fibrillation. HISTORY OF PRESENT ILLNESS:  This is a 58-year-old male here for his procedure. Please see office note for PMH, allergies, FH/SH, ROS (unchanged). PE:  Stable vital signs. IMPRESSION:  As above, given the potential benefits, risks, and alternatives, he agrees to proceed with cardioversion.         MD LALITHA Rosenberg/[email or web address]  D:  2021 12:54  T:  2021 13:23  JOB #:  [id number]

## 2021-12-27 NOTE — DISCHARGE INSTRUCTIONS
DISCHARGE SUMMARY     The following personal items collected during your admission are returned to you:   Dental Appliance:    Vision:    Hearing Aid:    Jewelry:    Clothing:        PATIENT INSTRUCTIONS: Continue taking all the same medications. The cardioversion procedure can cause redness to the skin where the patches were placed. You may treat this with Aloe or Cortisone cream over the counter lotion. If the skin appears very reddened or blistered contact your physician      Call to make an appointment with Dr. Nikita Craven in 3 months. What to do at Home:  Recommended activity: No driving today    The discharge information has been reviewed with the PATIENT . The PATIENT  verbalized understanding.

## 2021-12-27 NOTE — PROGRESS NOTES
Discharge instructions reviewed with patient. Allowed adequate time to ask questions, all questions answered. Printed copy of AVS given to patient. All belongings gathered, IV and tele discontinued. Transported via wheelchair to main entrance and into care of family.

## 2021-12-27 NOTE — PROGRESS NOTES
Pt sedated with 2mg Versed and 50mcg Fentanyl for Cardioversion, given 1 synchronized shock(s) at 300 Joules, VPaced w/ AFib converted to VPaced with NSR. (monitored sedation from 1251 to 1302)

## 2021-12-27 NOTE — PROGRESS NOTES
Patient arrived to Non-Invasive Cardiology Lab for Out Patient [facility] Procedure. Staff introduced to patient. Patient identifiers verified with Name and Date of Birth. Procedure verified with patient. Consent forms reviewed and signed by patient or authorized representative and verified. Allergies verified. Patient informed of procedure and plan of care. Questions answered with review. Patient on cardiac monitor, non-invasive blood pressure, SPO2 monitor. On 2L NC O2. Patient is A&Ox3. Patient reports no complaints. Patient on stretcher, in low position, with side rails up. Patient instructed to call for assistance as needed. Friend waiting at home will return for .

## 2022-01-05 DIAGNOSIS — E87.29 HYPERCHLOREMIC METABOLIC ACIDOSIS: ICD-10-CM

## 2022-01-05 RX ORDER — SODIUM BICARBONATE 650 MG/1
TABLET ORAL
Qty: 180 TABLET | Refills: 0 | Status: SHIPPED | OUTPATIENT
Start: 2022-01-05 | End: 2022-08-11

## 2022-01-20 ENCOUNTER — HOSPITAL ENCOUNTER (OUTPATIENT)
Dept: CT IMAGING | Age: 77
Discharge: HOME | End: 2022-01-20
Attending: INTERNAL MEDICINE
Payer: MEDICARE

## 2022-01-20 DIAGNOSIS — R91.1 INCIDENTAL LUNG NODULE: ICD-10-CM

## 2022-01-20 PROCEDURE — 71250 CT THORAX DX C-: CPT

## 2022-02-09 ENCOUNTER — TRANSCRIBE ORDER (OUTPATIENT)
Dept: SCHEDULING | Age: 77
End: 2022-02-09

## 2022-02-09 DIAGNOSIS — R91.1 INCIDENTAL LUNG NODULE: Primary | ICD-10-CM

## 2022-02-24 ENCOUNTER — OFFICE VISIT (OUTPATIENT)
Dept: FAMILY MEDICINE CLINIC | Age: 77
End: 2022-02-24
Payer: MEDICARE

## 2022-02-24 VITALS
RESPIRATION RATE: 16 BRPM | DIASTOLIC BLOOD PRESSURE: 61 MMHG | HEART RATE: 62 BPM | WEIGHT: 208.8 LBS | TEMPERATURE: 97 F | SYSTOLIC BLOOD PRESSURE: 110 MMHG | BODY MASS INDEX: 28.28 KG/M2 | OXYGEN SATURATION: 98 % | HEIGHT: 72 IN

## 2022-02-24 DIAGNOSIS — I10 ESSENTIAL HYPERTENSION: ICD-10-CM

## 2022-02-24 DIAGNOSIS — I27.20 PULMONARY HYPERTENSION (HCC): ICD-10-CM

## 2022-02-24 DIAGNOSIS — I48.91 ATRIAL FIBRILLATION, UNSPECIFIED TYPE (HCC): ICD-10-CM

## 2022-02-24 DIAGNOSIS — I50.22 CHF NYHA CLASS I (NO SYMPTOMS FROM ORDINARY ACTIVITIES), CHRONIC, SYSTOLIC (HCC): ICD-10-CM

## 2022-02-24 DIAGNOSIS — Z95.5 S/P DRUG ELUTING CORONARY STENT PLACEMENT: ICD-10-CM

## 2022-02-24 DIAGNOSIS — Z79.899 ENCOUNTER FOR LONG-TERM (CURRENT) USE OF MEDICATIONS: ICD-10-CM

## 2022-02-24 DIAGNOSIS — Z95.2 S/P TAVR (TRANSCATHETER AORTIC VALVE REPLACEMENT): ICD-10-CM

## 2022-02-24 DIAGNOSIS — R35.1 NOCTURIA: ICD-10-CM

## 2022-02-24 DIAGNOSIS — Z98.890 S/P ABLATION OF ATRIAL FLUTTER: ICD-10-CM

## 2022-02-24 DIAGNOSIS — Z86.79 S/P ABLATION OF ATRIAL FLUTTER: ICD-10-CM

## 2022-02-24 DIAGNOSIS — D69.6 THROMBOCYTOPENIA (HCC): ICD-10-CM

## 2022-02-24 DIAGNOSIS — N18.31 STAGE 3A CHRONIC KIDNEY DISEASE (HCC): Primary | ICD-10-CM

## 2022-02-24 PROCEDURE — G8752 SYS BP LESS 140: HCPCS | Performed by: FAMILY MEDICINE

## 2022-02-24 PROCEDURE — G8536 NO DOC ELDER MAL SCRN: HCPCS | Performed by: FAMILY MEDICINE

## 2022-02-24 PROCEDURE — G0463 HOSPITAL OUTPT CLINIC VISIT: HCPCS | Performed by: FAMILY MEDICINE

## 2022-02-24 PROCEDURE — G8432 DEP SCR NOT DOC, RNG: HCPCS | Performed by: FAMILY MEDICINE

## 2022-02-24 PROCEDURE — 1101F PT FALLS ASSESS-DOCD LE1/YR: CPT | Performed by: FAMILY MEDICINE

## 2022-02-24 PROCEDURE — 3017F COLORECTAL CA SCREEN DOC REV: CPT | Performed by: FAMILY MEDICINE

## 2022-02-24 PROCEDURE — G8754 DIAS BP LESS 90: HCPCS | Performed by: FAMILY MEDICINE

## 2022-02-24 PROCEDURE — 99214 OFFICE O/P EST MOD 30 MIN: CPT | Performed by: FAMILY MEDICINE

## 2022-02-24 PROCEDURE — G8419 CALC BMI OUT NRM PARAM NOF/U: HCPCS | Performed by: FAMILY MEDICINE

## 2022-02-24 PROCEDURE — G8427 DOCREV CUR MEDS BY ELIG CLIN: HCPCS | Performed by: FAMILY MEDICINE

## 2022-02-24 NOTE — PROGRESS NOTES
Marina Devi (: 1945) is a 77 y.o. male, established patient, here for evaluation of the following chief complaint(s):  No chief complaint on file. ASSESSMENT/PLAN: Stable overall. Getting US retroperitoneum given ongoing CKD stage IIIa. CHF doing well. s/p TAVR, history of stenting, following with cardiology regularly. Seeing pulmonology for pulmonary hypertension and incidental lung nodules as well. Rechecking labs. Below is the assessment and plan developed based on review of pertinent history, physical exam, labs, studies, and medications. 1. Stage 3a chronic kidney disease (HCC)  -     US RETROPERITONEUM COMP; Future  -     METABOLIC PANEL, BASIC; Future  2. CHF NYHA class I (no symptoms from ordinary activities), chronic, systolic (HCC)  -     METABOLIC PANEL, BASIC; Future  3. Atrial fibrillation, unspecified type ([address]  4. Thrombocytopenia ([address]  5. Essential hypertension  6. S/P TAVR (transcatheter aortic valve replacement)  7. S/P drug eluting coronary stent placement  8. S/P ablation of atrial flutter  9. Pulmonary hypertension ([address]  10. Nocturia  -     PSA, DIAGNOSTIC (PROSTATE SPECIFIC AG); Future  11. Encounter for long-term (current) use of medications  -     PSA, DIAGNOSTIC (PROSTATE SPECIFIC AG); Future  -     US RETROPERITONEUM COMP; Future  -     METABOLIC PANEL, BASIC; Future      Return in about 3 months (around 2022). SUBJECTIVE/OBJECTIVE:    Saw Dr. Garner Spurling recently-  Taken off Plavix and cut down on Torsemide. No more short of breath. Has gained some jere    Saw Dr. Ifeanyi Carter recently- stable. Following with CTs. Does endorse some mild nocturia. Reviewed previous history:  Had TAVR on 2021 and stenting of RCA with Dr. Garner Spurling and Dr. Yue Weston. He ended up having an occlusion of his RCA and cardiac arrest requiring CPR and defibrillation. Had a second stent to his RCA and was intubated.   Did very well after this and was able to be discharged on 7/23/2021. Sx started to improve but developed some dyspnea that was similar to his symptoms prior to hospitalization. EF on echo dropped slightly to 15-20%. Reviewed notes from Dr. Raúl Wang. Working with Dr. Renetta Naik and had CRT turned back on. Using inhaler from Dr. Simi Miner. ROS  Gen - no fever/chills  Resp - no dyspnea or cough  CV - no chest pain or FERRER  Rest per HPI    Blood pressure 110/61, pulse 62, temperature 97 °F (36.1 °C), temperature source Temporal, resp. rate 16, height 6' (1.829 m), weight 208 lb 12.8 oz (94.7 kg), SpO2 98 %. Physical Exam  General appearance - alert, well appearing, and in no distress  Eyes -sclera anicteric  Neck - supple, no significant adenopathy, no thyromegaly  Chest - clear to auscultation, no wheezes, rales or rhonchi, symmetric air entry  Heart - normal rate, regular rhythm, normal S1, S2, no murmurs, rubs, clicks or gallops, + device  Neurological - alert, oriented, normal speech, no focal findings or movement disorder noted  Extr - no edema  Psych - normal mood and affect    On this date 02/24/2022 I have spent 30 minutes reviewing previous notes, test results and face to face with the patient discussing the diagnosis and importance of compliance with the treatment plan as well as documenting on the day of the visit. An electronic signature was used to authenticate this note.   -- Amadeo Humphrey MD

## 2022-02-25 LAB
BUN SERPL-MCNC: 24 MG/DL (ref 8–27)
BUN/CREAT SERPL: 17 (ref 10–24)
CALCIUM SERPL-MCNC: 9.8 MG/DL (ref 8.6–10.2)
CHLORIDE SERPL-SCNC: 97 MMOL/L (ref 96–106)
CO2 SERPL-SCNC: 27 MMOL/L (ref 20–29)
CREAT SERPL-MCNC: 1.41 MG/DL (ref 0.76–1.27)
GLUCOSE SERPL-MCNC: 112 MG/DL (ref 65–99)
INTERPRETATION: NORMAL
POTASSIUM SERPL-SCNC: 4.1 MMOL/L (ref 3.5–5.2)
PSA SERPL-MCNC: 0.6 NG/ML (ref 0–4)
SODIUM SERPL-SCNC: 141 MMOL/L (ref 134–144)

## 2022-03-18 DIAGNOSIS — N52.9 ED (ERECTILE DYSFUNCTION) OF ORGANIC ORIGIN: ICD-10-CM

## 2022-03-18 DIAGNOSIS — E29.1 HYPOGONADISM IN MALE: ICD-10-CM

## 2022-03-18 PROBLEM — I27.20 PULMONARY HYPERTENSION (HCC): Status: ACTIVE | Noted: 2017-08-08

## 2022-03-18 PROBLEM — Z95.0 S/P BIVENTRICULAR CARDIAC PACEMAKER PROCEDURE: Status: ACTIVE | Noted: 2021-01-26

## 2022-03-19 PROBLEM — I35.0 AORTIC STENOSIS: Status: ACTIVE | Noted: 2021-07-21

## 2022-03-19 PROBLEM — Z95.2 S/P TAVR (TRANSCATHETER AORTIC VALVE REPLACEMENT): Status: ACTIVE | Noted: 2021-07-26

## 2022-03-19 PROBLEM — I35.0 NONRHEUMATIC AORTIC VALVE STENOSIS: Status: ACTIVE | Noted: 2021-07-08

## 2022-03-19 RX ORDER — TESTOSTERONE 10 MG/G
GEL TOPICAL
Qty: 225 G | Refills: 1 | Status: SHIPPED | OUTPATIENT
Start: 2022-03-19 | End: 2022-07-22

## 2022-04-14 ENCOUNTER — PATIENT OUTREACH (OUTPATIENT)
Dept: CASE MANAGEMENT | Age: 77
End: 2022-04-14

## 2022-04-14 NOTE — PROGRESS NOTES
04/14/22   ACM attempted to follow up with patient for CCM assessment. Attempts to reach patient were unsuccessful. On final call a VM was left for patient with the following information: ACM contact information and reason for call. Will follow up again in 7 days.

## 2022-04-21 ENCOUNTER — PATIENT OUTREACH (OUTPATIENT)
Dept: CASE MANAGEMENT | Age: 77
End: 2022-04-21

## 2022-04-21 NOTE — PROGRESS NOTES
Ambulatory Care Management Note    Date/Time:  4/21/2022 9:39 AM    This patient was received as a referral from Daily assignment. Ambulatory Care Manager outreached to patient today to offer care management services. Introduction to self and role of care manager provided. Patient accepted care management services at this time. Follow up call scheduled at this time. Patient has Ambulatory Care Manager's contact number for for any questions or concerns.

## 2022-04-29 ENCOUNTER — PATIENT OUTREACH (OUTPATIENT)
Dept: CASE MANAGEMENT | Age: 77
End: 2022-04-29

## 2022-04-29 NOTE — PROGRESS NOTES
Ambulatory Care Management Note    Date/Time:  2022 4:08 PM    This Ambulatory Care Manager (ACM) reviewed and updated the following screenings during this call; general assessment, disease specific assessment , self management assessment, SDOH assessments, ACP assessment and note and medication reconciliation     Patient's challenges to self-management identified:   depression, financial, functional cognitive ability, functional physical ability, ineffective coping, lack of knowledge about disease, level of motivation, medical condition, medication management, multi health system providers, PCP relationship, polypharmacy, support system, transportation and utilization of services      Medication Management:  good adherence and good understanding    Advance Care Planning:   Does patient have an Advance Directive:  yes; reviewed and current     [employer], Referrals, and Durable Medical Equipment:     Heart Failure Education outreach Date/Time: 22 03:52 PM    Ambulatory Care Manager (ACM) contacted the patient by telephone to perform Ambulatory Care Coordination. Verified name and  with patient as identifiers. Provided introduction to self, and explanation of the Ambulatory Care Manager's role. ACM reviewed that a Health Healthy tips for the Holiday packet has been mailed to the them. ACM reviewed CHF zones, daily weights, fluid restriction, the importance of low sodium diet with the patient. Instructed patient to call their PCP if they have a weight gain of 3 lbs in 2 days or 5 lbs in a week. Patient reminded that there is a physician on call 24 hours a day / 7 days a week should the patient have questions or concerns. The patient verbalized understanding.   Health Maintenance Due   Topic Date Due    Shingrix Vaccine Age 49> (3 of 2) Never done    AAA Screening 73-69 YO Male Smoking Patients  Never done    Colorectal Cancer Screening Combo  10/24/2020    COVID-19 Vaccine (3 - Booster for VONTRAVEL series) 09/17/2021    Medicare Yearly Exam  11/05/2021     Health Maintenance Reviewed: yes    Patient was asked to consider health care goals that they would like to focus on with this ACM. ACM will follow up with patient to discuss goals and establish care plan in the next 7-14 days.        PCP/Specialist follow up:   Future Appointments   Date Time Provider Jillian Camacho   5/5/2022 10:00 AM Justice Rosa    5/5/2022 11:15 AM [facility] [place] [facility] ER 2 [facility]    5/24/2022 10:45 AM Radha Schaffer MD MEDICAL [facility] BS AMB

## 2022-05-05 ENCOUNTER — HOSPITAL ENCOUNTER (OUTPATIENT)
Dept: ULTRASOUND IMAGING | Age: 77
Discharge: HOME | End: 2022-05-05
Attending: FAMILY MEDICINE
Payer: MEDICARE

## 2022-05-05 ENCOUNTER — HOSPITAL ENCOUNTER (OUTPATIENT)
Dept: CT IMAGING | Age: 77
Discharge: HOME | End: 2022-05-05
Attending: INTERNAL MEDICINE
Payer: MEDICARE

## 2022-05-05 DIAGNOSIS — Z79.899 ENCOUNTER FOR LONG-TERM (CURRENT) USE OF MEDICATIONS: ICD-10-CM

## 2022-05-05 DIAGNOSIS — R91.1 INCIDENTAL LUNG NODULE: ICD-10-CM

## 2022-05-05 DIAGNOSIS — N18.31 STAGE 3A CHRONIC KIDNEY DISEASE (HCC): ICD-10-CM

## 2022-05-05 PROCEDURE — 71250 CT THORAX DX C-: CPT

## 2022-05-05 PROCEDURE — 76770 US EXAM ABDO BACK WALL COMP: CPT

## 2022-05-09 ENCOUNTER — PATIENT OUTREACH (OUTPATIENT)
Dept: CASE MANAGEMENT | Age: 77
End: 2022-05-09

## 2022-05-09 NOTE — PROGRESS NOTES
05/09/22  ACM attempted to follow up with patient for CCM assessment. Attempts to reach patient were unsuccessful. On final call a VM was left for patient with the following information: ACM contact information and reason for call. Will follow up again in 7 days.

## 2022-05-17 ENCOUNTER — PATIENT OUTREACH (OUTPATIENT)
Dept: CASE MANAGEMENT | Age: 77
End: 2022-05-17

## 2022-05-17 NOTE — PROGRESS NOTES
05/17/22   ACM attempted to follow up with patient for CCM assessment. Attempts to reach patient were unsuccessful. On final call a VM was left for patient with the following information: ACM contact information and reason for call. Will follow up again in 7 days.

## 2022-05-24 ENCOUNTER — OFFICE VISIT (OUTPATIENT)
Dept: FAMILY MEDICINE CLINIC | Age: 77
End: 2022-05-24
Payer: MEDICARE

## 2022-05-24 VITALS
DIASTOLIC BLOOD PRESSURE: 68 MMHG | RESPIRATION RATE: 16 BRPM | HEIGHT: 72 IN | WEIGHT: 200.6 LBS | BODY MASS INDEX: 27.17 KG/M2 | HEART RATE: 66 BPM | TEMPERATURE: 97.1 F | OXYGEN SATURATION: 100 % | SYSTOLIC BLOOD PRESSURE: 96 MMHG

## 2022-05-24 DIAGNOSIS — Z95.5 S/P DRUG ELUTING CORONARY STENT PLACEMENT: ICD-10-CM

## 2022-05-24 DIAGNOSIS — I50.22 CHF NYHA CLASS I (NO SYMPTOMS FROM ORDINARY ACTIVITIES), CHRONIC, SYSTOLIC (HCC): ICD-10-CM

## 2022-05-24 DIAGNOSIS — I27.20 PULMONARY HYPERTENSION (HCC): ICD-10-CM

## 2022-05-24 DIAGNOSIS — Z95.0 S/P BIVENTRICULAR CARDIAC PACEMAKER PROCEDURE: ICD-10-CM

## 2022-05-24 DIAGNOSIS — Z86.79 S/P ABLATION OF ATRIAL FLUTTER: ICD-10-CM

## 2022-05-24 DIAGNOSIS — Z95.2 S/P TAVR (TRANSCATHETER AORTIC VALVE REPLACEMENT): ICD-10-CM

## 2022-05-24 DIAGNOSIS — J90 PLEURAL EFFUSION: ICD-10-CM

## 2022-05-24 DIAGNOSIS — Z12.11 COLON CANCER SCREENING: ICD-10-CM

## 2022-05-24 DIAGNOSIS — Z00.00 MEDICARE ANNUAL WELLNESS VISIT, SUBSEQUENT: Primary | ICD-10-CM

## 2022-05-24 DIAGNOSIS — I10 ESSENTIAL HYPERTENSION: ICD-10-CM

## 2022-05-24 DIAGNOSIS — E87.29 HYPERCHLOREMIC METABOLIC ACIDOSIS: ICD-10-CM

## 2022-05-24 DIAGNOSIS — N18.31 STAGE 3A CHRONIC KIDNEY DISEASE (HCC): ICD-10-CM

## 2022-05-24 DIAGNOSIS — Z98.890 S/P ABLATION OF ATRIAL FLUTTER: ICD-10-CM

## 2022-05-24 DIAGNOSIS — E78.2 MIXED HYPERLIPIDEMIA: ICD-10-CM

## 2022-05-24 DIAGNOSIS — D69.6 THROMBOCYTOPENIA (HCC): ICD-10-CM

## 2022-05-24 DIAGNOSIS — Z79.899 ENCOUNTER FOR LONG-TERM (CURRENT) USE OF MEDICATIONS: ICD-10-CM

## 2022-05-24 DIAGNOSIS — R91.1 INCIDENTAL PULMONARY NODULE: ICD-10-CM

## 2022-05-24 PROCEDURE — 3017F COLORECTAL CA SCREEN DOC REV: CPT | Performed by: FAMILY MEDICINE

## 2022-05-24 PROCEDURE — 1101F PT FALLS ASSESS-DOCD LE1/YR: CPT | Performed by: FAMILY MEDICINE

## 2022-05-24 PROCEDURE — G8427 DOCREV CUR MEDS BY ELIG CLIN: HCPCS | Performed by: FAMILY MEDICINE

## 2022-05-24 PROCEDURE — 99214 OFFICE O/P EST MOD 30 MIN: CPT | Performed by: FAMILY MEDICINE

## 2022-05-24 PROCEDURE — 1123F ACP DISCUSS/DSCN MKR DOCD: CPT | Performed by: FAMILY MEDICINE

## 2022-05-24 PROCEDURE — G8536 NO DOC ELDER MAL SCRN: HCPCS | Performed by: FAMILY MEDICINE

## 2022-05-24 PROCEDURE — G8432 DEP SCR NOT DOC, RNG: HCPCS | Performed by: FAMILY MEDICINE

## 2022-05-24 PROCEDURE — G8754 DIAS BP LESS 90: HCPCS | Performed by: FAMILY MEDICINE

## 2022-05-24 PROCEDURE — G8752 SYS BP LESS 140: HCPCS | Performed by: FAMILY MEDICINE

## 2022-05-24 PROCEDURE — G8417 CALC BMI ABV UP PARAM F/U: HCPCS | Performed by: FAMILY MEDICINE

## 2022-05-24 PROCEDURE — G0439 PPPS, SUBSEQ VISIT: HCPCS | Performed by: FAMILY MEDICINE

## 2022-05-24 PROCEDURE — G0463 HOSPITAL OUTPT CLINIC VISIT: HCPCS | Performed by: FAMILY MEDICINE

## 2022-05-24 NOTE — PROGRESS NOTES
Subjective  Martha Barajas is a 77 y.o. male. Experiencing increased shortness of breath. Exertion seems to worsen, sitting down seems to make it feel better. Started with the heart valve. Beginning of February went to the dentist, patient took amoxicillin 2000mg (4 500mg). Having some left and right lower quadrant pain, kombucha and activia tried, but it's not seeming to help much. Cyclical pain with constipation and diarrhea happening every now and then. Miralax taken but it was too effective. Diet consists of never fast food, no sugary drinks, no beef, fairly regular on fruits and vegetables. Brussel sprouts, broccoli, bananas, seldom apples. Couple weeks ago sleeping decreased to about 4-5 hours a night. Some ruminations about current events. Going to the bathroom wakes him up. Tylenol PM and melatonin made him drowsy, quality of sleep didn't seem to be there. Sleeping environment consists of adequate ventilation, dark lighting, and  subjectively cool. Testosterone gel seems to be too inconvenient to apply. Not reporting any hearing issues. Review of Systems   Constitutional: Negative for diaphoresis. Respiratory: Positive for shortness of breath. Negative for cough and wheezing. Cardiovascular: Negative for chest pain, palpitations, orthopnea, claudication and leg swelling. Gastrointestinal: Positive for constipation and diarrhea. Negative for blood in stool, nausea and vomiting. Last bowel movement was 3 days ago   Psychiatric/Behavioral: The patient has insomnia. Wake up to go to the bathroom, can't get back to sleep. 4-5 hr/night       Objective  Physical Exam  Constitutional:       Appearance: Normal appearance. He is normal weight. Cardiovascular:      Rate and Rhythm: Normal rate and regular rhythm. Pulses: Normal pulses. Pulmonary:      Effort: Pulmonary effort is normal.      Breath sounds: Normal breath sounds.    Abdominal:      Palpations: Abdomen is soft. Musculoskeletal:      Cervical back: Neck supple. Neurological:      Mental Status: He is alert. Assessment & Plan  Discussed results of CT chest scan, kidney US. 1. Shortness of breath on exertion  - could be as a result of many things: pulmonary hypertension, chronic systolic heart failure. - plan is to treat with increasing the frequency of the torsemide. Continue coordinating with electrophysiologist as a setting change to decrease the blood backing up into the heart might help. 2. Low GFR  - get another CMP   3. High cholesterol  - get a routine fasting lipid panel. 4. Recent thrombocytopenia  - get a CBC    Sachi Low     *ATTENTION:  This note has been created by a medical student for educational purposes only. Please do not refer to the content of this note for clinical decision-making, billing, or other purposes. Please see attending physicians note to obtain clinical information on this patient. *

## 2022-05-24 NOTE — PROGRESS NOTES
Chief Complaint   Patient presents with   [facility] Annual Wellness Visit     Medicare   1. Have you been to the ER, urgent care clinic since your last visit? Hospitalized since your last visit? No    2. Have you seen or consulted any other health care providers outside of the [address] [place] [address] since your last visit? Include any pap smears or colon screening.  Yes Where: Dentist     Discuss GI issues after taking Amoxicillin

## 2022-05-24 NOTE — PROGRESS NOTES
Martha Barajas (: 1945) is a 77 y.o. male, established patient, here for evaluation of the following chief complaint(s): Annual Wellness Visit (Medicare)       ASSESSMENT/PLAN:  Diagnoses and all orders for this visit:    1. Medicare annual wellness visit, subsequent    2. Essential hypertension-controlled with some concern for hypotension  -     METABOLIC PANEL, COMPREHENSIVE; Future    3. Mixed hyperlipidemia-continue Crestor  -     METABOLIC PANEL, COMPREHENSIVE; Future  -     LIPID PANEL; Future    4. S/P TAVR (transcatheter aortic valve replacement)-stable, following with Dr. Ani Pugh    5. S/P drug eluting coronary stent placement    6. S/P ablation of atrial flutter  7. S/P biventricular cardiac pacemaker procedure  -Stable, following with Dr. Bernadette Woods    8. Stage 3a chronic kidney disease (HCC) stable, newer issue and largely related to diuretics  -     METABOLIC PANEL, COMPREHENSIVE; Future  -     LIPID PANEL; Future  -     CBC W/O DIFF; Future    9. CHF NYHA class I (no symptoms from ordinary activities), chronic, systolic ([facility] [address]- stable overall  -     LIPID PANEL; Future    10. Pulmonary hypertension ([facility] [address]- ongoing issue, following with Dr. Lorenzo Mcrae    11. Hyperchloremic metabolic acidosis    12. Encounter for long-term (current) use of medications  -     METABOLIC PANEL, COMPREHENSIVE; Future  -     LIPID PANEL; Future  -     CBC W/O DIFF; Future    13. Incidental pulmonary nodule- stable, pulmonology monitoring with routine CTs    14. Pleural effusion- small and noted on most recent CT. May be contributing to dyspnea  -     METABOLIC PANEL, COMPREHENSIVE; Future    15. Thrombocytopenia ([facility] [address]- improving, rechecking labs  -     CBC W/O DIFF; Future    16. Colon cancer screening  -     OCCULT BLOOD IMMUNOASSAY,DIAGNOSTIC; Future    Restarting testosterone and encouraged sleep hygiene.     Return in about 3 months (around 2022), or if symptoms worsen or fail to improve. SUBJECTIVE/OBJECTIVE:  76 yo WM with PMH sig for viral CM, AS s/p TAVR, Pulm HTN, HLD, preDM, CKD st 3b, hypogonadism. Incr dyspnea. Small pleural effusion noted on CT. Taking Torsemide 60 mg in am and 40 mg at 2pm.  Had device setting changed with Dr. Teri Singh which    Intermittent abd pain - + constipation. Using miralax which helps but stools getting too loose. Started after going on amoxicillin for dental procedure. Tried kombucha and [place] without much improvement. Denies any nausea, vomiting, hematochezia or melena. Intermittent insomnia -sx for a few weeks, waking at 2 am and can't fall back asleep. No depression sx. Newer issue. Avoiding screens. Waking to urinate initially and then having trouble falling asleep again. Tylenol PM and Melatonin helped but felt drowsy. Off testosterone for the past month due effort. Planning to restart.      CT Chest 5/2022:  IMPRESSION  1. Interval development of small bilateral pleural effusions right greater than left and mild interstitial edema. Additionally there is cardiomegaly. These findings are consistent with interval development of congestive heart failure. 2. Increased nodular area of consolidation posteriorly and medially in left upper lobe. This has increased in size and there are 2 adjacent small nodules. Additionally this is surrounded by some groundglass opacities and appears to occupy secondary pulmonary lobule. Most likely this is acute infectious or inflammatory process. Neoplastic process is not excluded. Clinical correlation and close follow-up is suggested. Follow-up exam in 3 months is suggested. 3. Increased clustered nodularity and tree-in-bud opacities posteriorly in the right upper lobe consistent with progressive small airways infection. 4. Persistent nodular densities medially and inferiorly in the right upper lobe.  As described above there are other small nodular densities in the lungs that are new likely related to infectious process. Neoplastic process is not excluded and  close follow-up is suggested. 5. Enlarged subcarinal lymph node has increased in size. Close follow-up is suggested. Follow-up exam in 3 months is suggested. 23X      US retroperitoneal:  FINDINGS:  The patient is studied with coronal and axial real-time ultrasound. The right kidney measures 11.6 cm, and the left kidney measures 12.2 cm. Renal contour and echogenicity are normal.  There is no mass or shadowing calculus. There are bilateral kidney cysts measuring up to 1.5 cm on the right and 2.3 cm on the left. There is no hydronephrosis. The ureters are not dilated. The abdominal aorta tapers normally. The proximal origins of the iliac arteries are normal in caliber. The IVC is patent. The urinary bladder is unremarkable. Bilateral ureteral jets are noted. ROS  Gen - no fever/chills  Resp - no dyspnea or cough  CV - no chest pain, + FERRER  Rest per HPI    Blood pressure 96/68, pulse 66, temperature 97.1 °F (36.2 °C), temperature source Temporal, resp. rate 16, height 6' (1.829 m), weight 200 lb 9.6 oz (91 kg), SpO2 100 %. Physical Exam  General appearance - alert, well appearing, and in no distress  Eyes -sclera anicteric  Neck - supple, no significant adenopathy, no thyromegaly  Chest - clear to auscultation, no wheezes, rales or rhonchi, symmetric air entry  Heart - normal rate, regular rhythm, normal S1, S2, no murmurs, rubs, clicks or gallops  Neurological - alert, oriented, normal speech, no focal findings or movement disorder noted  Extr - no edema  Psych - normal mood and affect      On this date 05/24/2022 I have spent 30 minutes reviewing previous notes, test results and face to face with the patient discussing the diagnosis and importance of compliance with the treatment plan as well as documenting on the day of the visit. An electronic signature was used to authenticate this note.   -- Mckenzie Soler MD     This is the Subsequent Medicare Annual Wellness Exam, performed 12 months or more after the Initial AWV or the last Subsequent AWV    I have reviewed the patient's medical history in detail and updated the computerized patient record. Assessment/Plan   Education and counseling provided:  Are appropriate based on today's review and evaluation    1. Medicare annual wellness visit, subsequent  2. Essential hypertension  -     METABOLIC PANEL, COMPREHENSIVE; Future  3. Mixed hyperlipidemia  -     METABOLIC PANEL, COMPREHENSIVE; Future  -     LIPID PANEL; Future  4. S/P TAVR (transcatheter aortic valve replacement)  5. S/P drug eluting coronary stent placement  6. S/P ablation of atrial flutter  7. S/P biventricular cardiac pacemaker procedure  8. Stage 3a chronic kidney disease (HCC)  -     METABOLIC PANEL, COMPREHENSIVE; Future  -     LIPID PANEL; Future  -     CBC W/O DIFF; Future  9. CHF NYHA class I (no symptoms from ordinary activities), chronic, systolic (HCC)  -     LIPID PANEL; Future  10. Pulmonary hypertension ([facility] [address]  11. Hyperchloremic metabolic acidosis  12. Encounter for long-term (current) use of medications  -     METABOLIC PANEL, COMPREHENSIVE; Future  -     LIPID PANEL; Future  -     CBC W/O DIFF; Future  13. Incidental pulmonary nodule  14. Pleural effusion  -     METABOLIC PANEL, COMPREHENSIVE; Future  15. Thrombocytopenia (HCC)  -     CBC W/O DIFF; Future  16.  Colon cancer screening  -     OCCULT BLOOD IMMUNOASSAY,DIAGNOSTIC; Future       Depression Risk Factor Screening     3 most recent PHQ Screens 11/23/2021   Little interest or pleasure in doing things Not at all   Feeling down, depressed, irritable, or hopeless Not at all   Total Score PHQ 2 0       Alcohol & Drug Abuse Risk Screen    Do you average more than 1 drink per night or more than 7 drinks a week: No    In the past three months have you have had more than 4 drinks containing alcohol on one occasion: No          Functional Ability and Level of Safety    Hearing: Hearing is good. Activities of Daily Living: The home contains: grab bar and hand rails  Patient does total self care      Ambulation: with no difficulty     Fall Risk:  Fall Risk Assessment, last 12 mths 2/24/2022   Able to walk? Yes   Fall in past 12 months? 0   Do you feel unsteady?  0   Are you worried about falling 0      Abuse Screen:  Patient is not abused       Cognitive Screening    Has your family/caregiver stated any concerns about your memory: no     Cognitive Screening: Normal - Verbal Fluency Test    Health Maintenance Due     Health Maintenance Due   Topic Date Due    AAA Screening 73-69 YO Male Smoking Patients  Never done    Colorectal Cancer Screening Combo  10/24/2020       Patient Care Team   Patient Care Team:  Jocelyne Perez MD as PCP - General (Family Medicine)  Jocelyne Perez MD as PCP - [address] [place] [address]  Prince Claudio MD (Cardiovascular Disease Physician)  Joanne Valadez MD (Pulmonary Disease)  Fidel Moore MD (Cardiovascular Disease Physician)  Queen Katey MD (Cardiothoracic Surgery)  Chuckie Sadler RN as Ambulatory Care Manager (Family Medicine)    History     Patient Active Problem List   Diagnosis Code    High cholesterol E78.00    Eczema L30.9    HTN (hypertension) I10    Encounter for long-term (current) use of medications Z79.899    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    Prediabetes R73.03    Arrhythmia I49.9    Cardiomyopathy ([address] I42.9    CHF NYHA class I (no symptoms from ordinary activities), chronic, systolic ([address] U48.70    S/P ablation of atrial flutter Z98.890, Z86.79    Advance care planning Z71.89    Pulmonary hypertension ([address] I27.20    S/P biventricular cardiac pacemaker procedure Z95.0    Nonrheumatic aortic valve stenosis I35.0    Aortic stenosis I35.0    S/P TAVR (transcatheter aortic valve replacement) Z95.2     Past Medical History:   Diagnosis Date    Arrhythmia 11/11/2014    CAD (coronary artery disease)     stent, cardiac cath, RAUDEL X2    ED (erectile dysfunction) of organic origin     High cholesterol 3/31/2010    HTN (hypertension) 3/31/2010    Prediabetes 11/11/2014    Pulmonary hypertension ([facility] [address] 8/8/2017    S/P ablation of atrial flutter 3/22/2016    S/P biventricular cardiac pacemaker procedure 5/19/5016    Systolic CHF, acute ([facility] [address] 3/22/2016      Past Surgical History:   Procedure Laterality Date    HX ORTHOPAEDIC      fx wrist    HX PACEMAKER      BI-V Pacer/AICD 1/2021    HX TONSILLECTOMY      CA CARDIAC SURG PROCEDURE UNLIST      TAVR june 2021    CA INSJ/RPLCMT PERM DFB W/TRNSVNS LDS 1/DUAL CHMBR N/A 1/26/2021    INSERT ICD BIV MULTI performed by Torito Bustillo MD at [facility] CARDIAC CATH LAB     Current Outpatient Medications   Medication Sig Dispense Refill    testosterone 12.5 mg/ 1.25 gram (1 %) glpm APPLY 4 PUMPS TO THE SHOULDERS AND UPPER ARMS ONCE DAILY IN THE MORNING 225 g 1    sodium bicarbonate 650 mg tablet TAKE ONE TABLET BY MOUTH TWICE A  Tablet 0    OTHER Take 1 Capsule by mouth daily. 1 tumeric capsule daily.  lisinopriL (PRINIVIL, ZESTRIL) 5 mg tablet 2.5 mg two (2) times a day.  Spiriva Respimat 1.25 mcg/actuation inhaler       rosuvastatin (CRESTOR) 20 mg tablet TAKE ONE TABLET BY MOUTH ONE TIME DAILY AT NIGHT 90 Tablet 3    carvediloL (COREG) 12.5 mg tablet Take 0.5 Tablets by mouth two (2) times daily (with meals). TAKE ONE TABLET BY MOUTH TWICE A DAY (Patient taking differently: Take 12.5 mg by mouth two (2) times daily (with meals). TAKE ONE TABLET BY MOUTH TWICE A DAY) 180 Tablet 3    acetaminophen (TYLENOL) 325 mg tablet Take 650 mg by mouth every four (4) hours as needed for Pain.  torsemide (DEMADEX) 20 mg tablet Take 3 Tabs by mouth Before breakfast and dinner.  Indications: Congestive Heart Failure (this replaces bumetanide or Bumex) 180 Tab 6    Eliquis 5 mg tablet Take 5 mg by mouth two (2) times a day.  Cholecalciferol, Vitamin D3, (VITAMIN D3) 1,000 unit Cap Take 1 Cap by mouth daily.  gabapentin (NEURONTIN) 300 mg capsule TAKE ONE CAPSULE BY MOUTH EVERY EVENING (Patient not taking: Reported on 2021) 90 Capsule 1    budesonide-formoteroL (SYMBICORT) 160-4.5 mcg/actuation HFAA Take 2 Puffs by inhalation.  (Patient not taking: Reported on 2021)       No Known Allergies    Family History   Problem Relation Age of Onset    Heart Disease Father     Hypertension Father     Hypertension Mother     Heart Disease Mother     Cancer Brother      Social History     Tobacco Use    Smoking status: Former Smoker     Packs/day: 2.00     Years: 35.00     Pack years: 70.00     Types: Cigarettes     Start date: 1963     Quit date: 1995     Years since quittin.1    Smokeless tobacco: Never Used   Substance Use Topics    Alcohol use: Not Currently     Alcohol/week: 1.0 standard drink     Types: 1 Glasses of wine per week     Comment: 1 drink a month         Ramiro Lin MD

## 2022-06-03 ENCOUNTER — PATIENT OUTREACH (OUTPATIENT)
Dept: CASE MANAGEMENT | Age: 77
End: 2022-06-03

## 2022-06-07 NOTE — PROGRESS NOTES
Ambulatory Care Management Note      Date/Time:  6/6/2022 05:22 PM    This patient was received as a referral from Daily assignment. Top Challenges reviewed with the provider   · S/P biventricular cardiac pacemaker procedure  -Stable, following with Dr. Gonzalez Hopes  · Creat=1.41 on 2/24/22  · BP 90s at times, feels lightheaded at times     Ambulatory  contacted patient for discussion and case management of self care  Summary of patients top problems:   1. CHF-7/22/21-EF=25-30%, S/P TAVR (transcatheter aortic valve replacement, S/P drug eluting coronary stent placement, S/P ablation of atrial flutter   S/P biventricular cardiac pacemaker procedure  2. Hypotension- complain of BP being low, feeling a little dizzy. History of hypertension. Patient's challenges to self management identified:   depression, financial, functional cognitive ability, functional physical ability, ineffective coping, lack of knowledge about disease, level of motivation, medical condition, medication management, multi health system providers, PCP relationship, polypharmacy, support system and utilization of services      Medication Management:  good adherence and good understanding    Advance Care Planning:   Does patient have an Advance Directive:  yes; reviewed and current     [employer], Referrals, and Durable Medical Equipment:     PCP/Specialist follow up:   Future Appointments   Date Time Provider Jillian Camacho   8/23/2022 10:30 AM Javi Roper MD [facility]      Attends follow up appointments on schedule      06/06/22   Reminded of all follow up appointment   Reminded of ordered labs pending   Will attend up coming appointment on 8/23/22 or before if needed   ACM will follow in 12-14 days.  pmk       Improve activity tolerance and quality of life by end of episode      06/06/22   Julián Outreach completed, reminded of ordered labs pending   Patient will participate in activities that reduce BP/cardiac workload.  Patient will maintain BP within individually acceptable range.  Will take all medications as ordered   Patient will demonstrate stable cardiac rhythm and rate within patient's normal range.  Patient will participate in activities that will prevent stress (stress management, balanced activities and rest plan).  Educated on the Importance of Supportive resources in place to maintain patient in the community (ie. Home Health, DME equipment, refer to, medication assistant plan, [facility] etc.)   ACM contact information given. Will follow up in 10-14 days. [facility]             Patient verbalized understanding of all information discussed. Patient has this Ambulatory Care Manager's contact information for any further questions, concerns, or needs.

## 2022-06-16 LAB — HEMOCCULT STL QL IA: NEGATIVE

## 2022-06-27 ENCOUNTER — PATIENT OUTREACH (OUTPATIENT)
Dept: CASE MANAGEMENT | Age: 77
End: 2022-06-27

## 2022-06-27 NOTE — PROGRESS NOTES
Goals Addressed                 This Visit's Progress     Attends follow up appointments on schedule   On track     06/06/22   Reminded of all follow up appointment   Reminded of ordered labs pending   Will attend up coming appointment on 8/23/22 or before if needed   ACM will follow in 12-14 days. Pmk    06/27/22   Has followed up as scheduled   Reviewed up coming appointments   Patient was able to tell ACM of appointment scheduled with PCP on 8/23/22   ACM will follow in 12-14 days. Pmk       Improve activity tolerance and quality of life by end of episode   On track     06/06/22   [facility] Outreach completed, reminded of ordered labs pending   Patient will participate in activities that reduce BP/cardiac workload.  Patient will maintain BP within individually acceptable range.  Will take all medications as ordered   Patient will demonstrate stable cardiac rhythm and rate within patient's normal range.  Patient will participate in activities that will prevent stress (stress management, balanced activities and rest plan).  Educated on the Importance of Supportive resources in place to maintain patient in the community (ie. Home Health, DME equipment, refer to, medication assistant plan, [facility] etc.)   ACM contact information given. Will follow up in 10-14 days. [address] [place] [address]    06/27/22   Outreach completed, patient says he is doing well   Completed social assessment   Admits to taking all medications as ordered   Admits to daily activities by taking short walks    Remain consistent with a low sodium diet   ACM contact information given. Will follow up in 10-14 days.  [address] [place] [address]

## 2022-07-01 ENCOUNTER — PATIENT OUTREACH (OUTPATIENT)
Dept: CASE MANAGEMENT | Age: 77
End: 2022-07-01

## 2022-07-01 NOTE — PROGRESS NOTES
Heart Failure Education outreach Date/Time: 2022 4:07 PM    Ambulatory Care Manager (ACM) contacted the patient by telephone to perform Ambulatory Care Coordination. Verified name and  with patient as identifiers. Provided introduction to self, and explanation of the Ambulatory Care Manager's role. ACM reviewed that a Health Healthy tips for the Holiday packet has been mailed to the them. ACM reviewed the importance of low sodium diet with the patient. Instructed patient to call their Cardiologist if they have a weight gain of 3 lbs in 2 days or 5 lbs in a week. Patient reminded that there is a physician on call 24 hours a day / 7 days a week should the patient have questions or concerns. The patient verbalized understanding.

## 2022-07-07 ENCOUNTER — PATIENT OUTREACH (OUTPATIENT)
Dept: CASE MANAGEMENT | Age: 77
End: 2022-07-07

## 2022-07-07 NOTE — PROGRESS NOTES
07/07/22  Incoming call from patient today requesting  that message be sent to provider to have his most recent lab results mailed to him. Message forwarded to PCP.

## 2022-07-12 ENCOUNTER — PATIENT OUTREACH (OUTPATIENT)
Dept: CASE MANAGEMENT | Age: 77
End: 2022-07-12

## 2022-07-13 NOTE — PROGRESS NOTES
Patient has graduated from the Complex Case Management  program on 07/12/22. Patient/family has the ability to self-manage at this time. Care management goals have been completed. No further Ambulatory Care Manager follow up scheduled. Goals Addressed                 This Visit's Progress     COMPLETED: Attends follow up appointments on schedule   On track     06/06/22   Reminded of all follow up appointment   Reminded of ordered labs pending   Will attend up coming appointment on 8/23/22 or before if needed   ACM will follow in 12-14 days. Pmk    06/27/22   Has followed up as scheduled   Reviewed up coming appointments   Patient was able to tell ACM of appointment scheduled with PCP on 8/23/22   ACM will follow in 12-14 days. Pmk       COMPLETED: Improve activity tolerance and quality of life by end of episode   On track     06/06/22   [facility] Outreach completed, reminded of ordered labs pending   Patient will participate in activities that reduce BP/cardiac workload.  Patient will maintain BP within individually acceptable range.  Will take all medications as ordered   Patient will demonstrate stable cardiac rhythm and rate within patient's normal range.  Patient will participate in activities that will prevent stress (stress management, balanced activities and rest plan).  Educated on the Importance of Supportive resources in place to maintain patient in the community (ie. Home Health, DME equipment, refer to, medication assistant plan, [facility] etc.)   ACM contact information given. Will follow up in 10-14 days. [facility]    06/27/22   Outreach completed, patient says he is doing well   Completed social assessment   Admits to taking all medications as ordered   Admits to daily activities by taking short walks    Remain consistent with a low sodium diet   ACM contact information given. Will follow up in 10-14 days.  [facility]            Patient has Ambulatory Care Manager's contact information for any further questions, concerns, or needs.   Patients upcoming visits:    Future Appointments   Date Time Provider Jillian Camacho   8/23/2022 10:30 AM Elmira Armenta MD MEDICAL [facility] AMB

## 2022-07-22 DIAGNOSIS — N52.9 ED (ERECTILE DYSFUNCTION) OF ORGANIC ORIGIN: ICD-10-CM

## 2022-07-22 DIAGNOSIS — E29.1 HYPOGONADISM IN MALE: ICD-10-CM

## 2022-07-22 RX ORDER — TESTOSTERONE 10 MG/G
GEL TOPICAL
Qty: 225 G | Refills: 1 | Status: SHIPPED | OUTPATIENT
Start: 2022-07-22

## 2022-07-26 ENCOUNTER — TRANSCRIBE ORDER (OUTPATIENT)
Dept: SCHEDULING | Age: 77
End: 2022-07-26

## 2022-07-26 DIAGNOSIS — R91.1 INCIDENTAL LUNG NODULE: Primary | ICD-10-CM

## 2022-08-09 ENCOUNTER — TELEPHONE (OUTPATIENT)
Dept: FAMILY MEDICINE CLINIC | Age: 77
End: 2022-08-09

## 2022-08-09 NOTE — TELEPHONE ENCOUNTER
----- Message from David Weeks sent at 7/20/2022  1:59 PM EDT -----  Subject: Message to Provider    QUESTIONS  Information for Provider? Please mail lab results to patient. Call if any   questions.   ---------------------------------------------------------------------------  --------------  [id number]  [phone]; Do not leave any message, patient will call back for answer  ---------------------------------------------------------------------------  --------------  SCRIPT ANSWERS  Relationship to Patient?  Self

## 2022-08-11 ENCOUNTER — HOSPITAL ENCOUNTER (OUTPATIENT)
Dept: CT IMAGING | Age: 77
Discharge: HOME | End: 2022-08-11
Attending: INTERNAL MEDICINE
Payer: MEDICARE

## 2022-08-11 DIAGNOSIS — R91.1 INCIDENTAL LUNG NODULE: ICD-10-CM

## 2022-08-11 DIAGNOSIS — E87.29 HYPERCHLOREMIC METABOLIC ACIDOSIS: ICD-10-CM

## 2022-08-11 PROCEDURE — 71250 CT THORAX DX C-: CPT

## 2022-08-11 RX ORDER — SODIUM BICARBONATE 650 MG/1
TABLET ORAL
Qty: 180 TABLET | Refills: 0 | Status: SHIPPED | OUTPATIENT
Start: 2022-08-11

## 2022-08-11 NOTE — TELEPHONE ENCOUNTER
Spoke with patient and he had labs drawn and never received results. Geovany Arechiga was called for lab results and they will fax to office.

## 2022-08-23 ENCOUNTER — OFFICE VISIT (OUTPATIENT)
Dept: FAMILY MEDICINE CLINIC | Age: 77
End: 2022-08-23
Payer: MEDICARE

## 2022-08-23 VITALS
HEART RATE: 61 BPM | BODY MASS INDEX: 27.25 KG/M2 | SYSTOLIC BLOOD PRESSURE: 91 MMHG | DIASTOLIC BLOOD PRESSURE: 61 MMHG | OXYGEN SATURATION: 98 % | TEMPERATURE: 97.3 F | RESPIRATION RATE: 16 BRPM | WEIGHT: 201.2 LBS | HEIGHT: 72 IN

## 2022-08-23 DIAGNOSIS — H53.8 BLURRY VISION, RIGHT EYE: ICD-10-CM

## 2022-08-23 DIAGNOSIS — E83.41 HYPERMAGNESEMIA: ICD-10-CM

## 2022-08-23 DIAGNOSIS — E78.2 MIXED HYPERLIPIDEMIA: ICD-10-CM

## 2022-08-23 DIAGNOSIS — N18.31 STAGE 3A CHRONIC KIDNEY DISEASE (HCC): ICD-10-CM

## 2022-08-23 DIAGNOSIS — Z79.899 ENCOUNTER FOR LONG-TERM (CURRENT) USE OF MEDICATIONS: ICD-10-CM

## 2022-08-23 DIAGNOSIS — Z98.890 S/P ABLATION OF ATRIAL FLUTTER: ICD-10-CM

## 2022-08-23 DIAGNOSIS — Z95.0 S/P BIVENTRICULAR CARDIAC PACEMAKER PROCEDURE: ICD-10-CM

## 2022-08-23 DIAGNOSIS — Z86.79 S/P ABLATION OF ATRIAL FLUTTER: ICD-10-CM

## 2022-08-23 DIAGNOSIS — Z95.5 S/P DRUG ELUTING CORONARY STENT PLACEMENT: ICD-10-CM

## 2022-08-23 DIAGNOSIS — I10 ESSENTIAL HYPERTENSION: Primary | ICD-10-CM

## 2022-08-23 DIAGNOSIS — Z95.2 S/P TAVR (TRANSCATHETER AORTIC VALVE REPLACEMENT): ICD-10-CM

## 2022-08-23 PROCEDURE — G8427 DOCREV CUR MEDS BY ELIG CLIN: HCPCS | Performed by: FAMILY MEDICINE

## 2022-08-23 PROCEDURE — G0463 HOSPITAL OUTPT CLINIC VISIT: HCPCS | Performed by: FAMILY MEDICINE

## 2022-08-23 PROCEDURE — 1123F ACP DISCUSS/DSCN MKR DOCD: CPT | Performed by: FAMILY MEDICINE

## 2022-08-23 PROCEDURE — 99214 OFFICE O/P EST MOD 30 MIN: CPT | Performed by: FAMILY MEDICINE

## 2022-08-23 PROCEDURE — G8432 DEP SCR NOT DOC, RNG: HCPCS | Performed by: FAMILY MEDICINE

## 2022-08-23 PROCEDURE — G8754 DIAS BP LESS 90: HCPCS | Performed by: FAMILY MEDICINE

## 2022-08-23 PROCEDURE — 1101F PT FALLS ASSESS-DOCD LE1/YR: CPT | Performed by: FAMILY MEDICINE

## 2022-08-23 PROCEDURE — G8752 SYS BP LESS 140: HCPCS | Performed by: FAMILY MEDICINE

## 2022-08-23 PROCEDURE — 3017F COLORECTAL CA SCREEN DOC REV: CPT | Performed by: FAMILY MEDICINE

## 2022-08-23 PROCEDURE — G8536 NO DOC ELDER MAL SCRN: HCPCS | Performed by: FAMILY MEDICINE

## 2022-08-23 PROCEDURE — G8417 CALC BMI ABV UP PARAM F/U: HCPCS | Performed by: FAMILY MEDICINE

## 2022-08-23 RX ORDER — CARVEDILOL 6.25 MG/1
6.25 TABLET ORAL 2 TIMES DAILY
COMMUNITY
Start: 2022-07-18

## 2022-08-23 NOTE — PROGRESS NOTES
José Sood (: 1945) is a 77 y.o. male, established patient, here for evaluation of the following chief complaint(s):  Follow-up (3 month)       ASSESSMENT/PLAN:  Diagnoses and all orders for this visit:    1. Essential hypertension-controlled with some concern for hypotension  -     METABOLIC PANEL, COMPREHENSIVE; Future    2. Blurry vision, right eye - to Ophtho to eval  -     REFERRAL TO OPHTHALMOLOGY    3. Mixed hyperlipidemia - controlled  -     LIPID PANEL; Future  -     METABOLIC PANEL, COMPREHENSIVE; Future    4. S/P TAVR (transcatheter aortic valve replacement)  5. S/P drug eluting coronary stent placement  6. S/P ablation of atrial flutter  7. S/P biventricular cardiac pacemaker procedure  - Stable, ct following with Dr. Jeovany Estrada and Dr. Daija Parekh    8. Stage 3a chronic kidney disease (HCC) - stable, newer issue and largely related to diuretics  -     CBC W/O DIFF; Future  -     LIPID PANEL; Future  -     METABOLIC PANEL, COMPREHENSIVE; Future  -     MAGNESIUM; Future    9. Hypermagnesemia  -     MAGNESIUM; Future    10. Encounter for long-term (current) use of medications  -     CBC W/O DIFF; Future  -     LIPID PANEL; Future  -     METABOLIC PANEL, COMPREHENSIVE; Future  -     MAGNESIUM; Future    Return in about 3 months (around 2022). SUBJECTIVE:  76 yo WM with PMH sig for viral CM, AS s/p TAVR, Pulm HTN, HLD, preDM, CKD st 3b, hypogonadism. Dyspnea stable. Small pleural effusion noted on CT. Taking Torsemide 60 mg in am and 40 mg at 2pm.  Had device setting changed with Dr. Daija Parekh which    Abd/GI sx - resolved after adjusting diet and pulling away from gluten. Sent to H/O back in 2021 for thrombocytopenia but has not had eval yet. R eye with worsening vision. Off testosterone for the past month due effort. Planning to restart. CT Chest 2022:  IMPRESSION  1.  Interval development of small bilateral pleural effusions right greater than left and mild interstitial edema. Additionally there is cardiomegaly. These findings are consistent with interval development of congestive heart failure. 2. Increased nodular area of consolidation posteriorly and medially in left upper lobe. This has increased in size and there are 2 adjacent small nodules. Additionally this is surrounded by some groundglass opacities and appears to occupy secondary pulmonary lobule. Most likely this is acute infectious or inflammatory process. Neoplastic process is not excluded. Clinical correlation and close follow-up is suggested. Follow-up exam in 3 months is suggested. 3. Increased clustered nodularity and tree-in-bud opacities posteriorly in the right upper lobe consistent with progressive small airways infection. 4. Persistent nodular densities medially and inferiorly in the right upper lobe. As described above there are other small nodular densities in the lungs that are new likely related to infectious process. Neoplastic process is not excluded and  close follow-up is suggested. 5. Enlarged subcarinal lymph node has increased in size. Close follow-up is suggested. Follow-up exam in 3 months is suggested. 23X      US retroperitoneal:  FINDINGS:  The patient is studied with coronal and axial real-time ultrasound. The right kidney measures 11.6 cm, and the left kidney measures 12.2 cm. Renal contour and echogenicity are normal.  There is no mass or shadowing calculus. There are bilateral kidney cysts measuring up to 1.5 cm on the right and 2.3 cm on the left. There is no hydronephrosis. The ureters are not dilated. The abdominal aorta tapers normally. The proximal origins of the iliac arteries are normal in caliber. The IVC is patent. The urinary bladder is unremarkable. Bilateral ureteral jets are noted.     ROS  Gen - no fever/chills  Resp - no dyspnea or cough  CV - no chest pain, + FERRER   - per HPI  Rest per HPI    OBJECTIVE:  Blood pressure 91/61, pulse 61, temperature 97.3 °F (36.3 °C), temperature source Temporal, resp. rate 16, height 6' (1.829 m), weight 201 lb 3.2 oz (91.3 kg), SpO2 98 %. Physical Exam  General appearance - alert, well appearing, and in no distress  Eyes -sclera anicteric  Neck - supple, no significant adenopathy, no thyromegaly  Chest - clear to auscultation, no wheezes, rales or rhonchi, symmetric air entry  Heart - normal rate, regular rhythm, normal S1, S2, no murmurs, rubs, clicks or gallops  Neurological - alert, oriented, normal speech, no focal findings or movement disorder noted  Extr - no edema  Psych - normal mood and affect      On this date 08/23/2022 I have spent 30 minutes reviewing previous notes, test results and face to face with the patient discussing the diagnosis and importance of compliance with the treatment plan as well as documenting on the day of the visit. An electronic signature was used to authenticate this note.   -- Eileen Marin MD

## 2022-08-23 NOTE — PROGRESS NOTES
Chief Complaint   Patient presents with    Follow-up     3 month    1. Have you been to the ER, urgent care clinic since your last visit? Hospitalized since your last visit? No    2. Have you seen or consulted any other health care providers outside of the [address] [place] [address] since your last visit? Include any pap smears or colon screening.  No     Losing sight in right eye

## 2022-08-25 LAB — HEMOCCULT STL QL IA: NEGATIVE

## 2022-09-01 DIAGNOSIS — E78.2 MIXED HYPERLIPIDEMIA: ICD-10-CM

## 2022-09-02 ENCOUNTER — TELEPHONE (OUTPATIENT)
Dept: CARDIOLOGY CLINIC | Age: 77
End: 2022-09-02

## 2022-09-06 RX ORDER — ROSUVASTATIN CALCIUM 20 MG/1
TABLET, COATED ORAL
Qty: 90 TABLET | Refills: 3 | Status: SHIPPED | OUTPATIENT
Start: 2022-09-06 | End: 2022-09-28 | Stop reason: SDUPTHER

## 2022-09-15 ENCOUNTER — TRANSCRIBE ORDER (OUTPATIENT)
Dept: SCHEDULING | Age: 77
End: 2022-09-15

## 2022-09-15 DIAGNOSIS — R91.1 INCIDENTAL LUNG NODULE: Primary | ICD-10-CM

## 2022-09-23 NOTE — PERIOP NOTES
[facility]  Ambulatory Surgery Unit  Pre-operative Instructions    Surgery/Procedure Date  10/6            Tentative Arrival Time TBD      1. On the day of your surgery/procedure, please report to the Ambulatory Surgery Unit Registration Desk and sign in at your designated time. The Ambulatory Surgery Unit is located in [facility] on the [facility] side of the [facility] across from the [address] [place] [address]. Please have all of your health insurance cards, co-payment, and a photo ID.    **TWO adults may accompany you the day of the procedure. We have limited seating available. If our waiting room is at capacity, your ride may be asked to remain in their vehicle. No one under 15 is allowed in the waiting room. Masks, fully covering the mouth and nose, are required in the waiting room. 2. You must have someone with you to drive you home, as you should not drive a car for 24 hours following anesthesia. Please make arrangements for a responsible adult friend or family member to stay with you for at least the first 24 hours after your surgery. 3. Do not have anything to eat or drink (including water, gum, mints, coffee, juice) after 11:59 PM  10/5. This may not apply to medications prescribed by your physician. (Please note below the special instructions with medications to take the morning of surgery, if applicable.)    4. We recommend you do not drink any alcoholic beverages for 24 hours before and after your surgery. 5. Contact your surgeons office for instructions on the following medications: non-steroidal anti-inflammatory drugs (i.e. Advil, Aleve), vitamins, and supplements. (Some surgeons will want you to stop these medications prior to surgery and others may allow you to take them)   **If you are currently taking Plavix, Coumadin, Aspirin and/or other blood-thinning agents, contact your surgeon for instructions. ** Your surgeon will partner with the physician prescribing these medications to determine if it is safe to stop or if you need to continue taking. Please do not stop taking these medications without instructions from your surgeon. 6. In an effort to help prevent surgical site infection, we ask that you shower with an anti-bacterial soap (i.e. Dial/Safeguard, or the soap provided to you at your preadmission testing appointment) for 3 days prior to and on the morning of surgery, using a fresh towel after each shower. (Please begin this process with fresh bed linens.) Do not apply any lotions, powders, or deodorants after the shower on the day of your procedure. If applicable, please do not shave the operative site for 48 hours prior to surgery. 7. Wear comfortable clothes. Wear glasses instead of contacts. Do not bring any jewelry or money (other than copays or fees as instructed). Do not wear make-up, particularly mascara, the morning of your surgery. Do not wear nail polish, particularly if you are having foot /hand surgery. Wear your hair loose or down, no ponytails, buns, nina pins or clips. All body piercings must be removed. 8. You should understand that if you do not follow these instructions your surgery may be cancelled. If your physical condition changes (i.e. fever, cold or flu) please contact your surgeon as soon as possible. 9. It is important that you be on time. If a situation occurs where you may be late, or if you have any questions or problems, please call [phone].    10. Your surgery time may be subject to change. You will receive a phone call the day prior to surgery to confirm your arrival time. Special Instructions: Take all medications and inhalers, as prescribed, on the morning of surgery with a sip of water EXCEPT: n/a      \  I understand a pre-operative phone call will be made to verify my surgery time.   In the event that I am not available, I give permission for a message to be left on my answering service and/or with another person?       yes         ___________________      ___________________      ________________  (Signature of Patient)          (Witness)                   (Date and Time)

## 2022-09-26 ENCOUNTER — VIRTUAL VISIT (OUTPATIENT)
Dept: FAMILY MEDICINE CLINIC | Age: 77
End: 2022-09-26
Payer: MEDICARE

## 2022-09-26 DIAGNOSIS — H25.9 SENILE CATARACT OF RIGHT EYE, UNSPECIFIED AGE-RELATED CATARACT TYPE: ICD-10-CM

## 2022-09-26 DIAGNOSIS — Z01.818 PREOP EXAMINATION: Primary | ICD-10-CM

## 2022-09-26 PROCEDURE — G0463 HOSPITAL OUTPT CLINIC VISIT: HCPCS | Performed by: FAMILY MEDICINE

## 2022-09-26 PROCEDURE — 1101F PT FALLS ASSESS-DOCD LE1/YR: CPT | Performed by: FAMILY MEDICINE

## 2022-09-26 PROCEDURE — 1123F ACP DISCUSS/DSCN MKR DOCD: CPT | Performed by: FAMILY MEDICINE

## 2022-09-26 PROCEDURE — G8427 DOCREV CUR MEDS BY ELIG CLIN: HCPCS | Performed by: FAMILY MEDICINE

## 2022-09-26 PROCEDURE — G8756 NO BP MEASURE DOC: HCPCS | Performed by: FAMILY MEDICINE

## 2022-09-26 PROCEDURE — G8432 DEP SCR NOT DOC, RNG: HCPCS | Performed by: FAMILY MEDICINE

## 2022-09-26 PROCEDURE — 99213 OFFICE O/P EST LOW 20 MIN: CPT | Performed by: FAMILY MEDICINE

## 2022-09-26 RX ORDER — NEOMYCIN SULFATE, POLYMYXIN B SULFATE, AND DEXAMETHASONE 3.5; 10000; 1 MG/G; [USP'U]/G; MG/G
OINTMENT OPHTHALMIC
COMMUNITY
Start: 2022-09-13

## 2022-09-26 RX ORDER — BROMFENAC 0.76 MG/ML
SOLUTION/ DROPS OPHTHALMIC
COMMUNITY
Start: 2022-09-14

## 2022-09-26 RX ORDER — LOTEPREDNOL ETABONATE 3.8 MG/G
GEL OPHTHALMIC
COMMUNITY
Start: 2022-09-14

## 2022-09-26 NOTE — PROGRESS NOTES
King Costa is a 69 y.o. male who was seen by synchronous (real-time) audio-video technology on 9/26/2022 for Pre-op Exam (Cataract surgery Dr Jodi Dickinson surgery 10/10/22)      Assessment/ Plan:   Diagnoses and all orders for this visit:    1. Preop examination    2. Senile cataract of right eye, unspecified age-related cataract type  - Cleared for surgery - high risk patient for low risk surgery. Already has cardiac clearance. I spent at least 20 minutes on this visit with this established patient. Follow-up and Dispositions    Return if symptoms worsen or fail to improve. Subjective:   Pt is a 69 y.o. WM with PMH sig for viral CM s/p BiV pacemaker, CAD s/p stenting, AS s/p TAVR, Pulm HTN, HLD, preDM, CKD st 3b, hypogonadism who presents for preop. Here for preop. Seen by me in person last month. He will have right cataract surgery with Dr. Dar Aly on 10/6/2022 under topical anesthesia with IV sedation. Denies any latex allergy, previous anesthesia complications, or bleeding pathology. Significant cardiac history with viral cardiomyopathy, aortic stenosis status post TAVR, and pulmonary hypertension but he is largely optimized. He has already obtained cardiac clearance. Some limitations with FERRER with this but stable and optimized. He will ct Eliquis.     ROS  Gen - no fever/chills  Resp - no cough  CV - no chest pain  Rest per HPI    Current Outpatient Medications:     rosuvastatin (CRESTOR) 20 mg tablet, TAKE ONE TABLET BY MOUTH ONE TIME DAILY AT NIGHT, Disp: 90 Tablet, Rfl: 3    carvediloL (COREG) 6.25 mg tablet, Take 6.25 mg by mouth two (2) times a day., Disp: , Rfl:     sodium bicarbonate 650 mg tablet, TAKE ONE TABLET BY MOUTH TWICE A DAY, Disp: 180 Tablet, Rfl: 0    testosterone 12.5 mg/ 1.25 gram (1 %) glpm, APPLY 4 PUMPS TO THE SHOULDERS AND UPPER ARMS ONCE DAILY IN THE MORNING, Disp: 225 g, Rfl: 1    lisinopriL (PRINIVIL, ZESTRIL) 5 mg tablet, 2.5 mg two (2) times a day., Disp: , Rfl:     Spiriva Respimat 1.25 mcg/actuation inhaler, , Disp: , Rfl:     acetaminophen (TYLENOL) 325 mg tablet, Take 650 mg by mouth every four (4) hours as needed for Pain., Disp: , Rfl:     torsemide (DEMADEX) 20 mg tablet, Take 3 Tabs by mouth Before breakfast and dinner. Indications: Congestive Heart Failure (this replaces bumetanide or Bumex), Disp: 180 Tab, Rfl: 6    Eliquis 5 mg tablet, Take 5 mg by mouth two (2) times a day., Disp: , Rfl:     cholecalciferol (VITAMIN D3) 25 mcg (1,000 unit) cap, Take 1 Cap by mouth daily. , Disp: , Rfl:     Lotemax SM 0.38 % drpg, , Disp: , Rfl:     BromSite 0.075 % drop, , Disp: , Rfl:     neomycin-polymyxin-dexamethasone (DEXACINE) 3.5 mg/g-10,000 unit/g-0.1 % ophthalmic ointment, , Disp: , Rfl:     No Known Allergies    Past Medical History:   Diagnosis Date    Arrhythmia 11/11/2014    CAD (coronary artery disease)     stent, cardiac cath, RAUDEL X2    ED (erectile dysfunction) of organic origin     High cholesterol 3/31/2010    HTN (hypertension) 3/31/2010    Prediabetes 11/11/2014    Pulmonary hypertension ([facility] [address] 8/8/2017    S/P ablation of atrial flutter 3/22/2016    S/P biventricular cardiac pacemaker procedure 4/39/6547    Systolic CHF, acute ([address] 3/22/2016     Past Surgical History:   Procedure Laterality Date    HX ORTHOPAEDIC      fx wrist    HX PACEMAKER      BI-V Pacer/AICD 1/2021    HX TONSILLECTOMY      IA CARDIAC SURG PROCEDURE UNLIST      TAVR june 2021    IA INSJ/RPLCMT PERM DFB W/TRNSVNS LDS 1/DUAL CHMBR N/A 1/26/2021    INSERT ICD BIV MULTI performed by Davian Brown MD at [facility] CARDIAC CATH LAB        Objective:     Patient-Reported Vitals 9/26/2022   Patient-Reported Weight -   Patient-Reported Pulse 83   Patient-Reported Systolic  573   Patient-Reported Diastolic 69      Physical exam:  General appearance - alert, well appearing, and in no distress  Eyes -sclera anicteric, no discharge  HEENT- normocephalic, atraumatic, moist mucous membranes, no visualized neck mass  Chest -normal respiratory effort, no visualized signs of respiratory distress  Neurological - alert, awake, normal speech, no focal findings or movement disorder noted  Psych - normal mood and affect  Skin- no apparent lesions    We discussed the expected course, resolution and complications of the diagnosis(es) in detail. Medication risks, benefits, costs, interactions, and alternatives were discussed as indicated. I advised him to contact the office if his condition worsens, changes or fails to improve as anticipated. He expressed understanding with the diagnosis(es) and plan. Akbar Anguiano, was evaluated through a synchronous (real-time) audio-video encounter. The patient (or guardian if applicable) is aware that this is a billable service, which includes applicable co-pays. This Virtual Visit was conducted with patient's (and/or legal guardian's) consent. The visit was conducted pursuant to the emergency declaration under the [facility], [facility] authority and the [employer] Act. Patient identification was verified, and a caregiver was present when appropriate.   The patient was located at: Home: [place] [phone]  The provider was located at: Home: [unfilled]        Danny Donaldson MD

## 2022-09-26 NOTE — PROGRESS NOTES
Chief Complaint   Patient presents with    Pre-op Exam     Cataract surgery Dr Cherelle Moreau surgery 10/10/22    1. Have you been to the ER, urgent care clinic since your last visit? Hospitalized since your last visit? No    2. Have you seen or consulted any other health care providers outside of the [address] [place] [address] since your last visit? Include any pap smears or colon screening.  No

## 2022-09-27 ENCOUNTER — DOCUMENTATION ONLY (OUTPATIENT)
Dept: FAMILY MEDICINE CLINIC | Age: 77
End: 2022-09-27

## 2022-09-27 DIAGNOSIS — E78.2 MIXED HYPERLIPIDEMIA: ICD-10-CM

## 2022-09-28 RX ORDER — ROSUVASTATIN CALCIUM 20 MG/1
TABLET, COATED ORAL
Qty: 90 TABLET | Refills: 3 | Status: SHIPPED | OUTPATIENT
Start: 2022-09-28

## 2022-10-05 ENCOUNTER — ANESTHESIA EVENT (OUTPATIENT)
Dept: SURGERY | Age: 77
End: 2022-10-05
Payer: MEDICARE

## 2022-10-06 ENCOUNTER — HOSPITAL ENCOUNTER (OUTPATIENT)
Age: 77
Setting detail: OUTPATIENT SURGERY
Discharge: HOME | End: 2022-10-06
Attending: OPHTHALMOLOGY | Admitting: OPHTHALMOLOGY
Payer: MEDICARE

## 2022-10-06 ENCOUNTER — ANESTHESIA (OUTPATIENT)
Dept: SURGERY | Age: 77
End: 2022-10-06
Payer: MEDICARE

## 2022-10-06 VITALS
DIASTOLIC BLOOD PRESSURE: 75 MMHG | SYSTOLIC BLOOD PRESSURE: 107 MMHG | TEMPERATURE: 97.8 F | BODY MASS INDEX: 27.77 KG/M2 | RESPIRATION RATE: 17 BRPM | WEIGHT: 205 LBS | OXYGEN SATURATION: 96 % | HEIGHT: 72 IN | HEART RATE: 82 BPM

## 2022-10-06 PROCEDURE — V2632 POST CHMBR INTRAOCULAR LENS: HCPCS | Performed by: OPHTHALMOLOGY

## 2022-10-06 PROCEDURE — 76210000050 HC AMBSU PH II REC 0.5 TO 1 HR: Performed by: OPHTHALMOLOGY

## 2022-10-06 PROCEDURE — 76030000002 HC AMB SURG OR TIME FIRST 0.: Performed by: OPHTHALMOLOGY

## 2022-10-06 PROCEDURE — 2709999900 HC NON-CHARGEABLE SUPPLY: Performed by: OPHTHALMOLOGY

## 2022-10-06 PROCEDURE — 76060000073 HC AMB SURG ANES FIRST 0.5 HR: Performed by: OPHTHALMOLOGY

## 2022-10-06 PROCEDURE — 74011250636 HC RX REV CODE- 250/636: Performed by: NURSE ANESTHETIST, CERTIFIED REGISTERED

## 2022-10-06 PROCEDURE — 74011000250 HC RX REV CODE- 250: Performed by: OPHTHALMOLOGY

## 2022-10-06 PROCEDURE — 74011000250 HC RX REV CODE- 250

## 2022-10-06 PROCEDURE — 74011250636 HC RX REV CODE- 250/636: Performed by: OPHTHALMOLOGY

## 2022-10-06 DEVICE — LENS IOL SN60WF 21.5D: Type: IMPLANTABLE DEVICE | Site: EYE | Status: FUNCTIONAL

## 2022-10-06 RX ORDER — TROPICAMIDE 10 MG/ML
SOLUTION/ DROPS OPHTHALMIC
Status: COMPLETED
Start: 2022-10-06 | End: 2022-10-06

## 2022-10-06 RX ORDER — SODIUM CHLORIDE 0.9 % (FLUSH) 0.9 %
5-40 SYRINGE (ML) INJECTION AS NEEDED
Status: DISCONTINUED | OUTPATIENT
Start: 2022-10-06 | End: 2022-10-06 | Stop reason: HOSPADM

## 2022-10-06 RX ORDER — CIPROFLOXACIN HYDROCHLORIDE 3.5 MG/ML
1 SOLUTION/ DROPS TOPICAL
Status: COMPLETED | OUTPATIENT
Start: 2022-10-06 | End: 2022-10-06

## 2022-10-06 RX ORDER — SODIUM CHLORIDE, SODIUM LACTATE, POTASSIUM CHLORIDE, CALCIUM CHLORIDE 600; 310; 30; 20 MG/100ML; MG/100ML; MG/100ML; MG/100ML
25 INJECTION, SOLUTION INTRAVENOUS CONTINUOUS
Status: DISCONTINUED | OUTPATIENT
Start: 2022-10-06 | End: 2022-10-06 | Stop reason: HOSPADM

## 2022-10-06 RX ORDER — TROPICAMIDE 10 MG/ML
1 SOLUTION/ DROPS OPHTHALMIC
Status: COMPLETED | OUTPATIENT
Start: 2022-10-06 | End: 2022-10-06

## 2022-10-06 RX ORDER — MIDAZOLAM HYDROCHLORIDE 1 MG/ML
INJECTION, SOLUTION INTRAMUSCULAR; INTRAVENOUS AS NEEDED
Status: DISCONTINUED | OUTPATIENT
Start: 2022-10-06 | End: 2022-10-06 | Stop reason: HOSPADM

## 2022-10-06 RX ORDER — SODIUM CHLORIDE 9 MG/ML
25 INJECTION, SOLUTION INTRAVENOUS CONTINUOUS
Status: DISCONTINUED | OUTPATIENT
Start: 2022-10-06 | End: 2022-10-06 | Stop reason: HOSPADM

## 2022-10-06 RX ORDER — SODIUM CHLORIDE 0.9 % (FLUSH) 0.9 %
5-40 SYRINGE (ML) INJECTION EVERY 8 HOURS
Status: DISCONTINUED | OUTPATIENT
Start: 2022-10-06 | End: 2022-10-06 | Stop reason: HOSPADM

## 2022-10-06 RX ORDER — TIMOLOL MALEATE 5 MG/ML
1 SOLUTION/ DROPS OPHTHALMIC 2 TIMES DAILY
Status: DISCONTINUED | OUTPATIENT
Start: 2022-10-06 | End: 2022-10-06 | Stop reason: HOSPADM

## 2022-10-06 RX ORDER — TETRACAINE HYDROCHLORIDE 5 MG/ML
1 SOLUTION OPHTHALMIC
Status: ACTIVE | OUTPATIENT
Start: 2022-10-06 | End: 2022-10-06

## 2022-10-06 RX ORDER — DIPHENHYDRAMINE HYDROCHLORIDE 50 MG/ML
12.5 INJECTION, SOLUTION INTRAMUSCULAR; INTRAVENOUS AS NEEDED
Status: DISCONTINUED | OUTPATIENT
Start: 2022-10-06 | End: 2022-10-06 | Stop reason: HOSPADM

## 2022-10-06 RX ORDER — OFLOXACIN 3 MG/ML
2 SOLUTION/ DROPS OPHTHALMIC ONCE
Status: COMPLETED | OUTPATIENT
Start: 2022-10-06 | End: 2022-10-06

## 2022-10-06 RX ORDER — FENTANYL CITRATE 50 UG/ML
25 INJECTION, SOLUTION INTRAMUSCULAR; INTRAVENOUS
Status: DISCONTINUED | OUTPATIENT
Start: 2022-10-06 | End: 2022-10-06 | Stop reason: HOSPADM

## 2022-10-06 RX ORDER — ONDANSETRON 2 MG/ML
4 INJECTION INTRAMUSCULAR; INTRAVENOUS AS NEEDED
Status: DISCONTINUED | OUTPATIENT
Start: 2022-10-06 | End: 2022-10-06 | Stop reason: HOSPADM

## 2022-10-06 RX ORDER — LIDOCAINE HYDROCHLORIDE 10 MG/ML
0.1 INJECTION, SOLUTION EPIDURAL; INFILTRATION; INTRACAUDAL; PERINEURAL AS NEEDED
Status: DISCONTINUED | OUTPATIENT
Start: 2022-10-06 | End: 2022-10-06 | Stop reason: HOSPADM

## 2022-10-06 RX ORDER — NEOMYCIN SULFATE, POLYMYXIN B SULFATE, AND DEXAMETHASONE 3.5; 10000; 1 MG/G; [USP'U]/G; MG/G
OINTMENT OPHTHALMIC ONCE
Status: COMPLETED | OUTPATIENT
Start: 2022-10-06 | End: 2022-10-06

## 2022-10-06 RX ADMIN — CIPROFLOXACIN 1 DROP: 3 SOLUTION OPHTHALMIC at 13:17

## 2022-10-06 RX ADMIN — MIDAZOLAM HYDROCHLORIDE 0.5 MG: 1 INJECTION, SOLUTION INTRAMUSCULAR; INTRAVENOUS at 14:09

## 2022-10-06 RX ADMIN — TROPICAMIDE 1 DROP: 10 SOLUTION/ DROPS OPHTHALMIC at 13:09

## 2022-10-06 RX ADMIN — CIPROFLOXACIN 1 DROP: 3 SOLUTION OPHTHALMIC at 13:09

## 2022-10-06 RX ADMIN — MIDAZOLAM HYDROCHLORIDE 0.5 MG: 1 INJECTION, SOLUTION INTRAMUSCULAR; INTRAVENOUS at 13:59

## 2022-10-06 RX ADMIN — CIPROFLOXACIN 1 DROP: 3 SOLUTION OPHTHALMIC at 13:33

## 2022-10-06 RX ADMIN — SODIUM CHLORIDE 25 ML/HR: 9 INJECTION, SOLUTION INTRAVENOUS at 13:06

## 2022-10-06 RX ADMIN — TROPICAMIDE 1 DROP: 10 SOLUTION/ DROPS OPHTHALMIC at 13:33

## 2022-10-06 RX ADMIN — TROPICAMIDE 1 DROP: 10 SOLUTION/ DROPS OPHTHALMIC at 13:17

## 2022-10-06 NOTE — PERIOP NOTES
Permission received to review discharge instructions and discuss private health information with Khalida Larson, ana.

## 2022-10-06 NOTE — PERIOP NOTES
Pt. Alert. Denies pain or chill. Discharge instructions reviewed with caregiver and patient. Allowed and answered questions. Tolerating PO fluids. Both state ready for discharge. 1505 Discharged to car with bag with wallet, keys, and has eyeglasses on.  No issues

## 2022-10-06 NOTE — OP NOTES
Operative Note    Patient: Siva Shipman  YOB: 1945  MRN: [id number]    Date of Procedure: 10/6/2022     Preoperative Diagnosis: Nuclear Sclerotic cataract right eye H25.11  Postoperative Diagnosis: Nuclear Sclerotic cataract right eye H25.11  Procedure: Extracapsular cataract extraction with lens implant right eye  Surgeon:  DANA Odonnell MD  Assistants: None  Anesthesia: MAC with local  Estimated Blood Loss: None  Findings: Cataract right eye  Complications: None  Specimens: None  Prosthetic Devices: Intraocular lens implant     The patient's right eye was dilated with mydriacyl 1% and ciprofloxacin 0.3% for 3 doses preoperatively. The patient was taken to the operating room and was given sedation. Tetracaine was given topically to the right eye, and the eye was prepped and draped in the usual manner for sterile eye surgery, including Betadine solution being dropped onto the conjunctiva at the beginning of the prep. The eyelashes were isolated with a plastic drape. A lid speculum was placed. A #15 blade was used to make a paracentesis at the 10:00 location. The eye was flushed with a lidocaine / epinephrine mixture (\"Shugarcaine\"). The eye was filled with Viscoat (Duovisc), and a crescent blade was used to make a 2.5 mm incision at the limbus temporally. This was dissected 2 mm into clear cornea, and the eye was entered with a 2.4 mm keratome. A 0.12 forceps was used for fixation during the procedure. A capsulorhexis flap was started with a cystotome, and this was completed 360 degrees with Utrata forceps. The capsular piece was removed. Goodridge dissection was performed with the \"Shugarcaine\" mixture on a cannula. The lens nucleus was removed using phacoemulsification with a total phaco time of 2:04 minutes at 14.8%. The lens was cracked and manipulated with a Sinsky hook. Residual cortex was removed using irrigation / aspiration. The capsule remained intact. The capsule was refilled with Provisc (Duovisc), and an Jose Intraocular lens model SN60WF power 21.50 was placed in a lens folding cartridge with Provisc. The lens was unfolded into the capsular bag. The lens centered well. Residual Provisc and Viscoat were removed using I / A. No suture was required to close the incision. The eye was flushed with BSS through the paracentesis. Betadine solution was placed on the conjunctival surface at the end of the case. The eye was left soft and formed at the end of the case. The incision site was water tight. The speculum was removed, and a drop of timolol 0.5% and neomycin/polymixin/dexamethasone ointment was placed on the eye followed by a shield. The patient tolerated the procedure well and is to follow-up in one day.

## 2022-10-06 NOTE — BRIEF OP NOTE
Brief Postoperative Note    Patient: Marina Devi  YOB: 1945  MRN: [id number]    Date of Procedure: 10/6/2022     Pre-Op Diagnosis: Age-related nuclear cataract, right eye [H25.11]    Post-Op Diagnosis: Nuclear Sclerotic cataract right eye H25.11    Procedure(s):  RIGHT EYE CATARACT EXTRACTION WITH INTRA OCULAR LENS IMPLANT    Surgeon(s):  Durward Libman, MD    Surgical Assistant: None    Anesthesia: MAC     Estimated Blood Loss (mL): 0    Complications: none    Specimens: * No specimens in log *     Implants:   Implant Name Type Inv. Item Serial No.  Lot No. LRB No. Used Action   LENS IOL SN60WF 21.5D - [id number]  LENS IOL SN60WF 21.5D [id number] [employer] NA Right 1 Implanted       Drains: * No LDAs found *    Findings: Visually significant cataract right eye.     Electronically Signed by Padmini Wilson MD on 10/6/2022 at 2:39 PM

## 2022-10-06 NOTE — ANESTHESIA PREPROCEDURE EVALUATION
Anesthetic History   No history of anesthetic complications            Review of Systems / Medical History  Patient summary reviewed, nursing notes reviewed and pertinent labs reviewed    Pulmonary                Comments: Former smoker, quit 1995   Neuro/Psych   Within defined limits           Cardiovascular    Hypertension: well controlled  Valvular problems/murmurs: aortic stenosis    CHF (Combined systolic & diastolic/ Compensated ): NYHA Classification I, dyspnea on exertion  Dysrhythmias (Paroxysmal AF) : atrial fibrillation  Pacemaker, CAD, PAD, cardiac stents and hyperlipidemia    Exercise tolerance: <4 METS  Comments:  Viral Cardiomyopathy dx'd 2016   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 AV in stable position with mild adriano valvular leak.  Mod -severe MR and Pulmonary Htn    A fib ablation 2016  Cardioversion AF 12/21  BiV Pacemaker 01/21    Pulmonary Hypertension      **Optimized per PCP note         GI/Hepatic/Renal         Renal disease: CRI       Endo/Other  Within defined limits          Comments: Pre-diabetes Other Findings   Comments: Cataracts         Physical Exam    Airway  Mallampati: III  TM Distance: 4 - 6 cm  Neck ROM: decreased range of motion   Mouth opening: Normal     Cardiovascular    Rhythm: irregular           Dental    Dentition: Caps/crowns  Comments: Chipped upper right molar  Bridges on back teeth   Pulmonary  Breath sounds clear to auscultation               Abdominal  GI exam deferred       Other Findings            Anesthetic Plan    ASA: 4  Anesthesia type: MAC          Induction: Intravenous  Anesthetic plan and risks discussed with: Patient      Took BB at 8 am

## 2022-10-06 NOTE — DISCHARGE INSTRUCTIONS
Ugo Arellano MD  [address] [place] [address] Drive   BRISEIDA [place], [address]  Phone: [phone]  If you are unable to keep appointment, kindly give 24 hours notice please. REMOVE PATCH  START DROPS WHEN YOU GET HOME  PUT PATCH BACK ON AT BEDTIME    DO NOT RUB the eye that was operated on. Do not strain excessively. It is all right to bend as long as you do not strain. It is safe to take a shower, wash your face, and wash your hair. Just keep the eye closed. Do not swim for 1 week after surgery. If you have any problems or questions, do not hesitate to call [phone]. Follow instructions on eye drops from office. You may take Tylenol or Advil for discomfort. If it pressure not relieved by Tylenol or Advil, please call Dr. Salmeron Guard office. TO PREVENT AN INFECTION      [address] YOUR HANDS    To prevent infection, good handwashing is the most important thing you or your caregiver can do. Wash your hands with soap and water or use the hand  we gave you before you touch any wounds. USE CLEAN SHEETS    Use freshly cleaned sheets on your bed after surgery. To keep the surgery site clean, do not allow pets to sleep with you while your wound is still healing. STOP SMOKING    Stop smoking, or at least cut back on smoking    Smoking slows your healing. CONTROL YOUR BLOOD SUGAR    High blood sugars slow wound healing. If you are diabetic, control your blood sugar levels before and after your surgery. If you were given prescriptions, please review the written information on the prescribed medications. DO NOT DRIVE WHILE TAKING NARCOTIC PAIN MEDICATIONS. DISCHARGE SUMMARY from Nurse    The following personal items collected during your admission are returned to you:   Dental Appliance: Dental Appliances: None  Vision: Visual Aid: Glasses (PACU)  Hearing Aid:    Jewelry: Jewelry: None  Clothing: Clothing: With patient  Other Valuables:  Other Valuables: Cell Phone, Georgi Riedel (PACU)  Valuables sent to safe:      PATIENT INSTRUCTIONS:    After general anesthesia or intravenous sedation, for 24 hours or while taking prescription Narcotics:  Someone should be with you for the next 24 hours. For your own safety, a responsible adult must drive you home. Limit your activities  Recommended activity: Rest today, Do not climb stairs or shower unattended for the next 24 hours. Do not drive and operate hazardous machinery  Do not make important personal or business decisions  Do  not drink alcoholic beverages  If you have not urinated within 8 hours after discharge, please contact your surgeon on call. Report the following to your surgeon:  Excessive pain, swelling, redness or odor of or around the surgical area  Temperature over 100.5  Nausea and vomiting lasting longer than 4 hours or if unable to take medications    You will receive a Post Operative Call from one of the Recovery Room Nurses on the day after your surgery to check on you. It is very important for us to know how you are recovering after your surgery. You may receive an e-mail or letter in the mail from [employer] regarding your experience with us in the Ambulatory Surgery Unit. Your feedback is valuable to us and we appreciate your participation in the survey. We wish youre a speedy recovery ? What to do at Home:      *  Please give a list of your current medications to your Primary Care Provider. *  Please update this list whenever your medications are discontinued, doses are      changed, or new medications (including over-the-counter products) are added. *  Please carry medication information at all times in case of emergency situations.           These are general instructions for a healthy lifestyle:    No smoking/ No tobacco products/ Avoid exposure to second hand smoke    Surgeon General's Warning:  Quitting smoking now greatly reduces serious risk to your health. Obesity, smoking, and sedentary lifestyle greatly increases your risk for illness    A healthy diet, regular physical exercise & weight monitoring are important for maintaining a healthy lifestyle    You may be retaining fluid if you have a history of heart failure or if you experience any of the following symptoms:  Weight gain of 3 pounds or more overnight or 5 pounds in a week, increased swelling in our hands or feet or shortness of breath while lying flat in bed. Please call your doctor as soon as you notice any of these symptoms; do not wait until your next office visit. Recognize signs and symptoms of STROKE:    B - Balance  E - Eyes    F-face looks uneven    A-arms unable to move or move even    S-speech slurred or non-existent    T-time-call 911 as soon as signs and symptoms begin-DO NOT go       Back to bed or wait to see if you get better-TIME IS BRAIN. If you have not received your influenza and/or pneumococcal vaccine, please follow up with your primary care physician. The discharge information has been reviewed with the patient and caregiver. The patient and caregiver verbalized understanding.

## 2022-10-06 NOTE — ANESTHESIA POSTPROCEDURE EVALUATION
Procedure(s):  RIGHT EYE CATARACT EXTRACTION WITH INTRA OCULAR LENS IMPLANT.     MAC    Anesthesia Post Evaluation      Multimodal analgesia: multimodal analgesia used between 6 hours prior to anesthesia start to PACU discharge  Patient location during evaluation: PACU  Patient participation: complete - patient participated  Level of consciousness: awake and alert  Pain score: 0  Airway patency: patent  Anesthetic complications: no  Cardiovascular status: acceptable  Respiratory status: acceptable  Hydration status: acceptable  Post anesthesia nausea and vomiting:  none  Final Post Anesthesia Temperature Assessment:  Normothermia (36.0-37.5 degrees C)      INITIAL Post-op Vital signs:   Vitals Value Taken Time   /75 10/06/22 1445   Temp 36.6 °C (97.8 °F) 10/06/22 1432   Pulse 82 10/06/22 1445   Resp 17 10/06/22 1445   SpO2 96 % 10/06/22 1445

## 2022-10-06 NOTE — PERIOP NOTES
Lokesh Ellis  1945  [id number]    Situation:  Verbal report given from: RN  Procedure: Procedure(s):  RIGHT EYE CATARACT EXTRACTION WITH INTRA OCULAR LENS IMPLANT    Background:    Preoperative diagnosis: Age-related nuclear cataract, right eye    Postoperative diagnosis: Age-related nuclear cataract, right eye    :  Dr. Abdoulaye Lin    Assistant(s): Circ-1: Shanda Monk RN  Scrub Tech-1: Dontae Diaz  Scrub Tech-2: Marquis ADEN, RT    Specimens: * No specimens in log *    Assessment:  Intra-procedure medications         Anesthesia gave intra-procedure sedation and medications, see anesthesia flow sheet     Intravenous fluids: LR@ KVO     Vital signs stable.  Pt denies pain or chill       Recommendation:    Permission to share finding with friend : yes

## 2022-10-25 DIAGNOSIS — N52.9 ED (ERECTILE DYSFUNCTION) OF ORGANIC ORIGIN: ICD-10-CM

## 2022-10-25 DIAGNOSIS — E29.1 HYPOGONADISM IN MALE: ICD-10-CM

## 2022-11-05 DIAGNOSIS — E87.29 HYPERCHLOREMIC METABOLIC ACIDOSIS: ICD-10-CM

## 2022-11-05 RX ORDER — TESTOSTERONE 10 MG/G
GEL TOPICAL
Qty: 225 G | Refills: 1 | Status: SHIPPED | OUTPATIENT
Start: 2022-11-05

## 2022-11-08 ENCOUNTER — HOSPITAL ENCOUNTER (OUTPATIENT)
Dept: CT IMAGING | Age: 77
Discharge: HOME | End: 2022-11-08
Attending: INTERNAL MEDICINE
Payer: MEDICARE

## 2022-11-08 DIAGNOSIS — R91.1 INCIDENTAL LUNG NODULE: ICD-10-CM

## 2022-11-08 PROCEDURE — 71250 CT THORAX DX C-: CPT

## 2022-11-12 RX ORDER — SODIUM BICARBONATE 650 MG/1
TABLET ORAL
Qty: 180 TABLET | Refills: 0 | Status: SHIPPED | OUTPATIENT
Start: 2022-11-12 | End: 2022-11-29 | Stop reason: SDUPTHER

## 2022-11-29 ENCOUNTER — OFFICE VISIT (OUTPATIENT)
Dept: FAMILY MEDICINE CLINIC | Age: 77
End: 2022-11-29
Payer: MEDICARE

## 2022-11-29 VITALS
HEART RATE: 61 BPM | HEIGHT: 71 IN | WEIGHT: 207.4 LBS | TEMPERATURE: 97.1 F | RESPIRATION RATE: 16 BRPM | DIASTOLIC BLOOD PRESSURE: 72 MMHG | OXYGEN SATURATION: 99 % | SYSTOLIC BLOOD PRESSURE: 98 MMHG | BODY MASS INDEX: 29.03 KG/M2

## 2022-11-29 DIAGNOSIS — D69.6 THROMBOCYTOPENIA (HCC): ICD-10-CM

## 2022-11-29 DIAGNOSIS — I10 ESSENTIAL HYPERTENSION: ICD-10-CM

## 2022-11-29 DIAGNOSIS — Z86.79 S/P ABLATION OF ATRIAL FLUTTER: ICD-10-CM

## 2022-11-29 DIAGNOSIS — Z95.0 S/P BIVENTRICULAR CARDIAC PACEMAKER PROCEDURE: ICD-10-CM

## 2022-11-29 DIAGNOSIS — E29.1 HYPOGONADISM IN MALE: ICD-10-CM

## 2022-11-29 DIAGNOSIS — N18.31 STAGE 3A CHRONIC KIDNEY DISEASE (HCC): ICD-10-CM

## 2022-11-29 DIAGNOSIS — E78.2 MIXED HYPERLIPIDEMIA: Primary | ICD-10-CM

## 2022-11-29 DIAGNOSIS — E04.1 THYROID CYST: ICD-10-CM

## 2022-11-29 DIAGNOSIS — Z79.899 ENCOUNTER FOR LONG-TERM (CURRENT) USE OF MEDICATIONS: ICD-10-CM

## 2022-11-29 DIAGNOSIS — Z23 ENCOUNTER FOR IMMUNIZATION: ICD-10-CM

## 2022-11-29 DIAGNOSIS — N52.9 ED (ERECTILE DYSFUNCTION) OF ORGANIC ORIGIN: ICD-10-CM

## 2022-11-29 DIAGNOSIS — Z98.890 S/P ABLATION OF ATRIAL FLUTTER: ICD-10-CM

## 2022-11-29 DIAGNOSIS — Z95.5 S/P DRUG ELUTING CORONARY STENT PLACEMENT: ICD-10-CM

## 2022-11-29 DIAGNOSIS — Z95.2 S/P TAVR (TRANSCATHETER AORTIC VALVE REPLACEMENT): ICD-10-CM

## 2022-11-29 DIAGNOSIS — E87.29 HYPERCHLOREMIC METABOLIC ACIDOSIS: ICD-10-CM

## 2022-11-29 PROCEDURE — G0463 HOSPITAL OUTPT CLINIC VISIT: HCPCS | Performed by: FAMILY MEDICINE

## 2022-11-29 PROCEDURE — 90694 VACC AIIV4 NO PRSRV 0.5ML IM: CPT | Performed by: FAMILY MEDICINE

## 2022-11-29 RX ORDER — SODIUM BICARBONATE 650 MG/1
650 TABLET ORAL 2 TIMES DAILY
Qty: 180 TABLET | Refills: 1 | Status: SHIPPED | OUTPATIENT
Start: 2022-11-29

## 2022-11-29 NOTE — PROGRESS NOTES
Patient identified with two identification factors, Name and Date of Birth. Chief Complaint   Patient presents with    Hypertension     3 month follow-up. Cholesterol Problem    Blurred Vision       Visit Vitals  BP 98/72 (BP 1 Location: Right arm, BP Patient Position: Sitting, BP Cuff Size: Adult)   Pulse 61   Temp 97.1 °F (36.2 °C) (Oral)   Resp 16   Ht 5' 11\" (1.803 m)   Wt 207 lb 6.4 oz (94.1 kg)   SpO2 99%   BMI 28.93 kg/m²       Health Maintenance Due   Topic    Shingrix Vaccine Age 50> (1 of 2)    Flu Vaccine (1)    Lipid Screen     Depression Screen         1. \"Have you been to the ER, urgent care clinic since your last visit? Hospitalized since your last visit? \" No    2. \"Have you seen or consulted any other health care providers outside of the [address] [place] [address] since your last visit? \"  Yes. Doctor Jose Queen for Cataract removal.  October 2022.

## 2022-11-29 NOTE — Clinical Note
Liz Doll, looks like the flu shot was not put in by the float nurse. Could you add this info? I believe he received this in the office. Thanks! Detail Level: Detailed Detail Level: Simple

## 2022-11-29 NOTE — PROGRESS NOTES
After obtaining consent, and per orders of Dr. Ron Graf injection of Influenza FLUAD (left deltoid) at 10:27am given by Debbie Hill LPN. Patient instructed to remain in clinic for 20 minutes afterwards, and to report any adverse reaction to me immediately.

## 2022-11-29 NOTE — PROGRESS NOTES
Dallin Aguilera (: 1945) is a 69 y.o. male, established patient, here for evaluation of the following chief complaint(s):  Hypertension (3 month follow-up.), Cholesterol Problem, and Blurred Vision       ASSESSMENT/PLAN:  Diagnoses and all orders for this visit:    1. Mixed hyperlipidemia-has been stable, rechecking labs, continue on Crestor  -     METABOLIC PANEL, COMPREHENSIVE; Future  -     LIPID PANEL; Future  -     TSH 3RD GENERATION; Future  -     HEMOGLOBIN A1C WITH EAG; Future    2. Hyperchloremic metabolic acidosis-stable, continuing sodium bicarb and rechecking labs  -     sodium bicarbonate 650 mg tablet; Take 1 Tablet by mouth two (2) times a day. -     METABOLIC PANEL, COMPREHENSIVE; Future    3. Essential hypertension-well-controlled and continues checking home blood pressures regularly to avoid hypotension  -     METABOLIC PANEL, COMPREHENSIVE; Future    4. S/P TAVR (transcatheter aortic valve replacement)  5. S/P drug eluting coronary stent placement  6. S/P ablation of atrial flutter  7. S/P biventricular cardiac pacemaker procedure  - Stable, ct following with Dr. Katherine Duke and Dr. Delta Schmidt    8. Stage 3a chronic kidney disease (HCC)-has been stable, rechecking labs  -     CBC WITH AUTOMATED DIFF; Future  -     METABOLIC PANEL, COMPREHENSIVE; Future  -     LIPID PANEL; Future  -     TSH 3RD GENERATION; Future  -     HEMOGLOBIN A1C WITH EAG; Future    9. Encounter for long-term (current) use of medications  -     CBC WITH AUTOMATED DIFF; Future  -     METABOLIC PANEL, COMPREHENSIVE; Future  -     LIPID PANEL; Future  -     TSH 3RD GENERATION; Future  -     TESTOSTERONE, TOTAL, ADULT MALE; Future  -     HEMOGLOBIN A1C WITH EAG; Future    10. Encounter for immunization  -     INFLUENZA, FLUAD, (AGE 72 Y+), IM, PF, 0.5 ML    11. ED (erectile dysfunction) of organic origin    12.  Hypogonadism in male-rechecking labs, has been on and off testosterone but would like to continue  -     CBC WITH AUTOMATED DIFF; Future  -     TESTOSTERONE, TOTAL, ADULT MALE; Future    13. Thyroid cyst-stable with no new issues. Rechecking TSH    14. Thrombocytopenia (HCC)-rechecking labs and will plan sending to hematology again if platelets drop further  -     CBC WITH AUTOMATED DIFF; Future    Return in about 3 months (around 2/28/2023). Subjective:   78 yo WM with PMH sig for viral CM, AS s/p TAVR, Pulm HTN, HLD, preDM, CKD st 3b, hypogonadism who presents for routine follow up on chronic conditions. Doing well today. Recently saw Dr. Charu De Souza and planning for SUSANNA to eventually have MitraClip early next year for mod-severe MRArnulfo Still getting dyspnea with exertion (ex. Going to grocery, etc). Reviewed his known cardiac and pulmonary issues that are contributing. Sent to H/O back in 7/2021 for thrombocytopenia but never ended up having this evaluation. CT Chest 5/2022:  IMPRESSION  1. Interval development of small bilateral pleural effusions right greater than left and mild interstitial edema. Additionally there is cardiomegaly. These findings are consistent with interval development of congestive heart failure. 2. Increased nodular area of consolidation posteriorly and medially in left upper lobe. This has increased in size and there are 2 adjacent small nodules. Additionally this is surrounded by some groundglass opacities and appears to occupy secondary pulmonary lobule. Most likely this is acute infectious or inflammatory process. Neoplastic process is not excluded. Clinical correlation and close follow-up is suggested. Follow-up exam in 3 months is suggested. 3. Increased clustered nodularity and tree-in-bud opacities posteriorly in the right upper lobe consistent with progressive small airways infection. 4. Persistent nodular densities medially and inferiorly in the right upper lobe.  As described above there are other small nodular densities in the lungs that are new likely related to infectious process. Neoplastic process is not excluded and  close follow-up is suggested. 5. Enlarged subcarinal lymph node has increased in size. Close follow-up is suggested. Follow-up exam in 3 months is suggested. 23X    Continues following with Dr. Ana Lakhani for pulmonology and recent notes reviewed. Noted stability and CT findings. Planning for PFTs after MitraClip and expecting pulmonary hypertension to improve with MitraClip as well. ROS  Gen - no fever/chills  Resp - no dyspnea or cough  CV - no chest pain, + FERRER   - per HPI  Rest per HPI      Past Medical History:   Diagnosis Date    Arrhythmia 11/11/2014    CAD (coronary artery disease)     stent, cardiac cath, RAUDEL X2    Cataract (lens) fragments in eye following cataract surgery, right eye     October 2022    ED (erectile dysfunction) of organic origin     High cholesterol 03/31/2010    HTN (hypertension) 03/31/2010    Prediabetes 11/11/2014    Pulmonary hypertension ([facility] [address] 08/08/2017    S/P ablation of atrial flutter 03/22/2016    S/P biventricular cardiac pacemaker procedure 12/81/9473    Systolic CHF, acute ([facility] [address] 03/22/2016     Past Surgical History:   Procedure Laterality Date    HX ORTHOPAEDIC      fx wrist    HX PACEMAKER      BI-V Pacer/AICD 1/2021    HX TONSILLECTOMY      HI CARDIAC SURG PROCEDURE UNLIST      TAVR june 2021    HI INSJ/RPLCMT PERM DFB W/TRNSVNS LDS 1/DUAL CHMBR N/A 1/26/2021    INSERT ICD BIV MULTI performed by Ginny Sinclair MD at [facility] CARDIAC CATH LAB        Objective:     Blood pressure 98/72, pulse 61, temperature 97.1 °F (36.2 °C), temperature source Oral, resp. rate 16, height 5' 11\" (1.803 m), weight 207 lb 6.4 oz (94.1 kg), SpO2 99 %.     Physical Exam  General appearance - alert, well appearing, and in no distress  Eyes -sclera anicteric  Neck - supple, no significant adenopathy, no thyromegaly  Chest - clear to auscultation, no wheezes, rales or rhonchi, symmetric air entry  Heart - normal rate, regular rhythm, normal S1, S2, no murmurs, rubs, clicks or gallops, + pacemaker  Neurological - alert, oriented, normal speech, no focal findings or movement disorder noted  Extr - no edema  Psych - normal mood and affect    On this date 11/29/2022 I have spent 30 minutes reviewing previous notes, test results and face to face with the patient discussing the diagnosis and importance of compliance with the treatment plan as well as documenting on the day of the visit. An electronic signature was used to authenticate this note.   -- Naveen Davidson MD

## 2022-12-14 ENCOUNTER — HOSPITAL ENCOUNTER (OUTPATIENT)
Age: 77
Setting detail: OUTPATIENT SURGERY
Discharge: HOME | End: 2022-12-14
Attending: INTERNAL MEDICINE | Admitting: INTERNAL MEDICINE
Payer: MEDICARE

## 2022-12-14 ENCOUNTER — HOSPITAL ENCOUNTER (OUTPATIENT)
Dept: NON INVASIVE DIAGNOSTICS | Age: 77
Discharge: HOME | End: 2022-12-14
Attending: INTERNAL MEDICINE
Payer: MEDICARE

## 2022-12-14 ENCOUNTER — ANESTHESIA EVENT (OUTPATIENT)
Dept: NON INVASIVE DIAGNOSTICS | Age: 77
End: 2022-12-14
Payer: MEDICARE

## 2022-12-14 ENCOUNTER — ANESTHESIA (OUTPATIENT)
Dept: NON INVASIVE DIAGNOSTICS | Age: 77
End: 2022-12-14
Payer: MEDICARE

## 2022-12-14 VITALS
OXYGEN SATURATION: 99 % | RESPIRATION RATE: 25 BRPM | WEIGHT: 198.41 LBS | SYSTOLIC BLOOD PRESSURE: 108 MMHG | HEART RATE: 85 BPM | HEIGHT: 71 IN | TEMPERATURE: 97.6 F | BODY MASS INDEX: 27.78 KG/M2 | DIASTOLIC BLOOD PRESSURE: 79 MMHG

## 2022-12-14 VITALS
HEART RATE: 96 BPM | DIASTOLIC BLOOD PRESSURE: 76 MMHG | OXYGEN SATURATION: 96 % | SYSTOLIC BLOOD PRESSURE: 112 MMHG | TEMPERATURE: 97.6 F | RESPIRATION RATE: 32 BRPM

## 2022-12-14 DIAGNOSIS — I42.9 CARDIOMYOPATHY, UNSPECIFIED TYPE (HCC): ICD-10-CM

## 2022-12-14 DIAGNOSIS — I34.0 MITRAL VALVE INSUFFICIENCY, UNSPECIFIED ETIOLOGY: ICD-10-CM

## 2022-12-14 LAB
ECHO AV AREA PEAK VELOCITY: 2.4 CM2
ECHO AV AREA VTI: 2.4 CM2
ECHO AV AREA/BSA PEAK VELOCITY: 1.1 CM2/M2
ECHO AV AREA/BSA VTI: 1.1 CM2/M2
ECHO AV MEAN GRADIENT: 5 MMHG
ECHO AV MEAN VELOCITY: 1 M/S
ECHO AV PEAK GRADIENT: 14 MMHG
ECHO AV PEAK VELOCITY: 1.9 M/S
ECHO AV VELOCITY RATIO: 0.63
ECHO AV VTI: 26.3 CM
ECHO EST RA PRESSURE: 10 MMHG
ECHO LVOT AREA: 4.2 CM2
ECHO LVOT AV VTI INDEX: 0.62
ECHO LVOT DIAM: 2.3 CM
ECHO LVOT MEAN GRADIENT: 2 MMHG
ECHO LVOT PEAK GRADIENT: 5 MMHG
ECHO LVOT PEAK VELOCITY: 1.2 M/S
ECHO LVOT STROKE VOLUME INDEX: 32 ML/M2
ECHO LVOT SV: 67.3 ML
ECHO LVOT VTI: 16.2 CM
ECHO MV A VELOCITY: 0.26 M/S
ECHO MV AREA INDEX PLAN: 3.3 CM2/M2
ECHO MV AREA PHT: 4.6 CM2
ECHO MV AREA PLAN: 7 CM2
ECHO MV AREA VTI: 3.1 CM2
ECHO MV E DECELERATION TIME (DT): 163.7 MS
ECHO MV E VELOCITY: 0.89 M/S
ECHO MV E/A RATIO: 3.42
ECHO MV LVOT VTI INDEX: 1.33
ECHO MV MAX VELOCITY: 0.9 M/S
ECHO MV MEAN GRADIENT: 1 MMHG
ECHO MV MEAN VELOCITY: 0.4 M/S
ECHO MV PEAK GRADIENT: 3 MMHG
ECHO MV PRESSURE HALF TIME (PHT): 47.5 MS
ECHO MV REGURGITANT ALIASING (NYQUIST) VELOCITY: 39 CM/S
ECHO MV REGURGITANT VELOCITY PISA: 5 M/S
ECHO MV REGURGITANT VTIA: 170.1 CM
ECHO MV VTI: 21.5 CM
ECHO RIGHT VENTRICULAR SYSTOLIC PRESSURE (RVSP): 67 MMHG
ECHO TV REGURGITANT MAX VELOCITY: 3.77 M/S
ECHO TV REGURGITANT PEAK GRADIENT: 57 MMHG
ERYTHROCYTE [DISTWIDTH] IN BLOOD BY AUTOMATED COUNT: 14.7 % (ref 11.5–14.5)
HCT VFR BLD AUTO: 39 % (ref 36.6–50.3)
HGB BLD-MCNC: 13.4 G/DL (ref 12.1–17)
MCH RBC QN AUTO: 34.4 PG (ref 26–34)
MCHC RBC AUTO-ENTMCNC: 34.4 G/DL (ref 30–36.5)
MCV RBC AUTO: 100.3 FL (ref 80–99)
NRBC # BLD: 0 K/UL (ref 0–0.01)
NRBC BLD-RTO: 0 PER 100 WBC
PLATELET # BLD AUTO: 135 K/UL (ref 150–400)
PMV BLD AUTO: 11.9 FL (ref 8.9–12.9)
RBC # BLD AUTO: 3.89 M/UL (ref 4.1–5.7)
WBC # BLD AUTO: 8.7 K/UL (ref 4.1–11.1)

## 2022-12-14 PROCEDURE — 74011000250 HC RX REV CODE- 250: Performed by: INTERNAL MEDICINE

## 2022-12-14 PROCEDURE — 74011000250 HC RX REV CODE- 250: Performed by: ANESTHESIOLOGY

## 2022-12-14 PROCEDURE — 76060000033 HC ANESTHESIA 1 TO 1.5 HR

## 2022-12-14 PROCEDURE — 74011250636 HC RX REV CODE- 250/636: Performed by: NURSE ANESTHETIST, CERTIFIED REGISTERED

## 2022-12-14 PROCEDURE — 93005 ELECTROCARDIOGRAM TRACING: CPT

## 2022-12-14 PROCEDURE — 93451 RIGHT HEART CATH: CPT | Performed by: INTERNAL MEDICINE

## 2022-12-14 PROCEDURE — 74011250636 HC RX REV CODE- 250/636: Performed by: INTERNAL MEDICINE

## 2022-12-14 PROCEDURE — 77030013406 HC CATH CTRL EDWD -B: Performed by: INTERNAL MEDICINE

## 2022-12-14 PROCEDURE — 93312 ECHO TRANSESOPHAGEAL: CPT

## 2022-12-14 PROCEDURE — C1751 CATH, INF, PER/CENT/MIDLINE: HCPCS | Performed by: INTERNAL MEDICINE

## 2022-12-14 PROCEDURE — 77030013744: Performed by: INTERNAL MEDICINE

## 2022-12-14 PROCEDURE — 2709999900 HC NON-CHARGEABLE SUPPLY: Performed by: INTERNAL MEDICINE

## 2022-12-14 PROCEDURE — 36415 COLL VENOUS BLD VENIPUNCTURE: CPT

## 2022-12-14 PROCEDURE — 77030013797 HC KT TRNSDUC PRSSR EDWD -A: Performed by: INTERNAL MEDICINE

## 2022-12-14 PROCEDURE — C1769 GUIDE WIRE: HCPCS | Performed by: INTERNAL MEDICINE

## 2022-12-14 PROCEDURE — 76937 US GUIDE VASCULAR ACCESS: CPT | Performed by: INTERNAL MEDICINE

## 2022-12-14 PROCEDURE — 74011250636 HC RX REV CODE- 250/636: Performed by: ANESTHESIOLOGY

## 2022-12-14 PROCEDURE — 92960 CARDIOVERSION ELECTRIC EXT: CPT

## 2022-12-14 PROCEDURE — 85027 COMPLETE CBC AUTOMATED: CPT

## 2022-12-14 PROCEDURE — C1894 INTRO/SHEATH, NON-LASER: HCPCS | Performed by: INTERNAL MEDICINE

## 2022-12-14 RX ORDER — SODIUM CHLORIDE 9 MG/ML
INJECTION, SOLUTION INTRAVENOUS
Status: COMPLETED | OUTPATIENT
Start: 2022-12-14 | End: 2022-12-14

## 2022-12-14 RX ORDER — PROPOFOL 10 MG/ML
INJECTION, EMULSION INTRAVENOUS AS NEEDED
Status: DISCONTINUED | OUTPATIENT
Start: 2022-12-14 | End: 2022-12-14 | Stop reason: HOSPADM

## 2022-12-14 RX ORDER — LIDOCAINE HYDROCHLORIDE 10 MG/ML
INJECTION INFILTRATION; PERINEURAL AS NEEDED
Status: DISCONTINUED | OUTPATIENT
Start: 2022-12-14 | End: 2022-12-14 | Stop reason: HOSPADM

## 2022-12-14 RX ORDER — HEPARIN SODIUM 200 [USP'U]/100ML
INJECTION, SOLUTION INTRAVENOUS
Status: COMPLETED | OUTPATIENT
Start: 2022-12-14 | End: 2022-12-14

## 2022-12-14 RX ORDER — METOLAZONE 5 MG/1
5 TABLET ORAL
Qty: 1 TABLET | Refills: 0 | Status: SHIPPED
Start: 2022-12-15

## 2022-12-14 RX ORDER — NORETHINDRONE AND ETHINYL ESTRADIOL 0.5-0.035
KIT ORAL AS NEEDED
Status: DISCONTINUED | OUTPATIENT
Start: 2022-12-14 | End: 2022-12-14 | Stop reason: HOSPADM

## 2022-12-14 RX ORDER — SODIUM CHLORIDE, SODIUM LACTATE, POTASSIUM CHLORIDE, CALCIUM CHLORIDE 600; 310; 30; 20 MG/100ML; MG/100ML; MG/100ML; MG/100ML
INJECTION, SOLUTION INTRAVENOUS
Status: DISCONTINUED | OUTPATIENT
Start: 2022-12-14 | End: 2022-12-14 | Stop reason: HOSPADM

## 2022-12-14 RX ORDER — METOLAZONE 5 MG/1
5 TABLET ORAL
Status: ON HOLD | COMMUNITY
End: 2022-12-14 | Stop reason: SDUPTHER

## 2022-12-14 RX ADMIN — EPHEDRINE SULFATE 10 MG: 50 INJECTION INTRAVENOUS at 14:44

## 2022-12-14 RX ADMIN — PROPOFOL 20 MG: 10 INJECTION, EMULSION INTRAVENOUS at 14:16

## 2022-12-14 RX ADMIN — EPHEDRINE SULFATE 10 MG: 50 INJECTION INTRAVENOUS at 15:10

## 2022-12-14 RX ADMIN — PROPOFOL 20 MG: 10 INJECTION, EMULSION INTRAVENOUS at 14:14

## 2022-12-14 RX ADMIN — PROPOFOL 20 MG: 10 INJECTION, EMULSION INTRAVENOUS at 14:12

## 2022-12-14 RX ADMIN — SODIUM CHLORIDE, POTASSIUM CHLORIDE, SODIUM LACTATE AND CALCIUM CHLORIDE: 600; 310; 30; 20 INJECTION, SOLUTION INTRAVENOUS at 13:47

## 2022-12-14 RX ADMIN — PROPOFOL 30 MG: 10 INJECTION, EMULSION INTRAVENOUS at 14:21

## 2022-12-14 RX ADMIN — PROPOFOL 40 MG: 10 INJECTION, EMULSION INTRAVENOUS at 14:06

## 2022-12-14 RX ADMIN — PROPOFOL 30 MG: 10 INJECTION, EMULSION INTRAVENOUS at 14:27

## 2022-12-14 RX ADMIN — PROPOFOL 50 MG: 10 INJECTION, EMULSION INTRAVENOUS at 14:05

## 2022-12-14 RX ADMIN — SODIUM CHLORIDE 40 MCG/MIN: 9 INJECTION, SOLUTION INTRAVENOUS at 13:49

## 2022-12-14 RX ADMIN — PROPOFOL 30 MG: 10 INJECTION, EMULSION INTRAVENOUS at 14:38

## 2022-12-14 RX ADMIN — PROPOFOL 30 MG: 10 INJECTION, EMULSION INTRAVENOUS at 14:24

## 2022-12-14 RX ADMIN — PROPOFOL 30 MG: 10 INJECTION, EMULSION INTRAVENOUS at 14:33

## 2022-12-14 RX ADMIN — PROPOFOL 40 MG: 10 INJECTION, EMULSION INTRAVENOUS at 14:17

## 2022-12-14 RX ADMIN — PROPOFOL 30 MG: 10 INJECTION, EMULSION INTRAVENOUS at 14:09

## 2022-12-14 RX ADMIN — PROPOFOL 30 MG: 10 INJECTION, EMULSION INTRAVENOUS at 14:41

## 2022-12-14 NOTE — H&P
Outpatient Cath Lab History and Physical     2022  Patient: Aleksey Avila 69 y.o. male   Chief Complaint: []   Angina   [x]   Dyspnea   []       History of Present Illness: (for details see office notes)  Problems:  1. Non ischemic cardiomyopathy-diagnosed ;  EF 25% , EF 10-15% by echo . 2. Afib s/p ablation in . Maintaining NSR. 3. Mild-moderate aortic stenosis. 4. Moderate mitral regurgitation  5. Moderate pulmonary hypertension  6. LBBB  7. Hyperlipidemia:   8. Carotid disease-mild bilat 2020  9. Significant right eye vision impairment    He had a SJM BIV-ICD implanted in 2021. Father  from MI at age 47. Has had persistent FERRER with waxing/waning NYHA class II-II symptoms. TTE 22 demonstrated 3+ MR and evidence for moderate/severe pulmonary hypertension with RVSP 62 mmHg. Here for SUSANNA, RHC to evaluate MR severity and mechanism. 12 lead ECG shows AF with occasional paced beats. History:(for details see office notes)  Past Medical History:   Diagnosis Date    Arrhythmia 2014    CAD (coronary artery disease)     stent, cardiac cath, RAUDEL X2    Cataract (lens) fragments in eye following cataract surgery, right eye     2022    ED (erectile dysfunction) of organic origin     High cholesterol 2010    HTN (hypertension) 2010    Prediabetes 2014    Pulmonary hypertension ([facility] [address] 2017    S/P ablation of atrial flutter 2016    S/P biventricular cardiac pacemaker procedure     Systolic CHF, acute ([address] 2016       Review of Systems  Except as noted in HPI, patient denies recent fever or chills, nausea, vomiting, diarrhea, hemoptysis, hematemesis, dysuria, myalgias, focal neurologic symptoms, ecchymosis, angioedema, odynophagia, dysphagia, sore throat, earache,rash, melena, hematochezia, depression, GERD, cold intolerance, petechia, bleeding gums, or significant weight loss.     A comprehensive review of systems was negative except for that written in the HPI. Family History   Problem Relation Age of Onset    Heart Disease Father     Hypertension Father     Hypertension Mother     Heart Disease Mother     Cancer Brother    (see office notes for details)  Social History     Tobacco Use    Smoking status: Former     Packs/day: 2.00     Years: 35.00     Pack years: 70.00     Types: Cigarettes     Start date: 1963     Quit date: 1995     Years since quittin.7    Smokeless tobacco: Never   Substance Use Topics    Alcohol use: Not Currently     Alcohol/week: 1.0 standard drink     Types: 1 Glasses of wine per week     Comment: 1 drink a month   (see office notes for details)    Allergies: Not on File    Prior to Admission Medications (details in office records):  Prior to Admission medications    Medication Sig Start Date End Date Taking? Authorizing Provider   sodium bicarbonate 650 mg tablet Take 1 Tablet by mouth two (2) times a day. 22   Reynaldo Mayen MD   testosterone 12.5 mg/ 1.25 gram (1 %) glpm APPLY 4 PUMPS TO THE SHOULDERS AND UPPER ARMS ONE TIME DAILY IN THE MORNING 22   Reynaldo Mayen MD   rosuvastatin (CRESTOR) 20 mg tablet TAKE ONE TABLET BY MOUTH ONE TIME DAILY AT NIGHT 22   Reynaldo Mayen MD   Lotemax SM 0.38 % drpg  22   Provider, Historical   BromSite 0.075 % drop  22   Provider, Historical   neomycin-polymyxin-dexamethasone (DEXACINE) 3.5 mg/g-10,000 unit/g-0.1 % ophthalmic ointment  22   Provider, Historical   carvediloL (COREG) 6.25 mg tablet Take 6.25 mg by mouth two (2) times a day. 22   Provider, Historical   lisinopriL (PRINIVIL, ZESTRIL) 5 mg tablet 2.5 mg two (2) times a day. 21   Provider, Historical   Spiriva Respimat 1.25 mcg/actuation inhaler  21   Provider, Historical   acetaminophen (TYLENOL) 325 mg tablet Take 650 mg by mouth every four (4) hours as needed for Pain.  21   Sara Becker NP torsemide (DEMADEX) 20 mg tablet Take 3 Tabs by mouth Before breakfast and dinner. Indications: Congestive Heart Failure (this replaces bumetanide or Bumex) 5/10/21   Masoud Monique MD   Eliquis 5 mg tablet Take 5 mg by mouth two (2) times a day. 4/9/20   Provider, Historical   cholecalciferol (VITAMIN D3) 25 mcg (1,000 unit) cap Take 1 Cap by mouth daily. Provider, Historical         Physical Exam:  Overall VSSAF; There were no vitals taken for this visit. No data recorded. Physical Exam  GEN: NAD, appears stated age  [de-identified]: EOMI  NECK: Normal JVP   CV: RRR, normal S1 and S2, I/VI systolic murmur at apex  LUNGS: CTAB, no W/R/R  ABD: Soft, NT/ND  EXT: Trace pitting edema in b/l ankles  PSYCH: Mood and affect normal  NEURO: Alert, MAEW, face symmetrical, speech intact    Labs: per outpatient records  CXR: per outpatient records    Plan of Care/Planned Procedure:  Risks, benefits, and alternatives reviewed with patient and he agrees to proceed with the procedure. For other plans, see orders. Discussed risks/benefits/alternatives of SUSANNA, electrical cardioversion and cardiac catheterization including bleeding, contrast-reaction, kidney injury, stroke, MI, death, loss of limb or the need for emergent surgery and the patient consented to proceed. He last took Xarelto this morning. Rosa Jacob, [address] [place] [address] [facility]  12/14/22

## 2022-12-14 NOTE — DISCHARGE INSTRUCTIONS
**You were found to be in atrial fibrillation and underwent successful electrical cardioversion to normal sinus rhythm. **Please continue to take Xarelto without interruption. **Please take metolazone TWICE A WEEK and keep all of your other medications the same. **Your mitral valve is moderately, bordering on moderately to severely leaky. Dr. Deepika Doll will re-assess your symptoms at your next visit to determine if you need a repair of the mitral valve with a MitraClip. **Dr. Clarissa Mayorga office will call you for an appointment in about 6 weeks (towards the end of January 2023. **    YOUR CURRENT MEDICATIONS  Current Discharge Medication List        CONTINUE these medications which have CHANGED    Details   metOLazone (ZAROXOLYN) 5 mg tablet Take 1 Tablet by mouth every Monday and Thursday. As needed for wt gain  Qty: 1 Tablet, Refills: 0  Start date: 12/15/2022           CONTINUE these medications which have NOT CHANGED    Details   sodium bicarbonate 650 mg tablet Take 1 Tablet by mouth two (2) times a day. Qty: 180 Tablet, Refills: 1    Associated Diagnoses: Hyperchloremic metabolic acidosis      testosterone 12.5 mg/ 1.25 gram (1 %) glpm APPLY 4 PUMPS TO THE SHOULDERS AND UPPER ARMS ONE TIME DAILY IN THE MORNING  Qty: 225 g, Refills: 1    Associated Diagnoses: ED (erectile dysfunction) of organic origin; Hypogonadism in male      rosuvastatin (CRESTOR) 20 mg tablet TAKE ONE TABLET BY MOUTH ONE TIME DAILY AT NIGHT  Qty: 90 Tablet, Refills: 3    Associated Diagnoses: Mixed hyperlipidemia      carvediloL (COREG) 6.25 mg tablet Take 6.25 mg by mouth two (2) times a day. lisinopriL (PRINIVIL, ZESTRIL) 5 mg tablet 2.5 mg two (2) times a day. Spiriva Respimat 1.25 mcg/actuation inhaler       acetaminophen (TYLENOL) 325 mg tablet Take 650 mg by mouth every four (4) hours as needed for Pain.      torsemide (DEMADEX) 20 mg tablet Take 3 Tabs by mouth Before breakfast and dinner.  Indications: Congestive Heart Failure (this replaces bumetanide or Bumex)  Qty: 180 Tab, Refills: 6      Eliquis 5 mg tablet Take 5 mg by mouth two (2) times a day. cholecalciferol (VITAMIN D3) 25 mcg (1,000 unit) cap Take 1 Cap by mouth daily. After Your Cardiac Catheterization    Cardiac catheterization (also called cardiac cath) is an invasive imaging procedure that allows your doctor to look at your coronary arteries to diagnose coronary artery disease. It can also be used to measure pressures in your chambers, and evaluate the function of your heart. Instructions for going home after Cardiac Catheterization    Care for the Catheter Insertion Site  Procedures may be performed in the femoral artery in the groin (in the area at the top of your thigh) or in the radial artery in your arm. When you go home, there will be a bandage (dressing) over the catheter insertion site (also called the wound site). The morning after your procedure, you may take the dressing off. The easiest way to do this is when you are showering, get the tape and dressing wet and remove it. After the bandage is removed, cover the area with a small adhesive bandage. It is normal for the catheter insertion site to be black and blue for a couple of days. The site may also be slightly swollen and pink, and there may be a small lump (about the size of a quarter) at the site. Wash the catheter insertion site at least once daily with soap and water. Place soapy water on your hand or washcloth and gently wash the insertion site; do not rub. Keep the area clean and dry when you are not showering. Do not use powders, creams, lotions or ointment on the wound site. Wear loose clothes and loose underwear. Do not take a bath, tub soak, go in a Jacuzzi, or swim in a pool or lake for one week after the procedure. You may notice a small lump at the site. This is normal and may last up to 6 weeks.     CHECK THE CATHETER INSERTION SITE DAILY:    If bleeding at the cath site occurs, take a clean washcloth and apply direct pressure just above the puncture site for at least 15 minutes. Call 911 immediately if the bleeding is not controlled. Continue to apply direct pressure until an ambulance gets to your location. Do not try to drive yourself or have someone else drive you to the hospital.  Have the ambulance bring you to the emergency room. Activity Guidelines  Your doctor will tell you when you can resume activities. In general, you will need to take it easy for the first two days after you get home. You can expect to feel tired and weak the day after the procedure. Take walks around your house and plan to rest during the day. For femoral cardiac cath  Do not strain during bowel movements for the first 3 to 4 days after the procedure to prevent bleeding from the catheter insertion site. Avoid heavy lifting (more than 10 pounds) and pushing or pulling heavy objects for the first 5 to 7 days after the procedure. Do not participate in strenuous activities for 5 days after the procedure. This includes most sports - jogging, golfing, play tennis, and bowling. You may climb stairs if needed, but walk up and down the stairs more slowly than usual.   Gradually increase your activities until you reach your normal activity level within one week after the procedure. For radial cardiac cath  Do not participate in strenuous activities for 2 days after the procedure. This includes most sports - jogging, golfing, play tennis, and bowling. Gradually increase your activities until you reach your normal activity level within two days after the procedure. Ask your doctor when it is safe to  Return to work. Resume sexual activity. Resume driving. Most people are able to resume driving within 24 hours after going home. Medications  Please review your medications with your doctor before you go home.  Ask your doctor if you should continue taking the medications you were taking before the procedure. If you have diabetes, your doctor may adjust your diabetes medications for one to two days after your procedure. Please be sure to ask for specific directions about taking your diabetes medication after the procedure. Depending on the results of your procedure, your doctor may prescribe new medication. Please make sure you understand what medications you should be taking after the procedure and how often to take them. You may use Tylenol 325mg 1-2 tablets every 6 hours as needed for pain or discomfort, unless otherwise instructed. If you have significant         discomfort more than 48 hours after your procedure, please call your physicians office. If you take METFORMIN, do NOT take this for the next 2 days after your procedure. Importance of a Heart-Healthy Lifestyle  It is important for you to be committed to leading a heart-healthy lifestyle. Your health care team can help you achieve your goals, but it is up to you to take your medications as prescribed, make dietary changes, quit smoking, exercise regularly, keep your follow-up appointments and be an active member of the treatment team.    Follow Up  Please call your cardiologist to schedule a follow-up appointment in the next 1-2 weeks if possible. Ask your primary care doctor when you should return for follow-up. Please ask your doctor if you have any questions about cardiac catheterization, angioplasty or stenting. If possible, have someone stay with you for the first night. It is normal to feel tired for the first couple of days. Take it easy and follow your physicians instructions on activity.     CALL THE PHYSICIAN:  If the site becomes red, swollen, or feels warm to the touch, or is healing poorly    If you note any large/extending bruise, fever >101.0 or chills  If your extremity has numbness, tingling, discoloration, abnormal swelling, tightness or pain   If you have difficulty with urination or develop nausea, vomiting, or severe abdominal pain    SIGNS AND SYMPTOMS:  Notify your physician for new or recurrent symptoms of chest discomfort, unusual shortness of breath or fatigue. These could be signs of a problem and should be discussed with your physician. For significant chest pain or symptoms of angina not relieved with rest:  if you have been prescribed Nitroglycerin, take as directed (taken under the tongue, one at a time 5 minutes apart for a total of 3 doses, sitting down). If the discomfort is not relieved after the 3rd Nitroglycerin, call 911. If you have not been prescribed Nitroglycerin and your chest discomfort is significant, call 911. Take the ambulance, do not try to drive yourself or have someone else drive you to the hospital.     AFTER CARE:  Follow up with your physician as instructed  Follow a heart healthy diet with proper portion control, daily stress management, daily exercise, blood pressure and cholesterol control, and smoking cessation. The success of your stent, if you had one placed, and the prevention of future catheterizations heavily depends on your lifestyle changes you make now! You may start walking short distances the day of your procedure. Gradually increase as tolerated each day. Current activity recommendations are 30 minutes of exercise at least 5 days a week. Work up to this as you recover. Walk, ride a bike, or choose any other activity you enjoy to reach this activity goal.   Avoid walking outside in extremes of heat or cold. Walk inside (at home, at the mall, or at a large store) when it is cold and windy or hot and humid. If you had a stent placed, consider [facility] as a resource to help you make the needed lifestyle changes to live a heart healthy lifestyle. Discuss your candidacy with your physician. If you have questions, call your physicians office or the Cardiac Cath Lab at [phone]. The Cath Lab is operational from 6:30 a.m. to 5:00 p.m., Monday through Friday. After hours, notify your physician. [address] [place] [address] can be reached at [phone]. [facility] is operational Monday-Thursday 8:30 a.m. to 5:00 p.m. and Friday 8:30 a.m. to 12:00 p.m.     Remember:  IN CASE OF BLEEDING: KEEP FIRM PRESSURE ON THE PROCEDURE SITE AND CALL 911 IF NOT CONTROLLED

## 2022-12-14 NOTE — PROGRESS NOTES
Discharge instructions reviewed with pt. Ambulated 100 ft, gait steady, voided, back to stretcher assisted with dressing.   Reviewed d/c instructions, teach back method used  1745 discharged via w/c with friend Austin Bustillo), instructions, and belongings  Pt aware no driving til Friday am  Bandaid on right side of neck to come off tomorrow after 4 PM

## 2022-12-14 NOTE — PROGRESS NOTES
Cardiac Cath Lab Procedure Area Arrival Note:    Nancy Morrison arrived to Cardiac Cath Lab, Procedure Area. Patient identifiers verified with NAME and DATE OF BIRTH. Procedure verified with patient. Consent forms verified. Allergies verified. Patient informed of procedure and plan of care. Questions answered with review. Patient voiced understanding of procedure and plan of care. Patient on cardiac monitor, non-invasive blood pressure, SPO2 monitor. On RA and placed on O2 @ 2 lpm via NC.  IV of NS on pump at 25 ml/hr. Patient status doing well without problems. Patient is A&Ox 4. Patient reports no CP and no SOB. Patient medicated during procedure with orders obtained and verified by Dr. Olena Calle. Refer to patients Cardiac Cath Lab PROCEDURE REPORT for vital signs, assessment, status, and response during procedure, printed at end of case. Printed report on chart or scanned into chart. 1622 Transfer to [address] [place] [address] from Procedure Area    Verbal report given to MARIKA Gillis RN on Nancy Morrison being transferred to Cardiac Cath Lab  for routine progression of care   Patient is post RHC procedure. Patient stable upon transfer to . Report consisted of patients Situation, Background, Assessment and   Recommendations(SBAR). Information from the following report(s) SBAR, Procedure Summary, and MAR was reviewed with the receiving nurse. Opportunity for questions and clarification was provided. Patient medicated during procedure with orders obtained and verified by Dr. Olena Calle. Refer to patient PROCEDURE REPORT for vital signs, assessment, status, and response during procedure.

## 2022-12-14 NOTE — ANESTHESIA POSTPROCEDURE EVALUATION
* No procedures listed *. MAC    Anesthesia Post Evaluation        Patient participation: complete - patient participated  Level of consciousness: awake  Pain management: adequate  Airway patency: patent  Anesthetic complications: no  Cardiovascular status: hemodynamically stable  Respiratory status: acceptable  Hydration status: acceptable  Comments: The patient is ready for PACU discharge.   Basilia Apodaca DO                   Post anesthesia nausea and vomiting:  controlled      INITIAL Post-op Vital signs:   Vitals Value Taken Time   /59 12/14/22 1617   Temp     Pulse 66 12/14/22 1617   Resp 14 12/14/22 1617   SpO2 100 % 12/14/22 1617

## 2022-12-14 NOTE — PROGRESS NOTES
TRANSFER - OUT REPORT:    Verbal report given to cath lab RNs in cath lab room 1 on Amanda Naqvi being transferred to cath lab room 1 for ordered procedure       Report consisted of patient's Situation, Background, Assessment and   Recommendations(SBAR). Information from the following report(s) SBAR, Procedure Summary and MAR was reviewed with the receiving nurse. Opportunity for questions and clarification was provided. Patient transported with:   Monitor and CPAP mask intact with eyeglasses and belongings to cath lab room 1. Pt easily arousable now; VSS; pt denies pain, SOB, or dizziness.

## 2022-12-14 NOTE — PROGRESS NOTES
Opportunity for questions and clarification was provided. Assessment completed upon patients arrival to unit and care assumed.

## 2022-12-15 LAB
ATRIAL RATE: 86 BPM
CALCULATED P AXIS, ECG09: 65 DEGREES
CALCULATED R AXIS, ECG10: 163 DEGREES
CALCULATED T AXIS, ECG11: -18 DEGREES
DIAGNOSIS, 93000: NORMAL
P-R INTERVAL, ECG05: 220 MS
Q-T INTERVAL, ECG07: 492 MS
QRS DURATION, ECG06: 168 MS
QTC CALCULATION (BEZET), ECG08: 567 MS
VENTRICULAR RATE, ECG03: 80 BPM

## 2022-12-16 LAB
ATRIAL RATE: 46 BPM
CALCULATED R AXIS, ECG10: 95 DEGREES
CALCULATED T AXIS, ECG11: -92 DEGREES
DIAGNOSIS, 93000: NORMAL
Q-T INTERVAL, ECG07: 504 MS
QRS DURATION, ECG06: 170 MS
QTC CALCULATION (BEZET), ECG08: 570 MS
VENTRICULAR RATE, ECG03: 77 BPM

## 2023-01-01 ENCOUNTER — APPOINTMENT (OUTPATIENT)
Facility: HOSPITAL | Age: 78
DRG: 871 | End: 2023-01-01
Payer: MEDICARE

## 2023-01-01 ENCOUNTER — TELEPHONE (OUTPATIENT)
Age: 78
End: 2023-01-01

## 2023-01-01 ENCOUNTER — HOSPITAL ENCOUNTER (INPATIENT)
Facility: HOSPITAL | Age: 78
LOS: 1 days | DRG: 871 | End: 2023-07-18
Attending: EMERGENCY MEDICINE | Admitting: INTERNAL MEDICINE
Payer: MEDICARE

## 2023-01-01 VITALS
SYSTOLIC BLOOD PRESSURE: 46 MMHG | TEMPERATURE: 95.4 F | RESPIRATION RATE: 24 BRPM | BODY MASS INDEX: 27.16 KG/M2 | DIASTOLIC BLOOD PRESSURE: 17 MMHG | OXYGEN SATURATION: 83 % | HEIGHT: 71 IN | WEIGHT: 194 LBS | HEART RATE: 94 BPM

## 2023-01-01 DIAGNOSIS — R10.84 ABDOMINAL PAIN, GENERALIZED: ICD-10-CM

## 2023-01-01 DIAGNOSIS — J96.01 ACUTE RESPIRATORY FAILURE WITH HYPOXIA (HCC): ICD-10-CM

## 2023-01-01 DIAGNOSIS — A41.9 SEPTIC SHOCK (HCC): Primary | ICD-10-CM

## 2023-01-01 DIAGNOSIS — N18.6 ESRD (END STAGE RENAL DISEASE) (HCC): ICD-10-CM

## 2023-01-01 DIAGNOSIS — I48.91 ATRIAL FIBRILLATION, UNSPECIFIED TYPE (HCC): ICD-10-CM

## 2023-01-01 DIAGNOSIS — I50.33 ACUTE ON CHRONIC DIASTOLIC HEART FAILURE (HCC): ICD-10-CM

## 2023-01-01 DIAGNOSIS — R65.21 SEPTIC SHOCK (HCC): Primary | ICD-10-CM

## 2023-01-01 LAB
ALBUMIN SERPL-MCNC: 3.8 G/DL (ref 3.5–5)
ALBUMIN/GLOB SERPL: 1 (ref 1.1–2.2)
ALP SERPL-CCNC: 80 U/L (ref 45–117)
ALT SERPL-CCNC: 564 U/L (ref 12–78)
ANION GAP SERPL CALC-SCNC: 19 MMOL/L (ref 5–15)
ANION GAP SERPL CALC-SCNC: 36 MMOL/L (ref 5–15)
ANION GAP SERPL CALC-SCNC: ABNORMAL MMOL/L (ref 5–15)
APPEARANCE UR: ABNORMAL
APTT PPP: 53.6 SEC (ref 22.1–31)
ARTERIAL PATENCY WRIST A: ABNORMAL
ARTERIAL PATENCY WRIST A: YES
AST SERPL-CCNC: 471 U/L (ref 15–37)
BACTERIA SPEC CULT: NORMAL
BACTERIA URNS QL MICRO: ABNORMAL /HPF
BASE DEFICIT BLDA-SCNC: 28.2 MMOL/L
BASOPHILS # BLD: 0 K/UL (ref 0–0.1)
BASOPHILS NFR BLD: 0 % (ref 0–1)
BDY SITE: ABNORMAL
BDY SITE: ABNORMAL
BILIRUB SERPL-MCNC: 1.4 MG/DL (ref 0.2–1)
BILIRUB UR QL CFM: NEGATIVE
BUN SERPL-MCNC: 39 MG/DL (ref 6–20)
BUN SERPL-MCNC: 43 MG/DL (ref 6–20)
BUN SERPL-MCNC: 44 MG/DL (ref 6–20)
BUN/CREAT SERPL: 10 (ref 12–20)
CALCIUM SERPL-MCNC: 8.2 MG/DL (ref 8.5–10.1)
CALCIUM SERPL-MCNC: 8.6 MG/DL (ref 8.5–10.1)
CALCIUM SERPL-MCNC: 9.7 MG/DL (ref 8.5–10.1)
CC UR VC: NORMAL
CHLORIDE SERPL-SCNC: 89 MMOL/L (ref 97–108)
CHLORIDE SERPL-SCNC: 90 MMOL/L (ref 97–108)
CHLORIDE SERPL-SCNC: 95 MMOL/L (ref 97–108)
CO2 SERPL-SCNC: 15 MMOL/L (ref 21–32)
CO2 SERPL-SCNC: 9 MMOL/L (ref 21–32)
CO2 SERPL-SCNC: <5 MMOL/L (ref 21–32)
COLOR UR: ABNORMAL
CREAT SERPL-MCNC: 3.85 MG/DL (ref 0.7–1.3)
CREAT SERPL-MCNC: 4.11 MG/DL (ref 0.7–1.3)
CREAT SERPL-MCNC: 4.45 MG/DL (ref 0.7–1.3)
DIFFERENTIAL METHOD BLD: ABNORMAL
EKG ATRIAL RATE: 122 BPM
EKG DIAGNOSIS: NORMAL
EKG Q-T INTERVAL: 396 MS
EKG QRS DURATION: 156 MS
EKG QTC CALCULATION (BAZETT): 578 MS
EKG R AXIS: 260 DEGREES
EKG T AXIS: 91 DEGREES
EKG VENTRICULAR RATE: 128 BPM
EOSINOPHIL # BLD: 0 K/UL (ref 0–0.4)
EOSINOPHIL # BLD: 0 K/UL (ref 0–0.4)
EOSINOPHIL # BLD: 0.3 K/UL (ref 0–0.4)
EOSINOPHIL NFR BLD: 0 % (ref 0–7)
EOSINOPHIL NFR BLD: 0 % (ref 0–7)
EOSINOPHIL NFR BLD: 1 % (ref 0–7)
EPITH CASTS URNS QL MICRO: ABNORMAL /LPF
ERYTHROCYTE [DISTWIDTH] IN BLOOD BY AUTOMATED COUNT: 17.7 % (ref 11.5–14.5)
ERYTHROCYTE [DISTWIDTH] IN BLOOD BY AUTOMATED COUNT: 17.9 % (ref 11.5–14.5)
ERYTHROCYTE [DISTWIDTH] IN BLOOD BY AUTOMATED COUNT: 18.4 % (ref 11.5–14.5)
GLOBULIN SER CALC-MCNC: 3.8 G/DL (ref 2–4)
GLUCOSE SERPL-MCNC: 103 MG/DL (ref 65–100)
GLUCOSE SERPL-MCNC: 105 MG/DL (ref 65–100)
GLUCOSE SERPL-MCNC: 120 MG/DL (ref 65–100)
GLUCOSE UR STRIP.AUTO-MCNC: NEGATIVE MG/DL
HCO3 BLDA-SCNC: 6 MMOL/L (ref 22–26)
HCT VFR BLD AUTO: 35 % (ref 36.6–50.3)
HCT VFR BLD AUTO: 42.4 % (ref 36.6–50.3)
HCT VFR BLD AUTO: 43.2 % (ref 36.6–50.3)
HGB BLD-MCNC: 11.1 G/DL (ref 12.1–17)
HGB BLD-MCNC: 12.7 G/DL (ref 12.1–17)
HGB BLD-MCNC: 14.2 G/DL (ref 12.1–17)
HGB UR QL STRIP: ABNORMAL
HYALINE CASTS URNS QL MICRO: >20 /LPF (ref 0–5)
IMM GRANULOCYTES # BLD AUTO: 0 K/UL (ref 0–0.04)
IMM GRANULOCYTES # BLD AUTO: 0 K/UL (ref 0–0.04)
IMM GRANULOCYTES # BLD AUTO: 0.2 K/UL (ref 0–0.04)
IMM GRANULOCYTES NFR BLD AUTO: 0 % (ref 0–0.5)
IMM GRANULOCYTES NFR BLD AUTO: 0 % (ref 0–0.5)
IMM GRANULOCYTES NFR BLD AUTO: 1 % (ref 0–0.5)
INR PPP: 4 (ref 0.9–1.1)
KETONES UR QL STRIP.AUTO: ABNORMAL MG/DL
LACTATE BLD-SCNC: 7.55 MMOL/L (ref 0.4–2)
LACTATE SERPL-SCNC: 21.6 MMOL/L (ref 0.4–2)
LACTATE SERPL-SCNC: 24.2 MMOL/L (ref 0.4–2)
LACTATE SERPL-SCNC: 6.5 MMOL/L (ref 0.4–2)
LEUKOCYTE ESTERASE UR QL STRIP.AUTO: ABNORMAL
LIPASE SERPL-CCNC: 58 U/L (ref 73–393)
LYMPHOCYTES # BLD: 1.2 K/UL (ref 0.8–3.5)
LYMPHOCYTES # BLD: 1.4 K/UL (ref 0.8–3.5)
LYMPHOCYTES # BLD: 1.8 K/UL (ref 0.8–3.5)
LYMPHOCYTES NFR BLD: 4 % (ref 12–49)
LYMPHOCYTES NFR BLD: 5 % (ref 12–49)
LYMPHOCYTES NFR BLD: 6 % (ref 12–49)
MAGNESIUM SERPL-MCNC: 2.5 MG/DL (ref 1.6–2.4)
MAGNESIUM SERPL-MCNC: 2.7 MG/DL (ref 1.6–2.4)
MAGNESIUM SERPL-MCNC: 2.8 MG/DL (ref 1.6–2.4)
MCH RBC QN AUTO: 32.5 PG (ref 26–34)
MCH RBC QN AUTO: 32.9 PG (ref 26–34)
MCH RBC QN AUTO: 34.5 PG (ref 26–34)
MCHC RBC AUTO-ENTMCNC: 30 G/DL (ref 30–36.5)
MCHC RBC AUTO-ENTMCNC: 31.7 G/DL (ref 30–36.5)
MCHC RBC AUTO-ENTMCNC: 32.9 G/DL (ref 30–36.5)
MCV RBC AUTO: 108.7 FL (ref 80–99)
MCV RBC AUTO: 109.8 FL (ref 80–99)
MCV RBC AUTO: 98.9 FL (ref 80–99)
METAMYELOCYTES NFR BLD MANUAL: 1 %
METAMYELOCYTES NFR BLD MANUAL: 2 %
MONOCYTES # BLD: 1 K/UL (ref 0–1)
MONOCYTES # BLD: 1.9 K/UL (ref 0–1)
MONOCYTES # BLD: 2.4 K/UL (ref 0–1)
MONOCYTES NFR BLD: 3 % (ref 5–13)
MONOCYTES NFR BLD: 8 % (ref 5–13)
MONOCYTES NFR BLD: 8 % (ref 5–13)
MUCOUS THREADS URNS QL MICRO: ABNORMAL /LPF
MYELOCYTES NFR BLD MANUAL: 5 %
NEUTS BAND NFR BLD MANUAL: 1 %
NEUTS BAND NFR BLD MANUAL: 7 %
NEUTS SEG # BLD: 21 K/UL (ref 1.8–8)
NEUTS SEG # BLD: 25.6 K/UL (ref 1.8–8)
NEUTS SEG # BLD: 29.8 K/UL (ref 1.8–8)
NEUTS SEG NFR BLD: 79 % (ref 32–75)
NEUTS SEG NFR BLD: 83 % (ref 32–75)
NEUTS SEG NFR BLD: 86 % (ref 32–75)
NITRITE UR QL STRIP.AUTO: NEGATIVE
NRBC # BLD: 0.28 K/UL (ref 0–0.01)
NRBC # BLD: 1.1 K/UL (ref 0–0.01)
NRBC # BLD: 1.63 K/UL (ref 0–0.01)
NRBC BLD-RTO: 1.2 PER 100 WBC
NRBC BLD-RTO: 3.6 PER 100 WBC
NRBC BLD-RTO: 4.7 PER 100 WBC
OTHER: ABNORMAL
PATH REV BLD -IMP: NORMAL
PCO2 BLDA: 39 MMHG (ref 35–45)
PCO2 BLDA: <15 MMHG (ref 35–45)
PH BLDA: 6.82 (ref 7.35–7.45)
PH BLDA: 6.96 (ref 7.35–7.45)
PH UR STRIP: 5 (ref 5–8)
PHOSPHATE SERPL-MCNC: 10.9 MG/DL (ref 2.6–4.7)
PHOSPHATE SERPL-MCNC: 11.8 MG/DL (ref 2.6–4.7)
PLATELET # BLD AUTO: 42 K/UL (ref 150–400)
PLATELET # BLD AUTO: 45 K/UL (ref 150–400)
PLATELET # BLD AUTO: 79 K/UL (ref 150–400)
PLATELET COMMENT: ABNORMAL
PLATELET COMMENT: ABNORMAL
PMV BLD AUTO: 12.1 FL (ref 8.9–12.9)
PO2 BLDA: 349 MMHG (ref 80–100)
PO2 BLDA: 56 MMHG (ref 80–100)
POTASSIUM SERPL-SCNC: 3.8 MMOL/L (ref 3.5–5.1)
POTASSIUM SERPL-SCNC: 4.5 MMOL/L (ref 3.5–5.1)
POTASSIUM SERPL-SCNC: 4.8 MMOL/L (ref 3.5–5.1)
PROT SERPL-MCNC: 7.6 G/DL (ref 6.4–8.2)
PROT UR STRIP-MCNC: 30 MG/DL
PROTHROMBIN TIME: 37.7 SEC (ref 9–11.1)
RBC # BLD AUTO: 3.22 M/UL (ref 4.1–5.7)
RBC # BLD AUTO: 3.86 M/UL (ref 4.1–5.7)
RBC # BLD AUTO: 4.37 M/UL (ref 4.1–5.7)
RBC #/AREA URNS HPF: ABNORMAL /HPF (ref 0–5)
RBC MORPH BLD: ABNORMAL
SAO2 % BLD: 61 % (ref 92–97)
SAO2% DEVICE SAO2% SENSOR NAME: ABNORMAL
SAO2% DEVICE SAO2% SENSOR NAME: ABNORMAL
SERVICE CMNT-IMP: ABNORMAL
SERVICE CMNT-IMP: ABNORMAL
SERVICE CMNT-IMP: NORMAL
SODIUM SERPL-SCNC: 126 MMOL/L (ref 136–145)
SODIUM SERPL-SCNC: 129 MMOL/L (ref 136–145)
SODIUM SERPL-SCNC: 135 MMOL/L (ref 136–145)
SP GR UR REFRACTOMETRY: 1.02
SPECIMEN SITE: ABNORMAL
SPECIMEN SITE: ABNORMAL
THERAPEUTIC RANGE: ABNORMAL SECS (ref 58–77)
TROPONIN I SERPL HS-MCNC: 141 NG/L (ref 0–76)
URINE CULTURE IF INDICATED: ABNORMAL
UROBILINOGEN UR QL STRIP.AUTO: 1 EU/DL (ref 0.2–1)
WBC # BLD AUTO: 24.3 K/UL (ref 4.1–11.1)
WBC # BLD AUTO: 30.5 K/UL (ref 4.1–11.1)
WBC # BLD AUTO: 34.6 K/UL (ref 4.1–11.1)
WBC URNS QL MICRO: ABNORMAL /HPF (ref 0–4)

## 2023-01-01 PROCEDURE — 6360000002 HC RX W HCPCS: Performed by: HOSPITALIST

## 2023-01-01 PROCEDURE — 81001 URINALYSIS AUTO W/SCOPE: CPT

## 2023-01-01 PROCEDURE — 2500000003 HC RX 250 WO HCPCS

## 2023-01-01 PROCEDURE — 6360000004 HC RX CONTRAST MEDICATION: Performed by: EMERGENCY MEDICINE

## 2023-01-01 PROCEDURE — 6360000002 HC RX W HCPCS: Performed by: INTERNAL MEDICINE

## 2023-01-01 PROCEDURE — 4A133J1 MONITORING OF ARTERIAL PULSE, PERIPHERAL, PERCUTANEOUS APPROACH: ICD-10-PCS | Performed by: INTERNAL MEDICINE

## 2023-01-01 PROCEDURE — 96365 THER/PROPH/DIAG IV INF INIT: CPT

## 2023-01-01 PROCEDURE — 36556 INSERT NON-TUNNEL CV CATH: CPT

## 2023-01-01 PROCEDURE — 36415 COLL VENOUS BLD VENIPUNCTURE: CPT

## 2023-01-01 PROCEDURE — 85730 THROMBOPLASTIN TIME PARTIAL: CPT

## 2023-01-01 PROCEDURE — 2000000000 HC ICU R&B

## 2023-01-01 PROCEDURE — 84484 ASSAY OF TROPONIN QUANT: CPT

## 2023-01-01 PROCEDURE — 82803 BLOOD GASES ANY COMBINATION: CPT

## 2023-01-01 PROCEDURE — 74177 CT ABD & PELVIS W/CONTRAST: CPT

## 2023-01-01 PROCEDURE — 36600 WITHDRAWAL OF ARTERIAL BLOOD: CPT

## 2023-01-01 PROCEDURE — 2500000003 HC RX 250 WO HCPCS: Performed by: NURSE PRACTITIONER

## 2023-01-01 PROCEDURE — 2580000003 HC RX 258: Performed by: INTERNAL MEDICINE

## 2023-01-01 PROCEDURE — 2700000000 HC OXYGEN THERAPY PER DAY

## 2023-01-01 PROCEDURE — 83690 ASSAY OF LIPASE: CPT

## 2023-01-01 PROCEDURE — 87086 URINE CULTURE/COLONY COUNT: CPT

## 2023-01-01 PROCEDURE — 6360000002 HC RX W HCPCS: Performed by: NURSE PRACTITIONER

## 2023-01-01 PROCEDURE — 85610 PROTHROMBIN TIME: CPT

## 2023-01-01 PROCEDURE — 83605 ASSAY OF LACTIC ACID: CPT

## 2023-01-01 PROCEDURE — 99285 EMERGENCY DEPT VISIT HI MDM: CPT

## 2023-01-01 PROCEDURE — 2580000003 HC RX 258: Performed by: HOSPITALIST

## 2023-01-01 PROCEDURE — 80053 COMPREHEN METABOLIC PANEL: CPT

## 2023-01-01 PROCEDURE — 83735 ASSAY OF MAGNESIUM: CPT

## 2023-01-01 PROCEDURE — 93005 ELECTROCARDIOGRAM TRACING: CPT | Performed by: EMERGENCY MEDICINE

## 2023-01-01 PROCEDURE — 96374 THER/PROPH/DIAG INJ IV PUSH: CPT

## 2023-01-01 PROCEDURE — 6360000002 HC RX W HCPCS: Performed by: EMERGENCY MEDICINE

## 2023-01-01 PROCEDURE — 71045 X-RAY EXAM CHEST 1 VIEW: CPT

## 2023-01-01 PROCEDURE — 2500000003 HC RX 250 WO HCPCS: Performed by: HOSPITALIST

## 2023-01-01 PROCEDURE — 03HY32Z INSERTION OF MONITORING DEVICE INTO UPPER ARTERY, PERCUTANEOUS APPROACH: ICD-10-PCS | Performed by: INTERNAL MEDICINE

## 2023-01-01 PROCEDURE — 4A133B1 MONITORING OF ARTERIAL PRESSURE, PERIPHERAL, PERCUTANEOUS APPROACH: ICD-10-PCS | Performed by: INTERNAL MEDICINE

## 2023-01-01 PROCEDURE — 84100 ASSAY OF PHOSPHORUS: CPT

## 2023-01-01 PROCEDURE — 85025 COMPLETE CBC W/AUTO DIFF WBC: CPT

## 2023-01-01 PROCEDURE — 2580000003 HC RX 258: Performed by: NURSE PRACTITIONER

## 2023-01-01 PROCEDURE — 70450 CT HEAD/BRAIN W/O DYE: CPT

## 2023-01-01 PROCEDURE — 5A1D70Z PERFORMANCE OF URINARY FILTRATION, INTERMITTENT, LESS THAN 6 HOURS PER DAY: ICD-10-PCS | Performed by: INTERNAL MEDICINE

## 2023-01-01 PROCEDURE — 96375 TX/PRO/DX INJ NEW DRUG ADDON: CPT

## 2023-01-01 PROCEDURE — 2500000003 HC RX 250 WO HCPCS: Performed by: INTERNAL MEDICINE

## 2023-01-01 PROCEDURE — 02HV33Z INSERTION OF INFUSION DEVICE INTO SUPERIOR VENA CAVA, PERCUTANEOUS APPROACH: ICD-10-PCS | Performed by: INTERNAL MEDICINE

## 2023-01-01 PROCEDURE — 80048 BASIC METABOLIC PNL TOTAL CA: CPT

## 2023-01-01 PROCEDURE — 72125 CT NECK SPINE W/O DYE: CPT

## 2023-01-01 PROCEDURE — 93308 TTE F-UP OR LMTD: CPT

## 2023-01-01 PROCEDURE — 2580000003 HC RX 258: Performed by: EMERGENCY MEDICINE

## 2023-01-01 PROCEDURE — 5A1D70Z PERFORMANCE OF URINARY FILTRATION, INTERMITTENT, LESS THAN 6 HOURS PER DAY: ICD-10-PCS

## 2023-01-01 PROCEDURE — 90945 DIALYSIS ONE EVALUATION: CPT

## 2023-01-01 PROCEDURE — 87040 BLOOD CULTURE FOR BACTERIA: CPT

## 2023-01-01 RX ORDER — POLYETHYLENE GLYCOL 3350 17 G/17G
17 POWDER, FOR SOLUTION ORAL DAILY PRN
Status: DISCONTINUED | OUTPATIENT
Start: 2023-01-01 | End: 2023-01-01 | Stop reason: HOSPADM

## 2023-01-01 RX ORDER — MORPHINE SULFATE 4 MG/ML
4 INJECTION, SOLUTION INTRAMUSCULAR; INTRAVENOUS
Status: DISCONTINUED | OUTPATIENT
Start: 2023-01-01 | End: 2023-01-01 | Stop reason: HOSPADM

## 2023-01-01 RX ORDER — ACETAMINOPHEN 325 MG/1
650 TABLET ORAL EVERY 6 HOURS PRN
Status: DISCONTINUED | OUTPATIENT
Start: 2023-01-01 | End: 2023-01-01 | Stop reason: HOSPADM

## 2023-01-01 RX ORDER — ONDANSETRON 2 MG/ML
4 INJECTION INTRAMUSCULAR; INTRAVENOUS EVERY 6 HOURS PRN
Status: DISCONTINUED | OUTPATIENT
Start: 2023-01-01 | End: 2023-01-01 | Stop reason: HOSPADM

## 2023-01-01 RX ORDER — METOPROLOL TARTRATE 5 MG/5ML
5 INJECTION INTRAVENOUS EVERY 4 HOURS PRN
Status: DISCONTINUED | OUTPATIENT
Start: 2023-01-01 | End: 2023-01-01 | Stop reason: HOSPADM

## 2023-01-01 RX ORDER — POTASSIUM CHLORIDE 7.45 MG/ML
10 INJECTION INTRAVENOUS PRN
Status: DISCONTINUED | OUTPATIENT
Start: 2023-01-01 | End: 2023-01-01 | Stop reason: HOSPADM

## 2023-01-01 RX ORDER — METOPROLOL TARTRATE 5 MG/5ML
5 INJECTION INTRAVENOUS ONCE
Status: COMPLETED | OUTPATIENT
Start: 2023-01-01 | End: 2023-01-01

## 2023-01-01 RX ORDER — ONDANSETRON 4 MG/1
4 TABLET, ORALLY DISINTEGRATING ORAL EVERY 8 HOURS PRN
Status: DISCONTINUED | OUTPATIENT
Start: 2023-01-01 | End: 2023-01-01 | Stop reason: HOSPADM

## 2023-01-01 RX ORDER — 0.9 % SODIUM CHLORIDE 0.9 %
500 INTRAVENOUS SOLUTION INTRAVENOUS ONCE
Status: COMPLETED | OUTPATIENT
Start: 2023-01-01 | End: 2023-01-01

## 2023-01-01 RX ORDER — HEPARIN SODIUM 5000 [USP'U]/ML
5000 INJECTION, SOLUTION INTRAVENOUS; SUBCUTANEOUS EVERY 8 HOURS SCHEDULED
Status: DISCONTINUED | OUTPATIENT
Start: 2023-01-01 | End: 2023-01-01 | Stop reason: HOSPADM

## 2023-01-01 RX ORDER — EPINEPHRINE IN SOD CHLOR,ISO 1 MG/10 ML
SYRINGE (ML) INTRAVENOUS
Status: COMPLETED | OUTPATIENT
Start: 2023-01-01 | End: 2023-01-01

## 2023-01-01 RX ORDER — NOREPINEPHRINE BITARTRATE 0.06 MG/ML
1-100 INJECTION, SOLUTION INTRAVENOUS CONTINUOUS
Status: DISCONTINUED | OUTPATIENT
Start: 2023-01-01 | End: 2023-01-01 | Stop reason: HOSPADM

## 2023-01-01 RX ORDER — IPRATROPIUM BROMIDE AND ALBUTEROL SULFATE 2.5; .5 MG/3ML; MG/3ML
1 SOLUTION RESPIRATORY (INHALATION) EVERY 4 HOURS PRN
Status: DISCONTINUED | OUTPATIENT
Start: 2023-01-01 | End: 2023-01-01 | Stop reason: HOSPADM

## 2023-01-01 RX ORDER — FENTANYL CITRATE 50 UG/ML
50 INJECTION, SOLUTION INTRAMUSCULAR; INTRAVENOUS
Status: DISCONTINUED | OUTPATIENT
Start: 2023-01-01 | End: 2023-01-01 | Stop reason: HOSPADM

## 2023-01-01 RX ORDER — POTASSIUM CHLORIDE 29.8 MG/ML
20 INJECTION INTRAVENOUS PRN
Status: DISCONTINUED | OUTPATIENT
Start: 2023-01-01 | End: 2023-01-01 | Stop reason: HOSPADM

## 2023-01-01 RX ORDER — SODIUM CHLORIDE 9 MG/ML
INJECTION, SOLUTION INTRAVENOUS PRN
Status: DISCONTINUED | OUTPATIENT
Start: 2023-01-01 | End: 2023-01-01 | Stop reason: HOSPADM

## 2023-01-01 RX ORDER — ACETAMINOPHEN 650 MG/1
650 SUPPOSITORY RECTAL EVERY 6 HOURS PRN
Status: DISCONTINUED | OUTPATIENT
Start: 2023-01-01 | End: 2023-01-01 | Stop reason: HOSPADM

## 2023-01-01 RX ORDER — MAGNESIUM SULFATE IN WATER 40 MG/ML
2000 INJECTION, SOLUTION INTRAVENOUS PRN
Status: DISCONTINUED | OUTPATIENT
Start: 2023-01-01 | End: 2023-01-01 | Stop reason: HOSPADM

## 2023-01-01 RX ORDER — ONDANSETRON 2 MG/ML
4 INJECTION INTRAMUSCULAR; INTRAVENOUS ONCE
Status: COMPLETED | OUTPATIENT
Start: 2023-01-01 | End: 2023-01-01

## 2023-01-01 RX ORDER — SODIUM CHLORIDE 0.9 % (FLUSH) 0.9 %
5-40 SYRINGE (ML) INJECTION EVERY 12 HOURS SCHEDULED
Status: DISCONTINUED | OUTPATIENT
Start: 2023-01-01 | End: 2023-01-01 | Stop reason: HOSPADM

## 2023-01-01 RX ORDER — SODIUM CHLORIDE 0.9 % (FLUSH) 0.9 %
5-40 SYRINGE (ML) INJECTION PRN
Status: DISCONTINUED | OUTPATIENT
Start: 2023-01-01 | End: 2023-01-01 | Stop reason: HOSPADM

## 2023-01-01 RX ORDER — CALCIUM CHLORIDE, MAGNESIUM CHLORIDE, DEXTROSE MONOHYDRATE, LACTIC ACID, SODIUM CHLORIDE, SODIUM BICARBONATE AND POTASSIUM CHLORIDE 5.15; 2.03; 22; 5.4; 6.46; 3.09; .157 G/L; G/L; G/L; G/L; G/L; G/L; G/L
INJECTION INTRAVENOUS CONTINUOUS
Status: DISCONTINUED | OUTPATIENT
Start: 2023-01-01 | End: 2023-01-01 | Stop reason: HOSPADM

## 2023-01-01 RX ORDER — EPINEPHRINE IN SOD CHLOR,ISO 1 MG/10 ML
1 SYRINGE (ML) INTRAVENOUS ONCE
Status: COMPLETED | OUTPATIENT
Start: 2023-01-01 | End: 2023-01-01

## 2023-01-01 RX ADMIN — IOPAMIDOL 100 ML: 755 INJECTION, SOLUTION INTRAVENOUS at 18:49

## 2023-01-01 RX ADMIN — CALCIUM CHLORIDE, MAGNESIUM CHLORIDE, DEXTROSE MONOHYDRATE, LACTIC ACID, SODIUM CHLORIDE, SODIUM BICARBONATE AND POTASSIUM CHLORIDE: 5.15; 2.03; 22; 5.4; 6.46; 3.09; .157 INJECTION INTRAVENOUS at 07:35

## 2023-01-01 RX ADMIN — SODIUM CHLORIDE, PRESERVATIVE FREE 10 ML: 5 INJECTION INTRAVENOUS at 01:01

## 2023-01-01 RX ADMIN — SODIUM BICARBONATE 100 MEQ: 84 INJECTION, SOLUTION INTRAVENOUS at 07:17

## 2023-01-01 RX ADMIN — SODIUM CHLORIDE 500 ML: 9 INJECTION, SOLUTION INTRAVENOUS at 19:44

## 2023-01-01 RX ADMIN — HEPARIN SODIUM 5000 UNITS: 5000 INJECTION INTRAVENOUS; SUBCUTANEOUS at 22:09

## 2023-01-01 RX ADMIN — EPINEPHRINE 1 MG: 0.1 INJECTION, SOLUTION ENDOTRACHEAL; INTRACARDIAC; INTRAVENOUS at 07:16

## 2023-01-01 RX ADMIN — PIPERACILLIN AND TAZOBACTAM 3375 MG: 3; .375 INJECTION, POWDER, LYOPHILIZED, FOR SOLUTION INTRAVENOUS at 19:48

## 2023-01-01 RX ADMIN — CALCIUM CHLORIDE, MAGNESIUM CHLORIDE, DEXTROSE MONOHYDRATE, LACTIC ACID, SODIUM CHLORIDE, SODIUM BICARBONATE AND POTASSIUM CHLORIDE: 5.15; 2.03; 22; 5.4; 6.46; 3.09; .157 INJECTION INTRAVENOUS at 10:24

## 2023-01-01 RX ADMIN — AMIODARONE HYDROCHLORIDE 150 MG: 1.5 INJECTION, SOLUTION INTRAVENOUS at 01:57

## 2023-01-01 RX ADMIN — ONDANSETRON 4 MG: 2 INJECTION INTRAMUSCULAR; INTRAVENOUS at 17:53

## 2023-01-01 RX ADMIN — METOPROLOL TARTRATE 5 MG: 5 INJECTION INTRAVENOUS at 17:30

## 2023-01-01 RX ADMIN — SODIUM BICARBONATE 100 MEQ: 84 INJECTION, SOLUTION INTRAVENOUS at 10:14

## 2023-01-01 RX ADMIN — NOREPINEPHRINE BITARTRATE 5 MCG/MIN: 1 SOLUTION INTRAVENOUS at 01:07

## 2023-01-01 RX ADMIN — SODIUM BICARBONATE: 84 INJECTION, SOLUTION INTRAVENOUS at 02:15

## 2023-01-01 RX ADMIN — PIPERACILLIN AND TAZOBACTAM 3375 MG: 3; .375 INJECTION, POWDER, LYOPHILIZED, FOR SOLUTION INTRAVENOUS at 05:04

## 2023-01-01 RX ADMIN — SODIUM BICARBONATE 100 MEQ: 84 INJECTION, SOLUTION INTRAVENOUS at 12:27

## 2023-01-01 RX ADMIN — SODIUM BICARBONATE 100 MEQ: 84 INJECTION, SOLUTION INTRAVENOUS at 08:23

## 2023-01-01 RX ADMIN — SODIUM BICARBONATE 50 MEQ: 84 INJECTION, SOLUTION INTRAVENOUS at 00:45

## 2023-01-01 RX ADMIN — CALCIUM CHLORIDE, MAGNESIUM CHLORIDE, DEXTROSE MONOHYDRATE, LACTIC ACID, SODIUM CHLORIDE, SODIUM BICARBONATE AND POTASSIUM CHLORIDE: 5.15; 2.03; 22; 5.4; 6.46; 3.09; .157 INJECTION INTRAVENOUS at 13:07

## 2023-01-01 RX ADMIN — PIPERACILLIN AND TAZOBACTAM 3375 MG: 3; .375 INJECTION, POWDER, LYOPHILIZED, FOR SOLUTION INTRAVENOUS at 13:11

## 2023-01-01 RX ADMIN — SODIUM CHLORIDE, PRESERVATIVE FREE 10 ML: 5 INJECTION INTRAVENOUS at 08:46

## 2023-01-01 RX ADMIN — NOREPINEPHRINE BITARTRATE 100 MCG/MIN: 1 SOLUTION INTRAVENOUS at 08:27

## 2023-01-01 RX ADMIN — SODIUM CHLORIDE 500 ML: 9 INJECTION, SOLUTION INTRAVENOUS at 22:03

## 2023-01-01 RX ADMIN — EPINEPHRINE 5 MCG/MIN: 1 INJECTION INTRAMUSCULAR; INTRAVENOUS; SUBCUTANEOUS at 07:19

## 2023-01-01 ASSESSMENT — ENCOUNTER SYMPTOMS
DIARRHEA: 1
ABDOMINAL DISTENTION: 0
ABDOMINAL PAIN: 1
NAUSEA: 1
ABDOMINAL DISTENTION: 0
ABDOMINAL PAIN: 1
DIARRHEA: 1
NAUSEA: 1
SHORTNESS OF BREATH: 1
SHORTNESS OF BREATH: 1

## 2023-01-01 ASSESSMENT — PAIN - FUNCTIONAL ASSESSMENT: PAIN_FUNCTIONAL_ASSESSMENT: NONE - DENIES PAIN

## 2023-02-06 ENCOUNTER — ANESTHESIA EVENT (OUTPATIENT)
Dept: CARDIAC CATH/INVASIVE PROCEDURES | Age: 78
End: 2023-02-06
Payer: MEDICARE

## 2023-02-08 ENCOUNTER — ANESTHESIA (OUTPATIENT)
Dept: CARDIAC CATH/INVASIVE PROCEDURES | Age: 78
End: 2023-02-08
Payer: MEDICARE

## 2023-02-08 ENCOUNTER — HOSPITAL ENCOUNTER (OUTPATIENT)
Age: 78
Setting detail: OUTPATIENT SURGERY
Discharge: HOME | End: 2023-02-08
Attending: INTERNAL MEDICINE | Admitting: INTERNAL MEDICINE
Payer: MEDICARE

## 2023-02-08 VITALS
HEART RATE: 74 BPM | WEIGHT: 205 LBS | SYSTOLIC BLOOD PRESSURE: 105 MMHG | OXYGEN SATURATION: 100 % | HEIGHT: 72 IN | DIASTOLIC BLOOD PRESSURE: 71 MMHG | RESPIRATION RATE: 20 BRPM | TEMPERATURE: 98.1 F | BODY MASS INDEX: 27.77 KG/M2

## 2023-02-08 DIAGNOSIS — I48.11 LONGSTANDING PERSISTENT ATRIAL FIBRILLATION (HCC): ICD-10-CM

## 2023-02-08 LAB
ATRIAL RATE: 37 BPM
CALCULATED R AXIS, ECG10: 84 DEGREES
CALCULATED T AXIS, ECG11: -101 DEGREES
DIAGNOSIS, 93000: NORMAL
Q-T INTERVAL, ECG07: 504 MS
QRS DURATION, ECG06: 174 MS
QTC CALCULATION (BEZET), ECG08: 570 MS
VENTRICULAR RATE, ECG03: 77 BPM

## 2023-02-08 PROCEDURE — 74011250636 HC RX REV CODE- 250/636: Performed by: ANESTHESIOLOGY

## 2023-02-08 PROCEDURE — 76060000032 HC ANESTHESIA 0.5 TO 1 HR: Performed by: INTERNAL MEDICINE

## 2023-02-08 PROCEDURE — 93005 ELECTROCARDIOGRAM TRACING: CPT

## 2023-02-08 PROCEDURE — 92960 CARDIOVERSION ELECTRIC EXT: CPT | Performed by: INTERNAL MEDICINE

## 2023-02-08 PROCEDURE — 2709999900 HC NON-CHARGEABLE SUPPLY: Performed by: INTERNAL MEDICINE

## 2023-02-08 RX ORDER — SODIUM CHLORIDE 9 MG/ML
INJECTION, SOLUTION INTRAVENOUS
Status: DISCONTINUED | OUTPATIENT
Start: 2023-02-08 | End: 2023-02-08 | Stop reason: HOSPADM

## 2023-02-08 RX ORDER — PROPOFOL 10 MG/ML
INJECTION, EMULSION INTRAVENOUS AS NEEDED
Status: DISCONTINUED | OUTPATIENT
Start: 2023-02-08 | End: 2023-02-08 | Stop reason: HOSPADM

## 2023-02-08 RX ORDER — POTASSIUM CHLORIDE 750 MG/1
20 TABLET, EXTENDED RELEASE ORAL
COMMUNITY

## 2023-02-08 RX ORDER — PHENYLEPHRINE HCL IN 0.9% NACL 0.4MG/10ML
SYRINGE (ML) INTRAVENOUS AS NEEDED
Status: DISCONTINUED | OUTPATIENT
Start: 2023-02-08 | End: 2023-02-08 | Stop reason: HOSPADM

## 2023-02-08 RX ORDER — AZITHROMYCIN 250 MG/1
250 TABLET, FILM COATED ORAL DAILY
COMMUNITY

## 2023-02-08 RX ADMIN — Medication 40 MCG: at 11:18

## 2023-02-08 RX ADMIN — PROPOFOL 20 MG: 10 INJECTION, EMULSION INTRAVENOUS at 11:13

## 2023-02-08 RX ADMIN — SODIUM CHLORIDE: 900 INJECTION, SOLUTION INTRAVENOUS at 11:00

## 2023-02-08 RX ADMIN — Medication 80 MCG: at 11:11

## 2023-02-08 RX ADMIN — PROPOFOL 50 MG: 10 INJECTION, EMULSION INTRAVENOUS at 11:11

## 2023-02-08 NOTE — PROGRESS NOTES
1245: OOB, ambulated to the bathroom. No dizziness reported. 1300: discharge instructions reviewed, IV removed and patient dressed.   1310: taken out in wheelchair by RN

## 2023-02-08 NOTE — PROGRESS NOTES
TRANSFER - IN REPORT:    Verbal report received from Clementina Denise RN on Allyson Malik  being received from [facility] for routine progression of care. Report consisted of patients Situation, Background, Assessment and Recommendations(SBAR). Information from the following report(s) Procedure Summary, MAR, Recent Results, and Cardiac Rhythm BiV paced  was reviewed with the receiving clinician. Opportunity for questions and clarification was provided. Assessment completed upon patients arrival to [address] [place] [address] and care assumed. Cardiac Cath Lab Recovery Arrival Note:    Allyson Malik arrived to [facility] recovery area. Patient procedure= DCCV x 3 shocks. Patient on cardiac monitor, non-invasive blood pressure, SPO2 monitor. On RA. Patient status doing well without problems. Patient is A&Ox 4. Patient reports no complaints.

## 2023-02-08 NOTE — ANESTHESIA PREPROCEDURE EVALUATION
Anesthetic History   No history of anesthetic complications            Review of Systems / Medical History  Patient summary reviewed, nursing notes reviewed and pertinent labs reviewed    Pulmonary                Comments: Former smoker, quit 1995   Neuro/Psych   Within defined limits           Cardiovascular    Hypertension: well controlled  Valvular problems/murmurs: aortic stenosis    CHF (Combined systolic & diastolic/ Compensated ): NYHA Classification I, dyspnea on exertion  Dysrhythmias (Paroxysmal AF) : atrial fibrillation  Pacemaker, CAD, PAD, cardiac stents and hyperlipidemia    Exercise tolerance: <4 METS  Comments:  Viral Cardiomyopathy dx'd 2016   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 AV in stable position with mild adriano valvular leak.  Mod -severe MR and Pulmonary Htn    A fib ablation 2016  Cardioversion AF 12/21  BiV Pacemaker 01/21    Pulmonary Hypertension               GI/Hepatic/Renal         Renal disease: CRI       Endo/Other  Within defined limits          Comments: Pre-diabetes Other Findings   Comments: Cataracts           Physical Exam    Airway  Mallampati: III  TM Distance: 4 - 6 cm  Neck ROM: decreased range of motion   Mouth opening: Normal     Cardiovascular    Rhythm: irregular           Dental    Dentition: Caps/crowns  Comments: Chipped upper right molar  Bridges on back teeth   Pulmonary  Breath sounds clear to auscultation               Abdominal  GI exam deferred       Other Findings            Anesthetic Plan    ASA: 4  Anesthesia type: MAC          Induction: Intravenous  Anesthetic plan and risks discussed with: Patient CC:  Alessandro Whiteozkika RasmussenLanderos is here today for 6 week post-partum visit.    Medications: medications verified, no change  Refills needed today?  NO  denies Latex allergy or sensitivity  Patient would like communication of their results via:      Cell Phone:   Telephone Information:   Mobile [phone]     Okay to leave a message containing results? Yes     If your visit includes an exam today, would you like an assistant to be present in the room during that time? NO    Concerns today include: None     Type of delivery: Vaginal     Have you had a period since delivery? NO     If yes, when was the first day: n/a    Have you had intercourse since delivery? YES, without condom     Are you having difficulty with bowel movements? NO    Are you having difficulty with urination (frequency, pressure, burning)?  NO    Post Baby Questions:   How long did the vaginal bleeding after delivery last for?  4 weeks   Is your baby taking: both breast and bottle  What contraceptive options have you considered? Condoms  Are you planning on getting pregnant again? Yes    If yes, when? Couple years.      Depression screen  Recent Review Flowsheet Data     Date 3/7/2022    Adult PHQ 2 Score 1    Adult PHQ 2 Interpretation No further screening needed    Little interest or pleasure in activity? Several days    Feeling down, depressed or hopeless? Not at all          Generalized Anxiety Disorder (GAD7)    Over the last 2 weeks, how often have you been bothered by the following problems?   Score: 1    Score:  0-4 minimal symptoms 10-14 moderate symptoms   5-9 mild symptoms 15-21 severe symptoms      1.  Feeling nervous, anxious or on edge Not at all   2.  Not being able to stop or control worrying Not at all   3.  Worrying too much about different things Not at all   4.  Trouble relaxing Several days   5.  Being so restless that it's hard to sit still Not at all   6.  Becoming easily annoyed or irritable Not at all   7.  Feeling  afraid as if something awful might happen Not at all            If you checked off any problems, how difficult have these made it for you to do your work, take care of things at home, or get along with other people?

## 2023-02-08 NOTE — ANESTHESIA POSTPROCEDURE EVALUATION
Procedure(s):  EP CARDIOVERSION. MAC    Anesthesia Post Evaluation        Patient participation: complete - patient participated  Level of consciousness: awake  Pain management: adequate  Airway patency: patent  Anesthetic complications: no  Cardiovascular status: hemodynamically stable  Respiratory status: acceptable  Hydration status: acceptable  Comments: The patient is ready for PACU discharge. Basilia Apodaca DO                   Post anesthesia nausea and vomiting:  controlled      INITIAL Post-op Vital signs:   Vitals Value Taken Time   /71 02/08/23 1201   Temp 36.7 °C (98.1 °F) 02/08/23 1132   Pulse 66 02/08/23 1206   Resp 25 02/08/23 1206   SpO2 82 % 02/08/23 1206   Vitals shown include unvalidated device data.

## 2023-02-08 NOTE — Clinical Note
TRANSFER - OUT REPORT:     Verbal report given to: rn.     Report consisted of patient's Situation, Background, Assessment and   Recommendations(SBAR). Opportunity for questions and clarification was provided. Patient transported with a Registered Nurse. Patient transported to: recovery.

## 2023-02-08 NOTE — DISCHARGE INSTRUCTIONS
**You received 3 electrical shocks with eventual conversion to normal rhythm    **There have been NO changes to your medications. Please see below for the final list.    **Please be sure not to miss any doses of your Eliquis. **Dr. Ssuanna Cooley MD's office will contact you to schedule a follow-up appointment in about 1 month.**    YOUR CURRENT MEDICATIONS  Current Discharge Medication List        CONTINUE these medications which have NOT CHANGED    Details   azithromycin (ZITHROMAX) 250 mg tablet Take 250 mg by mouth daily. L.acid/L.casei/B.bif/B.chuy/FOS (PROBIOTIC BLEND PO) Take 1 Tablet by mouth daily. potassium chloride (KLOR-CON M10) 10 mEq tablet Take 20 mEq by mouth nightly. metOLazone (ZAROXOLYN) 5 mg tablet Take 1 Tablet by mouth every Monday and Thursday. As needed for wt gain  Qty: 1 Tablet, Refills: 0      sodium bicarbonate 650 mg tablet Take 1 Tablet by mouth two (2) times a day. Qty: 180 Tablet, Refills: 1    Associated Diagnoses: Hyperchloremic metabolic acidosis      rosuvastatin (CRESTOR) 20 mg tablet TAKE ONE TABLET BY MOUTH ONE TIME DAILY AT NIGHT  Qty: 90 Tablet, Refills: 3    Associated Diagnoses: Mixed hyperlipidemia      carvediloL (COREG) 6.25 mg tablet Take 6.25 mg by mouth two (2) times a day. lisinopriL (PRINIVIL, ZESTRIL) 5 mg tablet 2.5 mg two (2) times a day. Spiriva Respimat 1.25 mcg/actuation inhaler Take 2 Puffs by inhalation daily. acetaminophen (TYLENOL) 325 mg tablet Take 650 mg by mouth every four (4) hours as needed for Pain.      torsemide (DEMADEX) 20 mg tablet Take 3 Tabs by mouth Before breakfast and dinner. Indications: Congestive Heart Failure (this replaces bumetanide or Bumex)  Qty: 180 Tab, Refills: 6      Eliquis 5 mg tablet Take 5 mg by mouth two (2) times a day. cholecalciferol (VITAMIN D3) 25 mcg (1,000 unit) cap Take 1 Cap by mouth daily.       testosterone 12.5 mg/ 1.25 gram (1 %) glpm APPLY 4 PUMPS TO THE SHOULDERS AND UPPER ARMS ONE TIME DAILY IN THE MORNING  Qty: 225 g, Refills: 1    Associated Diagnoses: ED (erectile dysfunction) of organic origin;  Hypogonadism in male

## 2023-02-08 NOTE — ROUTINE PROCESS
Cardiac Cath Lab Recovery Arrival Note:      Law Pike arrived to Cardiac Cath Lab, Recovery Area. Staff introduced to patient. Patient identifiers verified with NAME and DATE OF BIRTH. Procedure verified with patient. Consent forms reviewed and signed by patient or authorized representative and verified. Allergies verified. Patient informed of procedure and plan of care. Questions answered with review. Patient prepped for procedure, per orders from physician, prior to arrival.    Patient on cardiac monitor, non-invasive blood pressure, SPO2 monitor. Patient is A&Ox 4. Patient reports no complaints. Patient in stretcher, in low position, with side rails up, call bell within reach, patient instructed to call of assistance as needed. Patient prep in: [facility] Recovery Area, Bed# [id number]. Family in: not present. Prep by: TRUDY Thorne

## 2023-02-23 LAB
ALBUMIN SERPL-MCNC: 4.6 G/DL (ref 3.5–5)
ALBUMIN/GLOB SERPL: 1.3 (ref 1.1–2.2)
ALP SERPL-CCNC: 54 U/L (ref 45–117)
ALT SERPL-CCNC: 50 U/L (ref 12–78)
ANION GAP SERPL CALC-SCNC: 9 MMOL/L (ref 5–15)
APPEARANCE UR: ABNORMAL
AST SERPL-CCNC: 42 U/L (ref 15–37)
BACTERIA URNS QL MICRO: NEGATIVE /HPF
BASOPHILS # BLD: 0 K/UL (ref 0–0.1)
BASOPHILS NFR BLD: 1 % (ref 0–1)
BILIRUB SERPL-MCNC: 0.7 MG/DL (ref 0.2–1)
BILIRUB UR QL: NEGATIVE
BUN SERPL-MCNC: 60 MG/DL (ref 6–20)
BUN/CREAT SERPL: 27 (ref 12–20)
CALCIUM SERPL-MCNC: 9.9 MG/DL (ref 8.5–10.1)
CHLORIDE SERPL-SCNC: 94 MMOL/L (ref 97–108)
CO2 SERPL-SCNC: 29 MMOL/L (ref 21–32)
COLOR UR: ABNORMAL
CREAT SERPL-MCNC: 2.2 MG/DL (ref 0.7–1.3)
DIFFERENTIAL METHOD BLD: ABNORMAL
EOSINOPHIL # BLD: 0.1 K/UL (ref 0–0.4)
EOSINOPHIL NFR BLD: 1 % (ref 0–7)
EPITH CASTS URNS QL MICRO: ABNORMAL /LPF
ERYTHROCYTE [DISTWIDTH] IN BLOOD BY AUTOMATED COUNT: 15 % (ref 11.5–14.5)
GLOBULIN SER CALC-MCNC: 3.5 G/DL (ref 2–4)
GLUCOSE SERPL-MCNC: 174 MG/DL (ref 65–100)
GLUCOSE UR STRIP.AUTO-MCNC: NEGATIVE MG/DL
HCT VFR BLD AUTO: 39.2 % (ref 36.6–50.3)
HGB BLD-MCNC: 13.1 G/DL (ref 12.1–17)
HGB UR QL STRIP: ABNORMAL
IMM GRANULOCYTES # BLD AUTO: 0.1 K/UL (ref 0–0.04)
IMM GRANULOCYTES NFR BLD AUTO: 1 % (ref 0–0.5)
KETONES UR QL STRIP.AUTO: NEGATIVE MG/DL
LEUKOCYTE ESTERASE UR QL STRIP.AUTO: NEGATIVE
LYMPHOCYTES # BLD: 1.4 K/UL (ref 0.8–3.5)
LYMPHOCYTES NFR BLD: 17 % (ref 12–49)
MCH RBC QN AUTO: 34.7 PG (ref 26–34)
MCHC RBC AUTO-ENTMCNC: 33.4 G/DL (ref 30–36.5)
MCV RBC AUTO: 104 FL (ref 80–99)
MONOCYTES # BLD: 1 K/UL (ref 0–1)
MONOCYTES NFR BLD: 12 % (ref 5–13)
NEUTS SEG # BLD: 5.6 K/UL (ref 1.8–8)
NEUTS SEG NFR BLD: 68 % (ref 32–75)
NITRITE UR QL STRIP.AUTO: NEGATIVE
NRBC # BLD: 0 K/UL (ref 0–0.01)
NRBC BLD-RTO: 0 PER 100 WBC
PH UR STRIP: 6.5 (ref 5–8)
PLATELET # BLD AUTO: 145 K/UL (ref 150–400)
PMV BLD AUTO: 11.6 FL (ref 8.9–12.9)
POTASSIUM SERPL-SCNC: 3.5 MMOL/L (ref 3.5–5.1)
PROT SERPL-MCNC: 8.1 G/DL (ref 6.4–8.2)
PROT UR STRIP-MCNC: 30 MG/DL
RBC # BLD AUTO: 3.77 M/UL (ref 4.1–5.7)
RBC #/AREA URNS HPF: >100 /HPF (ref 0–5)
SODIUM SERPL-SCNC: 132 MMOL/L (ref 136–145)
SP GR UR REFRACTOMETRY: 1.01 (ref 1–1.03)
UA: UC IF INDICATED,UAUC: ABNORMAL
UROBILINOGEN UR QL STRIP.AUTO: 0.2 EU/DL (ref 0.2–1)
WBC # BLD AUTO: 8.2 K/UL (ref 4.1–11.1)
WBC URNS QL MICRO: ABNORMAL /HPF (ref 0–4)

## 2023-02-23 PROCEDURE — 80053 COMPREHEN METABOLIC PANEL: CPT

## 2023-02-23 PROCEDURE — 36415 COLL VENOUS BLD VENIPUNCTURE: CPT

## 2023-02-23 PROCEDURE — 83880 ASSAY OF NATRIURETIC PEPTIDE: CPT

## 2023-02-23 PROCEDURE — 81001 URINALYSIS AUTO W/SCOPE: CPT

## 2023-02-23 PROCEDURE — 85025 COMPLETE CBC W/AUTO DIFF WBC: CPT

## 2023-02-23 PROCEDURE — 99285 EMERGENCY DEPT VISIT HI MDM: CPT

## 2023-02-24 ENCOUNTER — HOSPITAL ENCOUNTER (INPATIENT)
Age: 78
LOS: 1 days | Discharge: HOME | DRG: 813 | End: 2023-02-24
Attending: STUDENT IN AN ORGANIZED HEALTH CARE EDUCATION/TRAINING PROGRAM | Admitting: INTERNAL MEDICINE
Payer: MEDICARE

## 2023-02-24 VITALS
TEMPERATURE: 97 F | HEART RATE: 97 BPM | RESPIRATION RATE: 19 BRPM | BODY MASS INDEX: 27.77 KG/M2 | SYSTOLIC BLOOD PRESSURE: 107 MMHG | HEIGHT: 72 IN | WEIGHT: 205 LBS | OXYGEN SATURATION: 97 % | DIASTOLIC BLOOD PRESSURE: 65 MMHG

## 2023-02-24 DIAGNOSIS — Z79.01 ANTICOAGULATED: ICD-10-CM

## 2023-02-24 DIAGNOSIS — R31.9 HEMATURIA, UNSPECIFIED TYPE: ICD-10-CM

## 2023-02-24 DIAGNOSIS — N17.9 AKI (ACUTE KIDNEY INJURY) (HCC): Primary | ICD-10-CM

## 2023-02-24 LAB — BNP SERPL-MCNC: 6302 PG/ML

## 2023-02-24 PROCEDURE — 65270000046 HC RM TELEMETRY

## 2023-02-24 PROCEDURE — 74011250636 HC RX REV CODE- 250/636: Performed by: EMERGENCY MEDICINE

## 2023-02-24 PROCEDURE — 74011250637 HC RX REV CODE- 250/637: Performed by: HOSPITALIST

## 2023-02-24 RX ORDER — CARVEDILOL 3.12 MG/1
6.25 TABLET ORAL 2 TIMES DAILY
Status: DISCONTINUED | OUTPATIENT
Start: 2023-02-24 | End: 2023-02-24 | Stop reason: HOSPADM

## 2023-02-24 RX ORDER — SODIUM CHLORIDE 0.9 % (FLUSH) 0.9 %
5-40 SYRINGE (ML) INJECTION EVERY 8 HOURS
Status: DISCONTINUED | OUTPATIENT
Start: 2023-02-24 | End: 2023-02-24 | Stop reason: HOSPADM

## 2023-02-24 RX ORDER — SODIUM CHLORIDE 0.9 % (FLUSH) 0.9 %
5-40 SYRINGE (ML) INJECTION AS NEEDED
Status: DISCONTINUED | OUTPATIENT
Start: 2023-02-24 | End: 2023-02-24 | Stop reason: HOSPADM

## 2023-02-24 RX ORDER — ONDANSETRON 4 MG/1
4 TABLET, ORALLY DISINTEGRATING ORAL
Status: DISCONTINUED | OUTPATIENT
Start: 2023-02-24 | End: 2023-02-24 | Stop reason: HOSPADM

## 2023-02-24 RX ORDER — ACETAMINOPHEN 325 MG/1
650 TABLET ORAL
Status: DISCONTINUED | OUTPATIENT
Start: 2023-02-24 | End: 2023-02-24 | Stop reason: HOSPADM

## 2023-02-24 RX ORDER — SODIUM BICARBONATE 650 MG/1
650 TABLET ORAL 2 TIMES DAILY
Status: DISCONTINUED | OUTPATIENT
Start: 2023-02-24 | End: 2023-02-24 | Stop reason: HOSPADM

## 2023-02-24 RX ORDER — ACETAMINOPHEN 650 MG/1
650 SUPPOSITORY RECTAL
Status: DISCONTINUED | OUTPATIENT
Start: 2023-02-24 | End: 2023-02-24 | Stop reason: HOSPADM

## 2023-02-24 RX ORDER — ONDANSETRON 2 MG/ML
4 INJECTION INTRAMUSCULAR; INTRAVENOUS
Status: DISCONTINUED | OUTPATIENT
Start: 2023-02-24 | End: 2023-02-24 | Stop reason: HOSPADM

## 2023-02-24 RX ORDER — POLYETHYLENE GLYCOL 3350 17 G/17G
17 POWDER, FOR SOLUTION ORAL DAILY PRN
Status: DISCONTINUED | OUTPATIENT
Start: 2023-02-24 | End: 2023-02-24 | Stop reason: HOSPADM

## 2023-02-24 RX ADMIN — SODIUM BICARBONATE 650 MG: 650 TABLET ORAL at 09:12

## 2023-02-24 RX ADMIN — SODIUM CHLORIDE 500 ML: 9 INJECTION, SOLUTION INTRAVENOUS at 02:21

## 2023-02-24 RX ADMIN — CARVEDILOL 6.25 MG: 3.12 TABLET, FILM COATED ORAL at 09:12

## 2023-02-24 RX ADMIN — APIXABAN 5 MG: 5 TABLET, FILM COATED ORAL at 09:12

## 2023-02-24 NOTE — H&P
History and Physical    Date of Service:  2/24/2023  Primary Care Provider: Calixto Swenson MD  Source of information: The patient    Chief Complaint: Hematuria      History of Presenting Illness:   Chidi Oliva is a 69 y.o. male who presents with hematuria. Patient has past medical history of atrial fibrillation on Eliquis hyperlipidemia hypertension CHF and is status post TAVR by Dr. Bhumi Ambrose recently within 1 month came today with a history of urine getting bloody. He states that symptoms started around evening. He states his urine stream is grossly bloody and then transitions to normal looking urine with some blood clots. He denies any abdominal pain dysuria urinary frequency or any other medical symptoms. He does endorses that in the warm weather yesterday he did lifted some heavy stuff for doing some household outside work. Patient denies any other symptoms    The patient denies any headache, blurry vision, sore throat, trouble swallowing, trouble with speech, chest pain, SOB, cough, fever, chills, N/V/D, abd pain, urinary symptoms, constipation, recent travels, sick contacts, focal or generalized neurological symptoms, falls, injuries, rashes, contact with COVID-19 diagnosed patients, hematemesis, melena, hemoptysis, hematuria, rashes, denies starting any new medications and denies any other concerns or problems besides as mentioned above. REVIEW OF SYSTEMS:  Pertinent items are noted in the History of Present Illness.      Past Medical History:   Diagnosis Date    Arrhythmia 11/11/2014    CAD (coronary artery disease)     stent, cardiac cath, RAUDEL X2    Cataract (lens) fragments in eye following cataract surgery, right eye     October 2022    ED (erectile dysfunction) of organic origin     High cholesterol 03/31/2010    HTN (hypertension) 03/31/2010    Prediabetes 11/11/2014    Pulmonary hypertension ([facility] [address] 08/08/2017    S/P ablation of atrial flutter 03/22/2016    S/P biventricular cardiac pacemaker procedure 12/69/1742    Systolic CHF, acute ([facility] [address] 03/22/2016      Past Surgical History:   Procedure Laterality Date    HX ORTHOPAEDIC      fx wrist    HX PACEMAKER      BI-V Pacer/AICD 1/2021    HX TONSILLECTOMY      UT CARDIAC SURG PROCEDURE UNLIST      TAVR june 2021    UT INSJ/RPLCMT PERM DFB W/TRNSVNS LDS 1/DUAL CHMBR N/A 1/26/2021    INSERT ICD BIV MULTI performed by Triston Grigsby MD at [facility] CARDIAC CATH LAB     Prior to Admission medications    Medication Sig Start Date End Date Taking? Authorizing Provider   azithromycin (ZITHROMAX) 250 mg tablet Take 250 mg by mouth daily. Provider, Historical   L.acid/L.casei/B.bif/B.chuy/FOS (PROBIOTIC BLEND PO) Take 1 Tablet by mouth daily. Provider, Historical   potassium chloride (KLOR-CON M10) 10 mEq tablet Take 20 mEq by mouth nightly. Provider, Historical   metOLazone (ZAROXOLYN) 5 mg tablet Take 1 Tablet by mouth every Monday and Thursday. As needed for wt gain 12/15/22   Emmett Garcia MD   sodium bicarbonate 650 mg tablet Take 1 Tablet by mouth two (2) times a day. 11/29/22   Thane Homans, MD   testosterone 12.5 mg/ 1.25 gram (1 %) glpm APPLY 4 PUMPS TO THE SHOULDERS AND UPPER ARMS ONE TIME DAILY IN THE MORNING 11/5/22   Thane Homans, MD   rosuvastatin (CRESTOR) 20 mg tablet TAKE ONE TABLET BY MOUTH ONE TIME DAILY AT NIGHT 9/28/22   Thane Homans, MD   carvediloL (COREG) 6.25 mg tablet Take 6.25 mg by mouth two (2) times a day. 7/18/22   Provider, Historical   lisinopriL (PRINIVIL, ZESTRIL) 5 mg tablet 2.5 mg two (2) times a day. 11/17/21   Provider, Historical   Spiriva Respimat 1.25 mcg/actuation inhaler Take 2 Puffs by inhalation daily. 11/11/21   Provider, Historical   acetaminophen (TYLENOL) 325 mg tablet Take 650 mg by mouth every four (4) hours as needed for Pain. 7/23/21   Jennifer Higgins NP   torsemide (DEMADEX) 20 mg tablet Take 3 Tabs by mouth Before breakfast and dinner.  Indications: Congestive Heart Failure (this replaces bumetanide or Bumex) 5/10/21   Francisco Argueta MD   Eliquis 5 mg tablet Take 5 mg by mouth two (2) times a day. 4/9/20   Provider, Historical   cholecalciferol (VITAMIN D3) 25 mcg (1,000 unit) cap Take 1 Cap by mouth daily. Provider, Historical     No Known Allergies   Family History   Problem Relation Age of Onset    Heart Disease Father     Hypertension Father     Hypertension Mother     Heart Disease Mother     Cancer Brother       Social History:  reports that he quit smoking about 27 years ago. His smoking use included cigarettes. He started smoking about 60 years ago. He has a 70.00 pack-year smoking history. He has never used smokeless tobacco. He reports that he does not currently use alcohol after a past usage of about 1.0 standard drink per week. He reports that he does not use drugs. Social Determinants of Health     Tobacco Use: Medium Risk    Smoking Tobacco Use: Former    Smokeless Tobacco Use: Never    Passive Exposure: Not on file   Alcohol Use: Not on file   Financial Resource Strain: Low Risk     Difficulty of Paying Living Expenses: Not hard at all   Food Insecurity: No Food Insecurity    Worried About Running Out of Food in the Last Year: Never true    Ran Out of Food in the Last Year: Never true   Transportation Needs: Not on file   Physical Activity: Not on file   Stress: Not on file   Social Connections: Not on file   Intimate Partner Violence: Not on file   Depression: Not at risk    PHQ-2 Score: 0   Housing Stability: Not on file        Medications were reconciled to the best of my ability given all available resources at the time of admission. Route is PO if not otherwise noted. Family and social history were personally reviewed, all pertinent and relevant details are outlined as above.     Objective:   Visit Vitals  BP (!) 117/99   Pulse 95   Temp 96.8 °F (36 °C)   Resp 22   Ht 5' 11.5\" (1.816 m)   Wt 93 kg (205 lb)   SpO2 97%   BMI 28.19 kg/m²      O2 Device: None (Room air)    PHYSICAL EXAM:   General: Alert x oriented x 3, awake, no acute distress,   HEENT: PEERL, EOMI, moist mucus membranes  Neck: Supple, no JVD, no meningeal signs  Chest: Clear to auscultation bilaterally   CVS: RRR, S1 S2 heard, no murmurs/rubs/gallops  Abd: Soft, non-tender, non-distended, +bowel sounds   Ext: No clubbing, no cyanosis, no edema  Neuro/Psych: Pleasant mood and affect, CN 2-12 grossly intact, sensory grossly within normal limit, Strength 5/5 in all extremities, DTR 1+ x 4  Cap refill: Brisk, less than 3 seconds  Pulses: 2+, symmetric in all extremities  Skin: Warm, dry, without rashes or lesions    Data Review:   I have independently reviewed and interpreted patient's lab and all other diagnostic data    Abnormal Labs Reviewed   URINALYSIS W/ REFLEX CULTURE - Abnormal; Notable for the following components:       Result Value    Appearance TURBID (*)     Protein 30 (*)     Blood MODERATE (*)     RBC >100 (*)     All other components within normal limits   CBC WITH AUTOMATED DIFF - Abnormal; Notable for the following components:    RBC 3.77 (*)     .0 (*)     MCH 34.7 (*)     RDW 15.0 (*)     PLATELET 080 (*)     IMMATURE GRANULOCYTES 1 (*)     ABS. IMM.  GRANS. 0.1 (*)     All other components within normal limits   METABOLIC PANEL, COMPREHENSIVE - Abnormal; Notable for the following components:    Sodium 132 (*)     Chloride 94 (*)     Glucose 174 (*)     BUN 60 (*)     Creatinine 2.20 (*)     BUN/Creatinine ratio 27 (*)     eGFR 30 (*)     AST (SGOT) 42 (*)     All other components within normal limits       All Micro Results       None            IMAGING:   No orders to display        ECG/ECHO:    Results for orders placed or performed during the hospital encounter of 02/08/23   EKG, 12 LEAD, INITIAL   Result Value Ref Range    Ventricular Rate 77 BPM    Atrial Rate 37 BPM    QRS Duration 174 ms    Q-T Interval 504 ms    QTC Calculation (Bezet) 570 ms Calculated R Axis 84 degrees    Calculated T Axis -101 degrees    Diagnosis       Ventricular paced rhythm with frequent premature ventricular complexes  When compared with ECG of 14-DEC-2022 15:06,  Current undetermined rhythm precludes rhythm comparison, needs review  Confirmed by Shanta Sands MD ([id number]) on 2/8/2023 4:14:25 PM            Notes reviewed from all clinical/nonclinical/nursing services involved in patient's clinical care. Care coordination discussions were held with appropriate clinical/nonclinical/ nursing providers based on care coordination needs. Assessment:   Given the patient's current clinical presentation, there is a high level of concern for decompensation if discharged from the emergency department. Complex decision making was performed, which includes reviewing the patient's available past medical records, laboratory results, and imaging studies. Active Problems:    KAYE (acute kidney injury) ([facility] [address] (2/24/2023)        Plan:     Hematuria in the setting of using Eliquis  --Hemoglobin currently 13.4 and urine turning clear we will continue Eliquis at this time  --Only had TAVR by Dr. Adarsh Hua consult cardiology  --If continues to bleed may require urology and bladder irrigation  --Currently making slightly pinkish urine  --No dysuria as per patient    Atrial fibrillation status post pacemaker  --Patient is on Eliquis for above and rate controlled  -- Patient is on Coreg and will continue    KAYE on CKD 2  --Possibly in the setting of using diuretics patient is on metolazone and torsemide  --Holding for now gentle IV fluid  --Follow clinically follow the labs  --Continue bicarb    Hypertension holding lisinopril in the setting of KAYE  Hyperlipidemia May resume statin        DIET: ADULT DIET Regular;  Low Sodium (2 gm)   ISOLATION PRECAUTIONS: There are currently no Active Isolations  CODE STATUS: Full Code   DVT PROPHYLAXIS: Eliquis  FUNCTIONAL STATUS PRIOR TO HOSPITALIZATION: Fully active and ambulatory; able to carry on all self-care without restriction. Ambulatory status/function: By self   EARLY MOBILITY ASSESSMENT: Recommend routine ambulation while hospitalized with the assistance of nursing staff  ANTICIPATED DISCHARGE: 24-48 hours. ANTICIPATED DISPOSITION: Home with Home Healthcare  EMERGENCY CONTACT/SURROGATE DECISION MAKER:     CRITICAL CARE WAS PERFORMED FOR THIS ENCOUNTER: NO.      Signed By: Elfredia Simmonds, MD     February 24, 2023         Please note that this dictation may have been completed with Dragon, the computer voice recognition software. Quite often unanticipated grammatical, syntax, homophones, and other interpretive errors are inadvertently transcribed by the computer software. Please disregard these errors. Please excuse any errors that have escaped final proofreading.

## 2023-02-24 NOTE — CONSULTS
[address] [facility] Cardiology Consultation    Date of Consult:  02/24/23  Date of Admission: 2/24/2023  Primary Cardiologist: Dr. Brooklynn Peterson  Physician Requesting consult: Dr. Breann Angulo    Chief Complaint / Reason for Consult:   Hematuria, recommendations for anticoagulation    History of Present Illness:  Deb Norwood is a 69 y.o. male with the below listed medical history who was admitted with intermittent hematuria. Says he has had intermittent hematuria since yesterday. Recently red-tinged urine. Denies chest pain or LH. Feels that breathing is slightly improved since cardioversion. No orthopnea or ankle edema. Past Medical History:   Diagnosis Date    Arrhythmia 11/11/2014    CAD (coronary artery disease)     stent, cardiac cath, RAUDEL X2    Cataract (lens) fragments in eye following cataract surgery, right eye     October 2022    ED (erectile dysfunction) of organic origin     High cholesterol 03/31/2010    HTN (hypertension) 03/31/2010    Prediabetes 11/11/2014    Pulmonary hypertension ([facility] [address] 08/08/2017    S/P ablation of atrial flutter 03/22/2016    S/P biventricular cardiac pacemaker procedure 30/51/3694    Systolic CHF, acute ([facility] [address] 03/22/2016       Prior to Admission medications    Medication Sig Start Date End Date Taking? Authorizing Provider   azithromycin (ZITHROMAX) 250 mg tablet Take 250 mg by mouth daily. Provider, Historical   L.acid/L.casei/B.bif/B.chuy/FOS (PROBIOTIC BLEND PO) Take 1 Tablet by mouth daily. Provider, Historical   potassium chloride (KLOR-CON M10) 10 mEq tablet Take 20 mEq by mouth nightly. Provider, Historical   metOLazone (ZAROXOLYN) 5 mg tablet Take 1 Tablet by mouth every Monday and Thursday. As needed for wt gain 12/15/22   Anna Hidalgo MD   sodium bicarbonate 650 mg tablet Take 1 Tablet by mouth two (2) times a day.    11/29/22   Morales Narvaez MD   testosterone 12.5 mg/ 1.25 gram (1 %) glpm APPLY 4 PUMPS TO THE SHOULDERS AND UPPER ARMS ONE TIME DAILY IN THE MORNING 11/5/22   Casey Berrios MD   rosuvastatin (CRESTOR) 20 mg tablet TAKE ONE TABLET BY MOUTH ONE TIME DAILY AT NIGHT 9/28/22   Casey Berrios MD   carvediloL (COREG) 6.25 mg tablet Take 6.25 mg by mouth two (2) times a day. 7/18/22   Provider, Historical   lisinopriL (PRINIVIL, ZESTRIL) 5 mg tablet 2.5 mg two (2) times a day. 11/17/21   Provider, Historical   Spiriva Respimat 1.25 mcg/actuation inhaler Take 2 Puffs by inhalation daily. 11/11/21   Provider, Historical   acetaminophen (TYLENOL) 325 mg tablet Take 650 mg by mouth every four (4) hours as needed for Pain. 7/23/21   Di Soto NP   torsemide (DEMADEX) 20 mg tablet Take 3 Tabs by mouth Before breakfast and dinner. Indications: Congestive Heart Failure (this replaces bumetanide or Bumex) 5/10/21   Samanta Hurley MD   Eliquis 5 mg tablet Take 5 mg by mouth two (2) times a day. 4/9/20   Provider, Historical   cholecalciferol (VITAMIN D3) 25 mcg (1,000 unit) cap Take 1 Cap by mouth daily. Provider, Historical       Current Facility-Administered Medications   Medication Dose Route Frequency    carvediloL (COREG) tablet 6.25 mg  6.25 mg Oral BID    apixaban (ELIQUIS) tablet 5 mg  5 mg Oral BID    sodium bicarbonate tablet 650 mg  650 mg Oral BID    sodium chloride (NS) flush 5-40 mL  5-40 mL IntraVENous Q8H    sodium chloride (NS) flush 5-40 mL  5-40 mL IntraVENous PRN    acetaminophen (TYLENOL) tablet 650 mg  650 mg Oral Q6H PRN    Or    acetaminophen (TYLENOL) suppository 650 mg  650 mg Rectal Q6H PRN    polyethylene glycol (MIRALAX) packet 17 g  17 g Oral DAILY PRN    ondansetron (ZOFRAN ODT) tablet 4 mg  4 mg Oral Q8H PRN    Or    ondansetron (ZOFRAN) injection 4 mg  4 mg IntraVENous Q6H PRN     Current Outpatient Medications   Medication Sig    azithromycin (ZITHROMAX) 250 mg tablet Take 250 mg by mouth daily. L.acid/L.casei/B.bif/B.chuy/FOS (PROBIOTIC BLEND PO) Take 1 Tablet by mouth daily.     potassium chloride (KLOR-CON M10) 10 mEq tablet Take 20 mEq by mouth nightly. metOLazone (ZAROXOLYN) 5 mg tablet Take 1 Tablet by mouth every Monday and Thursday. As needed for wt gain    sodium bicarbonate 650 mg tablet Take 1 Tablet by mouth two (2) times a day. testosterone 12.5 mg/ 1.25 gram (1 %) glpm APPLY 4 PUMPS TO THE SHOULDERS AND UPPER ARMS ONE TIME DAILY IN THE MORNING    rosuvastatin (CRESTOR) 20 mg tablet TAKE ONE TABLET BY MOUTH ONE TIME DAILY AT NIGHT    carvediloL (COREG) 6.25 mg tablet Take 6.25 mg by mouth two (2) times a day. lisinopriL (PRINIVIL, ZESTRIL) 5 mg tablet 2.5 mg two (2) times a day. Spiriva Respimat 1.25 mcg/actuation inhaler Take 2 Puffs by inhalation daily. acetaminophen (TYLENOL) 325 mg tablet Take 650 mg by mouth every four (4) hours as needed for Pain.    torsemide (DEMADEX) 20 mg tablet Take 3 Tabs by mouth Before breakfast and dinner. Indications: Congestive Heart Failure (this replaces bumetanide or Bumex)    Eliquis 5 mg tablet Take 5 mg by mouth two (2) times a day. cholecalciferol (VITAMIN D3) 25 mcg (1,000 unit) cap Take 1 Cap by mouth daily.        Family History   Problem Relation Age of Onset    Heart Disease Father     Hypertension Father     Hypertension Mother     Heart Disease Mother     Cancer Brother        Social History     Socioeconomic History    Marital status: SINGLE     Spouse name: Not on file    Number of children: Not on file    Years of education: Not on file    Highest education level: Not on file   Occupational History    Not on file   Tobacco Use    Smoking status: Former     Packs/day: 2.00     Years: 35.00     Pack years: 70.00     Types: Cigarettes     Start date: 1963     Quit date: 1995     Years since quittin.9    Smokeless tobacco: Never   Vaping Use    Vaping Use: Never used   Substance and Sexual Activity    Alcohol use: Not Currently     Alcohol/week: 1.0 standard drink     Types: 1 Glasses of wine per week     Comment: 1 drink every 3 years    Drug use: No    Sexual activity: Yes     Partners: Female   Other Topics Concern    Not on file   Social History Narrative    Not on file     Social Determinants of Health     Financial Resource Strain: Low Risk     Difficulty of Paying Living Expenses: Not hard at all   Food Insecurity: No Food Insecurity    Worried About Running Out of Food in the Last Year: Never true    Ran Out of Food in the Last Year: Never true   Transportation Needs: Not on file   Physical Activity: Not on file   Stress: Not on file   Social Connections: Not on file   Intimate Partner Violence: Not on file   Housing Stability: Not on file       Review of Systems   All other systems reviewed and are negative.     Visit Vitals  /65   Pulse 97   Temp 97 °F (36.1 °C)   Resp 19   Ht 5' 11.5\" (1.816 m)   Wt 93 kg (205 lb)   SpO2 97%   BMI 28.19 kg/m²         Intake/Output Summary (Last 24 hours) at 2/24/2023 1205  Last data filed at 2/24/2023 0321  Gross per 24 hour   Intake 500 ml   Output --   Net 500 ml        Physical Exam  GEN: NAD, appears stated age  HEENT: EOMI  NECK: Normal JVP   CV: Irregularly irregular, normal S1 and S2, no M/R/G  LUNGS: CTAB, no W/R/R  ABD: Soft, NT/ND  EXT: No edema, 2+ and symmetrical radial pulses b/l  PSYCH: Mood and affect normal  NEURO: Alert, MAEW, face symmetrical, speech intact    Lab Review:  BMP:   Lab Results   Component Value Date/Time     (L) 02/23/2023 10:55 PM    K 3.5 02/23/2023 10:55 PM    CL 94 (L) 02/23/2023 10:55 PM    CO2 29 02/23/2023 10:55 PM    AGAP 9 02/23/2023 10:55 PM     (H) 02/23/2023 10:55 PM    BUN 60 (H) 02/23/2023 10:55 PM    CREA 2.20 (H) 02/23/2023 10:55 PM        CBC:  Lab Results   Component Value Date/Time    WBC 8.2 02/23/2023 10:55 PM    HGB 13.1 02/23/2023 10:55 PM    HCT 39.2 02/23/2023 10:55 PM    PLATELET 389 (L) 44/68/5309 10:55 PM    .0 (H) 02/23/2023 10:55 PM       All Cardiac Markers in the last 24 hours:    Lab Results Component Value Date/Time    BNPNT 6,302 (H) 02/23/2023 10:55 PM       Lab Results   Component Value Date/Time    Cholesterol, total 147 12/09/2022 11:25 AM    HDL Cholesterol 38 (L) 12/09/2022 11:25 AM    LDL, calculated 79 12/09/2022 11:25 AM    LDL, calculated 71 08/05/2020 11:47 AM    VLDL, calculated 30 12/09/2022 11:25 AM    VLDL, calculated 15 08/05/2020 11:47 AM    Triglyceride 174 (H) 12/09/2022 11:25 AM    CHOL/HDL Ratio 5.2 (H) 06/24/2010 09:50 AM        Data Review:  ECG tracing personally reviewed:   Telemetry shows possible AF with frequent ectopy. Echocardiogram:  12/14/22    ECHO SUSANNA W POSSIBLE CARDIOVERSION 12/14/2022 12/14/2022    Interpretation Summary  Successful direct current electrical cardioversion using one 200 J synchronized shock with restoration of normal sinus rhythm with frequent PVCs. For full results of the SUSANNA, please see the other SUSANNA report from this date. Summary results as follows:    Left Ventricle: Severely reduced left ventricular systolic function with a visually estimated EF of 20 - 25%. Left ventricle is mildly dilated. Normal wall thickness. Severe global hypokinesis present. Abnormal diastolic function. Right Ventricle: Mildly reduced systolic function. Aortic Valve: 26 mm Sandy 3 Ultra THV + 1 mL appropriately positioned transcatheter bioprosthetic valve that is well-seated. Mild paravalvular regurgitation, located anteriorly. No stenosis. AV mean gradient is 5 mmHg. AV peak velocity is 1.9 m/s. LVOT:AV VTI Index is 0.62. LVOT diameter is 2.3 cm. AV area by continuity VTI is 2.4 cm2. AV Stroke Volume index is 32.0 mL/m2. Mitral Valve: Moderate annular dilation. Moderate, bordering on moderate/severe (2+, bordering on 2-3+) mitral regurgitation. Mild systolic blunting of the left pulmonary veins. Severe systolic blunting to reversal of the right pulmonary veins. EROA 0.1 cm2, Rvol 18 mL.   The posterior leaflet measures 9-10 mm at the site of malcoaptation. No stenosis noted. MV mean gradient is 1 mmHg. MV area by PHT is 4.6 cm2. MV area by planimetry is 7.0 cm2. Tricuspid Valve: Severely elevated RVSP. The estimated RVSP is 67 mmHg. Left Atrium: Left atrium is moderately dilated. Decreased appendage flow velocity. No left atrial appendage thrombus noted. No left atrial appendage mass noted. Right Atrium: Right atrium is mildly dilated. 3D en face views of the mitral and aortic valves were obtained. Signed by: Jhoana Samayoa MD on 12/14/2022  5:31 PM    Assessment/Recommendations:    Hematuria  Would avoid holding anticoagulation until 3/8/23 since he had electrical cardioversion recently. 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 Discussed with Dr. Lou Hopkins. Thank you for the opportunity to participate in the care of [place] and please do not hesitate to contact us should you have any questions. Osiel Barton, [address] [facility]  02/24/23

## 2023-02-24 NOTE — ED TRIAGE NOTES
Pt presents to ED co hematuria. Pt states seeing some clots as well. Denies any dysuria.  Pt takes 5mg Eliquis BID

## 2023-02-24 NOTE — CONSULTS
CARDIOLOGY NOTE    Patient admitted with hematuria. TAVR was not recently, this was done on 7/21/21. Would avoid holding anticoagulation until 3/8/23 since he had electrical cardioversion recently. Continue outpatient cardiology follow-up. Please call if questions. Full note to follow. Thank you for the opportunity to participate in the care of Edita Bean and please do not hesitate to contact us should you have any questions. Mana Truong, [address] [facility]  02/24/23

## 2023-02-24 NOTE — DISCHARGE INSTRUCTIONS
Discharge Instructions       PATIENT ID: Wero Shepard  MRN: [id number]   YOB: 1945    DATE OF ADMISSION: 2/24/2023  2:10 AM    DATE OF DISCHARGE: 2/24/2023    PRIMARY CARE PROVIDER: Priscila Barajas MD     ATTENDING PHYSICIAN: Bonnie Woodson MD  DISCHARGING PROVIDER: John iSms MD    To contact this individual call [phone] and ask the  to page. If unavailable ask to be transferred the Adult Hospitalist Department. DISCHARGE DIAGNOSES  Hematuria    CONSULTATIONS: IP CONSULT TO HOSPITALIST  IP CONSULT TO CARDIOLOGY    PROCEDURES/SURGERIES: * No surgery found *    PENDING TEST RESULTS:   At the time of discharge the following test results are still pending:     FOLLOW UP APPOINTMENTS:   Follow-up Information       Follow up With Specialties Details Why Contact Info    Priscila Barajas MD Family Medicine Follow up in 1 week(s) pt will need a urology referal [phone]               ADDITIONAL CARE RECOMMENDATIONS:     DIET: Cardiac Diet    ACTIVITY: Activity as tolerated    WOUND CARE:     EQUIPMENT needed:       Radiology      DISCHARGE MEDICATIONS:   See Medication Reconciliation Form    It is important that you take the medication exactly as they are prescribed. Keep your medication in the bottles provided by the pharmacist and keep a list of the medication names, dosages, and times to be taken in your wallet. Do not take other medications without consulting your doctor. NOTIFY YOUR PHYSICIAN FOR ANY OF THE FOLLOWING:   Fever over 101 degrees for 24 hours. Chest pain, shortness of breath, fever, chills, nausea, vomiting, diarrhea, change in mentation, falling, weakness, bleeding. Severe pain or pain not relieved by medications. Or, any other signs or symptoms that you may have questions about.       DISPOSITION:  x  Home With:   OT  PT  HH  RN       SNF/Inpatient Rehab/LTAC    Independent/assisted living Hospice    Other:     CDMP Checked:   Yes x     PROBLEM LIST Updated:  Yes x       Signed:   Markell Patricio MD  2/24/2023  10:37 AM

## 2023-02-24 NOTE — ED PROVIDER NOTES
Please note that this dictation was completed with [employer], the computer voice recognition software. Quite often unanticipated grammatical, syntax, homophones, and other interpretive errors are inadvertently transcribed by the computer software. Please disregard these errors. Please excuse any errors that have escaped final proofreading. Patient is a 71-year-old male with history of atrial fibrillation on Eliquis, hyperlipidemia, hypertension, CHF, presenting to ED for evaluation of hematuria. He states his symptoms started around 7 PM this evening. Explains that his urine stream is grossly bloody and then transitions to normal looking urine with small blood clots. He denies any abdominal pain, back pain, dysuria, urinary frequency, or any additional medical complaints. Denies any recent trauma or injury.            Past Medical History:   Diagnosis Date    Arrhythmia 11/11/2014    CAD (coronary artery disease)     stent, cardiac cath, RAUDEL X2    Cataract (lens) fragments in eye following cataract surgery, right eye     October 2022    ED (erectile dysfunction) of organic origin     High cholesterol 03/31/2010    HTN (hypertension) 03/31/2010    Prediabetes 11/11/2014    Pulmonary hypertension ([address] 08/08/2017    S/P ablation of atrial flutter 03/22/2016    S/P biventricular cardiac pacemaker procedure 82/88/1776    Systolic CHF, acute ([address] 03/22/2016       Past Surgical History:   Procedure Laterality Date    HX ORTHOPAEDIC      fx wrist    HX PACEMAKER      BI-V Pacer/AICD 1/2021    HX TONSILLECTOMY      AK CARDIAC SURG PROCEDURE UNLIST      TAVR june 2021    AK INSJ/RPLCMT PERM DFB W/TRNSVNS LDS 1/DUAL CHMBR N/A 1/26/2021    INSERT ICD BIV MULTI performed by Elysia Edgar MD at [facility] CARDIAC CATH LAB         Family History:   Problem Relation Age of Onset    Heart Disease Father     Hypertension Father     Hypertension Mother     Heart Disease Mother     Cancer Brother        Social History Socioeconomic History    Marital status: SINGLE     Spouse name: Not on file    Number of children: Not on file    Years of education: Not on file    Highest education level: Not on file   Occupational History    Not on file   Tobacco Use    Smoking status: Former     Packs/day: 2.00     Years: 35.00     Pack years: 70.00     Types: Cigarettes     Start date: 1963     Quit date: 1995     Years since quittin.9    Smokeless tobacco: Never   Vaping Use    Vaping Use: Never used   Substance and Sexual Activity    Alcohol use: Not Currently     Alcohol/week: 1.0 standard drink     Types: 1 Glasses of wine per week     Comment: 1 drink every 3 years    Drug use: No    Sexual activity: Yes     Partners: Female   Other Topics Concern    Not on file   Social History Narrative    Not on file     Social Determinants of Health     Financial Resource Strain: Low Risk     Difficulty of Paying Living Expenses: Not hard at all   Food Insecurity: No Food Insecurity    Worried About Running Out of Food in the Last Year: Never true    Ran Out of Food in the Last Year: Never true   Transportation Needs: Not on file   Physical Activity: Not on file   Stress: Not on file   Social Connections: Not on file   Intimate Partner Violence: Not on file   Housing Stability: Not on file         ALLERGIES: Patient has no known allergies. Review of Systems   Constitutional:  Negative for fever. HENT:  Negative for congestion and sore throat. Eyes:  Negative for visual disturbance. Respiratory:  Negative for cough and shortness of breath. Cardiovascular:  Negative for chest pain. Gastrointestinal:  Negative for abdominal pain, diarrhea, nausea and vomiting. Genitourinary:  Positive for hematuria. Negative for dysuria. Musculoskeletal:  Negative for back pain. Skin:  Negative for color change. Neurological:  Negative for dizziness and headaches. Psychiatric/Behavioral:  Negative for confusion.       Vitals: 02/23/23 2233   BP: 108/64   Pulse: 95   Resp: 18   Temp: 96.8 °F (36 °C)   SpO2: 99%   Weight: 93 kg (205 lb)   Height: 5' 11.5\" (1.816 m)            Physical Exam  Vitals and nursing note reviewed. Constitutional:       General: He is not in acute distress. Appearance: Normal appearance. He is not ill-appearing. HENT:      Head: Normocephalic and atraumatic. Eyes:      General: Vision grossly intact. Extraocular Movements: Extraocular movements intact. Conjunctiva/sclera: Conjunctivae normal.   Neck:      Trachea: Phonation normal.   Cardiovascular:      Rate and Rhythm: Normal rate and regular rhythm. Heart sounds: Normal heart sounds. Pulmonary:      Effort: Pulmonary effort is normal.      Breath sounds: Normal breath sounds. Abdominal:      Palpations: Abdomen is soft. Tenderness: There is no abdominal tenderness. Musculoskeletal:         General: Normal range of motion. Cervical back: Normal range of motion. Skin:     General: Skin is warm and dry. Neurological:      General: No focal deficit present. Mental Status: He is alert and oriented to person, place, and time. Medical Decision Making  This is a 41-year-old male who presents to the emergency room ambulatory with stable vitals signs with complaints of painless gross hematuria with onset several hours prior to arrival.   I personally reviewed any available previous notes and chronic medical issues  Differential Diagnoses: UTI, KAYE, unspecified hematuria, other  On physical exam he is well-appearing. Abdomen soft nontender. No flank or lower back pain. I ordered and interpreted the following tests: Labs with KAYE with creatinine at 2.2, most recent baseline 1.5.   Urine without evidence of infection  Interventions and Plan: Due to history of CHF we will start very gentle fluids and admit to hospitalist.    Discussed patient with ED attending Loki Geiger DO who agrees with current management plan.       Amount and/or Complexity of Data Reviewed  Labs: ordered. Decision-making details documented in ED Course. ED Course as of 02/24/23 0021   Thu Feb 23, 2023   2345 Creatinine(!): 2.20 [EP]   Fri Feb 24, 2023   0001 URINALYSIS W/ REFLEX CULTURE(!):    Color RED   Appearance TURBID(!)   Specific gravity 1.010   pH (UA) 6.5   Protein 30(!)   Glucose Negative   Ketone Negative   Bilirubin Negative   Blood MODERATE(!)   Urobilinogen 0.2   Nitrites Negative   Leukocyte Esterase Negative   WBC 0-4   RBC >100(!)   Epithelial cells FEW   Bacteria Negative   UA:UC IF INDICATED CULTURE NOT INDICATED BY UA RESULT [EP]   0001 HGB: 13.1 [EP]      ED Course User Index  [EP] HIRA Murdock     Perfect Serve Consult for Admission  12:21 AM    ED Room Number: Room/bed info not found  Patient Name and age:  Amaury Thacker 69 y.o.  male  Working Diagnosis:   1. KAYE (acute kidney injury) ([facility] [address]    2. Hematuria, unspecified type        COVID-19 Suspicion:  no  Sepsis present:  no  Reassessment needed: no  Code Status:  Full Code  Readmission: no  Isolation Requirements:  no  Recommended Level of Care:  telemetry  Department: [facility] Adult ED - 21     Other:  68 yo M presents with painless hematuria with onset today. He has an KAYE with creatinine at 2.2, 1.5 is his baseline. He has a hx of CHF so we are starting gentle fluids.  Takes Eliquis for hx of afib        Procedures

## 2023-02-24 NOTE — ED NOTES
Bedside and Verbal shift change report given to Simon Sandoval (oncoming nurse) by Jenifer Al (offgoing nurse). Report included the following information SBAR, Kardex, ED Summary, MAR, and Recent Results.

## 2023-02-27 ENCOUNTER — PATIENT OUTREACH (OUTPATIENT)
Dept: CASE MANAGEMENT | Age: 78
End: 2023-02-27

## 2023-02-27 NOTE — ACP (ADVANCE CARE PLANNING)
Advance Care Planning:   Does patient have an Advance Directive: yes, reviewed and current.      Primary Decision Maker: Morris Miguel - [phone]

## 2023-02-27 NOTE — PROGRESS NOTES
Care Transitions Initial Call    Call within 2 business days of discharge: Yes     Patient Current Location: [place]    Patient: Law Pike Patient : 1945 MRN: [id number]    Last Discharge [address]       Date Complaint Diagnosis Description Type Department Provider    23 Hematuria KAYE (acute kidney injury) ([facility] [address] . .. ED (ADMIT) Codie Antonio MD; Ella Santana . .. Was this an external facility discharge? No Discharge Facility: n/a    Challenges to be reviewed by the provider   Additional needs identified to be addressed with provider: no  none             Method of communication with provider : none    Discussed COVID-19 related testing which was not done at this time. Test results were not done. Patient informed of results, if available? no     Advance Care Planning:   Does patient have an Advance Directive: yes, reviewed and current. Primary Decision Maker: Jason Egan - [phone]    Inpatient Readmission Risk score: Unplanned Readmit Risk Score: 11.8    Was this a readmission? no   Patient stated reason for the admission: blood in urine    Patients top risk factors for readmission: medical condition-CHF, pulm htn, KAYE, afib    Interventions to address risk factors: Scheduled appointment with PCP-, Scheduled appointment with Specialist-cards, and Obtained and reviewed discharge summary and/or continuity of care documents    Care Transition Nurse (CTN) contacted the patient by telephone to perform post hospital discharge assessment. Verified name and  with patient as identifiers. Provided introduction to self, and explanation of the CTN role. CTN reviewed discharge instructions, medical action plan and red flags with patient who verbalized understanding. Were discharge instructions available to patient? yes.  Reviewed appropriate site of care based on symptoms and resources available to patient including: PCP and Specialist. Patient given an opportunity to ask questions and does not have any further questions or concerns at this time. The patient agrees to contact the PCP office for questions related to their healthcare. Medication reconciliation was performed with patient, who verbalizes understanding of administration of home medications. Referral to Pharm D needed: no     Home Health/Outpatient orders at discharge: none    Durable Medical Equipment ordered at discharge: None    Discussed follow-up appointments. If no appointment was previously scheduled, appointment scheduling offered:  n/a . Is follow up appointment scheduled within 7 days of discharge? yes. [facility] follow up appointment(s):   Future Appointments   Date Time Provider Jillian Camacho   2/28/2023 11:15 AM Jennifer St MD [facility]     Non-[facility] follow up appointment(s):   Cards- Dr. Smita Frankel- 2 weeks  Suggested referrals at discharge  Urology and nephrology (patient will discuss with PCP tomorrow)    Plan for follow-up call in 5-7 days based on severity of symptoms and risk factors. Plan for next call: symptom management-monitor red flags and follow up appointment-attend follow up. CTN provided contact information for future needs. Goals Addressed                   This Visit's Progress     Prevent complications post hospitalization. 02/27/23  Daily weights- today weight was 205.4 lbs, no lower ext edema. Monitor fluid intake, 6-  8 oz glasses water daily  Low sodium diet/fluid restrictions  Patient will monitor  and report following (red flags):  - Weight gain of 2-3 lbs. in one day or 5 lbs.  in one week  -increased SOB, feeling more tired or no energy, dizziness  -increase swelling to feet, ankles, legs or stomach  -CP

## 2023-02-28 ENCOUNTER — OFFICE VISIT (OUTPATIENT)
Dept: FAMILY MEDICINE CLINIC | Age: 78
End: 2023-02-28
Payer: MEDICARE

## 2023-02-28 VITALS
TEMPERATURE: 97.3 F | BODY MASS INDEX: 32.13 KG/M2 | WEIGHT: 237.2 LBS | RESPIRATION RATE: 18 BRPM | SYSTOLIC BLOOD PRESSURE: 94 MMHG | DIASTOLIC BLOOD PRESSURE: 43 MMHG | OXYGEN SATURATION: 100 % | HEIGHT: 72 IN | HEART RATE: 50 BPM

## 2023-02-28 DIAGNOSIS — N18.31 STAGE 3A CHRONIC KIDNEY DISEASE (HCC): ICD-10-CM

## 2023-02-28 DIAGNOSIS — Z95.5 S/P DRUG ELUTING CORONARY STENT PLACEMENT: ICD-10-CM

## 2023-02-28 DIAGNOSIS — E87.1 HYPONATREMIA: ICD-10-CM

## 2023-02-28 DIAGNOSIS — Z95.0 S/P BIVENTRICULAR CARDIAC PACEMAKER PROCEDURE: ICD-10-CM

## 2023-02-28 DIAGNOSIS — R31.0 GROSS HEMATURIA: ICD-10-CM

## 2023-02-28 DIAGNOSIS — Z86.79 S/P ABLATION OF ATRIAL FLUTTER: ICD-10-CM

## 2023-02-28 DIAGNOSIS — E11.9 DIABETES MELLITUS TYPE 2, DIET-CONTROLLED (HCC): ICD-10-CM

## 2023-02-28 DIAGNOSIS — Z95.2 S/P TAVR (TRANSCATHETER AORTIC VALVE REPLACEMENT): ICD-10-CM

## 2023-02-28 DIAGNOSIS — Z98.890 S/P ABLATION OF ATRIAL FLUTTER: ICD-10-CM

## 2023-02-28 DIAGNOSIS — Z79.899 ENCOUNTER FOR LONG-TERM (CURRENT) USE OF MEDICATIONS: ICD-10-CM

## 2023-02-28 DIAGNOSIS — N17.9 AKI (ACUTE KIDNEY INJURY) (HCC): Primary | ICD-10-CM

## 2023-02-28 DIAGNOSIS — I42.9 CARDIOMYOPATHY, UNSPECIFIED TYPE (HCC): ICD-10-CM

## 2023-02-28 DIAGNOSIS — D69.6 THROMBOCYTOPENIA (HCC): ICD-10-CM

## 2023-02-28 RX ORDER — CHLORHEXIDINE GLUCONATE 1.2 MG/ML
RINSE ORAL
COMMUNITY
Start: 2023-02-16

## 2023-02-28 NOTE — PROGRESS NOTES
Ramiro Goodman (: 1945) is a 69 y.o. male, established patient, here for evaluation of the following chief complaint(s):  Follow-up (HBP/Discuss ER visit/labs /Referral to Nephrology)       ASSESSMENT/PLAN:  Diagnoses and all orders for this visit:    1. KAYE (acute kidney injury) ([facility] [address]  -     REFERRAL TO NEPHROLOGY  -     METABOLIC PANEL, BASIC; Future  -     PHOSPHORUS; Future  -     PTH INTACT; Future  -     VITAMIN D, 25 HYDROXY; Future    2. Stage 3a chronic kidney disease (HCC)  -     REFERRAL TO NEPHROLOGY  -     METABOLIC PANEL, BASIC; Future  -     PHOSPHORUS; Future  -     PTH INTACT; Future  -     VITAMIN D, 25 HYDROXY; Future  -     HEMOGLOBIN A1C WITH EAG; Future    3. Gross hematuria  -     REFERRAL TO NEPHROLOGY    4. Thrombocytopenia ([facility] [address]    5. [facility]    6. Encounter for long-term (current) use of medications  -     METABOLIC PANEL, BASIC; Future  -     PHOSPHORUS; Future  -     PTH INTACT; Future  -     VITAMIN D, 25 HYDROXY; Future  -     HEMOGLOBIN A1C WITH EAG; Future    7. S/P TAVR (transcatheter aortic valve replacement)    8. S/P drug eluting coronary stent placement    9. S/P ablation of atrial flutter    10. S/P biventricular cardiac pacemaker procedure    11. Hyponatremia  -     METABOLIC PANEL, BASIC; Future    12. Diabetes mellitus type 2, diet-controlled ([facility] [address]  -     HEMOGLOBIN A1C WITH EAG; Future        4. S/P TAVR (transcatheter aortic valve replacement)  5. S/P drug eluting coronary stent placement  6. S/P ablation of atrial flutter  7. S/P biventricular cardiac pacemaker procedure  - Stable, ct following with Dr. Ridge Churchill    8. Stage 3a chronic kidney disease (HCC)-has been stable, rechecking labs      Return in about 3 months (around 2023). Subjective:   78 yo WM with PMH sig for viral CM, AS s/p TAVR, Pulm HTN, HLD, preDM, CKD st 3b, hypogonadism who presents for routine follow up on chronic conditions. Doing well today. Had cardioversion with Dr. Emil Araujo on 2/8/23. Need for anticoag until 3/8/23 but had hematuria. Oral surgery on 3/13/23. Recently saw Dr. Emil Araujo and planning for SUSANNA to eventually have MitraClip early next year for mod-severe  Still getting dyspnea with exertion (ex. Going to grocery, etc). Reviewed his known cardiac and pulmonary issues that are contributing. Sent to H/O back in 7/2021 for thrombocytopenia but never ended up having this evaluation. CT Chest 5/2022:  IMPRESSION  1. Interval development of small bilateral pleural effusions right greater than left and mild interstitial edema. Additionally there is cardiomegaly. These findings are consistent with interval development of congestive heart failure. 2. Increased nodular area of consolidation posteriorly and medially in left upper lobe. This has increased in size and there are 2 adjacent small nodules. Additionally this is surrounded by some groundglass opacities and appears to occupy secondary pulmonary lobule. Most likely this is acute infectious or inflammatory process. Neoplastic process is not excluded. Clinical correlation and close follow-up is suggested. Follow-up exam in 3 months is suggested. 3. Increased clustered nodularity and tree-in-bud opacities posteriorly in the right upper lobe consistent with progressive small airways infection. 4. Persistent nodular densities medially and inferiorly in the right upper lobe. As described above there are other small nodular densities in the lungs that are new likely related to infectious process. Neoplastic process is not excluded and  close follow-up is suggested. 5. Enlarged subcarinal lymph node has increased in size. Close follow-up is suggested. Follow-up exam in 3 months is suggested. 23X    Continues following with Dr. Kyler Anderson for pulmonology and recent notes reviewed. Noted stability and CT findings.   Planning for PFTs after MitraClip and expecting pulmonary hypertension to improve with MitraClip as well. ROS  Gen - no fever/chills  Resp - no dyspnea or cough  CV - no chest pain, + FERRER   - per HPI  Rest per HPI      Past Medical History:   Diagnosis Date    Arrhythmia 11/11/2014    CAD (coronary artery disease)     stent, cardiac cath, RAUDEL X2    Cataract (lens) fragments in eye following cataract surgery, right eye     October 2022    ED (erectile dysfunction) of organic origin     High cholesterol 03/31/2010    HTN (hypertension) 03/31/2010    Prediabetes 11/11/2014    Pulmonary hypertension ([facility] [address] 08/08/2017    S/P ablation of atrial flutter 03/22/2016    S/P biventricular cardiac pacemaker procedure 71/04/6831    Systolic CHF, acute ([facility] [address] 03/22/2016     Past Surgical History:   Procedure Laterality Date    HX ORTHOPAEDIC      fx wrist    HX PACEMAKER      BI-V Pacer/AICD 1/2021    HX TONSILLECTOMY      MO INSJ/RPLCMT PERM DFB W/TRNSVNS LDS 1/DUAL CHMBR N/A 1/26/2021    INSERT ICD BIV MULTI performed by Jareth Azul MD at [facility] CARD[facility]AC [facility]ATH [facility]    MO UNLISTED PROCEDURE CARDIAC SURGERY      TAVR june 2021        Objective:     Blood pressure (!) 94/43, pulse (!) 50, temperature 97.3 °F (36.3 °C), temperature source Temporal, resp. rate 18, height 5' 11.5\" (1.816 m), weight 237 lb 3.2 oz (107.6 kg), SpO2 100 %.     Physical Exam  General appearance - alert, well appearing, and in no distress  Eyes -sclera anicteric  Neck - supple, no significant adenopathy, no thyromegaly  Chest - clear to auscultation, no wheezes, rales or rhonchi, symmetric air entry  Heart - normal rate, regular rhythm, normal S1, S2, no murmurs, rubs, clicks or gallops, + pacemaker  Neurological - alert, oriented, normal speech, no focal findings or movement disorder noted  Extr - no edema  Psych - normal mood and affect    On this date 02/28/2023 I have spent 30 minutes reviewing previous notes, test results and face to face with the patient discussing the diagnosis and importance of compliance with the treatment plan as well as documenting on the day of the visit. An electronic signature was used to authenticate this note.   -- Yaniv Maurer MD

## 2023-02-28 NOTE — PROGRESS NOTES
Chief Complaint   Patient presents with    Follow-up     HBP/Discuss ER visit/labs /Referral to Nephrology    1. Have you been to the ER, urgent care clinic since your last visit? Hospitalized since your last visit? Yes Where: ER    2. Have you seen or consulted any other health care providers outside of the [address] [place] [address] since your last visit? Include any pap smears or colon screening.  Yes Where: Cardiology

## 2023-03-01 NOTE — DISCHARGE SUMMARY
Discharge Summary       PATIENT ID: Shanika Gonzalez  MRN: [id number]   YOB: 1945    DATE OF ADMISSION: 2/24/2023  2:10 AM    DATE OF DISCHARGE: 2/24/23   PRIMARY CARE PROVIDER: Tyson Davis MD     ATTENDING PHYSICIAN: Cristian May  DISCHARGING PROVIDER: Jaguar Alston MD      CONSULTATIONS: IP CONSULT TO CARDIOLOGY    PROCEDURES/SURGERIES: * No surgery found *    ADMISSION SUMMARY AND HOSPITAL COURSE:   Shanika Gonzalez is a 69 y.o. male who presents with hematuria. Patient has past medical history of atrial fibrillation on Eliquis hyperlipidemia hypertension CHF and is status post TAVR by Dr. Bianca Franklin recently within 1 month came today with a history of urine getting bloody. He states that symptoms started around evening. He states his urine stream is grossly bloody and then transitions to normal looking urine with some blood clots. He denies any abdominal pain dysuria urinary frequency or any other medical symptoms. He does endorses that in the warm weather yesterday he did lifted some heavy stuff for doing some household outside work. Patient denies any other symptoms    Patient seen by cardiology Dr. Bianca Franklin and deemed appropriate for discharge patient was discharged with instructions and follow-up with Dr. Bianca Franklin as an outpatient    [address] [facility] [address] / PLAN:      D/c home    BMI: Body mass index is 28.19 kg/m². . This patient: Meets criteria for overweight given BMI >/= 25 and < 30 due to excess calories/nutritional. Weight loss and lifestyle modifications should be encouraged as an outpatient. PENDING TEST RESULTS:   At the time of discharge the following test results are still pending:      ADDITIONAL CARE RECOMMENDATIONS:      DIET: Cardiac Diet    ACTIVITY: Activity as tolerated    EQUIPMENT needed:     NOTIFY YOUR PHYSICIAN FOR ANY OF THE FOLLOWING:   Fever over 101 degrees for 24 hours.    Chest pain, shortness of breath, fever, chills, nausea, vomiting, diarrhea, change in mentation, falling, weakness, bleeding. Severe pain or pain not relieved by medications, as well as any other signs or symptoms that you may have questions about. FOLLOW UP APPOINTMENTS:    Follow-up Information       Follow up With Specialties Details Why Contact Info    Collette Quiver, MD Family Medicine Follow up in 1 week(s) pt will need a urology referal [address] [id number]  [phone]      Sofi Machuca MD [address] [place] [address] Vascular Surgery, Interventional Cardiology Physician, Cardiovascular Disease Physician Follow up in 2 week(s)  [address] [place] [address]  [phone]               DISCHARGE MEDICATIONS:  Discharge Medication List as of 2/24/2023 11:18 AM        CONTINUE these medications which have NOT CHANGED    Details   azithromycin (ZITHROMAX) 250 mg tablet Take 250 mg by mouth daily. , Historical Med      L.acid/L.casei/B.bif/B.chuy/FOS (PROBIOTIC BLEND PO) Take 1 Tablet by mouth daily. , Historical Med      potassium chloride (KLOR-CON M10) 10 mEq tablet Take 20 mEq by mouth nightly., Historical Med      metOLazone (ZAROXOLYN) 5 mg tablet Take 1 Tablet by mouth every Monday and Thursday. As needed for wt gain, No Print, Disp-1 Tablet, R-0      sodium bicarbonate 650 mg tablet Take 1 Tablet by mouth two (2) times a day.  , Normal, Disp-180 Tablet, R-1      testosterone 12.5 mg/ 1.25 gram (1 %) glpm APPLY 4 PUMPS TO THE SHOULDERS AND UPPER ARMS ONE TIME DAILY IN THE MORNING, Normal, Disp-225 g, R-1      rosuvastatin (CRESTOR) 20 mg tablet TAKE ONE TABLET BY MOUTH ONE TIME DAILY AT NIGHT, Normal, Disp-90 Tablet, R-3      carvediloL (COREG) 6.25 mg tablet Take 6.25 mg by mouth two (2) times a day., Historical Med      lisinopriL (PRINIVIL, ZESTRIL) 5 mg tablet 2.5 mg two (2) times a day., Historical Med      Spiriva Respimat 1.25 mcg/actuation inhaler Take 2 Puffs by inhalation daily. , Historical Med, ISIAH      acetaminophen (TYLENOL) 325 mg tablet Take 650 mg by mouth every four (4) hours as needed for Pain., OTC      torsemide (DEMADEX) 20 mg tablet Take 3 Tabs by mouth Before breakfast and dinner. Indications: Congestive Heart Failure (this replaces bumetanide or Bumex), Print, Disp-180 Tab, R-6      Eliquis 5 mg tablet Take 5 mg by mouth two (2) times a day., Historical Med, ISIAH      cholecalciferol (VITAMIN D3) 25 mcg (1,000 unit) cap Take 1 Cap by mouth daily. , Historical Med             DISPOSITION:  x  Home With:   OT  PT  HH  RN       Long term SNF/Inpatient Rehab    Independent/assisted living    Hospice    Other:     PATIENT CONDITION AT DISCHARGE:     Functional status    Poor     Deconditioned    x Independent      Cognition    x Lucid     Forgetful     Dementia      Catheters/lines (plus indication)    Urrutia     PICC     PEG    x None      Code status    x Full code     DNR      PHYSICAL EXAMINATION AT DISCHARGE:    General:          Alert, cooperative, no distress, appears stated age. HEENT:           Atraumatic, anicteric sclerae, pink conjunctivae                          No oral ulcers, mucosa moist, throat clear, dentition fair  Neck:               Supple, symmetrical  Lungs:             Clear to auscultation bilaterally. No Wheezing or Rhonchi. No rales. Chest wall:      No tenderness  No Accessory muscle use. Heart:              Regular  rhythm,  No  murmur   No edema  Abdomen:        Soft, non-tender. Not distended. Bowel sounds normal  Extremities:     No cyanosis. No clubbing,                            Skin turgor normal, Capillary refill normal  Skin:                Not pale. Not Jaundiced  No rashes   Psych:             Not anxious or agitated.   Neurologic:      Alert, moves all extremities, answers questions appropriately and responds to commands       CHRONIC MEDICAL DIAGNOSES:  Problem List as of 2/24/2023 Date Reviewed: 11/30/2022            Codes Class Noted - Resolved    [facility]) [facility] ICD-10-CM: N17.9  ICD-9-CM: 584.9  2/24/2023 - Present        S/P TAVR (transcatheter aortic valve replacement) ICD-10-CM: Z95.2  ICD-9-CM: V43.3  7/26/2021 - Present        Aortic stenosis ICD-10-CM: I35.0  ICD-9-CM: 424.1  7/21/2021 - Present        Nonrheumatic aortic valve stenosis ICD-10-CM: I35.0  ICD-9-CM: 424.1  7/8/2021 - Present        S/P biventricular cardiac pacemaker procedure ICD-10-CM: Z95.0  ICD-9-CM: V45.01  1/26/2021 - Present    Overview Signed 1/26/2021  2:05 PM by Lazarus Baird, LAYLA     1/26/2021 [facility] BIVICD implant             Pulmonary hypertension ([facility] ICD-10-CM: I27.20  ICD-9-CM: 416.8  8/8/2017 - Present        Advance care planning ICD-10-CM: Z71.89  ICD-9-CM: V65.49  3/24/2016 - Present        CHF NYHA class I (no symptoms from ordinary activities), chronic, systolic (HCC) BDO-37-RS: I50.22  ICD-9-CM: 428.22, 428.0  3/22/2016 - Present        S/P ablation of atrial flutter ICD-10-CM: Z98.890, Z86.79  ICD-9-CM: V45.89  3/22/2016 - Present        Cardiomyopathy ([facility] ICD-10-CM: I42.9  ICD-9-CM: 425.4  3/12/2016 - Present        Prediabetes ICD-10-CM: R73.03  ICD-9-CM: 790.29  11/11/2014 - Present        Arrhythmia ICD-10-CM: I49.9  ICD-9-CM: 427.9  11/11/2014 - Present        Encounter for long-term (current) use of medications ICD-10-CM: Z79.899  ICD-9-CM: V58.69  4/1/2010 - Present        ED (erectile dysfunction) of organic origin ICD-10-CM: N52.9  ICD-9-CM: 607.84  4/1/2010 - Present        High cholesterol ICD-10-CM: E78.00  ICD-9-CM: 272.0  3/31/2010 - Present        Eczema ICD-10-CM: L30.9  ICD-9-CM: 692.9  3/31/2010 - Present        HTN (hypertension) ICD-10-CM: I10  ICD-9-CM: 401.9  3/31/2010 - Present        RESOLVED: Cardiogenic shock ([facility] ICD-10-CM: R57.0  ICD-9-CM: 785.51  3/12/2016 - 12/12/2017        RESOLVED: Acute renal failure (ARF) (HCC) ICD-10-CM: N17.9  ICD-9-CM: 584.9  3/11/2016 - 12/12/2017        RESOLVED: Atrial flutter with rapid ventricular response ([facility] [address] ICD-10-CM: I48.92  ICD-9-CM: 427.32  3/11/2016 - 12/12/2017        RESOLVED: Lower abdominal pain ICD-10-CM: R10.30  ICD-9-CM: 789.09  3/11/2016 - 12/12/2017           Greater than 30  minutes were spent with the patient on counseling and coordination of care    Signed:   Zonia Summers MD  3/1/2023  7:38 AM

## 2023-03-06 ENCOUNTER — PATIENT OUTREACH (OUTPATIENT)
Dept: CASE MANAGEMENT | Age: 78
End: 2023-03-06

## 2023-03-06 NOTE — PROGRESS NOTES
Care Transitions Follow Up Call    Patient Current Location: [facility] Transition Nurse (CTN) contacted the patient by telephone to follow up after admission on -. Verified name and  with patient as identifiers. Addressed changes since last contact: none  Follow up appointment completed? yes. Was follow up appointment scheduled within 7 days of discharge? yes. CTN reviewed discharge instructions, medical action plan and red flags with patient and discussed any barriers to care and/or understanding of plan of care after discharge. Discussed appropriate site of care based on symptoms and resources available to patient including: PCP and Specialist. The patient agrees to contact the PCP office for questions related to their healthcare. [facility]   Future Appointments   Date Time Provider Jillian Camacho   2023 10:30 AM Geovanni Alexander MD [facility]     CTN provided contact information for future needs. Plan for follow-up call in 5-7 days based on severity of symptoms and risk factors. Plan for next call: self management-monitor hydration, daily weights       Goals Addressed                   This Visit's Progress     Prevent complications post hospitalization. 23  Daily weights- today weight was 205.4 lbs, no lower ext edema. Monitor fluid intake, 6-  8 oz glasses water daily  Low sodium diet/fluid restrictions  Patient will monitor  and report following (red flags):  - Weight gain of 2-3 lbs. in one day or 5 lbs. in one week  -increased SOB, feeling more tired or no energy, dizziness  -increase swelling to feet, ankles, legs or stomach  -CP    23  Dental surgery planned for 3/13. Eating soft food d/t discomfort, poor appetite. Will try to add protein to diet, drinking shakes. Consider ensure. Will have to stop blood thinner for oral surgery. Using chlorhexidine mouth wash and started on doxycycline.    BP on low side (94/43) at PCP follow up. Patient checks at home and fluctuates but usually higher, denies feeling lightheaded or dizzy.

## 2023-03-13 ENCOUNTER — PATIENT OUTREACH (OUTPATIENT)
Dept: CASE MANAGEMENT | Age: 78
End: 2023-03-13

## 2023-03-15 ENCOUNTER — TELEPHONE (OUTPATIENT)
Dept: CARDIOLOGY CLINIC | Age: 78
End: 2023-03-15

## 2023-03-15 LAB
25(OH)D3+25(OH)D2 SERPL-MCNC: 65.7 NG/ML (ref 30–100)
BUN SERPL-MCNC: 57 MG/DL (ref 8–27)
BUN/CREAT SERPL: 32 (ref 10–24)
CALCIUM SERPL-MCNC: 10.3 MG/DL (ref 8.6–10.2)
CHLORIDE SERPL-SCNC: 89 MMOL/L (ref 96–106)
CO2 SERPL-SCNC: 26 MMOL/L (ref 20–29)
CREAT SERPL-MCNC: 1.76 MG/DL (ref 0.76–1.27)
EGFRCR SERPLBLD CKD-EPI 2021: 40 ML/MIN/1.73
EST. AVERAGE GLUCOSE BLD GHB EST-MCNC: 146 MG/DL
GLUCOSE SERPL-MCNC: 114 MG/DL (ref 70–99)
HBA1C MFR BLD: 6.7 % (ref 4.8–5.6)
PHOSPHATE SERPL-MCNC: 4.2 MG/DL (ref 2.8–4.1)
POTASSIUM SERPL-SCNC: 3.7 MMOL/L (ref 3.5–5.2)
PTH-INTACT SERPL-MCNC: 93 PG/ML (ref 15–65)
REPORT: NORMAL
SODIUM SERPL-SCNC: 135 MMOL/L (ref 134–144)

## 2023-03-15 NOTE — TELEPHONE ENCOUNTER
Left message for patient to return call to schedule new patient appt-referred by Dr. Abdelrahman Swain.

## 2023-03-17 ENCOUNTER — TELEPHONE (OUTPATIENT)
Dept: CARDIOLOGY CLINIC | Age: 78
End: 2023-03-17

## 2023-03-17 ENCOUNTER — PATIENT OUTREACH (OUTPATIENT)
Dept: CASE MANAGEMENT | Age: 78
End: 2023-03-17

## 2023-03-17 NOTE — PROGRESS NOTES
Care Transitions Follow Up Call    Attempted to reach patient for follow up today. Unable to reach patient, LVMM. Patient: Amaury Kirstie Patient : 1945 MRN: [id number]    Last Discharge [address]       Date Complaint Diagnosis Description Type Department Provider    23 Hematuria KAYE (acute kidney injury) ([facility] [address] . .. ED (ADMIT) Rodrigo Melton MD; Tim Bennett . .. Noted following upcoming appointments from discharge chart review:   [facility] follow up appointment(s):   Future Appointments   Date Time Provider Jillian Camacho   2023 10:30 AM Marcela Asher MD [facility]      Goals        Prevent complications post hospitalization. 23  Daily weights- today weight was 205.4 lbs, no lower ext edema. Monitor fluid intake, 6-  8 oz glasses water daily  Low sodium diet/fluid restrictions  Patient will monitor  and report following (red flags):  - Weight gain of 2-3 lbs. in one day or 5 lbs. in one week  -increased SOB, feeling more tired or no energy, dizziness  -increase swelling to feet, ankles, legs or stomach  -CP    23  Dental surgery planned for 3/13. Eating soft food d/t discomfort, poor appetite. Will try to add protein to diet, drinking shakes. Consider ensure. Will have to stop blood thinner for oral surgery. Using chlorhexidine mouth wash and started on doxycycline. BP on low side (94/43) at PCP follow up. Patient checks at home and fluctuates but usually higher, denies feeling lightheaded or dizzy. 23  Patient scheduled for dental surgery today. 23  Unable to reach patient today, reviewed chart. Per EMR, patient referred to [facility], needs to schedule appt.

## 2023-03-17 NOTE — TELEPHONE ENCOUNTER
Pt returned Zaynab's call to schedule new pt appt. Pt will see Dr. Deleta Cockayne on Fri, 4/14/23 at 8:00 a.m. Pt knows where we're located and what to bring.

## 2023-03-24 ENCOUNTER — PATIENT OUTREACH (OUTPATIENT)
Dept: CASE MANAGEMENT | Age: 78
End: 2023-03-24

## 2023-03-27 NOTE — PROGRESS NOTES
Patient has graduated from the Transitions of Care Coordination  program on 3/27/2023. Patient/family has the ability to self-manage at this time Care management goals have been completed. Patient was not referred to the [facility] team for further management. Goals Addressed                   This Visit's Progress     COMPLETED: Prevent complications post hospitalization. 02/27/23  Daily weights- today weight was 205.4 lbs, no lower ext edema. Monitor fluid intake, 6-  8 oz glasses water daily  Low sodium diet/fluid restrictions  Patient will monitor  and report following (red flags):  - Weight gain of 2-3 lbs. in one day or 5 lbs. in one week  -increased SOB, feeling more tired or no energy, dizziness  -increase swelling to feet, ankles, legs or stomach  -CP    03/06/23  Dental surgery planned for 3/13. Eating soft food d/t discomfort, poor appetite. Will try to add protein to diet, drinking shakes. Consider ensure. Will have to stop blood thinner for oral surgery. Using chlorhexidine mouth wash and started on doxycycline. BP on low side (94/43) at PCP follow up. Patient checks at home and fluctuates but usually higher, denies feeling lightheaded or dizzy. 03/13/23  Patient scheduled for dental surgery today. 03/17/23  Unable to reach patient today, reviewed chart. Per EMR, patient referred to [facility], needs to schedule appt. Patient has Care Transition Nurse's contact information for any further questions, concerns, or needs.   Patients upcoming visits:    Future Appointments   Date Time Provider Jillian Camacho   4/14/2023  8:00 AM Magda Meier MD [facility] BS AMB   6/6/2023 10:30 AM Narcisa Irby MD [facility] BS AMB

## 2023-04-17 ENCOUNTER — TELEPHONE (OUTPATIENT)
Dept: CARDIOLOGY CLINIC | Age: 78
End: 2023-04-17

## 2023-04-17 NOTE — TELEPHONE ENCOUNTER
----- Message from Eagle Casillas MD sent at 4/17/2023  8:01 AM EDT -----  Please let patient know lab results reviewed. His magnesium is elevated. If he is taking any magnesium supplements at home he should discontinue this. His labs also show worsening renal function and low sodium, but pro-BNP remains elevated. I do not want him to start Jardiance as we had previously discussed and prescribed. Given his lab results, prior [facility] results. I think he is going to need inotrope therapy to stabilize him prior to MitraClip. If he would be willing to come into the hospital and start inotrope lets schedule him for admission. If he would like to discuss this first, please schedule him in clinic this week with me or NP. Thanks     Called patient and educated on above, patient verbalized understanding regarding lab results and medication changes. Patient also agreeable to come in Wednesday at 1pm to see Bharati Diallo NP to discuss next steps.

## 2023-04-18 ENCOUNTER — TELEPHONE (OUTPATIENT)
Dept: CARDIOLOGY CLINIC | Age: 78
End: 2023-04-18

## 2023-04-19 ENCOUNTER — VIRTUAL VISIT (OUTPATIENT)
Dept: CARDIOLOGY CLINIC | Age: 78
End: 2023-04-19

## 2023-04-19 ENCOUNTER — TELEPHONE (OUTPATIENT)
Dept: CARDIOLOGY CLINIC | Age: 78
End: 2023-04-19

## 2023-04-19 DIAGNOSIS — I50.22 CHRONIC SYSTOLIC HEART FAILURE (HCC): Primary | ICD-10-CM

## 2023-04-19 RX ORDER — ALLOPURINOL 100 MG/1
100 TABLET ORAL DAILY
Qty: 30 TABLET | Refills: 3 | Status: SHIPPED | OUTPATIENT
Start: 2023-04-19

## 2023-04-19 RX ORDER — FLUTICASONE PROPIONATE 50 MCG
2 SPRAY, SUSPENSION (ML) NASAL
COMMUNITY

## 2023-04-19 NOTE — TELEPHONE ENCOUNTER
Called patient to discuss how he is feeling due to cancelling appt today. Patient reports for the last two days he has beed disoriented/ dizzy monday-- yesterday with low urine output, loose bowel movements 3 times- sunday-monday. Today he states he feels better but is not yet back to normal. Urine output improved yesterday/today. Concerned about walking/driving to the office. Reviewed recent weight logs, pt did not have BPs but reported a low SBP in 70s on monday 4/8- 210lb  4/9-212.6lb- took metolazone  4/10- 207lb  4/13- 207lb  4/15-206lb  skipped a couple days of recorded  4/18-213lb took metolazone  4/19-209lb 94/65- 74     Reviewed above with Peri RICH, she feels patient needs to be evaluated, does not think a VV is appropriate so she is recommending ER. She states patient likely needs repeat labs, and possible initiation of inotropes to help with diuresis. \"Patient states that is not going to happen, the last time I went to the ER they didn't do anything and kept me all night. Id rather wait and see if things get worse\" Patient states he will call if things get worse. RN will alert NP of patients decision    Patient agreeable to VV at 1pm with Peri RICH.  Patient scheduled

## 2023-04-19 NOTE — PATIENT INSTRUCTIONS
Medication changes:    Stop lisinopril  Start allopurinol 100g daily--this is because you had elevated uric acid levels which can lead to gout (long term diuretic use can cause high uric acid levels)    Please take this to your pharmacy to notify them of the change in medications. Testing Ordered:    Lab work has been ordered to be completed on 4/28 when you see Dr Joaquín Nichole. You may go to lab owen at [facility] in [facility] [address]. Please present to a [facility] facility location of your choice with the orders provided to have lab work done. Our office will notify you of any abnormal results requiring changes to your current plan of care. Other Recommendations:      Ensure your drinking an adequate amount of water with a goal of 6-8 eight ounce glasses (1.5-2 liters) of fluid daily. Your urine should be clear and light yellow straw colored. If your blood pressure begins to consistently run below 90/60 and/or you begin to experience dizziness or lightheadedness, please contact the [facility] at [phone]. Follow up in 2 weeks with [facility]      Please monitor your weights daily upon waking and after using the bathroom. Keep a written records of your weights and bring to your next appointment. If you have a weight gain of 3 or more pounds overnight OR 5 or more pounds in one week please contact our office. Thank you for allowing us the privilege of being a part of your healthcare team! Please do not hesitate to contact our office at [phone] with any questions or concerns. Virtual Heart Failure [employer] [address] invites you to learn more about heart failure and to share your questions, ideas, and experiences with others. Each month, the Heart Failure Support Group features a new educational topic and a guest speaker, followed by an interactive discussion. Our Heart Failure Nurse Navigator will moderate each session.  You will be able to participate by phone, tablet or computer through [employer]. This support group takes place on the 3rd Thursday of each month from 6:00-7:30PM. All individuals living with heart failure and their caregivers are welcome to join. If you are interested in participating, please contact us at [email or web address] and you will be sent the link to join the [employer].

## 2023-04-19 NOTE — PROGRESS NOTES
[facility] in [facility], [address] Note  Remote Clinic Visit     Patient name: Radu Garcia  Patient : 1945  Patient MRN: [id number]  Date of service: 23    Primary care physician: Louis Barnes MD  Primary cardiologist: Felipe Lynch MD  Primary [facility] cardiologist: Astrid Fitzgerald MD      Chief Complaint   Patient presents with    Dizziness     Patient reports for the last two days he has beed disoriented/ dizzy monday-- yesterday with low urine output, loose bowel movements 3 times- -monday. Shortness of Breath    CHF    Follow-up         PLAN OF CARE:  69 y.o. man with a history of chronic systolic heart failure due to non ischemic cardiomyopathy, stage C. GDMT is limited by hypotension and renal dysfunction. Moderate to severe MR with NYHA class III symptoms. We discussed options for advanced heart failure therapies, focusing on LVAD. He would not be a candidate for transplant owing to his age. Dr. Tiffanie Patel discussed anticipated benefits to both quality and quantity of life with DT LVAD. Patient is not interested in LVAD therapy options. I would agree with ongoing evaluation for MitraClip. Will optimize medical therapy for heart failure. Due to worsening lab values and continued fluid, have recommended that patient be admitted for inotropes, to stabilize prior to mitral clip. He is not willing to be admitted at this time. INTERVAL HISTORY:  -BP 90s  -labs :  Na 128; creat 2.33; Mg 2.9; t bili 1.0; uric acid 12.8; pBNP up to 8277  -weight down 2 lbs from visit 2 days ago   -Mr. Throckmorton did not feel well enough to drive to clinic, and declined coming to the ED, so visit was changed to virtual.  He is more SOB and more dizzy than he has been.   He does not trust himself to drive, but does not want to come to the hospital.  He is not currently SOB at rest, but is SOB with very little activity. He denies swelling, chest pain, palpitations. He is very fatigued. He's been having loose bowel movements for ~3 days. No abd pain or n/v. We had a long discussion about inotrope therapy with indications and expected outcomes. He declines at this time. He wants to wait to see if he feels better or worsens. PLAN:  Heart failure/Cardiomyopathy: NYHA class III. Continue current medical therapy for heart failure:  Beta-blocker: He has struggled with hypotension. ContinueToprol XL 25 mg BID. Stop current dose of ACE/ARB/ARNi: stop Lisinopril due to declining renal function   MRA: Not on MRA due to hypotension. SGLT2 inhibitor: stopped initiation of Jardiance after recent labs. Continue current dose of diuretic: Torsemide 60 mg BID and Metolazone 5 mg PRN. Takes PRN about once every 5 days   Start allopurinol due to elevated uric acid of 12.8  Reinforced low salt diet  Reinforced fluid restriction to 6 x 8oz glasses per day  Labs: repeat labs in 2 weeks  Check PYP scan. Had appt for 4/21, but patient requested it be cancelled   Follow up with Dr. Linda Moore next week. Arrhythmia/ICD:  Follow-up with EP cardiologist  ICD interrogation q3 months. Chronic anticoagulation with Apixaban for Persistent A-fib    At risk for MICHEL:  Patient declines sleep referral    Recommend flu, covid and pneumonia vaccinations      Follow up in clinic in 2 weeks        HISTORY OF PRESENT ILLNESS:  From prior notes,  \"I had the pleasure of seeing Ana María García in [facility] at [address] [place] [address] in [place]. Ana María García is a 69 y.o. male with a history of chronic systolic heart failure due to non ischemic cardiomyopathy, Atrial flutter s/p ablation, HTN, aortic stenosis s/p TAVR. Mr. Throckmorton's cardiac history dates back to March 2016 when he presented with shortness of breath.  He was found to have A-flutter with RVR by his PCP and referred to cardiology. He was admitted to the hospital shortly after his initial visit. Echocardiogram during hospitalization showed EF of 5-10% with significant RV dysfunction, moderate MR and mild AS. He was evaluated with cardiac cath on 3/18/16. RHC showed decompensated central hemodynamics with RA 17, mPA 38, PCW 27, CI 1.39 by Alek. There was no significant coronary artery disease. He underwent A-flutter ablation during admission and maintained sinus rhythm. He was started on GDMT and discharged with a LifeVest. His cardiomyopathy was presumed to be viral.     EF remained reduced despite medical therapy. BiV ICD implanted 1/26/2021. He initially did not feel well with CRT and it was turned off. CRT was turned back on in September 2021. He had no improvement in dyspnea symptoms after CRT. He reported NYHA class III symptoms. A dobutamine stress echo was performed and showed aortic valve gradient increase from 16 to 33 mmHg. He underwent invasive testing on 5/10/21. Again coronary arteries were normal, hemodynamics showed elevated biventricular filling pressures and low cardiac index. AV gradient increased to 37 mmHg and valve area decreased to 0.81 with Dobutamine at 20 mcg/kg/min. He underwent transfemoral TAVR on 7/21/2021. TAVR was complicated by embolic occlusion of the RCA and subsequent VF arrest. He received 2 stents to the RCA. He was discharged on POD 2. He developed A-fib in late 2021 and underwent DCCV 12/27/2021. He had recurrent A-fib and waxing/waning NYHA class II-III symptoms. Echo in August 2022 suggested 3+ mitral regurgitation. In December 2022 he underwent SUSANNA to evaluate MR and cardioversion. SUSANNA showed 2-3+ MR. He required repeat cardioversion on 2/8/23. He was admitted with hematuria 2/24/23, but given recent cardioversion, anticoagulation was not held. He had recurrent A-fib following cardioversion. \"      Problem List:  Chronic systolic heart failure  Non ischemic cardiomyopathy  A-flutter  -s/p ablation 3/16/2016  Persistent A-fib  -s/p Cardioversion 12/27/21; 12/14/22; 2/8/23  LBBB  S/p [place] BiV ICD 1/26/2021  Severe AS  -s/p right transfemoral TAVR 7/21/2021 with 26 mm Sandy 3 Ultra  -complicated by occlusion of the RCA and VF arrest requiring CPR and defibrillation s/p PCI x 2  Mild bilateral carotid stenosis  HLD  Moderate to severe mitral regurg    LIFE GOALS:  Lifestyle goals reviewed with the patient. Patient's personal goals include: improve quality of life      CARDIAC IMAGING and DIAGNOSTICS:  ECG 2/8/23: V-paced, frequent PVCs, HR 77,  ms,  ms    SUSANNA 12/14/22    Left Ventricle: Severely reduced left ventricular systolic function with a visually estimated EF of 20 - 25%. Left ventricle is mildly dilated. Normal wall thickness. Severe global hypokinesis present. Abnormal diastolic function. Right Ventricle: Mildly reduced systolic function. Aortic Valve: 26 mm Sandy 3 Ultra THV + 1 mL appropriately positioned transcatheter bioprosthetic valve that is well-seated. Mild paravalvular regurgitation, located anteriorly. No stenosis. AV mean gradient is 5 mmHg. AV peak velocity is 1.9 m/s. LVOT:AV VTI Index is 0.62. LVOT diameter is 2.3 cm. AV area by continuity VTI is 2.4 cm2. AV Stroke Volume index is 32.0 mL/m2. Mitral Valve: Moderate annular dilation. Moderate, bordering on moderate/severe (2+, bordering on 2-3+) mitral regurgitation. Mild systolic blunting of the left pulmonary veins. Severe systolic blunting to reversal of the right pulmonary veins. EROA 0.1 cm2, Rvol 18 mL. The posterior leaflet measures 9-10 mm at the site of malcoaptation. No stenosis noted. MV mean gradient is 1 mmHg. MV area by PHT is 4.6 cm2. MV area by planimetry is 7.0 cm2. Tricuspid Valve: Severely elevated RVSP. The estimated RVSP is 67 mmHg. Left Atrium: Left atrium is moderately dilated. Decreased appendage flow velocity.  No left atrial appendage thrombus noted. No left atrial appendage mass noted. Right Atrium: Right atrium is mildly dilated. 3D en face views of the mitral and aortic valves were obtained. Echo 6/30/2016  SUMMARY:  Left ventricle: The ventricle was moderately dilated. Systolic function  was severely reduced. Ejection fraction was estimated in the range of 20 %  to 25 %. There was severe diffuse hypokinesis. Left atrium: The atrium was moderately dilated. Mitral valve: There was mild to moderate regurgitation. Tricuspid valve: There was mild regurgitation. There was mild pulmonary  hypertension. Echo 3/12/2016  SUMMARY:  Left ventricle: The ventricle was severely dilated. Systolic function was  severely reduced. Ejection fraction was estimated in the range of 5 % to  10 %. There was diffuse hypokinesis. Right ventricle: The size was at the upper limits of normal. Systolic  function was markedly reduced. Left atrium: The atrium was moderately to severely dilated. Right atrium: The atrium was moderately to severely dilated. Mitral valve: There was moderate regurgitation. Aortic valve: There was mild stenosis. Tricuspid valve: There was mild to moderate regurgitation. Inferior vena cava, hepatic veins: The inferior vena cava was moderately  dilated. RHC 12/14/22  RA 16, PA 69/27 (41), PCW 33 with v waves to 45, TDCI 1.79, skilled nursing 1.63    Cardiac Cath 5/10/2021  Minimal non-obstructive CAD in a right dominant coronary circulation. RHC: RA 12, PA 73/31 (50), PCW 34, LVEDP 28-32, skilled nursing 1.95, SVR 1784 dynes/sec/cm-5    AORTIC VALVE STUDY  BASELINE  CO 4.08 L/min  CI 1.95 L/min/m2  MARQUEZ 0.89 cm2  AV MG 29.92 mmHg  DOBUTAMINE AT 20 MCG/KG/MIN after 10 minutes  CO 5.10 L/min  CI 2.44 L/min/m2  MARQUEZ 0.81 cm2  MARQUEZ MG 37.04 mmHg     Cardiac Cath 3/18/2016  IMPRESSIONS:  There is no significant coronary artery disease.   RA 17, mPA 38, PCW 27, CI 1.39 by Alek    REVIEW OF SYSTEMS:  Review of Systems   Constitutional:  Positive for malaise/fatigue. Negative for chills and fever. HENT:  Negative for ear pain and sore throat. Eyes:  Negative for blurred vision and double vision. Respiratory:  Positive for shortness of breath. Negative for cough and wheezing. Cardiovascular:  Negative for chest pain, palpitations, orthopnea, leg swelling and PND. Gastrointestinal:  Positive for diarrhea. Negative for abdominal pain, blood in stool, constipation, melena, nausea and vomiting. Bloating   Genitourinary:  Positive for frequency. Negative for dysuria, hematuria and urgency. Musculoskeletal:  Negative for falls. Skin:  Negative for rash. Neurological:  Positive for dizziness. Negative for headaches. Endo/Heme/Allergies:  Does not bruise/bleed easily. Psychiatric/Behavioral:  Negative for depression. The patient is not nervous/anxious and does not have insomnia.         PHYSICAL EXAM:  Patient-Reported Vitals 4/19/2023   Patient-Reported Weight 209   Patient-Reported Pulse 74   Patient-Reported Systolic  94   Patient-Reported Diastolic 65                Physical Assessment: limited by virtual visit  General Appearance: alert, cooperative, elderly  male in NAD; appears stated age  Eyes: sclera anicteric  Mouth/Throat: oral pharynx clear  Neck: supple;   Pulmonary:  speaking full sentences  Cardiovascular:   Abdomen:   Musculoskeletal:   Extremities:   Skin: pale  Neuro: grossly normal  Psych: normal mood and affect given the setting          PAST MEDICAL HISTORY:  Past Medical History:   Diagnosis Date    Arrhythmia 11/11/2014    CAD (coronary artery disease)     stent, cardiac cath, RAUDEL X2    Cataract (lens) fragments in eye following cataract surgery, right eye     October 2022    ED (erectile dysfunction) of organic origin     High cholesterol 03/31/2010    HTN (hypertension) 03/31/2010    Prediabetes 11/11/2014    Pulmonary hypertension ([facility] [address] 08/08/2017    S/P ablation of atrial flutter 03/22/2016    S/P biventricular cardiac pacemaker procedure     Systolic CHF, acute ([address] 2016       PAST SURGICAL HISTORY:  Past Surgical History:   Procedure Laterality Date    HX HEART CATHETERIZATION      HX ORTHOPAEDIC      fx wrist    HX PACEMAKER      BI-V Pacer/AICD 2021    HX TONSILLECTOMY      IN INSJ/RPLCMT PERM DFB W/TRNSVNS LDS 1/DUAL CHMBR N/A 2021    INSERT ICD BIV MULTI performed by Charlene Parry MD at [facility] CARDIAC CATH LAB    IN UNLISTED PROCEDURE CARDIAC SURGERY      TAVR 2021       FAMILY HISTORY:  Family History   Problem Relation Age of Onset    Heart Disease Father     Hypertension Father     Hypertension Mother     Heart Disease Mother     Cancer Brother        SOCIAL HISTORY:  Social History     Socioeconomic History    Marital status: SINGLE   Tobacco Use    Smoking status: Former     Packs/day: 2.00     Years: 35.00     Pack years: 70.00     Types: Cigarettes     Start date: 1963     Quit date: 1995     Years since quittin.0    Smokeless tobacco: Never   Vaping Use    Vaping Use: Never used   Substance and Sexual Activity    Alcohol use: Not Currently     Alcohol/week: 1.0 standard drink     Types: 1 Glasses of wine per week     Comment: 1 drink every 3 years    Drug use: No    Sexual activity: Yes     Partners: Female     Social Determinants of Health     Financial Resource Strain: Low Risk     Difficulty of Paying Living Expenses: Not hard at all   Food Insecurity: No Food Insecurity    Worried About [address] [place] [address] in the Last Year: Never true    Ran Out of Food in the Last Year: Never true       LABORATORY RESULTS:  Lab Results   Component Value Date/Time    Sodium 128 (L) 2023 02:18 PM    Potassium 3.8 2023 02:18 PM    Chloride 89 (L) 2023 02:18 PM    CO2 31 2023 02:18 PM    Anion gap 8 2023 02:18 PM    Glucose 118 (H) 2023 02:18 PM    BUN 69 (H) 2023 02:18 PM    Creatinine 2.33 (H) 2023 02:18 PM BUN/Creatinine ratio 30 (H) 04/14/2023 02:18 PM    GFR est AA 56 (L) 02/24/2022 12:09 PM    GFR est non-AA 48 (L) 02/24/2022 12:09 PM    Calcium 10.3 (H) 04/14/2023 02:18 PM    Bilirubin, total 1.0 04/14/2023 02:18 PM    Alk. phosphatase 40 (L) 04/14/2023 02:18 PM    Protein, total 8.4 (H) 04/14/2023 02:18 PM    Protein, total 7.9 04/14/2023 02:18 PM    Albumin 5.1 (H) 04/14/2023 02:18 PM    Globulin 3.3 04/14/2023 02:18 PM    A-G Ratio 1.5 04/14/2023 02:18 PM    ALT (SGPT) 46 04/14/2023 02:18 PM    AST (SGOT) 36 04/14/2023 02:18 PM       Lab Results   Component Value Date/Time    WBC 10.1 04/14/2023 02:18 PM    HGB 13.5 04/14/2023 02:18 PM    HCT 41.1 04/14/2023 02:18 PM    PLATELET 963 92/07/3736 02:18 PM    .7 (H) 04/14/2023 02:18 PM         ALLERGY:  No Known Allergies     CURRENT MEDICATIONS:    Current Outpatient Medications:     fluticasone propionate (Flonase Allergy Relief) 50 mcg/actuation nasal spray, 2 Sprays by Both Nostrils route daily as needed. , Disp: , Rfl:     midodrine (PROAMATINE) 5 mg tablet, Take 0.5 Tablets by mouth Every morning., Disp: , Rfl:     metoprolol succinate (TOPROL-XL) 25 mg XL tablet, Take 1 Tablet by mouth two (2) times a day., Disp: 60 Tablet, Rfl: 2    metOLazone (ZAROXOLYN) 5 mg tablet, Take 1 Tablet by mouth as needed for PRN Reason (Other) (Take 1-2 times per week as needed for weight gain>3lbs/day or 5lbs/week). As needed for wt gain, Disp: 1 Tablet, Rfl: 0    chlorhexidine (PERIDEX) 0.12 % solution, RINSE MOUTH WITH 15 MILLILITERS TWICE A DAY AFTER BRUSHING, SWISH IN MOUTH FOR 30 SECONDS THEN SPIT, Disp: , Rfl:     rosuvastatin (CRESTOR) 20 mg tablet, TAKE ONE TABLET BY MOUTH ONE TIME DAILY AT NIGHT, Disp: 90 Tablet, Rfl: 3    lisinopriL (PRINIVIL, ZESTRIL) 5 mg tablet, 0.5 Tablets two (2) times a day., Disp: , Rfl:     Spiriva Respimat 1.25 mcg/actuation inhaler, Take 2 Puffs by inhalation daily. , Disp: , Rfl:     acetaminophen (TYLENOL) 325 mg tablet, Take 2 Tablets by mouth every four (4) hours as needed for Pain., Disp: , Rfl:     torsemide (DEMADEX) 20 mg tablet, Take 3 Tabs by mouth Before breakfast and dinner. Indications: Congestive Heart Failure (this replaces bumetanide or Bumex), Disp: 180 Tab, Rfl: 6    Eliquis 5 mg tablet, Take 1 Tablet by mouth two (2) times a day., Disp: , Rfl:     cholecalciferol (VITAMIN D3) 25 mcg (1,000 unit) cap, Take 1 Capsule by mouth daily. , Disp: , Rfl:     potassium chloride (KLOR-CON M10) 10 mEq tablet, Take 2 Tablets by mouth nightly. (Patient not taking: Reported on 4/19/2023), Disp: , Rfl:     sodium bicarbonate 650 mg tablet, Take 1 Tablet by mouth two (2) times a day. (Patient not taking: Reported on 4/19/2023), Disp: 180 Tablet, Rfl: 1    testosterone 12.5 mg/ 1.25 gram (1 %) glpm, APPLY 4 PUMPS TO THE SHOULDERS AND UPPER ARMS ONE TIME DAILY IN THE MORNING (Patient not taking: Reported on 4/14/2023), Disp: 225 g, Rfl: 1    Thank you for your referral and allowing me to participate in this patient's care. Rob Franco NP  DNP, RN, [address], [address]  Phone: [phone]      PATIENT CARE TEAM:  Patient Care Team:  Timmy Emmanuel MD as PCP - General (Family Medicine)  Timmy Emmanuel MD as PCP - [facility]  Og Eagle MD (Cardiovascular Disease Physician)  Laura Amezquita MD (Pulmonary Disease)  Zaina Ann MD (Cardiovascular Disease Physician)  Greyson Lezama MD (Cardiothoracic Surgery)       VIRTUAL VIDEO VISIT DOCUMENTATION:    Christianne Myrick, was evaluated through a synchronous (real-time) audio-video encounter to substitute for in-person clinic visit. The patient (or guardian if applicable) is aware that this is a billable service, which includes applicable co-pays. This Virtual Visit was conducted with patient's (and/or legal guardian's) consent.  The visit was conducted pursuant to the emergency declaration under the [facility] waiver authority and the [employer] Supplemental Appropriations Act to reduce the patient's risk of exposure to COVID-19 and provide continuity of care for an established patient. Patient identification was verified, and a caregiver was present when appropriate. The patient was located in a state where the provider was licensed to provide care. I affirm this is a Patient Initiated Episode with an Established Patient . This service was provided through telehealth; patient called from home and provider was in the office at the [address] [place] [address] [address].      Total Time: minutes: 33

## 2023-04-19 NOTE — TELEPHONE ENCOUNTER
Pt called to cx today's appt as he does not think he could walk to the office. He was not sure why this appt was scheduled since he has a follow-up in May. He also wants to cx the test that Cayetano ordered for him. After speaking with Cayetano, I understand that the pt's labs were not good, and that was the reason for the appt today. I offered to bring pt up in a wheelchair when he arrived, but he said that he was not comfortable driving either. After all of that he said that he was feeling a bit better today with better urine output and hopes he'll continue to feel better. He assured me that he'd call us if he starts to feel worse.

## 2023-04-29 LAB
ALBUMIN SERPL-MCNC: 5.5 G/DL (ref 3.7–4.7)
ALBUMIN/GLOB SERPL: 2.2 {RATIO} (ref 1.2–2.2)
ALP SERPL-CCNC: 51 IU/L (ref 44–121)
ALT SERPL-CCNC: 34 IU/L (ref 0–44)
AST SERPL-CCNC: 35 IU/L (ref 0–40)
BILIRUB SERPL-MCNC: 1.3 MG/DL (ref 0–1.2)
BUN SERPL-MCNC: 78 MG/DL (ref 8–27)
BUN/CREAT SERPL: 35 (ref 10–24)
CALCIUM SERPL-MCNC: 10.4 MG/DL (ref 8.6–10.2)
CHLORIDE SERPL-SCNC: 84 MMOL/L (ref 96–106)
CO2 SERPL-SCNC: 25 MMOL/L (ref 20–29)
CREAT SERPL-MCNC: 2.24 MG/DL (ref 0.76–1.27)
EGFRCR SERPLBLD CKD-EPI 2021: 30 ML/MIN/1.73
GLOBULIN SER CALC-MCNC: 2.5 G/DL (ref 1.5–4.5)
GLUCOSE SERPL-MCNC: 206 MG/DL (ref 70–99)
NT-PROBNP SERPL-MCNC: ABNORMAL PG/ML (ref 0–486)
POTASSIUM SERPL-SCNC: 2.9 MMOL/L (ref 3.5–5.2)
PROT SERPL-MCNC: 8 G/DL (ref 6–8.5)
SODIUM SERPL-SCNC: 134 MMOL/L (ref 134–144)

## 2023-05-01 ENCOUNTER — HOSPITAL ENCOUNTER (INPATIENT)
Age: 78
LOS: 5 days | Discharge: STILL ADMITTED OR ADMINISTRATIVE | End: 2023-05-06
Attending: EMERGENCY MEDICINE | Admitting: HOSPITALIST
Payer: MEDICARE

## 2023-05-01 ENCOUNTER — TELEPHONE (OUTPATIENT)
Dept: CARDIOLOGY CLINIC | Age: 78
End: 2023-05-01

## 2023-05-01 ENCOUNTER — APPOINTMENT (OUTPATIENT)
Dept: GENERAL RADIOLOGY | Age: 78
End: 2023-05-01
Attending: EMERGENCY MEDICINE
Payer: MEDICARE

## 2023-05-01 ENCOUNTER — HOSPITAL ENCOUNTER (INPATIENT)
Facility: HOSPITAL | Age: 78
LOS: 23 days | Discharge: HOME | DRG: 286 | End: 2023-05-24
Attending: HOSPITALIST | Admitting: HOSPITALIST
Payer: MEDICARE

## 2023-05-01 DIAGNOSIS — E87.1 HYPONATREMIA: ICD-10-CM

## 2023-05-01 DIAGNOSIS — N17.9 AKI (ACUTE KIDNEY INJURY) (HCC): ICD-10-CM

## 2023-05-01 DIAGNOSIS — I50.21 ACUTE SYSTOLIC CONGESTIVE HEART FAILURE (HCC): ICD-10-CM

## 2023-05-01 DIAGNOSIS — I48.91 ATRIAL FIBRILLATION WITH RAPID VENTRICULAR RESPONSE (HCC): ICD-10-CM

## 2023-05-01 DIAGNOSIS — I42.0 DILATED CARDIOMYOPATHY (HCC): ICD-10-CM

## 2023-05-01 DIAGNOSIS — I50.9 ACUTE CONGESTIVE HEART FAILURE, UNSPECIFIED HEART FAILURE TYPE (HCC): Primary | ICD-10-CM

## 2023-05-01 DIAGNOSIS — I50.9 CONGESTIVE HEART FAILURE, UNSPECIFIED HF CHRONICITY, UNSPECIFIED HEART FAILURE TYPE (HCC): ICD-10-CM

## 2023-05-01 LAB
ALBUMIN SERPL-MCNC: 4.4 G/DL (ref 3.5–5)
ALBUMIN/GLOB SERPL: 1.1 (ref 1.1–2.2)
ALP SERPL-CCNC: 47 U/L (ref 45–117)
ALT SERPL-CCNC: 34 U/L (ref 12–78)
ANION GAP SERPL CALC-SCNC: 14 MMOL/L (ref 5–15)
APPEARANCE UR: CLEAR
AST SERPL-CCNC: 31 U/L (ref 15–37)
ATRIAL RATE: 136 BPM
BACTERIA URNS QL MICRO: NEGATIVE /HPF
BASOPHILS # BLD: 0 K/UL (ref 0–0.1)
BASOPHILS NFR BLD: 0 % (ref 0–1)
BILIRUB SERPL-MCNC: 2 MG/DL (ref 0.2–1)
BILIRUB UR QL: NEGATIVE
BNP SERPL-MCNC: ABNORMAL PG/ML
BUN SERPL-MCNC: 84 MG/DL (ref 6–20)
BUN/CREAT SERPL: 31 (ref 12–20)
CALCIUM SERPL-MCNC: 10.1 MG/DL (ref 8.5–10.1)
CALCULATED P AXIS, ECG09: 56 DEGREES
CALCULATED R AXIS, ECG10: -106 DEGREES
CALCULATED T AXIS, ECG11: 59 DEGREES
CHLORIDE SERPL-SCNC: 85 MMOL/L (ref 97–108)
CO2 SERPL-SCNC: 27 MMOL/L (ref 21–32)
COLOR UR: ABNORMAL
COMMENT, HOLDF: NORMAL
COMMENT, HOLDF: NORMAL
CREAT SERPL-MCNC: 2.72 MG/DL (ref 0.7–1.3)
DIAGNOSIS, 93000: NORMAL
DIFFERENTIAL METHOD BLD: ABNORMAL
EOSINOPHIL # BLD: 0 K/UL (ref 0–0.4)
EOSINOPHIL NFR BLD: 0 % (ref 0–7)
EPITH CASTS URNS QL MICRO: ABNORMAL /LPF
ERYTHROCYTE [DISTWIDTH] IN BLOOD BY AUTOMATED COUNT: 15.6 % (ref 11.5–14.5)
GLOBULIN SER CALC-MCNC: 4.1 G/DL (ref 2–4)
GLUCOSE SERPL-MCNC: 299 MG/DL (ref 65–100)
GLUCOSE UR STRIP.AUTO-MCNC: NEGATIVE MG/DL
HCT VFR BLD AUTO: 40.1 % (ref 36.6–50.3)
HGB BLD-MCNC: 13.3 G/DL (ref 12.1–17)
HGB UR QL STRIP: NEGATIVE
HYALINE CASTS URNS QL MICRO: ABNORMAL /LPF (ref 0–5)
IMM GRANULOCYTES # BLD AUTO: 0.1 K/UL (ref 0–0.04)
IMM GRANULOCYTES NFR BLD AUTO: 1 % (ref 0–0.5)
KETONES UR QL STRIP.AUTO: NEGATIVE MG/DL
LACTATE SERPL-SCNC: 3 MMOL/L (ref 0.4–2)
LEUKOCYTE ESTERASE UR QL STRIP.AUTO: NEGATIVE
LYMPHOCYTES # BLD: 0.7 K/UL (ref 0.8–3.5)
LYMPHOCYTES NFR BLD: 6 % (ref 12–49)
MCH RBC QN AUTO: 35.1 PG (ref 26–34)
MCHC RBC AUTO-ENTMCNC: 33.2 G/DL (ref 30–36.5)
MCV RBC AUTO: 105.8 FL (ref 80–99)
MONOCYTES # BLD: 0.7 K/UL (ref 0–1)
MONOCYTES NFR BLD: 6 % (ref 5–13)
MUCOUS THREADS URNS QL MICRO: ABNORMAL /LPF
NEUTS SEG # BLD: 9.5 K/UL (ref 1.8–8)
NEUTS SEG NFR BLD: 87 % (ref 32–75)
NITRITE UR QL STRIP.AUTO: NEGATIVE
NRBC # BLD: 0.03 K/UL (ref 0–0.01)
NRBC BLD-RTO: 0.3 PER 100 WBC
PH UR STRIP: 5.5 (ref 5–8)
PLATELET # BLD AUTO: 147 K/UL (ref 150–400)
PMV BLD AUTO: 12.2 FL (ref 8.9–12.9)
POTASSIUM SERPL-SCNC: 3.7 MMOL/L (ref 3.5–5.1)
PROCALCITONIN SERPL-MCNC: 0.09 NG/ML
PROT SERPL-MCNC: 8.5 G/DL (ref 6.4–8.2)
PROT UR STRIP-MCNC: NEGATIVE MG/DL
Q-T INTERVAL, ECG07: 394 MS
QRS DURATION, ECG06: 160 MS
QTC CALCULATION (BEZET), ECG08: 540 MS
RBC # BLD AUTO: 3.79 M/UL (ref 4.1–5.7)
RBC #/AREA URNS HPF: ABNORMAL /HPF (ref 0–5)
RBC MORPH BLD: ABNORMAL
SAMPLES BEING HELD,HOLD: NORMAL
SAMPLES BEING HELD,HOLD: NORMAL
SODIUM SERPL-SCNC: 126 MMOL/L (ref 136–145)
SP GR UR REFRACTOMETRY: 1.01 (ref 1–1.03)
TROPONIN I SERPL HS-MCNC: 176 NG/L (ref 0–57)
TROPONIN I SERPL HS-MCNC: 197 NG/L (ref 0–57)
TROPONIN I SERPL HS-MCNC: 208 NG/L (ref 0–57)
TSH SERPL DL<=0.05 MIU/L-ACNC: 1.47 UIU/ML (ref 0.36–3.74)
UROBILINOGEN UR QL STRIP.AUTO: 0.2 EU/DL (ref 0.2–1)
VENTRICULAR RATE, ECG03: 113 BPM
WBC # BLD AUTO: 11 K/UL (ref 4.1–11.1)
WBC URNS QL MICRO: ABNORMAL /HPF (ref 0–4)

## 2023-05-01 PROCEDURE — 93005 ELECTROCARDIOGRAM TRACING: CPT

## 2023-05-01 PROCEDURE — 80053 COMPREHEN METABOLIC PANEL: CPT

## 2023-05-01 PROCEDURE — 71045 X-RAY EXAM CHEST 1 VIEW: CPT

## 2023-05-01 PROCEDURE — 86160 COMPLEMENT ANTIGEN: CPT

## 2023-05-01 PROCEDURE — 74011250637 HC RX REV CODE- 250/637: Performed by: HOSPITALIST

## 2023-05-01 PROCEDURE — 74011000250 HC RX REV CODE- 250: Performed by: EMERGENCY MEDICINE

## 2023-05-01 PROCEDURE — 96374 THER/PROPH/DIAG INJ IV PUSH: CPT

## 2023-05-01 PROCEDURE — 96375 TX/PRO/DX INJ NEW DRUG ADDON: CPT

## 2023-05-01 PROCEDURE — 81001 URINALYSIS AUTO W/SCOPE: CPT

## 2023-05-01 PROCEDURE — 74011250636 HC RX REV CODE- 250/636: Performed by: EMERGENCY MEDICINE

## 2023-05-01 PROCEDURE — 65660000001 HC RM ICU INTERMED STEPDOWN

## 2023-05-01 PROCEDURE — 74011000250 HC RX REV CODE- 250: Performed by: INTERNAL MEDICINE

## 2023-05-01 PROCEDURE — 36415 COLL VENOUS BLD VENIPUNCTURE: CPT

## 2023-05-01 PROCEDURE — 84443 ASSAY THYROID STIM HORMONE: CPT

## 2023-05-01 PROCEDURE — 84145 PROCALCITONIN (PCT): CPT

## 2023-05-01 PROCEDURE — 51798 US URINE CAPACITY MEASURE: CPT

## 2023-05-01 PROCEDURE — 83605 ASSAY OF LACTIC ACID: CPT

## 2023-05-01 PROCEDURE — 87040 BLOOD CULTURE FOR BACTERIA: CPT

## 2023-05-01 PROCEDURE — 83880 ASSAY OF NATRIURETIC PEPTIDE: CPT

## 2023-05-01 PROCEDURE — 85025 COMPLETE CBC W/AUTO DIFF WBC: CPT

## 2023-05-01 PROCEDURE — 84484 ASSAY OF TROPONIN QUANT: CPT

## 2023-05-01 PROCEDURE — 9990 CHARGE CONVERSION

## 2023-05-01 PROCEDURE — 74011000250 HC RX REV CODE- 250: Performed by: HOSPITALIST

## 2023-05-01 PROCEDURE — 99285 EMERGENCY DEPT VISIT HI MDM: CPT

## 2023-05-01 RX ORDER — ONDANSETRON 2 MG/ML
4 INJECTION INTRAMUSCULAR; INTRAVENOUS
Status: COMPLETED | OUTPATIENT
Start: 2023-05-01 | End: 2023-05-01

## 2023-05-01 RX ORDER — SODIUM CHLORIDE 0.9 % (FLUSH) 0.9 %
5-40 SYRINGE (ML) INJECTION AS NEEDED
Status: DISCONTINUED | OUTPATIENT
Start: 2023-05-01 | End: 2023-05-06 | Stop reason: HOSPADM

## 2023-05-01 RX ORDER — MIDODRINE HYDROCHLORIDE 5 MG/1
2.5 TABLET ORAL EVERY MORNING
Status: DISCONTINUED | OUTPATIENT
Start: 2023-05-02 | End: 2023-05-03

## 2023-05-01 RX ORDER — ACETAMINOPHEN 325 MG/1
650 TABLET ORAL
Status: DISCONTINUED | OUTPATIENT
Start: 2023-05-01 | End: 2023-05-06 | Stop reason: HOSPADM

## 2023-05-01 RX ORDER — METOPROLOL SUCCINATE 25 MG/1
25 TABLET, EXTENDED RELEASE ORAL DAILY
Status: DISCONTINUED | OUTPATIENT
Start: 2023-05-02 | End: 2023-05-06 | Stop reason: HOSPADM

## 2023-05-01 RX ORDER — POLYETHYLENE GLYCOL 3350 17 G/17G
17 POWDER, FOR SOLUTION ORAL DAILY PRN
Status: DISCONTINUED | OUTPATIENT
Start: 2023-05-01 | End: 2023-05-06 | Stop reason: HOSPADM

## 2023-05-01 RX ORDER — CYANOCOBALAMIN (VITAMIN B-12) 500 MCG
1000 TABLET ORAL DAILY
Status: DISCONTINUED | OUTPATIENT
Start: 2023-05-02 | End: 2023-05-05

## 2023-05-01 RX ORDER — ONDANSETRON 2 MG/ML
4 INJECTION INTRAMUSCULAR; INTRAVENOUS
Status: DISCONTINUED | OUTPATIENT
Start: 2023-05-01 | End: 2023-05-06 | Stop reason: HOSPADM

## 2023-05-01 RX ORDER — BUMETANIDE 0.25 MG/ML
2 INJECTION INTRAMUSCULAR; INTRAVENOUS EVERY 12 HOURS
Status: DISCONTINUED | OUTPATIENT
Start: 2023-05-01 | End: 2023-05-01

## 2023-05-01 RX ORDER — METOPROLOL SUCCINATE 25 MG/1
25 TABLET, EXTENDED RELEASE ORAL 2 TIMES DAILY
Status: DISCONTINUED | OUTPATIENT
Start: 2023-05-01 | End: 2023-05-01

## 2023-05-01 RX ORDER — SODIUM CHLORIDE 0.9 % (FLUSH) 0.9 %
5-40 SYRINGE (ML) INJECTION EVERY 8 HOURS
Status: DISCONTINUED | OUTPATIENT
Start: 2023-05-01 | End: 2023-05-06 | Stop reason: HOSPADM

## 2023-05-01 RX ORDER — ROSUVASTATIN CALCIUM 10 MG/1
10 TABLET, COATED ORAL DAILY
Status: DISCONTINUED | OUTPATIENT
Start: 2023-05-02 | End: 2023-05-02

## 2023-05-01 RX ORDER — ONDANSETRON 4 MG/1
4 TABLET, ORALLY DISINTEGRATING ORAL
Status: DISCONTINUED | OUTPATIENT
Start: 2023-05-01 | End: 2023-05-06 | Stop reason: HOSPADM

## 2023-05-01 RX ORDER — BUMETANIDE 0.25 MG/ML
1 INJECTION INTRAMUSCULAR; INTRAVENOUS
Status: COMPLETED | OUTPATIENT
Start: 2023-05-01 | End: 2023-05-01

## 2023-05-01 RX ORDER — ACETAMINOPHEN 650 MG/1
650 SUPPOSITORY RECTAL
Status: DISCONTINUED | OUTPATIENT
Start: 2023-05-01 | End: 2023-05-06 | Stop reason: HOSPADM

## 2023-05-01 RX ORDER — ALLOPURINOL 100 MG/1
100 TABLET ORAL DAILY
Status: DISCONTINUED | OUTPATIENT
Start: 2023-05-02 | End: 2023-05-02

## 2023-05-01 RX ORDER — SODIUM BICARBONATE 650 MG/1
650 TABLET ORAL 2 TIMES DAILY
Status: DISCONTINUED | OUTPATIENT
Start: 2023-05-01 | End: 2023-05-01

## 2023-05-01 RX ADMIN — SODIUM CHLORIDE, PRESERVATIVE FREE 10 ML: 5 INJECTION INTRAVENOUS at 21:21

## 2023-05-01 RX ADMIN — BUMETANIDE 1 MG/HR: 0.25 INJECTION INTRAMUSCULAR; INTRAVENOUS at 21:22

## 2023-05-01 RX ADMIN — APIXABAN 5 MG: 5 TABLET, FILM COATED ORAL at 19:06

## 2023-05-01 RX ADMIN — ACETAMINOPHEN 650 MG: 325 TABLET ORAL at 23:58

## 2023-05-01 RX ADMIN — SODIUM CHLORIDE, PRESERVATIVE FREE 10 ML: 5 INJECTION INTRAVENOUS at 16:12

## 2023-05-01 RX ADMIN — ONDANSETRON 4 MG: 2 INJECTION INTRAMUSCULAR; INTRAVENOUS at 14:10

## 2023-05-01 RX ADMIN — BUMETANIDE 1 MG: 0.25 INJECTION INTRAMUSCULAR; INTRAVENOUS at 14:54

## 2023-05-01 NOTE — PROGRESS NOTES
per [facility] PSYCHIATRIC [place] P&T Committee Protocol Rosuvastatin 20 mg will be reduced to 10 mg with respect to CrCl< 30 mL/min. Pharmacy will continue to monitor patient daily and will make dosage adjustments based upon changing renal function.

## 2023-05-01 NOTE — H&P
History and Physical    Date of Service:  5/1/2023  Primary Care Provider: Jorge Ruiz MD  Source of information: The patient and Chart review    Chief Complaint: Shortness of Breath      History of Presenting Illness:   Stuart Farrar is a 69 y.o. 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 Patient endorses compliance with treatment. He denies chest pain. He denies respiratory symptoms, fever, cough or chills. On initial evaluation vital signs were unremarkable except tachycardia in the 110-1 120s. CMP significant for hyponatremia, ; BUN 84, creatinine 2.70; proBNP 22,000; troponin 176    Patient was admitted a month ago for hematuria. He had outpatient urology evaluation 6, he is currently on apixaban, no recurrence of hematuria. The patient denies any headache, blurry vision, sore throat, trouble swallowing, trouble with speech, chest pain, SOB, cough, fever, chills, N/V/D, abd pain, urinary symptoms, constipation, recent travels, sick contacts, focal or generalized neurological symptoms, falls, injuries, rashes, contact with COVID-19 diagnosed patients, hematemesis, melena, hemoptysis, hematuria, rashes, denies starting any new medications and denies any other concerns or problems besides as mentioned above. REVIEW OF SYSTEMS:  A comprehensive review of systems was negative except for that written in the History of Present Illness.      Past Medical History:   Diagnosis Date    Arrhythmia 11/11/2014    CAD (coronary artery disease)     stent, cardiac cath, RAUDEL X2    Cataract (lens) fragments in eye following cataract surgery, right eye     October 2022    ED (erectile dysfunction) of organic origin     High cholesterol 03/31/2010    HTN (hypertension) 03/31/2010    Prediabetes 11/11/2014    Pulmonary hypertension ([facility] [address] 08/08/2017    S/P ablation of atrial flutter 03/22/2016    S/P biventricular cardiac pacemaker procedure 02/60/5688    [facility] 03/22/2016      Past Surgical History:   Procedure Laterality Date    HX HEART CATHETERIZATION      HX ORTHOPAEDIC      fx wrist    HX PACEMAKER      BI-V Pacer/AICD 1/2021    HX TONSILLECTOMY      NC INSJ/RPLCMT PERM DFB W/TRNSVNS LDS 1/DUAL CHMBR N/A 01/26/2021    INSERT ICD BIV MULTI performed by Mary Huber MD at [facility] CARDIAC CATH LAB    NC UNLISTED PROCEDURE CARDIAC SURGERY      TAVR june 2021     Prior to Admission medications    Medication Sig Start Date End Date Taking? Authorizing Provider   fluticasone propionate (Flonase Allergy Relief) 50 mcg/actuation nasal spray 2 Sprays by Both Nostrils route daily as needed. Provider, Historical   allopurinoL (ZYLOPRIM) 100 mg tablet Take 1 Tablet by mouth daily. 4/19/23   Olive Hopkins NP   midodrine (PROAMATINE) 5 mg tablet Take 0.5 Tablets by mouth Every morning. Provider, Historical   metoprolol succinate (TOPROL-XL) 25 mg XL tablet Take 1 Tablet by mouth two (2) times a day. 4/14/23   Celena Berger MD   metOLazone (ZAROXOLYN) 5 mg tablet Take 1 Tablet by mouth as needed for PRN Reason (Other) (Take 1-2 times per week as needed for weight gain>3lbs/day or 5lbs/week). As needed for wt gain 4/14/23   Celean Berger MD   chlorhexidine (PERIDEX) 0.12 % solution RINSE MOUTH WITH 15 MILLILITERS TWICE A DAY AFTER BRUSHING, SWISH IN MOUTH FOR 30 SECONDS THEN SPIT 2/16/23   Provider, Historical   potassium chloride (KLOR-CON M10) 10 mEq tablet Take 2 Tablets by mouth nightly. Patient not taking: Reported on 4/19/2023    Provider, Historical   sodium bicarbonate 650 mg tablet Take 1 Tablet by mouth two (2) times a day.     Patient not taking: Reported on 4/19/2023 11/29/22   Rachna Naqvi MD   testosterone 12.5 mg/ 1.25 gram (1 %) glpm APPLY 4 PUMPS TO THE SHOULDERS AND UPPER ARMS ONE TIME DAILY IN THE MORNING  Patient not taking: Reported on 4/14/2023 11/5/22   Nidhi Cornelius MD   rosuvastatin (CRESTOR) 20 mg tablet TAKE ONE TABLET BY MOUTH ONE TIME DAILY AT NIGHT 9/28/22   Nidhi Cornelius MD   Spiriva Respimat 1.25 mcg/actuation inhaler Take 2 Puffs by inhalation daily. 11/11/21   Provider, Historical   acetaminophen (TYLENOL) 325 mg tablet Take 2 Tablets by mouth every four (4) hours as needed for Pain. 7/23/21   Kylee Madison NP   torsemide (DEMADEX) 20 mg tablet Take 3 Tabs by mouth Before breakfast and dinner. Indications: Congestive Heart Failure (this replaces bumetanide or Bumex) 5/10/21   Gerry Calles MD   Eliquis 5 mg tablet Take 1 Tablet by mouth two (2) times a day. 4/9/20   Provider, Historical   cholecalciferol (VITAMIN D3) 25 mcg (1,000 unit) cap Take 1 Capsule by mouth daily. Provider, Historical     No Known Allergies   Family History   Problem Relation Age of Onset    Heart Disease Father     Hypertension Father     Hypertension Mother     Heart Disease Mother     Cancer Brother       Social History:  reports that he quit smoking about 28 years ago. His smoking use included cigarettes. He started smoking about 60 years ago. He has a 70.00 pack-year smoking history. He has never used smokeless tobacco. He reports that he does not currently use alcohol after a past usage of about 1.0 standard drink per week. He reports that he does not use drugs.    Social Determinants of Health     Tobacco Use: Medium Risk    Smoking Tobacco Use: Former    Smokeless Tobacco Use: Never    Passive Exposure: Not on file   Alcohol Use: Not on file   Financial Resource Strain: Low Risk     Difficulty of Paying Living Expenses: Not hard at all   Food Insecurity: No Food Insecurity    Worried About Running Out of Food in the Last Year: Never true    Ran Out of Food in the Last Year: Never true   Transportation Needs: Not on file   Physical Activity: Not on file Stress: Not on file   Social Connections: Not on file   Intimate Partner Violence: Not on file   Depression: Not at risk    PHQ-2 Score: 0   Housing Stability: Not on file        Medications were reconciled to the best of my ability given all available resources at the time of admission. Route is PO if not otherwise noted. Family and social history were personally reviewed, all pertinent and relevant details are outlined as above. Objective:   Visit Vitals  /76   Pulse (!) 122   Temp 98 °F (36.7 °C)   Resp 21   Ht 5' 11\" (1.803 m)   Wt 93.8 kg (206 lb 12.7 oz)   SpO2 97%   BMI 28.84 kg/m²      O2 Device: None (Room air)    PHYSICAL EXAM:   General: Alert x oriented x 3, awake, no acute distress,   HEENT: PEERL, EOMI, moist mucus membranes  Neck: Supple, no JVD, no meningeal signs  Chest: Clear to auscultation bilaterally   CVS: Tachycardic, irregular  Abd/JUDI: Soft, non-tender, non-distended, +bowel sounds          Shifting dullness    Musculoskeletal/extremity: Trace edema  Neuro/Psych:   Pleasant mood and affect  And oriented x3  CN 2-12 grossly intact, sensory grossly within normal limit, Strength 5/5 in all extremities, DTR 1+ x 4  Skin: Warm, dry, without rashes or lesions    Data Review:   I have independently reviewed and interpreted patient's lab and all other diagnostic data    Abnormal Labs Reviewed   CBC WITH AUTOMATED DIFF - Abnormal; Notable for the following components:       Result Value    RBC 3.79 (*)     .8 (*)     MCH 35.1 (*)     RDW 15.6 (*)     PLATELET 385 (*)     NRBC 0.3 (*)     ABSOLUTE NRBC 0.03 (*)     NEUTROPHILS 87 (*)     LYMPHOCYTES 6 (*)     IMMATURE GRANULOCYTES 1 (*)     ABS. NEUTROPHILS 9.5 (*)     ABS. LYMPHOCYTES 0.7 (*)     ABS. IMM.  GRANS. 0.1 (*)     All other components within normal limits   METABOLIC PANEL, COMPREHENSIVE - Abnormal; Notable for the following components:    Sodium 126 (*)     Chloride 85 (*)     Glucose 299 (*)     BUN 84 (*) Creatinine 2.72 (*)     BUN/Creatinine ratio 31 (*)     eGFR 23 (*)     Bilirubin, total 2.0 (*)     Protein, total 8.5 (*)     Globulin 4.1 (*)     All other components within normal limits   NT-PRO BNP - Abnormal; Notable for the following components:    NT pro-BNP 22,002 (*)     All other components within normal limits   TROPONIN-HIGH SENSITIVITY - Abnormal; Notable for the following components:    Troponin-High Sensitivity 176 (*)     All other components within normal limits       All Micro Results       None            IMAGING:   XR CHEST PORT   Final Result   Cardiomegaly, mild pulmonary edema and small pleural effusions. ECG/ECHO:    Results for orders placed or performed during the hospital encounter of 05/01/23   EKG, 12 LEAD, INITIAL   Result Value Ref Range    Ventricular Rate 121 BPM    Atrial Rate 115 BPM    QRS Duration 150 ms    Q-T Interval 402 ms    QTC Calculation (Bezet) 570 ms    Calculated R Axis -47 degrees    Calculated T Axis 91 degrees    Diagnosis       Ventricular-paced rhythm with premature ventricular or aberrantly conducted   complexes  Abnormal ECG  When compared with ECG of 01-MAY-2023 12:44,  premature ventricular complexes are no longer present  Vent. rate has increased BY   8 BPM            Notes reviewed from all clinical/nonclinical/nursing services involved in patient's clinical care. Care coordination discussions were held with appropriate clinical/nonclinical/ nursing providers based on care coordination needs. Assessment and plan   Given the patient's current clinical presentation, there is a high level of concern for decompensation if discharged from the emergency department. Complex decision making was performed, which includes reviewing the patient's available past medical records, laboratory results, and imaging studies.     Acute on chronic combined systolic and diastolic CHF, advanced with NYHA class IV status post CRT-D  A-fib with mild RVR status post CRT-D-D  KAYE on CKD stage IV  Mild elevated troponin likely due to CHF, patient has no chest pain. Admit to monitored unit  Diuresed with bumetanide 2 mg IV twice daily. Monitor input and output. Monitor weight daily. Monitor creatinine closely. Consult his cardiologist placed. GDMT limited by hypotension and worsening CKD. Consult nephrology, patient has advanced CKD and needs aggressive diuresis to keep him euvolemic, would like to get nephrology assistance, patient needs close outpatient follow-up by urology. Further management per cardiology          DIET: ADULT DIET Regular; Low Fat/Low Chol/High Fiber/LUCINDA   ISOLATION PRECAUTIONS: There are currently no Active Isolations  CODE STATUS: Full Code   DVT PROPHYLAXIS: Eliquis  FUNCTIONAL STATUS PRIOR TO HOSPITALIZATION: Fully active and ambulatory; able to carry on all self-care without restriction. Ambulatory status/function: By self   EARLY MOBILITY ASSESSMENT: Recommend routine ambulation while hospitalized with the assistance of nursing staff  ANTICIPATED DISCHARGE: Greater than 48 hours. ANTICIPATED DISPOSITION: Home  EMERGENCY CONTACT/SURROGATE DECISION MAKER:   Extended Emergency Contact Information  Primary Emergency Contact: [facility] Phone: [phone]  Mobile Phone: [phone]  Relation: Friend      CRITICAL CARE WAS PERFORMED FOR THIS ENCOUNTER: NO.      Signed By: Denette Spurling, MD     May 1, 2023         Please note that this dictation may have been completed with Dragon, the [employer] voice recognition software. Quite often unanticipated grammatical, syntax, homophones, and other interpretive errors are inadvertently transcribed by the computer software. Please disregard these errors. Please excuse any errors that have escaped final proofreading.

## 2023-05-01 NOTE — PROGRESS NOTES
1848: TRANSFER - IN REPORT:    Verbal report received from [facility] [place] [address] (name) on [address]  being received from ED (unit) for routine progression of care      Report consisted of patients Situation, Background, Assessment and   Recommendations(SBAR). Information from the following report(s) SBAR, Kardex, MAR, and Cardiac Rhythm ST with PVCs and intermittent VTach  was reviewed with the receiving nurse. Opportunity for questions and clarification was provided. Assessment completed upon patients arrival to unit and care assumed. 1846: Notified MD Cronin of pt HR VTach 120-150s that's intermittently paced, pt hasn't voided with multiple bladder scans ranging from 20-80 mLs. Sodium 126, clarified bumex gtt order, per MD Cronin continue with bumex gtt. Updated MD Cronin regarding temp 96.3 axillary. 1915: Telephone orders received via MD Cronin for warner with temp sensor and ID workup orders placed. Per MD Cronin if pt become hypotensive overnight on bumex gtt, notify nephrology or cardiology. Warner placed with temp sensor, temp 97.7 F.     1930: Bedside and Verbal shift change report given to [facility] AT ALLEN KERR RN and Rene Banks RN (oncoming nurse) by Dede Anthony RN (offgoing nurse). Report included the following information SBAR, Kardex, MAR, and Cardiac Rhythm ST with PVCs and intermittent pacing and Vtach 120-160 .

## 2023-05-01 NOTE — ED NOTES
TRANSFER - OUT REPORT:    Verbal report given to BRETT Moreno on Destiny Ortiz  being transferred to 463/01 for routine progression of care       Report consisted of patients Situation, Background, Assessment and   Recommendations(SBAR). Information from the following report(s) SBAR, Kardex, ED Summary, Procedure Summary, MAR, and Cardiac Rhythm Sinus tachy with Mutlti PVC  was reviewed with the receiving nurse. Lines:   Peripheral IV 05/01/23 Right Antecubital (Active)   Site Assessment Clean, dry, & intact 05/01/23 1308   Phlebitis Assessment 0 05/01/23 1308   Infiltration Assessment 0 05/01/23 1308   Dressing Status Clean, dry, & intact 05/01/23 1308   Dressing Type Transparent 05/01/23 1308   Hub Color/Line Status Pink;Flushed;Patent 05/01/23 1308   Action Taken Blood drawn 05/01/23 1308        Opportunity for questions and clarification was provided.       Patient transported with:   [employer]

## 2023-05-01 NOTE — CONSULTS
Seen and examined  Thanks for the consult  A/P:  CKD-3b  KAYE,could be cardio renal but need to ro other etiologies  Hyponatremia,partially corrects with high BS  Recent macrohematuria,saw urology as per him and neg work up  CHF  High montserrat  Hx of low C3,C4    IV diuretics  UA  Recheck C3,C4  Renal U/S in AM if not better  Check CK,cortisol level  Discussed with him

## 2023-05-01 NOTE — TELEPHONE ENCOUNTER
----- Message from Oscar Decker MD sent at 5/1/2023 10:13 AM EDT -----  Regarding: labs/office visit  I reviewed the labs from 4/28 and office visit with Dr. Vera Mirza. I do recommend admission, pro-BNP is rising, renal function remains impaired and his potassium is low. I do not want to titrate diuretics without close monitoring of potassium levels. It looks like Vera Mirza also wants him admitted for inotrope. He is planning RHC and admission. If patient is not willing to come in now for admission, can we make sure [facility] is going to get him in ASAP for RHC and direct admission. For now he needs to increase Potassium to 40 meq BID x 2 days and then 40 meq daily as his maintenance dose. I called patient, reviewed all of above per Dr. Ruchi Shea. He states he is feeling \"awful\", is not sleeping, is hardly urinating. I reviewed his lab results and advised he be admitted to hospital.  He immediately agreed. I called bed placement and they state no beds are available. I notified Dr Ruchi Shea, will have patient present to ED, will have him take 40 meq potassium now-he states he has not been taking any potassium, as he has had trouble swallowing pills. He states he will take potassium, he is agreeable to presenting to ED, I notified Dr. Ruchi Shea.

## 2023-05-01 NOTE — ED PROVIDER NOTES
Shortness of Breath       67y M with CAD and CHF here with shortness of breath. On diuretics but not helping. Decreased urine output in the past few days. Not able to sleep due to dyspnea. Spoke with his doctor who advised he come in for admission for failed outpatient therapy. No chest pain. No fever. No leg swelling. Can only take one or two steps without getting short of breath.     Past Medical History:   Diagnosis Date    Arrhythmia 11/11/2014    CAD (coronary artery disease)     stent, cardiac cath, RAUDEL X2    Cataract (lens) fragments in eye following cataract surgery, right eye     October 2022    ED (erectile dysfunction) of organic origin     High cholesterol 03/31/2010    HTN (hypertension) 03/31/2010    Prediabetes 11/11/2014    Pulmonary hypertension ([facility] [address] 08/08/2017    S/P ablation of atrial flutter 03/22/2016    S/P biventricular cardiac pacemaker procedure 88/88/0121    Systolic CHF, acute ([facility] 03/22/2016       Past Surgical History:   Procedure Laterality Date    HX HEART CATHETERIZATION      HX ORTHOPAEDIC      fx wrist    HX PACEMAKER      BI-V Pacer/AICD 1/2021    HX TONSILLECTOMY      AK INSJ/RPLCMT PERM DFB W/TRNSVNS LDS 1/DUAL CHMBR N/A 01/26/2021    INSERT ICD BIV MULTI performed by Rico Velez MD at [facility] CARD[facility]AC CATH LAB    AK UNLISTED PROCEDURE CARDIAC SURGERY      TAVR june 2021         Family History:   Problem Relation Age of Onset    Heart Disease Father     Hypertension Father     Hypertension Mother     Heart Disease Mother     Cancer Brother        Social History     Socioeconomic History    Marital status: SINGLE     Spouse name: Not on file    Number of children: Not on file    Years of education: Not on file    Highest education level: Not on file   Occupational History    Not on file   Tobacco Use    Smoking status: Former     Packs/day: 2.00     Years: 35.00     Pack years: 70.00     Types: Cigarettes     Start date: 2/5/1963     Quit date: 4/1/1995     Years since quittin.1    Smokeless tobacco: Never   Vaping Use    Vaping Use: Never used   Substance and Sexual Activity    Alcohol use: Not Currently     Alcohol/week: 1.0 standard drink     Types: 1 Glasses of wine per week     Comment: 1 drink every 3 years    Drug use: No    Sexual activity: Yes     Partners: Female   Other Topics Concern    Not on file   Social History Narrative    Not on file     Social Determinants of Health     Financial Resource Strain: Low Risk     Difficulty of Paying Living Expenses: Not hard at all   Food Insecurity: No Food Insecurity    Worried About Running Out of Food in the Last Year: Never true    Ran Out of Food in the Last Year: Never true   Transportation Needs: Not on file   Physical Activity: Not on file   Stress: Not on file   Social Connections: Not on file   Intimate Partner Violence: Not on file   Housing Stability: Not on file         ALLERGIES: Patient has no known allergies. Review of Systems   Respiratory:  Positive for shortness of breath. Review of Systems   Constitutional: (-) weight loss. HEENT: (-) stiff neck   Eyes: (-) discharge. Respiratory: (-) cough. Cardiovascular: (-) syncope. Gastrointestinal: (-) blood in stool. Genitourinary: (-) hematuria. Musculoskeletal: (-) myalgias. Neurological: (-) seizure. Skin: (-) petechiae  Lymph/Immunologic: (-) enlarged lymph nodes  All other systems reviewed and are negative. Vitals:    23 1248   BP: (!) 132/92   Pulse: (!) 102   Resp: 18   Temp: 98 °F (36.7 °C)   SpO2: 98%   Weight: 93.8 kg (206 lb 12.7 oz)   Height: 5' 11\" (1.803 m)            Physical Exam   Nursing note and vitals reviewed. Constitutional: oriented to person, place, and time. appears well-developed and well-nourished. No distress. Head: Normocephalic and atraumatic.  Sclera anicteric  Nose: No rhinorrhea  Mouth/Throat: Oropharynx is clear and moist. Pharynx normal  Eyes: Conjunctivae are normal. Pupils are equal, round, and reactive to light. Right eye exhibits no discharge. Left eye exhibits no discharge. Neck: Painless normal range of motion. Neck supple. No LAD. Cardiovascular: Normal rate, regular rhythm, normal heart sounds and intact distal pulses. Exam reveals no gallop and no friction rub. No murmur heard. Pulmonary/Chest:  Mod respiratory distress. No wheezes. No rales. No rhonchi. (+) increased work of breathing. No accessory muscle use. Good air exchange throughout. Abdominal: soft, non-tender, no rebound or guarding. No hepatosplenomegaly. Normal bowel sounds throughout. Back: no tenderness to palpation, no deformities, no CVA tenderness  Extremities/Musculoskeletal: Normal range of motion. no tenderness. No edema. Distal extremities are neurovasc intact. Lymphadenopathy:   No adenopathy. Neurological:  Alert and oriented to person, place, and time. Coordination normal. CN 2-12 intact. Motor and sensory function intact. Skin: Skin is warm and dry. No rash noted. No pallor. Medical Decision Making  Amount and/or Complexity of Data Reviewed  Labs: ordered. Radiology: ordered. ECG/medicine tests: ordered. Risk  Prescription drug management. 67y M here with CHF exacerbation that has failed outpatient therapy. Will need labs, CXR, admission. Procedures      ED EKG interpretation:  Rhythm: paced and tachycardia and regular . Rate (approx.): 120; This EKG was interpreted by Jeremy Manzanares MD,ED Provider. Perfect Serve Consult for Admission  2:55 PM    ED Room Number: ON83/69  Patient Name and age:  Corinne  69 y.o.  male  Working Diagnosis:   1.  Acute congestive heart failure, unspecified heart failure type ([address]        COVID-19 Suspicion:  no  Sepsis present:  no  Reassessment needed: N/A  Code Status:  Full Code  Readmission: no  Isolation Requirements:  no  Recommended Level of Care:  telemetry  Department: [facility] Adult ED - [phone]    Other:  67y M here with failed outpatient therapy for CHF. Worsening dyspnea. Cannot sleep due to shortness of breath. No chest pain.  Sent in for admission by his cardiologist.

## 2023-05-01 NOTE — ED TRIAGE NOTES
Pt reports increased SOB, decreased urination, and constipation starting last week. Pt reports hx of CHF. Pt is currently on Torsemide. Pt was referred here by his Doctor for failing outpatient treatment. Pt also reports his kidney function has worsened as well.

## 2023-05-01 NOTE — CONSULTS
[address] [facility] Cardiology Consultation    Date of Consult:  05/01/23  Date of Admission: 5/1/2023  Primary Cardiologist: Dr. Nilsa Agarwal  Physician Requesting consult: Eliceo Marcial    Chief Complaint / Reason for Consult:   Dyspnea    History of Present Illness:  Joao Bolanos is a 69 y.o. male with the below listed medical history who was admitted with dyspnea. Very well known to me. Recently stopped nearly all of his medications after some changes were made by [facility] clinic. At my last visit with him this past week, we restarted his metoprolol due to AF with RVR. Reports worsening dyspnea since then. Has stable LH and orthopnea. Denies chest pain, syncope. No ankle edema. From my 4/28/23 evaluation in clinic:  \"  Here for routine follow-up. Since I last saw him he feels about the same. He has been able to tolerate midodrine 2.5 mg once daily. He previously had urinary retention of 5 mg twice daily. He was seen by the [facility] at [facility]. He was given allopurinol, but he didn't feel well on this so he stopped it. Says the only medications he has taken for the last 5 days are potassium, torsemide, Eliquis and vitamin D.      He still has some dyspnea with walking for extended distances. No pre-syncope, chest pain. \"    Past Medical History:   Diagnosis Date    Arrhythmia 11/11/2014    CAD (coronary artery disease)     stent, cardiac cath, RAUDEL X2    Cataract (lens) fragments in eye following cataract surgery, right eye     October 2022    ED (erectile dysfunction) of organic origin     High cholesterol 03/31/2010    HTN (hypertension) 03/31/2010    Prediabetes 11/11/2014    Pulmonary hypertension ([facility] [address] 08/08/2017    S/P ablation of atrial flutter 03/22/2016    S/P biventricular cardiac pacemaker procedure 74/32/5378    Systolic CHF, acute ([facility] [address] 03/22/2016       Prior to Admission medications    Medication Sig Start Date End Date Taking?  Authorizing Provider   fluticasone propionate (Flonase Allergy Relief) 50 mcg/actuation nasal spray 2 Sprays by Both Nostrils route daily as needed. Provider, Historical   allopurinoL (ZYLOPRIM) 100 mg tablet Take 1 Tablet by mouth daily. 4/19/23   Shanta Morse NP   midodrine (PROAMATINE) 5 mg tablet Take 0.5 Tablets by mouth Every morning. Provider, Historical   metoprolol succinate (TOPROL-XL) 25 mg XL tablet Take 1 Tablet by mouth two (2) times a day. 4/14/23   Marium Mejia MD   metOLazone (ZAROXOLYN) 5 mg tablet Take 1 Tablet by mouth as needed for PRN Reason (Other) (Take 1-2 times per week as needed for weight gain>3lbs/day or 5lbs/week). As needed for wt gain 4/14/23   Marium Mejia MD   chlorhexidine (PERIDEX) 0.12 % solution RINSE MOUTH WITH 15 MILLILITERS TWICE A DAY AFTER BRUSHING, SWISH IN MOUTH FOR 30 SECONDS THEN SPIT 2/16/23   Provider, Historical   potassium chloride (KLOR-CON M10) 10 mEq tablet Take 2 Tablets by mouth nightly. Patient not taking: Reported on 4/19/2023    Provider, Historical   sodium bicarbonate 650 mg tablet Take 1 Tablet by mouth two (2) times a day. Patient not taking: Reported on 4/19/2023 11/29/22   Collette Quiver, MD   testosterone 12.5 mg/ 1.25 gram (1 %) glpm APPLY 4 PUMPS TO THE SHOULDERS AND UPPER ARMS ONE TIME DAILY IN THE MORNING  Patient not taking: Reported on 4/14/2023 11/5/22   Collette Quiver, MD   rosuvastatin (CRESTOR) 20 mg tablet TAKE ONE TABLET BY MOUTH ONE TIME DAILY AT NIGHT 9/28/22   Collette Quiver, MD   Spiriva Respimat 1.25 mcg/actuation inhaler Take 2 Puffs by inhalation daily. 11/11/21   Provider, Historical   acetaminophen (TYLENOL) 325 mg tablet Take 2 Tablets by mouth every four (4) hours as needed for Pain. 7/23/21   Mackenzie Richards NP   torsemide (DEMADEX) 20 mg tablet Take 3 Tabs by mouth Before breakfast and dinner.  Indications: Congestive Heart Failure (this replaces bumetanide or Bumex) 5/10/21   MD Marilynn Martinezqupierce 5 mg tablet Take 1 Tablet by mouth two (2) times a day. 4/9/20   Provider, Historical   cholecalciferol (VITAMIN D3) 25 mcg (1,000 unit) cap Take 1 Capsule by mouth daily. Provider, Historical       Current Facility-Administered Medications   Medication Dose Route Frequency    sodium chloride (NS) flush 5-40 mL  5-40 mL IntraVENous Q8H    sodium chloride (NS) flush 5-40 mL  5-40 mL IntraVENous PRN    acetaminophen (TYLENOL) tablet 650 mg  650 mg Oral Q6H PRN    Or    acetaminophen (TYLENOL) suppository 650 mg  650 mg Rectal Q6H PRN    polyethylene glycol (MIRALAX) packet 17 g  17 g Oral DAILY PRN    ondansetron (ZOFRAN ODT) tablet 4 mg  4 mg Oral Q8H PRN    Or    ondansetron (ZOFRAN) injection 4 mg  4 mg IntraVENous Q6H PRN    [START ON 5/2/2023] allopurinoL (ZYLOPRIM) tablet 100 mg  100 mg Oral DAILY    [START ON 5/2/2023] cholecalciferol (VITAMIN D3) (400 Units /10 mcg) tablet 1,000 Units  1,000 Units Oral DAILY    apixaban (ELIQUIS) tablet 5 mg  5 mg Oral BID    metoprolol succinate (TOPROL-XL) XL tablet 25 mg  25 mg Oral BID    [START ON 5/2/2023] midodrine (PROAMATINE) tablet 2.5 mg  2.5 mg Oral QAM    [START ON 5/2/2023] rosuvastatin (CRESTOR) tablet 10 mg  10 mg Oral DAILY    bumetanide (BUMEX) injection 2 mg  2 mg IntraVENous Q12H     Current Outpatient Medications   Medication Sig    fluticasone propionate (Flonase Allergy Relief) 50 mcg/actuation nasal spray 2 Sprays by Both Nostrils route daily as needed. allopurinoL (ZYLOPRIM) 100 mg tablet Take 1 Tablet by mouth daily. midodrine (PROAMATINE) 5 mg tablet Take 0.5 Tablets by mouth Every morning. metoprolol succinate (TOPROL-XL) 25 mg XL tablet Take 1 Tablet by mouth two (2) times a day. metOLazone (ZAROXOLYN) 5 mg tablet Take 1 Tablet by mouth as needed for PRN Reason (Other) (Take 1-2 times per week as needed for weight gain>3lbs/day or 5lbs/week).  As needed for wt gain    chlorhexidine (PERIDEX) 0.12 % solution RINSE MOUTH WITH 15 MILLILITERS TWICE A DAY AFTER BRUSHING, SWISH IN MOUTH FOR 30 SECONDS THEN SPIT    potassium chloride (KLOR-CON M10) 10 mEq tablet Take 2 Tablets by mouth nightly. (Patient not taking: Reported on 2023)    sodium bicarbonate 650 mg tablet Take 1 Tablet by mouth two (2) times a day. (Patient not taking: Reported on 2023)    testosterone 12.5 mg/ 1.25 gram (1 %) glpm APPLY 4 PUMPS TO THE SHOULDERS AND UPPER ARMS ONE TIME DAILY IN THE MORNING (Patient not taking: Reported on 2023)    rosuvastatin (CRESTOR) 20 mg tablet TAKE ONE TABLET BY MOUTH ONE TIME DAILY AT NIGHT    Spiriva Respimat 1.25 mcg/actuation inhaler Take 2 Puffs by inhalation daily. acetaminophen (TYLENOL) 325 mg tablet Take 2 Tablets by mouth every four (4) hours as needed for Pain.    torsemide (DEMADEX) 20 mg tablet Take 3 Tabs by mouth Before breakfast and dinner. Indications: Congestive Heart Failure (this replaces bumetanide or Bumex)    Eliquis 5 mg tablet Take 1 Tablet by mouth two (2) times a day. cholecalciferol (VITAMIN D3) 25 mcg (1,000 unit) cap Take 1 Capsule by mouth daily.        Family History   Problem Relation Age of Onset    Heart Disease Father     Hypertension Father     Hypertension Mother     Heart Disease Mother     Cancer Brother        Social History     Socioeconomic History    Marital status: SINGLE     Spouse name: Not on file    Number of children: Not on file    Years of education: Not on file    Highest education level: Not on file   Occupational History    Not on file   Tobacco Use    Smoking status: Former     Packs/day: 2.00     Years: 35.00     Pack years: 70.00     Types: Cigarettes     Start date: 1963     Quit date: 1995     Years since quittin.1    Smokeless tobacco: Never   Vaping Use    Vaping Use: Never used   Substance and Sexual Activity    Alcohol use: Not Currently     Alcohol/week: 1.0 standard drink     Types: 1 Glasses of wine per week     Comment: 1 drink every 3 years    Drug use: No    Sexual activity: Yes     Partners: Female   Other Topics Concern    Not on file   Social History Narrative    Not on file     Social Determinants of Health     Financial Resource Strain: Low Risk     Difficulty of Paying Living Expenses: Not hard at all   Food Insecurity: No Food Insecurity    Worried About Running Out of Food in the Last Year: Never true    Ran Out of Food in the Last Year: Never true   Transportation Needs: Not on file   Physical Activity: Not on file   Stress: Not on file   Social Connections: Not on file   Intimate Partner Violence: Not on file   Housing Stability: Not on file       Review of Systems   All other systems reviewed and are negative.     Visit Vitals  /69   Pulse (!) 116   Temp 97.2 °F (36.2 °C)   Resp 20   Ht 5' 11\" (1.803 m)   Wt 93.8 kg (206 lb 12.7 oz)   SpO2 96%   BMI 28.84 kg/m²       No intake or output data in the 24 hours ending 05/01/23 1633     Physical Exam  GEN: NAD, appears stated age, no significant breathlessness with talking  HEENT: EOMI  NECK: JVP is elevated about 4-5 cm above clavicle with patient in the semi-Delacruz position  CV: Irregularly irregular, normal S1 and S2, II/VI systolic murmur at apex  LUNGS: L > R bibasilar inspiratory crackles with diminished BS at b/l bases  ABD: Soft, NT/ND  EXT: No edema, 2+ and symmetrical radial pulses b/l  PSYCH: Mood and affect normal  NEURO: Alert, MAEW, face symmetrical, speech intact    Lab Review:  BMP:   Lab Results   Component Value Date/Time     (L) 05/01/2023 01:05 PM    K 3.7 05/01/2023 01:05 PM    CL 85 (L) 05/01/2023 01:05 PM    CO2 27 05/01/2023 01:05 PM    AGAP 14 05/01/2023 01:05 PM     (H) 05/01/2023 01:05 PM    BUN 84 (H) 05/01/2023 01:05 PM    CREA 2.72 (H) 05/01/2023 01:05 PM        CBC:  Lab Results   Component Value Date/Time    WBC 11.0 05/01/2023 01:05 PM    HGB 13.3 05/01/2023 01:05 PM    HCT 40.1 05/01/2023 01:05 PM    PLATELET 366 (L) 68/90/0919 01:05 PM    .8 (H) 05/01/2023 01:05 PM       All Cardiac Markers in the last 24 hours:    Lab Results   Component Value Date/Time    BNPNT 22,002 (H) 05/01/2023 01:05 PM       Lab Results   Component Value Date/Time    Cholesterol, total 129 04/14/2023 02:18 PM    HDL Cholesterol 39 04/14/2023 02:18 PM    LDL, calculated 54.4 04/14/2023 02:18 PM    VLDL, calculated 35.6 04/14/2023 02:18 PM    Triglyceride 178 (H) 04/14/2023 02:18 PM    CHOL/HDL Ratio 3.3 04/14/2023 02:18 PM        Data Review:  ECG tracing personally reviewed:   Probable intermittent V-pacing, with probable AF with RVR    Echocardiogram:  12/14/22    ECHO SUSANNA W POSSIBLE CARDIOVERSION 12/14/2022 12/14/2022    Interpretation Summary  Successful direct current electrical cardioversion using one 200 J synchronized shock with restoration of normal sinus rhythm with frequent PVCs. For full results of the SUSANNA, please see the other SUSANNA report from this date. Summary results as follows:    Left Ventricle: Severely reduced left ventricular systolic function with a visually estimated EF of 20 - 25%. Left ventricle is mildly dilated. Normal wall thickness. Severe global hypokinesis present. Abnormal diastolic function. Right Ventricle: Mildly reduced systolic function. Aortic Valve: 26 mm Sandy 3 Ultra THV + 1 mL appropriately positioned transcatheter bioprosthetic valve that is well-seated. Mild paravalvular regurgitation, located anteriorly. No stenosis. AV mean gradient is 5 mmHg. AV peak velocity is 1.9 m/s. LVOT:AV VTI Index is 0.62. LVOT diameter is 2.3 cm. AV area by continuity VTI is 2.4 cm2. AV Stroke Volume index is 32.0 mL/m2. Mitral Valve: Moderate annular dilation. Moderate, bordering on moderate/severe (2+, bordering on 2-3+) mitral regurgitation. Mild systolic blunting of the left pulmonary veins. Severe systolic blunting to reversal of the right pulmonary veins. EROA 0.1 cm2, Rvol 18 mL.   The posterior leaflet measures 9-10 mm at the site of malcoaptation. No stenosis noted. MV mean gradient is 1 mmHg. MV area by PHT is 4.6 cm2. MV area by planimetry is 7.0 cm2. Tricuspid Valve: Severely elevated RVSP. The estimated RVSP is 67 mmHg. Left Atrium: Left atrium is moderately dilated. Decreased appendage flow velocity. No left atrial appendage thrombus noted. No left atrial appendage mass noted. Right Atrium: Right atrium is mildly dilated. 3D en face views of the mitral and aortic valves were obtained. Signed by: Mushtaq Rudd MD on 12/14/2022  5:31 PM      Other cardiac testing:  [address] 91/74/18:  Uncomplicated ultrasound guided access of the right IJ vein  Successful hemostasis of the 6 Fr slender right IJ vein access site using manual compression     1. Mildly elevated right-sided filling pressures (RA 18/20/16 mmHg) with moderately to severely elevated left-sided filling pressures (PCWP 28/45/33 mmHg). Moderate to large V wave in the PCWP tracing, suggestive of significant mitral regurgitation and/or decreased left atrial compliance. 2. Moderate to severe pulmonary hypertension (PA 69/27/41 mmHg) with normal PVR (2.1 Rodas), TPG 8 mmHg. 3. Depressed cardiac output (Alek 3.43 L/min, TD 3.77 L/min) and cardiac index (Alek 1.63 L/min/m2, TD 1.79 L/min/m2). 4. Elevated SVR (1422 dynes/sec/cm-5). 5. Augment diuresis. Continue torsemide 60 mg twice daily and increase metolazone from 5 mg once weekly to 5 mg twice weekly. 6. Results discussed with the patient. Other imaging:  CXR:  IMPRESSION  Cardiomegaly, mild pulmonary edema and small pleural effusions.     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% s/p CRT-D, NICM, NYHA class IV on admission  Diuresis: Bumetanide 1 mg/hr  Strict Is and Os, daily weights  BMP q12h; supplement K >4, Mg >2  If Na <125, give one dose of tolvaptan  Will need RHC once able to lie flat; tentatively Wed/Thurs; ok to continue Eliquis  May need inotropic support, however, would avoid for now given AF with RVR  Consult to F  Mitral regurgitation  SUSANNA 12/2022 shows good anatomy for CHAPARRITA with 3+ MR; TTE 8/11/22 ([facility]) also shows 3+ MR  Attention to [facility] on [facility]  Atrial fibrillation with RVR; s/p ablation in 2016 with recurrent AF  Continue metoprolol succinate 25 mg once daily  Limited by hypotension  May need to consider digoxin carefully if still rapid HR, but will need to correct any electrolyte disturbances first  Telemetry  Acute on chronic renal insufficiency  Baseline Cr around 1.8 to 2.2; currently above baseline, likely CRS  Appreciate nephrology   Severe AS s/p TAVR  H/o HTN, currently with hypotension limiting HF GDMT  Continue midodrine 2.5 mg once daily  Had urinary retention on 5 mg twice daily  CAD  S/p PCI to RCA at time of TAVR due to concern for coronary obstruction  Dyslipidemia    Thank you for the opportunity to participate in the care of Cathie Bar and please do not hesitate to contact us should you have any questions. Josef Acosta, [address] [facility]  05/01/23

## 2023-05-02 ENCOUNTER — APPOINTMENT (OUTPATIENT)
Dept: ULTRASOUND IMAGING | Age: 78
End: 2023-05-02
Attending: INTERNAL MEDICINE
Payer: MEDICARE

## 2023-05-02 LAB
ANION GAP SERPL CALC-SCNC: 14 MMOL/L (ref 5–15)
ANION GAP SERPL CALC-SCNC: 14 MMOL/L (ref 5–15)
BUN SERPL-MCNC: 91 MG/DL (ref 6–20)
BUN SERPL-MCNC: 92 MG/DL (ref 6–20)
BUN/CREAT SERPL: 30 (ref 12–20)
BUN/CREAT SERPL: 31 (ref 12–20)
CALCIUM SERPL-MCNC: 10 MG/DL (ref 8.5–10.1)
CALCIUM SERPL-MCNC: 9.2 MG/DL (ref 8.5–10.1)
CHLORIDE SERPL-SCNC: 86 MMOL/L (ref 97–108)
CHLORIDE SERPL-SCNC: 88 MMOL/L (ref 97–108)
CK SERPL-CCNC: 98 U/L (ref 39–308)
CO2 SERPL-SCNC: 24 MMOL/L (ref 21–32)
CO2 SERPL-SCNC: 28 MMOL/L (ref 21–32)
COMMENT, HOLDF: NORMAL
CORTIS SERPL-MCNC: 43.2 UG/DL
CREAT SERPL-MCNC: 2.93 MG/DL (ref 0.7–1.3)
CREAT SERPL-MCNC: 3 MG/DL (ref 0.7–1.3)
ERYTHROCYTE [DISTWIDTH] IN BLOOD BY AUTOMATED COUNT: 15.8 % (ref 11.5–14.5)
GLUCOSE SERPL-MCNC: 165 MG/DL (ref 65–100)
GLUCOSE SERPL-MCNC: 174 MG/DL (ref 65–100)
HCT VFR BLD AUTO: 37.5 % (ref 36.6–50.3)
HGB BLD-MCNC: 12.5 G/DL (ref 12.1–17)
LACTATE SERPL-SCNC: 2.4 MMOL/L (ref 0.4–2)
LACTATE SERPL-SCNC: 3.6 MMOL/L (ref 0.4–2)
LACTATE SERPL-SCNC: 3.9 MMOL/L (ref 0.4–2)
MAGNESIUM SERPL-MCNC: 2.6 MG/DL (ref 1.6–2.4)
MCH RBC QN AUTO: 35.4 PG (ref 26–34)
MCHC RBC AUTO-ENTMCNC: 33.3 G/DL (ref 30–36.5)
MCV RBC AUTO: 106.2 FL (ref 80–99)
NRBC # BLD: 0.06 K/UL (ref 0–0.01)
NRBC BLD-RTO: 0.5 PER 100 WBC
PLATELET # BLD AUTO: 131 K/UL (ref 150–400)
PMV BLD AUTO: 12.7 FL (ref 8.9–12.9)
POTASSIUM SERPL-SCNC: 3.4 MMOL/L (ref 3.5–5.1)
POTASSIUM SERPL-SCNC: 3.6 MMOL/L (ref 3.5–5.1)
RBC # BLD AUTO: 3.53 M/UL (ref 4.1–5.7)
SAMPLES BEING HELD,HOLD: NORMAL
SODIUM SERPL-SCNC: 126 MMOL/L (ref 136–145)
SODIUM SERPL-SCNC: 128 MMOL/L (ref 136–145)
WBC # BLD AUTO: 13.2 K/UL (ref 4.1–11.1)

## 2023-05-02 PROCEDURE — 74011250636 HC RX REV CODE- 250/636: Performed by: HOSPITALIST

## 2023-05-02 PROCEDURE — 82550 ASSAY OF CK (CPK): CPT

## 2023-05-02 PROCEDURE — 74011000250 HC RX REV CODE- 250: Performed by: INTERNAL MEDICINE

## 2023-05-02 PROCEDURE — 80048 BASIC METABOLIC PNL TOTAL CA: CPT

## 2023-05-02 PROCEDURE — 74011250637 HC RX REV CODE- 250/637: Performed by: HOSPITALIST

## 2023-05-02 PROCEDURE — 9990 CHARGE CONVERSION

## 2023-05-02 PROCEDURE — 85027 COMPLETE CBC AUTOMATED: CPT

## 2023-05-02 PROCEDURE — 36415 COLL VENOUS BLD VENIPUNCTURE: CPT

## 2023-05-02 PROCEDURE — 99223 1ST HOSP IP/OBS HIGH 75: CPT | Performed by: INTERNAL MEDICINE

## 2023-05-02 PROCEDURE — 74011000250 HC RX REV CODE- 250: Performed by: HOSPITALIST

## 2023-05-02 PROCEDURE — 83605 ASSAY OF LACTIC ACID: CPT

## 2023-05-02 PROCEDURE — 65660000001 HC RM ICU INTERMED STEPDOWN

## 2023-05-02 PROCEDURE — 82533 TOTAL CORTISOL: CPT

## 2023-05-02 PROCEDURE — P9047 ALBUMIN (HUMAN), 25%, 50ML: HCPCS | Performed by: HOSPITALIST

## 2023-05-02 PROCEDURE — P9047 ALBUMIN (HUMAN), 25%, 50ML: HCPCS

## 2023-05-02 PROCEDURE — 76770 US EXAM ABDO BACK WALL COMP: CPT

## 2023-05-02 PROCEDURE — 83735 ASSAY OF MAGNESIUM: CPT

## 2023-05-02 RX ORDER — ALBUMIN HUMAN 250 G/1000ML
25 SOLUTION INTRAVENOUS EVERY 6 HOURS
Status: DISCONTINUED | OUTPATIENT
Start: 2023-05-02 | End: 2023-05-02

## 2023-05-02 RX ORDER — POTASSIUM CHLORIDE 750 MG/1
40 TABLET, FILM COATED, EXTENDED RELEASE ORAL 2 TIMES DAILY
Status: DISCONTINUED | OUTPATIENT
Start: 2023-05-02 | End: 2023-05-04

## 2023-05-02 RX ORDER — ROSUVASTATIN CALCIUM 10 MG/1
10 TABLET, COATED ORAL
Status: DISCONTINUED | OUTPATIENT
Start: 2023-05-02 | End: 2023-05-06 | Stop reason: HOSPADM

## 2023-05-02 RX ORDER — ALBUMIN HUMAN 250 G/1000ML
25 SOLUTION INTRAVENOUS EVERY 12 HOURS
Status: DISCONTINUED | OUTPATIENT
Start: 2023-05-02 | End: 2023-05-02

## 2023-05-02 RX ADMIN — ALLOPURINOL 100 MG: 100 TABLET ORAL at 10:11

## 2023-05-02 RX ADMIN — BUMETANIDE 1 MG/HR: 0.25 INJECTION INTRAMUSCULAR; INTRAVENOUS at 17:47

## 2023-05-02 RX ADMIN — APIXABAN 5 MG: 5 TABLET, FILM COATED ORAL at 10:10

## 2023-05-02 RX ADMIN — POTASSIUM CHLORIDE 40 MEQ: 750 TABLET, FILM COATED, EXTENDED RELEASE ORAL at 17:51

## 2023-05-02 RX ADMIN — SODIUM CHLORIDE, PRESERVATIVE FREE 10 ML: 5 INJECTION INTRAVENOUS at 20:29

## 2023-05-02 RX ADMIN — Medication 1000 UNITS: at 10:11

## 2023-05-02 RX ADMIN — ALBUMIN (HUMAN) 25 G: 0.25 INJECTION, SOLUTION INTRAVENOUS at 12:45

## 2023-05-02 RX ADMIN — ROSUVASTATIN 10 MG: 10 TABLET, FILM COATED ORAL at 20:29

## 2023-05-02 RX ADMIN — MIDODRINE HYDROCHLORIDE 2.5 MG: 5 TABLET ORAL at 10:10

## 2023-05-02 RX ADMIN — APIXABAN 5 MG: 5 TABLET, FILM COATED ORAL at 17:51

## 2023-05-02 RX ADMIN — BUMETANIDE 1 MG/HR: 0.25 INJECTION INTRAMUSCULAR; INTRAVENOUS at 06:11

## 2023-05-02 RX ADMIN — SODIUM CHLORIDE, PRESERVATIVE FREE 10 ML: 5 INJECTION INTRAVENOUS at 15:31

## 2023-05-02 RX ADMIN — SODIUM CHLORIDE 1 G: 9 INJECTION INTRAMUSCULAR; INTRAVENOUS; SUBCUTANEOUS at 12:44

## 2023-05-02 RX ADMIN — ONDANSETRON 4 MG: 2 INJECTION INTRAMUSCULAR; INTRAVENOUS at 02:41

## 2023-05-02 RX ADMIN — SODIUM CHLORIDE, PRESERVATIVE FREE 10 ML: 5 INJECTION INTRAVENOUS at 06:11

## 2023-05-02 RX ADMIN — ACETAMINOPHEN 650 MG: 325 TABLET ORAL at 16:02

## 2023-05-02 NOTE — PROGRESS NOTES
0730: Bedside and Verbal shift change report given to BRETT Moreno (oncoming nurse) by Juvenal Maxwell RN (offgoing nurse). Report included the following information SBAR, Kardex, MAR, and Cardiac Rhythm ST with PVCs and intermittent pacing . Rate verified Bumex gtt. 1142: MD Lin Argueta notified of -120s, BP 90/68, repeat lactic 3.9. ID workup labs including paired blood cultures and lactic acid collected last night. 1215: New orders to follow including scheduled 2 small bottles of albumin and IVP abx.     1230: Discussed new medications with pt. Upon conversation pt discussed having valve replacement in the past (per chart shows TAVR 2021) and multiple recent dental work from December to March with non-compliance with abx. Pt stated dental procedure involved possible root canal that turned into just a new crown, while at dentist dental xray scrapped cheek leading to bone infection requiring dental/surgical procedure, based on conversation with pt, pt possibly didn't received abx prior to dental procedures and was non-compliant with abx use throughout dental treatments/procedures. Dental treatments/procedures ranging from December to March. MD Donte Kim paged. 1415: MD Donte Kim returned paged, per MD Donte Kim okay to continue IV abx but stop albumin d/t FVO and hold Bumex gtt if MAP < 60. Per MD Rivera HR and BP d/t chronic issues of A. Fib RVR and SBP 90s d/t HF. Pt c/o hand cramps. New orders to follow per MD Donte Kim. 1930: Bedside and Verbal shift change report given to Reese Chaparro RN and BRETT Mitchell (oncoming nurse) by Amanda Cuadra RN (offgoing nurse). Report included the following information SBAR, Kardex, MAR, and Cardiac Rhythm ST with PVCs and intermittent pacing .          Care Plan:   Problem: Heart Failure: Day 1  Goal: Activity/Safety  Outcome: Progressing Towards Goal  Goal: Consults, if ordered  Outcome: Progressing Towards Goal  Goal: Diagnostic Test/Procedures  Outcome: Progressing Towards Goal  Goal: Nutrition/Diet  Outcome: Progressing Towards Goal  Goal: Discharge Planning  Outcome: Progressing Towards Goal  Goal: Medications  Outcome: Progressing Towards Goal  Goal: Respiratory  Outcome: Progressing Towards Goal  Goal: Treatments/Interventions/Procedures  Outcome: Progressing Towards Goal  Goal: Psychosocial  Outcome: Progressing Towards Goal  Goal: *Oxygen saturation within defined limits  Outcome: Progressing Towards Goal  Goal: *Hemodynamically stable  Outcome: Progressing Towards Goal  Goal: *Optimal pain control at patient's stated goal  Outcome: Progressing Towards Goal  Goal: *Anxiety reduced or absent  Outcome: Progressing Towards Goal

## 2023-05-02 NOTE — PROGRESS NOTES
1930: Bedside and Verbal shift change report given to Nadeen (oncoming nurse) by Darron Dias RN (offgoing nurse). Report included the following information SBAR, Kardex, Intake/Output, MAR, Recent Results, and Cardiac Rhythm Sinus Tach w/ PVS w/ intermittent pacing . 0120: Patient demanded warner be removed due to pain and burning. Patient was educated on risk of infection for replacement and risk for retention. Patient was encouraged to keep warner in but still demanded it to be taken out. 0730: Bedside and Verbal shift change report given to Tiffanie WEST (oncoming nurse) by Nadeen (offgoing nurse). Report included the following information SBAR, Kardex, Intake/Output, MAR, Recent Results, and Cardiac Rhythm Sinus Tach w/ PVCs w/ intermittent pacing .        Care Plan:   Problem: Patient Education: Go to Patient Education Activity  Goal: Patient/Family Education  Outcome: Progressing Towards Goal     Problem: Patient Education: Go to Patient Education Activity  Goal: Patient/Family Education  Outcome: Progressing Towards Goal     Problem: Heart Failure: Day 1  Goal: Activity/Safety  Outcome: Progressing Towards Goal  Goal: Diagnostic Test/Procedures  Outcome: Progressing Towards Goal  Goal: Nutrition/Diet  Outcome: Progressing Towards Goal  Goal: Medications  Outcome: Progressing Towards Goal  Goal: Respiratory  Outcome: Progressing Towards Goal  Goal: Treatments/Interventions/Procedures  Outcome: Progressing Towards Goal  Goal: Psychosocial  Outcome: Progressing Towards Goal  Goal: *Oxygen saturation within defined limits  Outcome: Progressing Towards Goal  Goal: *Hemodynamically stable  Outcome: Progressing Towards Goal  Goal: *Optimal pain control at patient's stated goal  Outcome: Progressing Towards Goal  Goal: *Anxiety reduced or absent  Outcome: Progressing Towards Goal     Problem: Pressure Injury - Risk of  Goal: *Prevention of pressure injury  Description: Document Dirk Scale and appropriate interventions in the flowsheet.   Outcome: Progressing Towards Goal  Note: Pressure Injury Interventions:  Moisture Interventions: Absorbent underpads    Problem: Patient Education: Go to Patient Education Activity  Goal: Patient/Family Education  Outcome: Progressing Towards Goal

## 2023-05-02 NOTE — CONSULTS
[address]    Name:  Laury Fernandez  MR#:  [id number]  :  1945  ACCOUNT #:  [de-identified]  DATE OF SERVICE:  2023      HISTORY OF PRESENT ILLNESS:  The patient was seen by us in the office for the first time last month, came to the ER with microhematuria, and creatinine at that time 1.9, baseline creatinine around 1.5-1.7. He has advanced CHF, low EF. He had hematuria. He said he saw Urology and they told him his workup was negative for the hematuria. I am not sure if any imaging was done on him. He was seen previously in the past by us for renal issues. By the way, he had SPEP, did not show anything in it. In the past, the patient had low C3, negative   to have any low C4 and that was in 2016. Hepatitis testing on him done, negative hepatitis C and his creatinine was as high as 1.8 in 2016 and improved. Now the creatinine is going up again. Came with shortness of breath. Switched to Bumex 2 mg IV. His blood pressure on the low side. Imaging for him, chest x-ray was done, mild pulmonary edema. PAST MEDICAL HISTORY:  Includes:  1. History of CHF with very low EF, being worked up for LVAD. 2.  History of chronic kidney disease stage IIIB, baseline creatinine around 1.7, recent increase in the creatinine. 3.  Diabetes mellitus. 4.  History of hypocomplementemia. 5.  Hypertension. 6.  Macrohematuria, seeing Urology. SOCIAL HISTORY:  Reviewed. FAMILY HISTORY:  Reviewed. REVIEW OF SYSTEMS:  Look at the HPI, rest is negative. ALLERGIES:  NO KNOWN ALLERGIES . MEDICATIONS AS INPATIENT:  Include:  1. Bumex 2 mg IV q.12.  2.  Midodrine. 3.  Crestor. PHYSICAL EXAMINATION:  GENERAL:  Not in acute distress. VITAL SIGNS:  /69, saturating 96% on room air. NECK:  JVD is positive. EXTREMITIES:  Trace edema. ABDOMEN:   Soft. NEUROLOGIC:  Alert, oriented to time and place.     LABORATORY DATA:  Hemoglobin 13.3, platelets 486, eosinophils 0.  UA    none. Sodium 126, potassium 3.7, sugar 299, BUN 84, creatinine 2.7, calcium is 10.1. Total bilirubin is 2. Elevated gammaglobulin. Albumin is 4.4. AST and ALT are okay. IMPRESSION:  1. Chronic kidney disease stage IIIB with longstanding history of diabetes. 2.  Acute kidney injury, could be hemodynamic/cardiorenal syndrome, but we will need to rule out other etiology including obstruction. 3.  History of very low ejection fraction, coming with shortness of breath. Clinically, does not look very hypervolemic, but restarted on IV diuretics. 4.  Recent history of macrohematuria. 5.  Elevated bilirubin. 6.  Labile blood pressure. 7.  Mild hypercalcemia. RECOMMENDATIONS:  1. Repeat C3, C4.  2. On IV diuretics. 3.  Bladder scan. 4.  Repeat urine studies. 5.  If renal function not better, we will get renal ultrasound. 6.  Discussed at length.     Sincerely,      Jose Delgadillo MD      [id number]  D:  05/01/2023 16:47  T:  05/01/2023 20:58  JOB #:  [id number]

## 2023-05-03 LAB
ANION GAP SERPL CALC-SCNC: 11 MMOL/L (ref 5–15)
ANION GAP SERPL CALC-SCNC: 13 MMOL/L (ref 5–15)
ATRIAL RATE: 115 BPM
BUN SERPL-MCNC: 104 MG/DL (ref 6–20)
BUN SERPL-MCNC: 97 MG/DL (ref 6–20)
BUN/CREAT SERPL: 32 (ref 12–20)
BUN/CREAT SERPL: 33 (ref 12–20)
C3 SERPL-MCNC: 136 MG/DL (ref 82–167)
C4 SERPL-MCNC: 30 MG/DL (ref 12–38)
CALCIUM SERPL-MCNC: 9.5 MG/DL (ref 8.5–10.1)
CALCIUM SERPL-MCNC: 9.6 MG/DL (ref 8.5–10.1)
CALCULATED R AXIS, ECG10: -47 DEGREES
CALCULATED T AXIS, ECG11: 91 DEGREES
CHLORIDE SERPL-SCNC: 84 MMOL/L (ref 97–108)
CHLORIDE SERPL-SCNC: 87 MMOL/L (ref 97–108)
CO2 SERPL-SCNC: 29 MMOL/L (ref 21–32)
CO2 SERPL-SCNC: 29 MMOL/L (ref 21–32)
CREAT SERPL-MCNC: 3.03 MG/DL (ref 0.7–1.3)
CREAT SERPL-MCNC: 3.11 MG/DL (ref 0.7–1.3)
DIAGNOSIS, 93000: NORMAL
GLUCOSE SERPL-MCNC: 149 MG/DL (ref 65–100)
GLUCOSE SERPL-MCNC: 231 MG/DL (ref 65–100)
LACTATE SERPL-SCNC: 2.2 MMOL/L (ref 0.4–2)
MAGNESIUM SERPL-MCNC: 3 MG/DL (ref 1.6–2.4)
POTASSIUM SERPL-SCNC: 3 MMOL/L (ref 3.5–5.1)
POTASSIUM SERPL-SCNC: 3.2 MMOL/L (ref 3.5–5.1)
Q-T INTERVAL, ECG07: 402 MS
QRS DURATION, ECG06: 150 MS
QTC CALCULATION (BEZET), ECG08: 570 MS
SODIUM SERPL-SCNC: 126 MMOL/L (ref 136–145)
SODIUM SERPL-SCNC: 127 MMOL/L (ref 136–145)
VENTRICULAR RATE, ECG03: 121 BPM

## 2023-05-03 PROCEDURE — 77030013797 HC KT TRNSDUC PRSSR EDWD -A: Performed by: INTERNAL MEDICINE

## 2023-05-03 PROCEDURE — 77030013744: Performed by: INTERNAL MEDICINE

## 2023-05-03 PROCEDURE — C1751 CATH, INF, PER/CENT/MIDLINE: HCPCS | Performed by: INTERNAL MEDICINE

## 2023-05-03 PROCEDURE — 76937 US GUIDE VASCULAR ACCESS: CPT | Performed by: INTERNAL MEDICINE

## 2023-05-03 PROCEDURE — 74011000250 HC RX REV CODE- 250: Performed by: HOSPITALIST

## 2023-05-03 PROCEDURE — 74011250636 HC RX REV CODE- 250/636: Performed by: HOSPITALIST

## 2023-05-03 PROCEDURE — 2709999900 HC NON-CHARGEABLE SUPPLY: Performed by: INTERNAL MEDICINE

## 2023-05-03 PROCEDURE — C1894 INTRO/SHEATH, NON-LASER: HCPCS | Performed by: INTERNAL MEDICINE

## 2023-05-03 PROCEDURE — 65660000001 HC RM ICU INTERMED STEPDOWN

## 2023-05-03 PROCEDURE — 83605 ASSAY OF LACTIC ACID: CPT

## 2023-05-03 PROCEDURE — 74011000250 HC RX REV CODE- 250: Performed by: INTERNAL MEDICINE

## 2023-05-03 PROCEDURE — 77030013406 HC CATH CTRL EDWD -B: Performed by: INTERNAL MEDICINE

## 2023-05-03 PROCEDURE — 74011250637 HC RX REV CODE- 250/637: Performed by: HOSPITALIST

## 2023-05-03 PROCEDURE — 99233 SBSQ HOSP IP/OBS HIGH 50: CPT | Performed by: INTERNAL MEDICINE

## 2023-05-03 PROCEDURE — 74011000258 HC RX REV CODE- 258: Performed by: INTERNAL MEDICINE

## 2023-05-03 PROCEDURE — 74011250636 HC RX REV CODE- 250/636: Performed by: INTERNAL MEDICINE

## 2023-05-03 PROCEDURE — 93451 RIGHT HEART CATH: CPT | Performed by: INTERNAL MEDICINE

## 2023-05-03 PROCEDURE — 80048 BASIC METABOLIC PNL TOTAL CA: CPT

## 2023-05-03 PROCEDURE — 83735 ASSAY OF MAGNESIUM: CPT

## 2023-05-03 PROCEDURE — 9990 CHARGE CONVERSION

## 2023-05-03 PROCEDURE — 74011250636 HC RX REV CODE- 250/636: Performed by: NURSE PRACTITIONER

## 2023-05-03 PROCEDURE — 36415 COLL VENOUS BLD VENIPUNCTURE: CPT

## 2023-05-03 RX ORDER — NALOXONE HYDROCHLORIDE 0.4 MG/ML
0.4 INJECTION, SOLUTION INTRAMUSCULAR; INTRAVENOUS; SUBCUTANEOUS AS NEEDED
Status: DISCONTINUED | OUTPATIENT
Start: 2023-05-03 | End: 2023-05-06 | Stop reason: HOSPADM

## 2023-05-03 RX ORDER — DOBUTAMINE HYDROCHLORIDE 200 MG/100ML
2.5 INJECTION INTRAVENOUS CONTINUOUS
Status: DISCONTINUED | OUTPATIENT
Start: 2023-05-03 | End: 2023-05-03

## 2023-05-03 RX ORDER — LIDOCAINE HYDROCHLORIDE 10 MG/ML
INJECTION INFILTRATION; PERINEURAL AS NEEDED
Status: DISCONTINUED | OUTPATIENT
Start: 2023-05-03 | End: 2023-05-03 | Stop reason: HOSPADM

## 2023-05-03 RX ORDER — MIDODRINE HYDROCHLORIDE 5 MG/1
2.5 TABLET ORAL
Status: DISCONTINUED | OUTPATIENT
Start: 2023-05-03 | End: 2023-05-06 | Stop reason: HOSPADM

## 2023-05-03 RX ORDER — MIDODRINE HYDROCHLORIDE 5 MG/1
2.5 TABLET ORAL 2 TIMES DAILY WITH MEALS
Status: DISCONTINUED | OUTPATIENT
Start: 2023-05-03 | End: 2023-05-03

## 2023-05-03 RX ORDER — SODIUM CHLORIDE 0.9 % (FLUSH) 0.9 %
5-40 SYRINGE (ML) INJECTION AS NEEDED
Status: DISCONTINUED | OUTPATIENT
Start: 2023-05-03 | End: 2023-05-06 | Stop reason: HOSPADM

## 2023-05-03 RX ORDER — SODIUM CHLORIDE 0.9 % (FLUSH) 0.9 %
5-40 SYRINGE (ML) INJECTION EVERY 8 HOURS
Status: DISCONTINUED | OUTPATIENT
Start: 2023-05-03 | End: 2023-05-06 | Stop reason: HOSPADM

## 2023-05-03 RX ORDER — DOBUTAMINE HYDROCHLORIDE 200 MG/100ML
2.5 INJECTION INTRAVENOUS CONTINUOUS
Status: DISCONTINUED | OUTPATIENT
Start: 2023-05-03 | End: 2023-05-06 | Stop reason: HOSPADM

## 2023-05-03 RX ORDER — HEPARIN SODIUM 200 [USP'U]/100ML
INJECTION, SOLUTION INTRAVENOUS
Status: COMPLETED | OUTPATIENT
Start: 2023-05-03 | End: 2023-05-03

## 2023-05-03 RX ADMIN — SODIUM CHLORIDE, PRESERVATIVE FREE 10 ML: 5 INJECTION INTRAVENOUS at 06:46

## 2023-05-03 RX ADMIN — AMIODARONE HYDROCHLORIDE 0.5 MG/MIN: 50 INJECTION, SOLUTION INTRAVENOUS at 18:29

## 2023-05-03 RX ADMIN — ROSUVASTATIN 10 MG: 10 TABLET, FILM COATED ORAL at 20:34

## 2023-05-03 RX ADMIN — SODIUM CHLORIDE, PRESERVATIVE FREE 10 ML: 5 INJECTION INTRAVENOUS at 20:35

## 2023-05-03 RX ADMIN — ACETAMINOPHEN 650 MG: 325 TABLET ORAL at 12:04

## 2023-05-03 RX ADMIN — ONDANSETRON 4 MG: 2 INJECTION INTRAMUSCULAR; INTRAVENOUS at 06:54

## 2023-05-03 RX ADMIN — SODIUM CHLORIDE, PRESERVATIVE FREE 10 ML: 5 INJECTION INTRAVENOUS at 20:36

## 2023-05-03 RX ADMIN — POTASSIUM CHLORIDE 40 MEQ: 750 TABLET, FILM COATED, EXTENDED RELEASE ORAL at 10:03

## 2023-05-03 RX ADMIN — APIXABAN 5 MG: 5 TABLET, FILM COATED ORAL at 18:30

## 2023-05-03 RX ADMIN — Medication 1000 UNITS: at 10:02

## 2023-05-03 RX ADMIN — SODIUM CHLORIDE, PRESERVATIVE FREE 10 ML: 5 INJECTION INTRAVENOUS at 13:26

## 2023-05-03 RX ADMIN — SODIUM CHLORIDE, PRESERVATIVE FREE 10 ML: 5 INJECTION INTRAVENOUS at 09:36

## 2023-05-03 RX ADMIN — ACETAMINOPHEN 650 MG: 325 TABLET ORAL at 19:23

## 2023-05-03 RX ADMIN — APIXABAN 5 MG: 5 TABLET, FILM COATED ORAL at 10:02

## 2023-05-03 RX ADMIN — BUMETANIDE 1 MG/HR: 0.25 INJECTION INTRAMUSCULAR; INTRAVENOUS at 18:29

## 2023-05-03 RX ADMIN — AMIODARONE HYDROCHLORIDE 1 MG/MIN: 50 INJECTION, SOLUTION INTRAVENOUS at 10:16

## 2023-05-03 RX ADMIN — BUMETANIDE 1 MG/HR: 0.25 INJECTION INTRAMUSCULAR; INTRAVENOUS at 05:15

## 2023-05-03 RX ADMIN — DOBUTAMINE HYDROCHLORIDE 2.5 MCG/KG/MIN: 200 INJECTION INTRAVENOUS at 11:58

## 2023-05-03 RX ADMIN — SODIUM CHLORIDE 1 G: 9 INJECTION INTRAMUSCULAR; INTRAVENOUS; SUBCUTANEOUS at 12:05

## 2023-05-03 RX ADMIN — POTASSIUM CHLORIDE 40 MEQ: 750 TABLET, FILM COATED, EXTENDED RELEASE ORAL at 18:30

## 2023-05-04 ENCOUNTER — APPOINTMENT (OUTPATIENT)
Dept: GENERAL RADIOLOGY | Age: 78
End: 2023-05-04
Attending: FAMILY MEDICINE
Payer: MEDICARE

## 2023-05-04 DIAGNOSIS — I50.30 DIASTOLIC HEART FAILURE, UNSPECIFIED HF CHRONICITY (HCC): ICD-10-CM

## 2023-05-04 LAB
ANION GAP SERPL CALC-SCNC: 12 MMOL/L (ref 5–15)
ANION GAP SERPL CALC-SCNC: 13 MMOL/L (ref 5–15)
BUN SERPL-MCNC: 93 MG/DL (ref 6–20)
BUN SERPL-MCNC: 94 MG/DL (ref 6–20)
BUN/CREAT SERPL: 32 (ref 12–20)
BUN/CREAT SERPL: 32 (ref 12–20)
CALCIUM SERPL-MCNC: 9.6 MG/DL (ref 8.5–10.1)
CALCIUM SERPL-MCNC: 9.7 MG/DL (ref 8.5–10.1)
CHLORIDE SERPL-SCNC: 86 MMOL/L (ref 97–108)
CHLORIDE SERPL-SCNC: 88 MMOL/L (ref 97–108)
CO2 SERPL-SCNC: 27 MMOL/L (ref 21–32)
CO2 SERPL-SCNC: 27 MMOL/L (ref 21–32)
CREAT SERPL-MCNC: 2.93 MG/DL (ref 0.7–1.3)
CREAT SERPL-MCNC: 2.97 MG/DL (ref 0.7–1.3)
ERYTHROCYTE [DISTWIDTH] IN BLOOD BY AUTOMATED COUNT: 16.9 % (ref 11.5–14.5)
GLUCOSE BLD STRIP.AUTO-MCNC: 198 MG/DL (ref 65–117)
GLUCOSE SERPL-MCNC: 172 MG/DL (ref 65–100)
GLUCOSE SERPL-MCNC: 217 MG/DL (ref 65–100)
HCT VFR BLD AUTO: 40 % (ref 36.6–50.3)
HGB BLD-MCNC: 13.2 G/DL (ref 12.1–17)
LACTATE SERPL-SCNC: 2.5 MMOL/L (ref 0.4–2)
MAGNESIUM SERPL-MCNC: 2.9 MG/DL (ref 1.6–2.4)
MCH RBC QN AUTO: 34.9 PG (ref 26–34)
MCHC RBC AUTO-ENTMCNC: 33 G/DL (ref 30–36.5)
MCV RBC AUTO: 105.8 FL (ref 80–99)
NRBC # BLD: 0.02 K/UL (ref 0–0.01)
NRBC BLD-RTO: 0.2 PER 100 WBC
PLATELET # BLD AUTO: 145 K/UL (ref 150–400)
PMV BLD AUTO: 12.1 FL (ref 8.9–12.9)
POTASSIUM SERPL-SCNC: 3.1 MMOL/L (ref 3.5–5.1)
POTASSIUM SERPL-SCNC: 3.4 MMOL/L (ref 3.5–5.1)
RBC # BLD AUTO: 3.78 M/UL (ref 4.1–5.7)
SERVICE CMNT-IMP: ABNORMAL
SODIUM SERPL-SCNC: 126 MMOL/L (ref 136–145)
SODIUM SERPL-SCNC: 127 MMOL/L (ref 136–145)
WBC # BLD AUTO: 11 K/UL (ref 4.1–11.1)

## 2023-05-04 PROCEDURE — 36415 COLL VENOUS BLD VENIPUNCTURE: CPT

## 2023-05-04 PROCEDURE — 74011636637 HC RX REV CODE- 636/637: Performed by: FAMILY MEDICINE

## 2023-05-04 PROCEDURE — 99233 SBSQ HOSP IP/OBS HIGH 50: CPT | Performed by: INTERNAL MEDICINE

## 2023-05-04 PROCEDURE — 80048 BASIC METABOLIC PNL TOTAL CA: CPT

## 2023-05-04 PROCEDURE — 74011000258 HC RX REV CODE- 258: Performed by: INTERNAL MEDICINE

## 2023-05-04 PROCEDURE — 74011250637 HC RX REV CODE- 250/637: Performed by: HOSPITALIST

## 2023-05-04 PROCEDURE — 83605 ASSAY OF LACTIC ACID: CPT

## 2023-05-04 PROCEDURE — 74011000250 HC RX REV CODE- 250: Performed by: HOSPITALIST

## 2023-05-04 PROCEDURE — 74011250636 HC RX REV CODE- 250/636: Performed by: INTERNAL MEDICINE

## 2023-05-04 PROCEDURE — 74011250637 HC RX REV CODE- 250/637: Performed by: NURSE PRACTITIONER

## 2023-05-04 PROCEDURE — 74011000250 HC RX REV CODE- 250: Performed by: INTERNAL MEDICINE

## 2023-05-04 PROCEDURE — 74011250637 HC RX REV CODE- 250/637: Performed by: FAMILY MEDICINE

## 2023-05-04 PROCEDURE — 85027 COMPLETE CBC AUTOMATED: CPT

## 2023-05-04 PROCEDURE — 83735 ASSAY OF MAGNESIUM: CPT

## 2023-05-04 PROCEDURE — 9990 CHARGE CONVERSION

## 2023-05-04 PROCEDURE — 74011250636 HC RX REV CODE- 250/636: Performed by: HOSPITALIST

## 2023-05-04 PROCEDURE — 74018 RADEX ABDOMEN 1 VIEW: CPT

## 2023-05-04 PROCEDURE — 65660000001 HC RM ICU INTERMED STEPDOWN

## 2023-05-04 PROCEDURE — 82962 GLUCOSE BLOOD TEST: CPT

## 2023-05-04 PROCEDURE — 74011250636 HC RX REV CODE- 250/636: Performed by: NURSE PRACTITIONER

## 2023-05-04 RX ORDER — POLYETHYLENE GLYCOL 3350 17 G/17G
17 POWDER, FOR SOLUTION ORAL
Status: DISCONTINUED | OUTPATIENT
Start: 2023-05-04 | End: 2023-05-06 | Stop reason: HOSPADM

## 2023-05-04 RX ORDER — POTASSIUM CHLORIDE 750 MG/1
40 TABLET, FILM COATED, EXTENDED RELEASE ORAL 3 TIMES DAILY
Status: DISCONTINUED | OUTPATIENT
Start: 2023-05-04 | End: 2023-05-06 | Stop reason: HOSPADM

## 2023-05-04 RX ORDER — CHLORHEXIDINE GLUCONATE 1.2 MG/ML
15 RINSE ORAL EVERY 12 HOURS
Status: DISCONTINUED | OUTPATIENT
Start: 2023-05-04 | End: 2023-05-06 | Stop reason: HOSPADM

## 2023-05-04 RX ORDER — DIPHENHYDRAMINE HCL 25 MG
25 CAPSULE ORAL
Status: DISCONTINUED | OUTPATIENT
Start: 2023-05-04 | End: 2023-05-06 | Stop reason: HOSPADM

## 2023-05-04 RX ORDER — INSULIN GLARGINE 100 [IU]/ML
7 INJECTION, SOLUTION SUBCUTANEOUS
Status: DISCONTINUED | OUTPATIENT
Start: 2023-05-04 | End: 2023-05-06 | Stop reason: HOSPADM

## 2023-05-04 RX ORDER — AMOXICILLIN 250 MG
2 CAPSULE ORAL 2 TIMES DAILY
Status: DISCONTINUED | OUTPATIENT
Start: 2023-05-04 | End: 2023-05-06 | Stop reason: HOSPADM

## 2023-05-04 RX ADMIN — ROSUVASTATIN 10 MG: 10 TABLET, FILM COATED ORAL at 21:37

## 2023-05-04 RX ADMIN — SODIUM CHLORIDE, PRESERVATIVE FREE 10 ML: 5 INJECTION INTRAVENOUS at 06:28

## 2023-05-04 RX ADMIN — POLYETHYLENE GLYCOL 3350 17 G: 17 POWDER, FOR SOLUTION ORAL at 21:37

## 2023-05-04 RX ADMIN — APIXABAN 5 MG: 5 TABLET, FILM COATED ORAL at 09:00

## 2023-05-04 RX ADMIN — SODIUM CHLORIDE, PRESERVATIVE FREE 10 ML: 5 INJECTION INTRAVENOUS at 14:27

## 2023-05-04 RX ADMIN — AMIODARONE HYDROCHLORIDE 0.5 MG/MIN: 50 INJECTION, SOLUTION INTRAVENOUS at 12:36

## 2023-05-04 RX ADMIN — CHLORHEXIDINE GLUCONATE 15 ML: 1.2 RINSE ORAL at 14:27

## 2023-05-04 RX ADMIN — SODIUM CHLORIDE, PRESERVATIVE FREE 10 ML: 5 INJECTION INTRAVENOUS at 21:37

## 2023-05-04 RX ADMIN — DIPHENHYDRAMINE HYDROCHLORIDE 25 MG: 25 CAPSULE ORAL at 21:37

## 2023-05-04 RX ADMIN — ACETAMINOPHEN 650 MG: 325 TABLET ORAL at 16:03

## 2023-05-04 RX ADMIN — ACETAMINOPHEN 650 MG: 325 TABLET ORAL at 07:12

## 2023-05-04 RX ADMIN — POTASSIUM CHLORIDE 40 MEQ: 750 TABLET, FILM COATED, EXTENDED RELEASE ORAL at 21:37

## 2023-05-04 RX ADMIN — DOBUTAMINE HYDROCHLORIDE 2.5 MCG/KG/MIN: 200 INJECTION INTRAVENOUS at 12:37

## 2023-05-04 RX ADMIN — AMIODARONE HYDROCHLORIDE 0.5 MG/MIN: 50 INJECTION, SOLUTION INTRAVENOUS at 23:23

## 2023-05-04 RX ADMIN — SODIUM CHLORIDE 1 G: 9 INJECTION INTRAMUSCULAR; INTRAVENOUS; SUBCUTANEOUS at 12:37

## 2023-05-04 RX ADMIN — BUMETANIDE 1 MG/HR: 0.25 INJECTION INTRAMUSCULAR; INTRAVENOUS at 16:59

## 2023-05-04 RX ADMIN — POLYETHYLENE GLYCOL 3350 17 G: 17 POWDER, FOR SOLUTION ORAL at 15:58

## 2023-05-04 RX ADMIN — ACETAMINOPHEN 650 MG: 325 TABLET ORAL at 01:05

## 2023-05-04 RX ADMIN — POTASSIUM CHLORIDE 40 MEQ: 750 TABLET, FILM COATED, EXTENDED RELEASE ORAL at 09:00

## 2023-05-04 RX ADMIN — APIXABAN 5 MG: 5 TABLET, FILM COATED ORAL at 17:00

## 2023-05-04 RX ADMIN — SODIUM CHLORIDE, PRESERVATIVE FREE 10 ML: 5 INJECTION INTRAVENOUS at 21:38

## 2023-05-04 RX ADMIN — ACETAMINOPHEN 650 MG: 325 TABLET ORAL at 23:37

## 2023-05-04 RX ADMIN — BUMETANIDE 1 MG/HR: 0.25 INJECTION INTRAMUSCULAR; INTRAVENOUS at 02:18

## 2023-05-04 RX ADMIN — Medication 1000 UNITS: at 09:00

## 2023-05-04 RX ADMIN — DOBUTAMINE HYDROCHLORIDE 2.5 MCG/KG/MIN: 200 INJECTION INTRAVENOUS at 23:26

## 2023-05-04 RX ADMIN — AMIODARONE HYDROCHLORIDE 0.5 MG/MIN: 50 INJECTION, SOLUTION INTRAVENOUS at 19:39

## 2023-05-04 RX ADMIN — POTASSIUM CHLORIDE 40 MEQ: 750 TABLET, FILM COATED, EXTENDED RELEASE ORAL at 15:58

## 2023-05-04 RX ADMIN — DOCUSATE SODIUM 50 MG AND SENNOSIDES 8.6 MG 2 TABLET: 8.6; 5 TABLET, FILM COATED ORAL at 21:37

## 2023-05-04 RX ADMIN — CHLORHEXIDINE GLUCONATE 15 ML: 1.2 RINSE ORAL at 21:39

## 2023-05-04 RX ADMIN — INSULIN GLARGINE 7 UNITS: 100 INJECTION, SOLUTION SUBCUTANEOUS at 21:37

## 2023-05-05 VITALS
OXYGEN SATURATION: 98 % | SYSTOLIC BLOOD PRESSURE: 88 MMHG | HEART RATE: 118 BPM | TEMPERATURE: 97.6 F | RESPIRATION RATE: 18 BRPM | DIASTOLIC BLOOD PRESSURE: 55 MMHG | BODY MASS INDEX: 28.52 KG/M2 | WEIGHT: 203.71 LBS | HEIGHT: 71 IN

## 2023-05-05 LAB
ANION GAP SERPL CALC-SCNC: 8 MMOL/L (ref 5–15)
ANION GAP SERPL CALC-SCNC: 8 MMOL/L (ref 5–15)
BASOPHILS # BLD: 0 K/UL (ref 0–0.1)
BASOPHILS NFR BLD: 0 % (ref 0–1)
BUN SERPL-MCNC: 88 MG/DL (ref 6–20)
BUN SERPL-MCNC: 89 MG/DL (ref 6–20)
BUN/CREAT SERPL: 28 (ref 12–20)
BUN/CREAT SERPL: 30 (ref 12–20)
CALCIUM SERPL-MCNC: 10 MG/DL (ref 8.5–10.1)
CALCIUM SERPL-MCNC: 9.9 MG/DL (ref 8.5–10.1)
CHLORIDE SERPL-SCNC: 87 MMOL/L (ref 97–108)
CHLORIDE SERPL-SCNC: 89 MMOL/L (ref 97–108)
CO2 SERPL-SCNC: 29 MMOL/L (ref 21–32)
CO2 SERPL-SCNC: 30 MMOL/L (ref 21–32)
CREAT SERPL-MCNC: 2.95 MG/DL (ref 0.7–1.3)
CREAT SERPL-MCNC: 3.13 MG/DL (ref 0.7–1.3)
DIFFERENTIAL METHOD BLD: ABNORMAL
EOSINOPHIL # BLD: 0.1 K/UL (ref 0–0.4)
EOSINOPHIL NFR BLD: 1 % (ref 0–7)
ERYTHROCYTE [DISTWIDTH] IN BLOOD BY AUTOMATED COUNT: 17 % (ref 11.5–14.5)
GLUCOSE BLD STRIP.AUTO-MCNC: 209 MG/DL (ref 65–117)
GLUCOSE SERPL-MCNC: 138 MG/DL (ref 65–100)
GLUCOSE SERPL-MCNC: 256 MG/DL (ref 65–100)
HCT VFR BLD AUTO: 38 % (ref 36.6–50.3)
HGB BLD-MCNC: 12.5 G/DL (ref 12.1–17)
IMM GRANULOCYTES # BLD AUTO: 0 K/UL (ref 0–0.04)
IMM GRANULOCYTES NFR BLD AUTO: 0 % (ref 0–0.5)
LACTATE SERPL-SCNC: 2.5 MMOL/L (ref 0.4–2)
LYMPHOCYTES # BLD: 0.5 K/UL (ref 0.8–3.5)
LYMPHOCYTES NFR BLD: 5 % (ref 12–49)
MCH RBC QN AUTO: 35.2 PG (ref 26–34)
MCHC RBC AUTO-ENTMCNC: 32.9 G/DL (ref 30–36.5)
MCV RBC AUTO: 107 FL (ref 80–99)
MONOCYTES # BLD: 0.8 K/UL (ref 0–1)
MONOCYTES NFR BLD: 8 % (ref 5–13)
NEUTS SEG # BLD: 8 K/UL (ref 1.8–8)
NEUTS SEG NFR BLD: 86 % (ref 32–75)
NRBC # BLD: 0.03 K/UL (ref 0–0.01)
NRBC BLD-RTO: 0.3 PER 100 WBC
PLATELET # BLD AUTO: 141 K/UL (ref 150–400)
PMV BLD AUTO: 11.7 FL (ref 8.9–12.9)
POTASSIUM SERPL-SCNC: 4 MMOL/L (ref 3.5–5.1)
POTASSIUM SERPL-SCNC: 4 MMOL/L (ref 3.5–5.1)
RBC # BLD AUTO: 3.55 M/UL (ref 4.1–5.7)
RBC MORPH BLD: ABNORMAL
RBC MORPH BLD: ABNORMAL
SERVICE CMNT-IMP: ABNORMAL
SODIUM SERPL-SCNC: 125 MMOL/L (ref 136–145)
SODIUM SERPL-SCNC: 126 MMOL/L (ref 136–145)
WBC # BLD AUTO: 9.4 K/UL (ref 4.1–11.1)

## 2023-05-05 PROCEDURE — 74011250637 HC RX REV CODE- 250/637: Performed by: NURSE PRACTITIONER

## 2023-05-05 PROCEDURE — 74011250637 HC RX REV CODE- 250/637: Performed by: HOSPITALIST

## 2023-05-05 PROCEDURE — 74011000250 HC RX REV CODE- 250: Performed by: HOSPITALIST

## 2023-05-05 PROCEDURE — 97535 SELF CARE MNGMENT TRAINING: CPT

## 2023-05-05 PROCEDURE — 9990 CHARGE CONVERSION

## 2023-05-05 PROCEDURE — 80048 BASIC METABOLIC PNL TOTAL CA: CPT

## 2023-05-05 PROCEDURE — 74011250636 HC RX REV CODE- 250/636: Performed by: INTERNAL MEDICINE

## 2023-05-05 PROCEDURE — 74011636637 HC RX REV CODE- 636/637: Performed by: FAMILY MEDICINE

## 2023-05-05 PROCEDURE — 74011250637 HC RX REV CODE- 250/637: Performed by: FAMILY MEDICINE

## 2023-05-05 PROCEDURE — 97116 GAIT TRAINING THERAPY: CPT

## 2023-05-05 PROCEDURE — 74011000258 HC RX REV CODE- 258: Performed by: INTERNAL MEDICINE

## 2023-05-05 PROCEDURE — 83605 ASSAY OF LACTIC ACID: CPT

## 2023-05-05 PROCEDURE — 99232 SBSQ HOSP IP/OBS MODERATE 35: CPT | Performed by: NURSE PRACTITIONER

## 2023-05-05 PROCEDURE — 97161 PT EVAL LOW COMPLEX 20 MIN: CPT

## 2023-05-05 PROCEDURE — 82962 GLUCOSE BLOOD TEST: CPT

## 2023-05-05 PROCEDURE — 36415 COLL VENOUS BLD VENIPUNCTURE: CPT

## 2023-05-05 PROCEDURE — 65660000001 HC RM ICU INTERMED STEPDOWN

## 2023-05-05 PROCEDURE — 85025 COMPLETE CBC W/AUTO DIFF WBC: CPT

## 2023-05-05 PROCEDURE — 74011000250 HC RX REV CODE- 250: Performed by: INTERNAL MEDICINE

## 2023-05-05 PROCEDURE — 74011250636 HC RX REV CODE- 250/636: Performed by: HOSPITALIST

## 2023-05-05 PROCEDURE — 97165 OT EVAL LOW COMPLEX 30 MIN: CPT

## 2023-05-05 RX ORDER — ROSUVASTATIN CALCIUM 10 MG/1
10 TABLET, COATED ORAL NIGHTLY
Status: DISCONTINUED | OUTPATIENT
Start: 2023-05-06 | End: 2023-05-23

## 2023-05-05 RX ORDER — SODIUM CHLORIDE 9 MG/ML
INJECTION, SOLUTION INTRAVENOUS PRN
Status: DISCONTINUED | OUTPATIENT
Start: 2023-05-06 | End: 2023-05-24 | Stop reason: HOSPADM

## 2023-05-05 RX ORDER — POLYETHYLENE GLYCOL 3350 17 G/17G
17 POWDER, FOR SOLUTION ORAL DAILY PRN
Status: DISCONTINUED | OUTPATIENT
Start: 2023-05-06 | End: 2023-05-24 | Stop reason: HOSPADM

## 2023-05-05 RX ORDER — SODIUM CHLORIDE 0.9 % (FLUSH) 0.9 %
5-40 SYRINGE (ML) INJECTION PRN
Status: DISCONTINUED | OUTPATIENT
Start: 2023-05-06 | End: 2023-05-24 | Stop reason: HOSPADM

## 2023-05-05 RX ORDER — POTASSIUM CHLORIDE 750 MG/1
40 TABLET, FILM COATED, EXTENDED RELEASE ORAL 3 TIMES DAILY
Status: DISCONTINUED | OUTPATIENT
Start: 2023-05-06 | End: 2023-05-07

## 2023-05-05 RX ORDER — SODIUM CHLORIDE 0.9 % (FLUSH) 0.9 %
5-40 SYRINGE (ML) INJECTION EVERY 12 HOURS SCHEDULED
Status: DISCONTINUED | OUTPATIENT
Start: 2023-05-06 | End: 2023-05-24 | Stop reason: HOSPADM

## 2023-05-05 RX ORDER — NALOXONE HYDROCHLORIDE 0.4 MG/ML
0.4 INJECTION, SOLUTION INTRAMUSCULAR; INTRAVENOUS; SUBCUTANEOUS PRN
Status: DISCONTINUED | OUTPATIENT
Start: 2023-05-06 | End: 2023-05-24 | Stop reason: HOSPADM

## 2023-05-05 RX ORDER — ONDANSETRON 4 MG/1
4 TABLET, ORALLY DISINTEGRATING ORAL EVERY 8 HOURS PRN
Status: DISCONTINUED | OUTPATIENT
Start: 2023-05-06 | End: 2023-05-16

## 2023-05-05 RX ORDER — DIPHENHYDRAMINE HCL 25 MG
25 CAPSULE ORAL
Status: DISCONTINUED | OUTPATIENT
Start: 2023-05-06 | End: 2023-05-24 | Stop reason: HOSPADM

## 2023-05-05 RX ORDER — ECHINACEA PURPUREA EXTRACT 125 MG
2 TABLET ORAL PRN
Status: DISCONTINUED | OUTPATIENT
Start: 2023-05-06 | End: 2023-05-24 | Stop reason: HOSPADM

## 2023-05-05 RX ORDER — METOPROLOL SUCCINATE 25 MG/1
25 TABLET, EXTENDED RELEASE ORAL DAILY
Status: DISCONTINUED | OUTPATIENT
Start: 2023-05-06 | End: 2023-05-23

## 2023-05-05 RX ORDER — MIDODRINE HYDROCHLORIDE 5 MG/1
2.5 TABLET ORAL 3 TIMES DAILY PRN
Status: DISCONTINUED | OUTPATIENT
Start: 2023-05-06 | End: 2023-05-23

## 2023-05-05 RX ORDER — INSULIN GLARGINE 100 [IU]/ML
7 INJECTION, SOLUTION SUBCUTANEOUS NIGHTLY
Status: DISCONTINUED | OUTPATIENT
Start: 2023-05-06 | End: 2023-05-07

## 2023-05-05 RX ORDER — ACETAMINOPHEN 325 MG/1
650 TABLET ORAL EVERY 6 HOURS PRN
Status: DISCONTINUED | OUTPATIENT
Start: 2023-05-06 | End: 2023-05-24 | Stop reason: HOSPADM

## 2023-05-05 RX ORDER — POLYETHYLENE GLYCOL 3350 17 G/17G
17 POWDER, FOR SOLUTION ORAL DAILY
Status: DISCONTINUED | OUTPATIENT
Start: 2023-05-06 | End: 2023-05-24 | Stop reason: HOSPADM

## 2023-05-05 RX ORDER — ONDANSETRON 2 MG/ML
4 INJECTION INTRAMUSCULAR; INTRAVENOUS EVERY 6 HOURS PRN
Status: DISCONTINUED | OUTPATIENT
Start: 2023-05-06 | End: 2023-05-16

## 2023-05-05 RX ORDER — CHLORHEXIDINE GLUCONATE 0.12 MG/ML
15 RINSE ORAL 2 TIMES DAILY
Status: DISCONTINUED | OUTPATIENT
Start: 2023-05-06 | End: 2023-05-24 | Stop reason: HOSPADM

## 2023-05-05 RX ORDER — ACETAMINOPHEN 650 MG/1
650 SUPPOSITORY RECTAL EVERY 6 HOURS PRN
Status: DISCONTINUED | OUTPATIENT
Start: 2023-05-06 | End: 2023-05-24 | Stop reason: HOSPADM

## 2023-05-05 RX ORDER — DOBUTAMINE HYDROCHLORIDE 200 MG/100ML
2.5 INJECTION INTRAVENOUS CONTINUOUS
Status: DISPENSED | OUTPATIENT
Start: 2023-05-06 | End: 2023-05-14

## 2023-05-05 RX ORDER — SENNA AND DOCUSATE SODIUM 50; 8.6 MG/1; MG/1
2 TABLET, FILM COATED ORAL 2 TIMES DAILY
Status: DISCONTINUED | OUTPATIENT
Start: 2023-05-06 | End: 2023-05-24 | Stop reason: HOSPADM

## 2023-05-05 RX ORDER — DEXTROSE MONOHYDRATE 100 MG/ML
INJECTION, SOLUTION INTRAVENOUS CONTINUOUS PRN
Status: DISCONTINUED | OUTPATIENT
Start: 2023-05-06 | End: 2023-05-06 | Stop reason: SDUPTHER

## 2023-05-05 RX ADMIN — DOCUSATE SODIUM 50 MG AND SENNOSIDES 8.6 MG 2 TABLET: 8.6; 5 TABLET, FILM COATED ORAL at 17:04

## 2023-05-05 RX ADMIN — Medication 1000 UNITS: at 08:19

## 2023-05-05 RX ADMIN — SODIUM CHLORIDE 1 G: 9 INJECTION INTRAMUSCULAR; INTRAVENOUS; SUBCUTANEOUS at 14:23

## 2023-05-05 RX ADMIN — ROSUVASTATIN 10 MG: 10 TABLET, FILM COATED ORAL at 22:00

## 2023-05-05 RX ADMIN — SODIUM CHLORIDE, PRESERVATIVE FREE 10 ML: 5 INJECTION INTRAVENOUS at 14:23

## 2023-05-05 RX ADMIN — POTASSIUM CHLORIDE 40 MEQ: 750 TABLET, FILM COATED, EXTENDED RELEASE ORAL at 08:18

## 2023-05-05 RX ADMIN — POTASSIUM CHLORIDE 40 MEQ: 750 TABLET, FILM COATED, EXTENDED RELEASE ORAL at 17:04

## 2023-05-05 RX ADMIN — ACETAMINOPHEN 650 MG: 325 TABLET ORAL at 06:04

## 2023-05-05 RX ADMIN — APIXABAN 5 MG: 5 TABLET, FILM COATED ORAL at 08:19

## 2023-05-05 RX ADMIN — POTASSIUM CHLORIDE 40 MEQ: 750 TABLET, FILM COATED, EXTENDED RELEASE ORAL at 22:00

## 2023-05-05 RX ADMIN — BUMETANIDE 1 MG/HR: 0.25 INJECTION INTRAMUSCULAR; INTRAVENOUS at 06:04

## 2023-05-05 RX ADMIN — INSULIN GLARGINE 7 UNITS: 100 INJECTION, SOLUTION SUBCUTANEOUS at 22:09

## 2023-05-05 RX ADMIN — CHLORHEXIDINE GLUCONATE 15 ML: 1.2 RINSE ORAL at 08:20

## 2023-05-05 RX ADMIN — SODIUM CHLORIDE, PRESERVATIVE FREE 10 ML: 5 INJECTION INTRAVENOUS at 06:05

## 2023-05-05 RX ADMIN — APIXABAN 5 MG: 5 TABLET, FILM COATED ORAL at 17:04

## 2023-05-05 RX ADMIN — ACETAMINOPHEN 650 MG: 325 TABLET ORAL at 17:04

## 2023-05-05 RX ADMIN — AMIODARONE HYDROCHLORIDE 0.5 MG/MIN: 50 INJECTION, SOLUTION INTRAVENOUS at 15:00

## 2023-05-05 RX ADMIN — DIPHENHYDRAMINE HYDROCHLORIDE 25 MG: 25 CAPSULE ORAL at 22:00

## 2023-05-05 RX ADMIN — BUMETANIDE 1 MG/HR: 0.25 INJECTION INTRAMUSCULAR; INTRAVENOUS at 19:51

## 2023-05-05 RX ADMIN — POLYETHYLENE GLYCOL 3350 17 G: 17 POWDER, FOR SOLUTION ORAL at 08:19

## 2023-05-06 ENCOUNTER — APPOINTMENT (OUTPATIENT)
Facility: HOSPITAL | Age: 78
DRG: 286 | End: 2023-05-06
Attending: HOSPITALIST
Payer: MEDICARE

## 2023-05-06 LAB
ANION GAP SERPL CALC-SCNC: 7 MMOL/L (ref 5–15)
ANION GAP SERPL CALC-SCNC: 9 MMOL/L (ref 5–15)
BACTERIA SPEC CULT: NORMAL
BUN SERPL-MCNC: 86 MG/DL (ref 6–20)
BUN SERPL-MCNC: 87 MG/DL (ref 6–20)
BUN/CREAT SERPL: 28 (ref 12–20)
BUN/CREAT SERPL: 29 (ref 12–20)
CALCIUM SERPL-MCNC: 9.1 MG/DL (ref 8.5–10.1)
CALCIUM SERPL-MCNC: 9.8 MG/DL (ref 8.5–10.1)
CHLORIDE SERPL-SCNC: 91 MMOL/L (ref 97–108)
CHLORIDE SERPL-SCNC: 92 MMOL/L (ref 97–108)
CO2 SERPL-SCNC: 27 MMOL/L (ref 21–32)
CO2 SERPL-SCNC: 30 MMOL/L (ref 21–32)
CREAT SERPL-MCNC: 2.93 MG/DL (ref 0.7–1.3)
CREAT SERPL-MCNC: 3.15 MG/DL (ref 0.7–1.3)
GLUCOSE BLD STRIP.AUTO-MCNC: 198 MG/DL (ref 65–117)
GLUCOSE BLD STRIP.AUTO-MCNC: 213 MG/DL (ref 65–117)
GLUCOSE BLD STRIP.AUTO-MCNC: 230 MG/DL (ref 65–117)
GLUCOSE SERPL-MCNC: 184 MG/DL (ref 65–100)
GLUCOSE SERPL-MCNC: 290 MG/DL (ref 65–100)
MAGNESIUM SERPL-MCNC: 2.6 MG/DL (ref 1.6–2.4)
MAGNESIUM SERPL-MCNC: 2.8 MG/DL (ref 1.6–2.4)
NT PRO BNP: ABNORMAL PG/ML
PHOSPHATE SERPL-MCNC: 3.8 MG/DL (ref 2.6–4.7)
POTASSIUM SERPL-SCNC: 4 MMOL/L (ref 3.5–5.1)
POTASSIUM SERPL-SCNC: 4.5 MMOL/L (ref 3.5–5.1)
SERVICE CMNT-IMP: ABNORMAL
SERVICE CMNT-IMP: NORMAL
SODIUM SERPL-SCNC: 128 MMOL/L (ref 136–145)
SODIUM SERPL-SCNC: 128 MMOL/L (ref 136–145)

## 2023-05-06 PROCEDURE — 2060000000 HC ICU INTERMEDIATE R&B

## 2023-05-06 PROCEDURE — 6360000002 HC RX W HCPCS: Performed by: HOSPITALIST

## 2023-05-06 PROCEDURE — 84100 ASSAY OF PHOSPHORUS: CPT

## 2023-05-06 PROCEDURE — 36415 COLL VENOUS BLD VENIPUNCTURE: CPT

## 2023-05-06 PROCEDURE — 6370000000 HC RX 637 (ALT 250 FOR IP): Performed by: HOSPITALIST

## 2023-05-06 PROCEDURE — 83735 ASSAY OF MAGNESIUM: CPT

## 2023-05-06 PROCEDURE — 80048 BASIC METABOLIC PNL TOTAL CA: CPT

## 2023-05-06 PROCEDURE — 2500000003 HC RX 250 WO HCPCS: Performed by: HOSPITALIST

## 2023-05-06 PROCEDURE — 83880 ASSAY OF NATRIURETIC PEPTIDE: CPT

## 2023-05-06 PROCEDURE — 1100000003 HC PRIVATE W/ TELEMETRY

## 2023-05-06 PROCEDURE — 71045 X-RAY EXAM CHEST 1 VIEW: CPT

## 2023-05-06 PROCEDURE — 2580000003 HC RX 258: Performed by: HOSPITALIST

## 2023-05-06 PROCEDURE — 82962 GLUCOSE BLOOD TEST: CPT

## 2023-05-06 RX ORDER — INSULIN LISPRO 100 [IU]/ML
0-4 INJECTION, SOLUTION INTRAVENOUS; SUBCUTANEOUS
Status: DISCONTINUED | OUTPATIENT
Start: 2023-05-07 | End: 2023-05-24 | Stop reason: HOSPADM

## 2023-05-06 RX ORDER — DEXTROSE MONOHYDRATE 100 MG/ML
INJECTION, SOLUTION INTRAVENOUS CONTINUOUS PRN
Status: DISCONTINUED | OUTPATIENT
Start: 2023-05-06 | End: 2023-05-24 | Stop reason: HOSPADM

## 2023-05-06 RX ORDER — INSULIN LISPRO 100 [IU]/ML
0-4 INJECTION, SOLUTION INTRAVENOUS; SUBCUTANEOUS NIGHTLY
Status: DISCONTINUED | OUTPATIENT
Start: 2023-05-06 | End: 2023-05-24 | Stop reason: HOSPADM

## 2023-05-06 RX ADMIN — SODIUM CHLORIDE, PRESERVATIVE FREE 10 ML: 5 INJECTION INTRAVENOUS at 08:54

## 2023-05-06 RX ADMIN — POTASSIUM CHLORIDE 40 MEQ: 750 TABLET, FILM COATED, EXTENDED RELEASE ORAL at 08:51

## 2023-05-06 RX ADMIN — ACETAMINOPHEN 650 MG: 325 TABLET ORAL at 05:53

## 2023-05-06 RX ADMIN — INSULIN GLARGINE 7 UNITS: 100 INJECTION, SOLUTION SUBCUTANEOUS at 23:01

## 2023-05-06 RX ADMIN — DOBUTAMINE HYDROCHLORIDE 2.5 MCG/KG/MIN: 200 INJECTION INTRAVENOUS at 06:21

## 2023-05-06 RX ADMIN — CHLORHEXIDINE GLUCONATE 15 ML: 1.2 RINSE ORAL at 08:52

## 2023-05-06 RX ADMIN — SENNOSIDES AND DOCUSATE SODIUM 2 TABLET: 50; 8.6 TABLET ORAL at 08:52

## 2023-05-06 RX ADMIN — APIXABAN 5 MG: 5 TABLET, FILM COATED ORAL at 08:51

## 2023-05-06 RX ADMIN — SODIUM CHLORIDE, PRESERVATIVE FREE 10 ML: 5 INJECTION INTRAVENOUS at 21:15

## 2023-05-06 RX ADMIN — AMIODARONE HYDROCHLORIDE 0.5 MG/MIN: 50 INJECTION, SOLUTION INTRAVENOUS at 21:12

## 2023-05-06 RX ADMIN — POLYETHYLENE GLYCOL 3350 17 G: 17 POWDER, FOR SOLUTION ORAL at 08:52

## 2023-05-06 RX ADMIN — ACETAMINOPHEN 650 MG: 325 TABLET ORAL at 15:18

## 2023-05-06 RX ADMIN — POTASSIUM CHLORIDE 40 MEQ: 750 TABLET, FILM COATED, EXTENDED RELEASE ORAL at 21:13

## 2023-05-06 RX ADMIN — BUMETANIDE 1 MG/HR: 0.25 INJECTION INTRAMUSCULAR; INTRAVENOUS at 21:14

## 2023-05-06 RX ADMIN — ACETAMINOPHEN 650 MG: 325 TABLET ORAL at 21:13

## 2023-05-06 RX ADMIN — APIXABAN 5 MG: 5 TABLET, FILM COATED ORAL at 21:13

## 2023-05-06 RX ADMIN — ROSUVASTATIN 10 MG: 10 TABLET, FILM COATED ORAL at 21:14

## 2023-05-06 RX ADMIN — CEFTRIAXONE SODIUM 1000 MG: 1 INJECTION, POWDER, FOR SOLUTION INTRAMUSCULAR; INTRAVENOUS at 13:35

## 2023-05-06 RX ADMIN — POTASSIUM CHLORIDE 40 MEQ: 750 TABLET, FILM COATED, EXTENDED RELEASE ORAL at 13:35

## 2023-05-06 RX ADMIN — AMIODARONE HYDROCHLORIDE 0.5 MG/MIN: 50 INJECTION, SOLUTION INTRAVENOUS at 06:19

## 2023-05-06 RX ADMIN — BUMETANIDE 1 MG/HR: 0.25 INJECTION INTRAMUSCULAR; INTRAVENOUS at 06:59

## 2023-05-06 RX ADMIN — DIPHENHYDRAMINE HYDROCHLORIDE 25 MG: 25 CAPSULE ORAL at 23:01

## 2023-05-06 ASSESSMENT — PAIN DESCRIPTION - ORIENTATION
ORIENTATION: MID
ORIENTATION: PROXIMAL;MID

## 2023-05-06 ASSESSMENT — PAIN SCALES - GENERAL
PAINLEVEL_OUTOF10: 3
PAINLEVEL_OUTOF10: 0
PAINLEVEL_OUTOF10: 2

## 2023-05-06 ASSESSMENT — PAIN DESCRIPTION - LOCATION
LOCATION: ABDOMEN
LOCATION: BACK

## 2023-05-06 ASSESSMENT — PAIN DESCRIPTION - DESCRIPTORS
DESCRIPTORS: ACHING
DESCRIPTORS: ACHING

## 2023-05-06 ASSESSMENT — PAIN - FUNCTIONAL ASSESSMENT: PAIN_FUNCTIONAL_ASSESSMENT: PREVENTS OR INTERFERES SOME ACTIVE ACTIVITIES AND ADLS

## 2023-05-07 LAB
ALBUMIN SERPL-MCNC: 4 G/DL (ref 3.5–5)
ALBUMIN/GLOB SERPL: 1 (ref 1.1–2.2)
ALP SERPL-CCNC: 62 U/L (ref 45–117)
ALT SERPL-CCNC: 217 U/L (ref 12–78)
ANION GAP SERPL CALC-SCNC: 6 MMOL/L (ref 5–15)
AST SERPL-CCNC: 65 U/L (ref 15–37)
BILIRUB DIRECT SERPL-MCNC: 0.3 MG/DL (ref 0–0.2)
BILIRUB SERPL-MCNC: 0.8 MG/DL (ref 0.2–1)
BUN SERPL-MCNC: 83 MG/DL (ref 6–20)
BUN/CREAT SERPL: 28 (ref 12–20)
CALCIUM SERPL-MCNC: 10.1 MG/DL (ref 8.5–10.1)
CHLORIDE SERPL-SCNC: 93 MMOL/L (ref 97–108)
CO2 SERPL-SCNC: 30 MMOL/L (ref 21–32)
CREAT SERPL-MCNC: 2.99 MG/DL (ref 0.7–1.3)
GLOBULIN SER CALC-MCNC: 3.9 G/DL (ref 2–4)
GLUCOSE BLD STRIP.AUTO-MCNC: 129 MG/DL (ref 65–117)
GLUCOSE BLD STRIP.AUTO-MCNC: 155 MG/DL (ref 65–117)
GLUCOSE BLD STRIP.AUTO-MCNC: 235 MG/DL (ref 65–117)
GLUCOSE BLD STRIP.AUTO-MCNC: 270 MG/DL (ref 65–117)
GLUCOSE BLD STRIP.AUTO-MCNC: 335 MG/DL (ref 65–117)
GLUCOSE SERPL-MCNC: 184 MG/DL (ref 65–100)
NT PRO BNP: ABNORMAL PG/ML
POTASSIUM SERPL-SCNC: 4.4 MMOL/L (ref 3.5–5.1)
PROT SERPL-MCNC: 7.9 G/DL (ref 6.4–8.2)
SERVICE CMNT-IMP: ABNORMAL
SODIUM SERPL-SCNC: 129 MMOL/L (ref 136–145)

## 2023-05-07 PROCEDURE — 6370000000 HC RX 637 (ALT 250 FOR IP): Performed by: FAMILY MEDICINE

## 2023-05-07 PROCEDURE — 2060000000 HC ICU INTERMEDIATE R&B

## 2023-05-07 PROCEDURE — 36415 COLL VENOUS BLD VENIPUNCTURE: CPT

## 2023-05-07 PROCEDURE — 6370000000 HC RX 637 (ALT 250 FOR IP): Performed by: HOSPITALIST

## 2023-05-07 PROCEDURE — 6370000000 HC RX 637 (ALT 250 FOR IP)

## 2023-05-07 PROCEDURE — 2580000003 HC RX 258: Performed by: HOSPITALIST

## 2023-05-07 PROCEDURE — 82962 GLUCOSE BLOOD TEST: CPT

## 2023-05-07 PROCEDURE — 99231 SBSQ HOSP IP/OBS SF/LOW 25: CPT | Performed by: NURSE PRACTITIONER

## 2023-05-07 PROCEDURE — 80048 BASIC METABOLIC PNL TOTAL CA: CPT

## 2023-05-07 PROCEDURE — 6360000002 HC RX W HCPCS: Performed by: HOSPITALIST

## 2023-05-07 PROCEDURE — 83880 ASSAY OF NATRIURETIC PEPTIDE: CPT

## 2023-05-07 PROCEDURE — 2500000003 HC RX 250 WO HCPCS: Performed by: INTERNAL MEDICINE

## 2023-05-07 PROCEDURE — 80076 HEPATIC FUNCTION PANEL: CPT

## 2023-05-07 RX ORDER — POTASSIUM CHLORIDE 750 MG/1
40 TABLET, FILM COATED, EXTENDED RELEASE ORAL 2 TIMES DAILY
Status: DISCONTINUED | OUTPATIENT
Start: 2023-05-07 | End: 2023-05-13

## 2023-05-07 RX ORDER — INSULIN GLARGINE 100 [IU]/ML
10 INJECTION, SOLUTION SUBCUTANEOUS NIGHTLY
Status: DISCONTINUED | OUTPATIENT
Start: 2023-05-07 | End: 2023-05-11

## 2023-05-07 RX ADMIN — DIPHENHYDRAMINE HYDROCHLORIDE 25 MG: 25 CAPSULE ORAL at 22:42

## 2023-05-07 RX ADMIN — CHLORHEXIDINE GLUCONATE 15 ML: 1.2 RINSE ORAL at 20:51

## 2023-05-07 RX ADMIN — Medication 3 UNITS: at 12:13

## 2023-05-07 RX ADMIN — CHLORHEXIDINE GLUCONATE 15 ML: 1.2 RINSE ORAL at 09:07

## 2023-05-07 RX ADMIN — ACETAMINOPHEN 650 MG: 325 TABLET ORAL at 22:42

## 2023-05-07 RX ADMIN — CEFTRIAXONE SODIUM 1000 MG: 1 INJECTION, POWDER, FOR SOLUTION INTRAMUSCULAR; INTRAVENOUS at 12:04

## 2023-05-07 RX ADMIN — AMIODARONE HYDROCHLORIDE 0.5 MG/MIN: 50 INJECTION, SOLUTION INTRAVENOUS at 12:07

## 2023-05-07 RX ADMIN — POTASSIUM CHLORIDE 40 MEQ: 750 TABLET, FILM COATED, EXTENDED RELEASE ORAL at 09:04

## 2023-05-07 RX ADMIN — SENNOSIDES AND DOCUSATE SODIUM 2 TABLET: 50; 8.6 TABLET ORAL at 09:05

## 2023-05-07 RX ADMIN — POTASSIUM CHLORIDE 40 MEQ: 750 TABLET, FILM COATED, EXTENDED RELEASE ORAL at 20:45

## 2023-05-07 RX ADMIN — SODIUM CHLORIDE, PRESERVATIVE FREE 10 ML: 5 INJECTION INTRAVENOUS at 12:08

## 2023-05-07 RX ADMIN — ACETAMINOPHEN 650 MG: 325 TABLET ORAL at 04:44

## 2023-05-07 RX ADMIN — BUMETANIDE 1 MG/HR: 0.25 INJECTION INTRAMUSCULAR; INTRAVENOUS at 22:42

## 2023-05-07 RX ADMIN — APIXABAN 5 MG: 5 TABLET, FILM COATED ORAL at 20:46

## 2023-05-07 RX ADMIN — INSULIN GLARGINE 10 UNITS: 100 INJECTION, SOLUTION SUBCUTANEOUS at 20:46

## 2023-05-07 RX ADMIN — ROSUVASTATIN 10 MG: 10 TABLET, FILM COATED ORAL at 20:49

## 2023-05-07 RX ADMIN — APIXABAN 5 MG: 5 TABLET, FILM COATED ORAL at 09:05

## 2023-05-07 RX ADMIN — SODIUM CHLORIDE, PRESERVATIVE FREE 10 ML: 5 INJECTION INTRAVENOUS at 20:50

## 2023-05-07 RX ADMIN — SENNOSIDES AND DOCUSATE SODIUM 2 TABLET: 50; 8.6 TABLET ORAL at 20:45

## 2023-05-07 ASSESSMENT — ENCOUNTER SYMPTOMS
VOMITING: 0
NAUSEA: 0
SHORTNESS OF BREATH: 0
ABDOMINAL DISTENTION: 0
COUGH: 0

## 2023-05-07 ASSESSMENT — PAIN DESCRIPTION - LOCATION: LOCATION: HAND

## 2023-05-07 ASSESSMENT — PAIN SCALES - GENERAL: PAINLEVEL_OUTOF10: 5

## 2023-05-08 LAB
ANION GAP SERPL CALC-SCNC: 7 MMOL/L (ref 5–15)
BUN SERPL-MCNC: 79 MG/DL (ref 6–20)
BUN/CREAT SERPL: 27 (ref 12–20)
CALCIUM SERPL-MCNC: 9.3 MG/DL (ref 8.5–10.1)
CHLORIDE SERPL-SCNC: 94 MMOL/L (ref 97–108)
CO2 SERPL-SCNC: 29 MMOL/L (ref 21–32)
CREAT SERPL-MCNC: 2.92 MG/DL (ref 0.7–1.3)
ERYTHROCYTE [DISTWIDTH] IN BLOOD BY AUTOMATED COUNT: 16.9 % (ref 11.5–14.5)
GLUCOSE BLD STRIP.AUTO-MCNC: 117 MG/DL (ref 65–117)
GLUCOSE BLD STRIP.AUTO-MCNC: 206 MG/DL (ref 65–117)
GLUCOSE BLD STRIP.AUTO-MCNC: 236 MG/DL (ref 65–117)
GLUCOSE BLD STRIP.AUTO-MCNC: 243 MG/DL (ref 65–117)
GLUCOSE SERPL-MCNC: 158 MG/DL (ref 65–100)
HCT VFR BLD AUTO: 36.5 % (ref 36.6–50.3)
HGB BLD-MCNC: 12.1 G/DL (ref 12.1–17)
MAGNESIUM SERPL-MCNC: 2.5 MG/DL (ref 1.6–2.4)
MCH RBC QN AUTO: 35.3 PG (ref 26–34)
MCHC RBC AUTO-ENTMCNC: 33.2 G/DL (ref 30–36.5)
MCV RBC AUTO: 106.4 FL (ref 80–99)
NRBC # BLD: 0 K/UL (ref 0–0.01)
NRBC BLD-RTO: 0 PER 100 WBC
PHOSPHATE SERPL-MCNC: 3.6 MG/DL (ref 2.6–4.7)
PLATELET # BLD AUTO: 151 K/UL (ref 150–400)
PMV BLD AUTO: 11.5 FL (ref 8.9–12.9)
POTASSIUM SERPL-SCNC: 4.1 MMOL/L (ref 3.5–5.1)
RBC # BLD AUTO: 3.43 M/UL (ref 4.1–5.7)
SERVICE CMNT-IMP: ABNORMAL
SERVICE CMNT-IMP: NORMAL
SODIUM SERPL-SCNC: 130 MMOL/L (ref 136–145)
WBC # BLD AUTO: 9.7 K/UL (ref 4.1–11.1)

## 2023-05-08 PROCEDURE — 6360000002 HC RX W HCPCS: Performed by: HOSPITALIST

## 2023-05-08 PROCEDURE — 6370000000 HC RX 637 (ALT 250 FOR IP)

## 2023-05-08 PROCEDURE — 84100 ASSAY OF PHOSPHORUS: CPT

## 2023-05-08 PROCEDURE — 97116 GAIT TRAINING THERAPY: CPT

## 2023-05-08 PROCEDURE — 82962 GLUCOSE BLOOD TEST: CPT

## 2023-05-08 PROCEDURE — 80048 BASIC METABOLIC PNL TOTAL CA: CPT

## 2023-05-08 PROCEDURE — 83735 ASSAY OF MAGNESIUM: CPT

## 2023-05-08 PROCEDURE — 2500000003 HC RX 250 WO HCPCS: Performed by: INTERNAL MEDICINE

## 2023-05-08 PROCEDURE — 97530 THERAPEUTIC ACTIVITIES: CPT

## 2023-05-08 PROCEDURE — 2580000003 HC RX 258: Performed by: HOSPITALIST

## 2023-05-08 PROCEDURE — 97535 SELF CARE MNGMENT TRAINING: CPT

## 2023-05-08 PROCEDURE — 85027 COMPLETE CBC AUTOMATED: CPT

## 2023-05-08 PROCEDURE — 6370000000 HC RX 637 (ALT 250 FOR IP): Performed by: HOSPITALIST

## 2023-05-08 PROCEDURE — 99232 SBSQ HOSP IP/OBS MODERATE 35: CPT | Performed by: NURSE PRACTITIONER

## 2023-05-08 PROCEDURE — 36415 COLL VENOUS BLD VENIPUNCTURE: CPT

## 2023-05-08 PROCEDURE — 6370000000 HC RX 637 (ALT 250 FOR IP): Performed by: FAMILY MEDICINE

## 2023-05-08 PROCEDURE — 2060000000 HC ICU INTERMEDIATE R&B

## 2023-05-08 RX ADMIN — SODIUM CHLORIDE, PRESERVATIVE FREE 10 ML: 5 INJECTION INTRAVENOUS at 22:09

## 2023-05-08 RX ADMIN — SENNOSIDES AND DOCUSATE SODIUM 2 TABLET: 50; 8.6 TABLET ORAL at 22:07

## 2023-05-08 RX ADMIN — Medication 1 UNITS: at 12:34

## 2023-05-08 RX ADMIN — CHLORHEXIDINE GLUCONATE 15 ML: 1.2 RINSE ORAL at 09:20

## 2023-05-08 RX ADMIN — DOBUTAMINE HYDROCHLORIDE 2.5 MCG/KG/MIN: 200 INJECTION INTRAVENOUS at 08:05

## 2023-05-08 RX ADMIN — APIXABAN 5 MG: 5 TABLET, FILM COATED ORAL at 22:07

## 2023-05-08 RX ADMIN — CHLORHEXIDINE GLUCONATE 15 ML: 1.2 RINSE ORAL at 22:07

## 2023-05-08 RX ADMIN — CEFTRIAXONE SODIUM 1000 MG: 1 INJECTION, POWDER, FOR SOLUTION INTRAMUSCULAR; INTRAVENOUS at 12:33

## 2023-05-08 RX ADMIN — BUMETANIDE 1 MG/HR: 0.25 INJECTION INTRAMUSCULAR; INTRAVENOUS at 16:09

## 2023-05-08 RX ADMIN — SODIUM CHLORIDE, PRESERVATIVE FREE 10 ML: 5 INJECTION INTRAVENOUS at 09:21

## 2023-05-08 RX ADMIN — POTASSIUM CHLORIDE 40 MEQ: 750 TABLET, FILM COATED, EXTENDED RELEASE ORAL at 22:07

## 2023-05-08 RX ADMIN — Medication 1 UNITS: at 09:20

## 2023-05-08 RX ADMIN — POLYETHYLENE GLYCOL 3350 17 G: 17 POWDER, FOR SOLUTION ORAL at 09:20

## 2023-05-08 RX ADMIN — AMIODARONE HYDROCHLORIDE 0.5 MG/MIN: 50 INJECTION, SOLUTION INTRAVENOUS at 14:38

## 2023-05-08 RX ADMIN — DIPHENHYDRAMINE HYDROCHLORIDE 25 MG: 25 CAPSULE ORAL at 22:07

## 2023-05-08 RX ADMIN — AMIODARONE HYDROCHLORIDE 0.5 MG/MIN: 50 INJECTION, SOLUTION INTRAVENOUS at 01:01

## 2023-05-08 RX ADMIN — SENNOSIDES AND DOCUSATE SODIUM 2 TABLET: 50; 8.6 TABLET ORAL at 09:19

## 2023-05-08 RX ADMIN — APIXABAN 5 MG: 5 TABLET, FILM COATED ORAL at 09:19

## 2023-05-08 RX ADMIN — ROSUVASTATIN 10 MG: 10 TABLET, FILM COATED ORAL at 22:07

## 2023-05-08 RX ADMIN — POTASSIUM CHLORIDE 40 MEQ: 750 TABLET, FILM COATED, EXTENDED RELEASE ORAL at 09:19

## 2023-05-08 RX ADMIN — BUMETANIDE 1 MG/HR: 0.25 INJECTION INTRAMUSCULAR; INTRAVENOUS at 10:39

## 2023-05-08 RX ADMIN — POLYETHYLENE GLYCOL 3350 17 G: 17 POWDER, FOR SOLUTION ORAL at 12:40

## 2023-05-08 ASSESSMENT — PAIN SCALES - GENERAL
PAINLEVEL_OUTOF10: 0
PAINLEVEL_OUTOF10: 0

## 2023-05-09 LAB
ANION GAP SERPL CALC-SCNC: 10 MMOL/L (ref 5–15)
BUN SERPL-MCNC: 70 MG/DL (ref 6–20)
BUN/CREAT SERPL: 26 (ref 12–20)
CALCIUM SERPL-MCNC: 9.2 MG/DL (ref 8.5–10.1)
CHLORIDE SERPL-SCNC: 91 MMOL/L (ref 97–108)
CO2 SERPL-SCNC: 26 MMOL/L (ref 21–32)
CREAT SERPL-MCNC: 2.73 MG/DL (ref 0.7–1.3)
GLUCOSE BLD STRIP.AUTO-MCNC: 139 MG/DL (ref 65–117)
GLUCOSE BLD STRIP.AUTO-MCNC: 221 MG/DL (ref 65–117)
GLUCOSE BLD STRIP.AUTO-MCNC: 233 MG/DL (ref 65–117)
GLUCOSE BLD STRIP.AUTO-MCNC: 238 MG/DL (ref 65–117)
GLUCOSE SERPL-MCNC: 268 MG/DL (ref 65–100)
POTASSIUM SERPL-SCNC: 4.2 MMOL/L (ref 3.5–5.1)
SERVICE CMNT-IMP: ABNORMAL
SODIUM SERPL-SCNC: 127 MMOL/L (ref 136–145)

## 2023-05-09 PROCEDURE — 2060000000 HC ICU INTERMEDIATE R&B

## 2023-05-09 PROCEDURE — 82962 GLUCOSE BLOOD TEST: CPT

## 2023-05-09 PROCEDURE — 6370000000 HC RX 637 (ALT 250 FOR IP)

## 2023-05-09 PROCEDURE — 6360000002 HC RX W HCPCS: Performed by: INTERNAL MEDICINE

## 2023-05-09 PROCEDURE — 2500000003 HC RX 250 WO HCPCS: Performed by: INTERNAL MEDICINE

## 2023-05-09 PROCEDURE — 94640 AIRWAY INHALATION TREATMENT: CPT

## 2023-05-09 PROCEDURE — 2580000003 HC RX 258: Performed by: HOSPITALIST

## 2023-05-09 PROCEDURE — 80048 BASIC METABOLIC PNL TOTAL CA: CPT

## 2023-05-09 PROCEDURE — 6370000000 HC RX 637 (ALT 250 FOR IP): Performed by: HOSPITALIST

## 2023-05-09 PROCEDURE — 6360000002 HC RX W HCPCS: Performed by: HOSPITALIST

## 2023-05-09 PROCEDURE — 36415 COLL VENOUS BLD VENIPUNCTURE: CPT

## 2023-05-09 PROCEDURE — 6370000000 HC RX 637 (ALT 250 FOR IP): Performed by: FAMILY MEDICINE

## 2023-05-09 RX ORDER — ACETAZOLAMIDE 500 MG/5ML
250 INJECTION, POWDER, LYOPHILIZED, FOR SOLUTION INTRAVENOUS ONCE
Status: COMPLETED | OUTPATIENT
Start: 2023-05-09 | End: 2023-05-09

## 2023-05-09 RX ADMIN — APIXABAN 5 MG: 5 TABLET, FILM COATED ORAL at 20:16

## 2023-05-09 RX ADMIN — INSULIN GLARGINE 10 UNITS: 100 INJECTION, SOLUTION SUBCUTANEOUS at 21:48

## 2023-05-09 RX ADMIN — AMIODARONE HYDROCHLORIDE 0.5 MG/MIN: 50 INJECTION, SOLUTION INTRAVENOUS at 04:35

## 2023-05-09 RX ADMIN — BUMETANIDE 1 MG/HR: 0.25 INJECTION INTRAMUSCULAR; INTRAVENOUS at 00:09

## 2023-05-09 RX ADMIN — APIXABAN 5 MG: 5 TABLET, FILM COATED ORAL at 09:14

## 2023-05-09 RX ADMIN — DOBUTAMINE HYDROCHLORIDE 2.5 MCG/KG/MIN: 200 INJECTION INTRAVENOUS at 20:13

## 2023-05-09 RX ADMIN — SENNOSIDES AND DOCUSATE SODIUM 2 TABLET: 50; 8.6 TABLET ORAL at 09:14

## 2023-05-09 RX ADMIN — POTASSIUM CHLORIDE 40 MEQ: 750 TABLET, FILM COATED, EXTENDED RELEASE ORAL at 20:15

## 2023-05-09 RX ADMIN — POTASSIUM CHLORIDE 40 MEQ: 750 TABLET, FILM COATED, EXTENDED RELEASE ORAL at 09:14

## 2023-05-09 RX ADMIN — Medication 1 UNITS: at 11:58

## 2023-05-09 RX ADMIN — INSULIN GLARGINE 10 UNITS: 100 INJECTION, SOLUTION SUBCUTANEOUS at 00:09

## 2023-05-09 RX ADMIN — ACETAZOLAMIDE 250 MG: 500 INJECTION, POWDER, LYOPHILIZED, FOR SOLUTION INTRAVENOUS at 14:16

## 2023-05-09 RX ADMIN — AMIODARONE HYDROCHLORIDE 0.5 MG/MIN: 50 INJECTION, SOLUTION INTRAVENOUS at 20:13

## 2023-05-09 RX ADMIN — BUMETANIDE 2 MG/HR: 0.25 INJECTION INTRAMUSCULAR; INTRAVENOUS at 15:36

## 2023-05-09 RX ADMIN — DIPHENHYDRAMINE HYDROCHLORIDE 25 MG: 25 CAPSULE ORAL at 21:48

## 2023-05-09 RX ADMIN — ROSUVASTATIN 10 MG: 10 TABLET, FILM COATED ORAL at 20:15

## 2023-05-09 RX ADMIN — CHLORHEXIDINE GLUCONATE 15 ML: 1.2 RINSE ORAL at 20:16

## 2023-05-09 RX ADMIN — SODIUM CHLORIDE, PRESERVATIVE FREE 10 ML: 5 INJECTION INTRAVENOUS at 21:49

## 2023-05-09 RX ADMIN — CHLORHEXIDINE GLUCONATE 15 ML: 1.2 RINSE ORAL at 09:14

## 2023-05-09 RX ADMIN — SENNOSIDES AND DOCUSATE SODIUM 2 TABLET: 50; 8.6 TABLET ORAL at 20:15

## 2023-05-09 RX ADMIN — BUMETANIDE 2 MG/HR: 0.25 INJECTION INTRAMUSCULAR; INTRAVENOUS at 20:18

## 2023-05-09 RX ADMIN — Medication 1 UNITS: at 16:51

## 2023-05-10 LAB
ANION GAP SERPL CALC-SCNC: 9 MMOL/L (ref 5–15)
BUN SERPL-MCNC: 65 MG/DL (ref 6–20)
BUN/CREAT SERPL: 24 (ref 12–20)
CALCIUM SERPL-MCNC: 8.9 MG/DL (ref 8.5–10.1)
CHLORIDE SERPL-SCNC: 92 MMOL/L (ref 97–108)
CO2 SERPL-SCNC: 25 MMOL/L (ref 21–32)
CREAT SERPL-MCNC: 2.73 MG/DL (ref 0.7–1.3)
GLUCOSE BLD STRIP.AUTO-MCNC: 133 MG/DL (ref 65–117)
GLUCOSE BLD STRIP.AUTO-MCNC: 135 MG/DL (ref 65–117)
GLUCOSE BLD STRIP.AUTO-MCNC: 196 MG/DL (ref 65–117)
GLUCOSE BLD STRIP.AUTO-MCNC: 243 MG/DL (ref 65–117)
GLUCOSE SERPL-MCNC: 342 MG/DL (ref 65–100)
NT PRO BNP: ABNORMAL PG/ML
POTASSIUM SERPL-SCNC: 3.8 MMOL/L (ref 3.5–5.1)
SERVICE CMNT-IMP: ABNORMAL
SODIUM SERPL-SCNC: 126 MMOL/L (ref 136–145)

## 2023-05-10 PROCEDURE — 97116 GAIT TRAINING THERAPY: CPT

## 2023-05-10 PROCEDURE — 2580000003 HC RX 258: Performed by: HOSPITALIST

## 2023-05-10 PROCEDURE — 2500000003 HC RX 250 WO HCPCS: Performed by: INTERNAL MEDICINE

## 2023-05-10 PROCEDURE — 6370000000 HC RX 637 (ALT 250 FOR IP): Performed by: HOSPITALIST

## 2023-05-10 PROCEDURE — 82962 GLUCOSE BLOOD TEST: CPT

## 2023-05-10 PROCEDURE — 36415 COLL VENOUS BLD VENIPUNCTURE: CPT

## 2023-05-10 PROCEDURE — 6360000002 HC RX W HCPCS: Performed by: HOSPITALIST

## 2023-05-10 PROCEDURE — 80048 BASIC METABOLIC PNL TOTAL CA: CPT

## 2023-05-10 PROCEDURE — 6370000000 HC RX 637 (ALT 250 FOR IP): Performed by: FAMILY MEDICINE

## 2023-05-10 PROCEDURE — 99231 SBSQ HOSP IP/OBS SF/LOW 25: CPT | Performed by: NURSE PRACTITIONER

## 2023-05-10 PROCEDURE — 83880 ASSAY OF NATRIURETIC PEPTIDE: CPT

## 2023-05-10 PROCEDURE — 2060000000 HC ICU INTERMEDIATE R&B

## 2023-05-10 PROCEDURE — 6370000000 HC RX 637 (ALT 250 FOR IP): Performed by: INTERNAL MEDICINE

## 2023-05-10 RX ORDER — AMIODARONE HYDROCHLORIDE 200 MG/1
200 TABLET ORAL 2 TIMES DAILY
Status: DISCONTINUED | OUTPATIENT
Start: 2023-05-10 | End: 2023-05-23

## 2023-05-10 RX ADMIN — ROSUVASTATIN 10 MG: 10 TABLET, FILM COATED ORAL at 21:15

## 2023-05-10 RX ADMIN — AMIODARONE HYDROCHLORIDE 200 MG: 200 TABLET ORAL at 13:28

## 2023-05-10 RX ADMIN — BUMETANIDE 2 MG/HR: 0.25 INJECTION INTRAMUSCULAR; INTRAVENOUS at 10:35

## 2023-05-10 RX ADMIN — SENNOSIDES AND DOCUSATE SODIUM 2 TABLET: 50; 8.6 TABLET ORAL at 21:15

## 2023-05-10 RX ADMIN — SODIUM CHLORIDE, PRESERVATIVE FREE 10 ML: 5 INJECTION INTRAVENOUS at 21:15

## 2023-05-10 RX ADMIN — SENNOSIDES AND DOCUSATE SODIUM 2 TABLET: 50; 8.6 TABLET ORAL at 08:32

## 2023-05-10 RX ADMIN — POTASSIUM CHLORIDE 40 MEQ: 750 TABLET, FILM COATED, EXTENDED RELEASE ORAL at 08:32

## 2023-05-10 RX ADMIN — APIXABAN 5 MG: 5 TABLET, FILM COATED ORAL at 21:15

## 2023-05-10 RX ADMIN — INSULIN GLARGINE 10 UNITS: 100 INJECTION, SOLUTION SUBCUTANEOUS at 21:14

## 2023-05-10 RX ADMIN — SODIUM CHLORIDE, PRESERVATIVE FREE 10 ML: 5 INJECTION INTRAVENOUS at 08:32

## 2023-05-10 RX ADMIN — CHLORHEXIDINE GLUCONATE 15 ML: 1.2 RINSE ORAL at 08:32

## 2023-05-10 RX ADMIN — AMIODARONE HYDROCHLORIDE 0.5 MG/MIN: 50 INJECTION, SOLUTION INTRAVENOUS at 12:34

## 2023-05-10 RX ADMIN — POTASSIUM CHLORIDE 40 MEQ: 750 TABLET, FILM COATED, EXTENDED RELEASE ORAL at 21:14

## 2023-05-10 RX ADMIN — BUMETANIDE 2 MG/HR: 0.25 INJECTION INTRAMUSCULAR; INTRAVENOUS at 23:52

## 2023-05-10 RX ADMIN — CHLORHEXIDINE GLUCONATE 15 ML: 1.2 RINSE ORAL at 21:15

## 2023-05-10 RX ADMIN — POLYETHYLENE GLYCOL 3350 17 G: 17 POWDER, FOR SOLUTION ORAL at 08:32

## 2023-05-10 RX ADMIN — APIXABAN 5 MG: 5 TABLET, FILM COATED ORAL at 08:32

## 2023-05-10 RX ADMIN — AMIODARONE HYDROCHLORIDE 200 MG: 200 TABLET ORAL at 21:15

## 2023-05-10 RX ADMIN — DIPHENHYDRAMINE HYDROCHLORIDE 25 MG: 25 CAPSULE ORAL at 21:14

## 2023-05-11 LAB
ALBUMIN SERPL-MCNC: 3.9 G/DL (ref 3.5–5)
ALBUMIN/GLOB SERPL: 1 (ref 1.1–2.2)
ALP SERPL-CCNC: 64 U/L (ref 45–117)
ALT SERPL-CCNC: 110 U/L (ref 12–78)
ANION GAP SERPL CALC-SCNC: 10 MMOL/L (ref 5–15)
AST SERPL-CCNC: 48 U/L (ref 15–37)
BILIRUB SERPL-MCNC: 1.4 MG/DL (ref 0.2–1)
BUN SERPL-MCNC: 64 MG/DL (ref 6–20)
BUN/CREAT SERPL: 24 (ref 12–20)
CALCIUM SERPL-MCNC: 9 MG/DL (ref 8.5–10.1)
CHLORIDE SERPL-SCNC: 99 MMOL/L (ref 97–108)
CO2 SERPL-SCNC: 25 MMOL/L (ref 21–32)
CREAT SERPL-MCNC: 2.71 MG/DL (ref 0.7–1.3)
GLOBULIN SER CALC-MCNC: 4 G/DL (ref 2–4)
GLUCOSE BLD STRIP.AUTO-MCNC: 129 MG/DL (ref 65–117)
GLUCOSE BLD STRIP.AUTO-MCNC: 144 MG/DL (ref 65–117)
GLUCOSE SERPL-MCNC: 211 MG/DL (ref 65–100)
MAGNESIUM SERPL-MCNC: 2.6 MG/DL (ref 1.6–2.4)
NT PRO BNP: ABNORMAL PG/ML
POTASSIUM SERPL-SCNC: 3.9 MMOL/L (ref 3.5–5.1)
PROT SERPL-MCNC: 7.9 G/DL (ref 6.4–8.2)
SERVICE CMNT-IMP: ABNORMAL
SERVICE CMNT-IMP: ABNORMAL
SODIUM SERPL-SCNC: 134 MMOL/L (ref 136–145)

## 2023-05-11 PROCEDURE — 6370000000 HC RX 637 (ALT 250 FOR IP): Performed by: INTERNAL MEDICINE

## 2023-05-11 PROCEDURE — 6370000000 HC RX 637 (ALT 250 FOR IP)

## 2023-05-11 PROCEDURE — 6360000002 HC RX W HCPCS: Performed by: HOSPITALIST

## 2023-05-11 PROCEDURE — 6360000002 HC RX W HCPCS: Performed by: INTERNAL MEDICINE

## 2023-05-11 PROCEDURE — 6370000000 HC RX 637 (ALT 250 FOR IP): Performed by: FAMILY MEDICINE

## 2023-05-11 PROCEDURE — 99231 SBSQ HOSP IP/OBS SF/LOW 25: CPT | Performed by: NURSE PRACTITIONER

## 2023-05-11 PROCEDURE — 36415 COLL VENOUS BLD VENIPUNCTURE: CPT

## 2023-05-11 PROCEDURE — 97116 GAIT TRAINING THERAPY: CPT

## 2023-05-11 PROCEDURE — 93005 ELECTROCARDIOGRAM TRACING: CPT | Performed by: HOSPITALIST

## 2023-05-11 PROCEDURE — 80053 COMPREHEN METABOLIC PANEL: CPT

## 2023-05-11 PROCEDURE — 6370000000 HC RX 637 (ALT 250 FOR IP): Performed by: HOSPITALIST

## 2023-05-11 PROCEDURE — 2580000003 HC RX 258: Performed by: INTERNAL MEDICINE

## 2023-05-11 PROCEDURE — 82962 GLUCOSE BLOOD TEST: CPT

## 2023-05-11 PROCEDURE — 2580000003 HC RX 258: Performed by: HOSPITALIST

## 2023-05-11 PROCEDURE — 83735 ASSAY OF MAGNESIUM: CPT

## 2023-05-11 PROCEDURE — 2500000003 HC RX 250 WO HCPCS: Performed by: INTERNAL MEDICINE

## 2023-05-11 PROCEDURE — 2060000000 HC ICU INTERMEDIATE R&B

## 2023-05-11 PROCEDURE — 83880 ASSAY OF NATRIURETIC PEPTIDE: CPT

## 2023-05-11 RX ORDER — ACETAZOLAMIDE 500 MG/5ML
500 INJECTION, POWDER, LYOPHILIZED, FOR SOLUTION INTRAVENOUS ONCE
Status: DISCONTINUED | OUTPATIENT
Start: 2023-05-11 | End: 2023-05-11 | Stop reason: CLARIF

## 2023-05-11 RX ORDER — INSULIN GLARGINE 100 [IU]/ML
12 INJECTION, SOLUTION SUBCUTANEOUS NIGHTLY
Status: DISCONTINUED | OUTPATIENT
Start: 2023-05-11 | End: 2023-05-24 | Stop reason: HOSPADM

## 2023-05-11 RX ADMIN — BUMETANIDE 2 MG/HR: 0.25 INJECTION INTRAMUSCULAR; INTRAVENOUS at 05:04

## 2023-05-11 RX ADMIN — SODIUM CHLORIDE, PRESERVATIVE FREE 10 ML: 5 INJECTION INTRAVENOUS at 23:00

## 2023-05-11 RX ADMIN — CHLORHEXIDINE GLUCONATE 15 ML: 1.2 RINSE ORAL at 23:00

## 2023-05-11 RX ADMIN — AMIODARONE HYDROCHLORIDE 200 MG: 200 TABLET ORAL at 09:00

## 2023-05-11 RX ADMIN — DIPHENHYDRAMINE HYDROCHLORIDE 25 MG: 25 CAPSULE ORAL at 23:11

## 2023-05-11 RX ADMIN — INSULIN GLARGINE 12 UNITS: 100 INJECTION, SOLUTION SUBCUTANEOUS at 21:30

## 2023-05-11 RX ADMIN — SENNOSIDES AND DOCUSATE SODIUM 2 TABLET: 50; 8.6 TABLET ORAL at 09:00

## 2023-05-11 RX ADMIN — POTASSIUM CHLORIDE 40 MEQ: 750 TABLET, FILM COATED, EXTENDED RELEASE ORAL at 23:00

## 2023-05-11 RX ADMIN — POLYETHYLENE GLYCOL 3350 17 G: 17 POWDER, FOR SOLUTION ORAL at 09:00

## 2023-05-11 RX ADMIN — SENNOSIDES AND DOCUSATE SODIUM 2 TABLET: 50; 8.6 TABLET ORAL at 23:00

## 2023-05-11 RX ADMIN — AMIODARONE HYDROCHLORIDE 200 MG: 200 TABLET ORAL at 23:00

## 2023-05-11 RX ADMIN — APIXABAN 5 MG: 5 TABLET, FILM COATED ORAL at 09:00

## 2023-05-11 RX ADMIN — BUMETANIDE 2 MG/HR: 0.25 INJECTION INTRAMUSCULAR; INTRAVENOUS at 07:35

## 2023-05-11 RX ADMIN — Medication 1 UNITS: at 09:00

## 2023-05-11 RX ADMIN — ROSUVASTATIN 10 MG: 10 TABLET, FILM COATED ORAL at 23:00

## 2023-05-11 RX ADMIN — CHLOROTHIAZIDE SODIUM 250 MG: 500 INJECTION, POWDER, LYOPHILIZED, FOR SOLUTION INTRAVENOUS at 11:16

## 2023-05-11 RX ADMIN — DOBUTAMINE HYDROCHLORIDE 2.5 MCG/KG/MIN: 200 INJECTION INTRAVENOUS at 01:58

## 2023-05-11 RX ADMIN — POTASSIUM CHLORIDE 40 MEQ: 750 TABLET, FILM COATED, EXTENDED RELEASE ORAL at 09:00

## 2023-05-11 RX ADMIN — CHLORHEXIDINE GLUCONATE 15 ML: 1.2 RINSE ORAL at 09:01

## 2023-05-11 RX ADMIN — CHLOROTHIAZIDE SODIUM 250 MG: 500 INJECTION, POWDER, LYOPHILIZED, FOR SOLUTION INTRAVENOUS at 23:00

## 2023-05-11 RX ADMIN — APIXABAN 5 MG: 5 TABLET, FILM COATED ORAL at 21:30

## 2023-05-11 RX ADMIN — ACETAZOLAMIDE SODIUM 500 MG: 500 INJECTION, POWDER, LYOPHILIZED, FOR SOLUTION INTRAVENOUS at 10:06

## 2023-05-11 ASSESSMENT — PAIN SCALES - GENERAL
PAINLEVEL_OUTOF10: 0

## 2023-05-12 LAB
ALBUMIN SERPL-MCNC: 4.1 G/DL (ref 3.5–5)
ALBUMIN/GLOB SERPL: 1.2 (ref 1.1–2.2)
ALP SERPL-CCNC: 63 U/L (ref 45–117)
ALT SERPL-CCNC: 91 U/L (ref 12–78)
ANION GAP SERPL CALC-SCNC: 9 MMOL/L (ref 5–15)
AST SERPL-CCNC: 39 U/L (ref 15–37)
BILIRUB SERPL-MCNC: 1.5 MG/DL (ref 0.2–1)
BUN SERPL-MCNC: 74 MG/DL (ref 6–20)
BUN/CREAT SERPL: 24 (ref 12–20)
CALCIUM SERPL-MCNC: 9.9 MG/DL (ref 8.5–10.1)
CHLORIDE SERPL-SCNC: 98 MMOL/L (ref 97–108)
CO2 SERPL-SCNC: 29 MMOL/L (ref 21–32)
CREAT SERPL-MCNC: 3.07 MG/DL (ref 0.7–1.3)
EKG ATRIAL RATE: 116 BPM
EKG DIAGNOSIS: NORMAL
EKG P-R INTERVAL: 128 MS
EKG Q-T INTERVAL: 400 MS
EKG QRS DURATION: 150 MS
EKG QTC CALCULATION (BAZETT): 556 MS
EKG R AXIS: -64 DEGREES
EKG T AXIS: 75 DEGREES
EKG VENTRICULAR RATE: 116 BPM
ERYTHROCYTE [DISTWIDTH] IN BLOOD BY AUTOMATED COUNT: 17.2 % (ref 11.5–14.5)
GLOBULIN SER CALC-MCNC: 3.3 G/DL (ref 2–4)
GLUCOSE BLD STRIP.AUTO-MCNC: 117 MG/DL (ref 65–117)
GLUCOSE BLD STRIP.AUTO-MCNC: 127 MG/DL (ref 65–117)
GLUCOSE BLD STRIP.AUTO-MCNC: 170 MG/DL (ref 65–117)
GLUCOSE BLD STRIP.AUTO-MCNC: 242 MG/DL (ref 65–117)
GLUCOSE SERPL-MCNC: 121 MG/DL (ref 65–100)
HCT VFR BLD AUTO: 41.3 % (ref 36.6–50.3)
HGB BLD-MCNC: 12.9 G/DL (ref 12.1–17)
MAGNESIUM SERPL-MCNC: 3 MG/DL (ref 1.6–2.4)
MCH RBC QN AUTO: 34.4 PG (ref 26–34)
MCHC RBC AUTO-ENTMCNC: 31.2 G/DL (ref 30–36.5)
MCV RBC AUTO: 110.1 FL (ref 80–99)
NRBC # BLD: 0 K/UL (ref 0–0.01)
NRBC BLD-RTO: 0 PER 100 WBC
NT PRO BNP: ABNORMAL PG/ML
PLATELET # BLD AUTO: 195 K/UL (ref 150–400)
PMV BLD AUTO: 11.7 FL (ref 8.9–12.9)
POTASSIUM SERPL-SCNC: 3.9 MMOL/L (ref 3.5–5.1)
PROT SERPL-MCNC: 7.4 G/DL (ref 6.4–8.2)
RBC # BLD AUTO: 3.75 M/UL (ref 4.1–5.7)
SERVICE CMNT-IMP: ABNORMAL
SERVICE CMNT-IMP: NORMAL
SODIUM SERPL-SCNC: 136 MMOL/L (ref 136–145)
WBC # BLD AUTO: 9.6 K/UL (ref 4.1–11.1)

## 2023-05-12 PROCEDURE — 2500000003 HC RX 250 WO HCPCS: Performed by: INTERNAL MEDICINE

## 2023-05-12 PROCEDURE — 6370000000 HC RX 637 (ALT 250 FOR IP): Performed by: HOSPITALIST

## 2023-05-12 PROCEDURE — 6370000000 HC RX 637 (ALT 250 FOR IP): Performed by: FAMILY MEDICINE

## 2023-05-12 PROCEDURE — 80053 COMPREHEN METABOLIC PANEL: CPT

## 2023-05-12 PROCEDURE — 6370000000 HC RX 637 (ALT 250 FOR IP)

## 2023-05-12 PROCEDURE — 83880 ASSAY OF NATRIURETIC PEPTIDE: CPT

## 2023-05-12 PROCEDURE — 36415 COLL VENOUS BLD VENIPUNCTURE: CPT

## 2023-05-12 PROCEDURE — 2060000000 HC ICU INTERMEDIATE R&B

## 2023-05-12 PROCEDURE — 2580000003 HC RX 258: Performed by: INTERNAL MEDICINE

## 2023-05-12 PROCEDURE — 6360000002 HC RX W HCPCS: Performed by: INTERNAL MEDICINE

## 2023-05-12 PROCEDURE — 6370000000 HC RX 637 (ALT 250 FOR IP): Performed by: INTERNAL MEDICINE

## 2023-05-12 PROCEDURE — 85027 COMPLETE CBC AUTOMATED: CPT

## 2023-05-12 PROCEDURE — 82962 GLUCOSE BLOOD TEST: CPT

## 2023-05-12 PROCEDURE — 93010 ELECTROCARDIOGRAM REPORT: CPT | Performed by: INTERNAL MEDICINE

## 2023-05-12 PROCEDURE — 2580000003 HC RX 258: Performed by: HOSPITALIST

## 2023-05-12 PROCEDURE — 94640 AIRWAY INHALATION TREATMENT: CPT

## 2023-05-12 PROCEDURE — 83735 ASSAY OF MAGNESIUM: CPT

## 2023-05-12 PROCEDURE — 99231 SBSQ HOSP IP/OBS SF/LOW 25: CPT | Performed by: NURSE PRACTITIONER

## 2023-05-12 RX ORDER — BUMETANIDE 0.25 MG/ML
2 INJECTION INTRAMUSCULAR; INTRAVENOUS 3 TIMES DAILY
Status: DISCONTINUED | OUTPATIENT
Start: 2023-05-12 | End: 2023-05-12

## 2023-05-12 RX ORDER — BUMETANIDE 0.25 MG/ML
2 INJECTION INTRAMUSCULAR; INTRAVENOUS
Status: DISCONTINUED | OUTPATIENT
Start: 2023-05-12 | End: 2023-05-13

## 2023-05-12 RX ADMIN — POTASSIUM CHLORIDE 40 MEQ: 750 TABLET, FILM COATED, EXTENDED RELEASE ORAL at 22:43

## 2023-05-12 RX ADMIN — INSULIN GLARGINE 12 UNITS: 100 INJECTION, SOLUTION SUBCUTANEOUS at 22:42

## 2023-05-12 RX ADMIN — AMIODARONE HYDROCHLORIDE 200 MG: 200 TABLET ORAL at 08:43

## 2023-05-12 RX ADMIN — CHLORHEXIDINE GLUCONATE 15 ML: 1.2 RINSE ORAL at 08:43

## 2023-05-12 RX ADMIN — Medication 1 UNITS: at 11:48

## 2023-05-12 RX ADMIN — SENNOSIDES AND DOCUSATE SODIUM 2 TABLET: 50; 8.6 TABLET ORAL at 22:43

## 2023-05-12 RX ADMIN — POLYETHYLENE GLYCOL 3350 17 G: 17 POWDER, FOR SOLUTION ORAL at 08:43

## 2023-05-12 RX ADMIN — BUMETANIDE 2 MG: 0.25 INJECTION INTRAMUSCULAR; INTRAVENOUS at 16:44

## 2023-05-12 RX ADMIN — AMIODARONE HYDROCHLORIDE 200 MG: 200 TABLET ORAL at 22:43

## 2023-05-12 RX ADMIN — BUMETANIDE 2 MG/HR: 0.25 INJECTION INTRAMUSCULAR; INTRAVENOUS at 01:55

## 2023-05-12 RX ADMIN — APIXABAN 5 MG: 5 TABLET, FILM COATED ORAL at 22:43

## 2023-05-12 RX ADMIN — SODIUM CHLORIDE, PRESERVATIVE FREE 10 ML: 5 INJECTION INTRAVENOUS at 22:42

## 2023-05-12 RX ADMIN — POTASSIUM CHLORIDE 40 MEQ: 750 TABLET, FILM COATED, EXTENDED RELEASE ORAL at 08:43

## 2023-05-12 RX ADMIN — CHLOROTHIAZIDE SODIUM 250 MG: 500 INJECTION, POWDER, LYOPHILIZED, FOR SOLUTION INTRAVENOUS at 09:04

## 2023-05-12 RX ADMIN — ROSUVASTATIN 10 MG: 10 TABLET, FILM COATED ORAL at 22:43

## 2023-05-12 RX ADMIN — SENNOSIDES AND DOCUSATE SODIUM 2 TABLET: 50; 8.6 TABLET ORAL at 08:43

## 2023-05-12 RX ADMIN — DIPHENHYDRAMINE HYDROCHLORIDE 25 MG: 25 CAPSULE ORAL at 22:43

## 2023-05-12 RX ADMIN — APIXABAN 5 MG: 5 TABLET, FILM COATED ORAL at 08:43

## 2023-05-12 ASSESSMENT — PAIN SCALES - GENERAL
PAINLEVEL_OUTOF10: 0

## 2023-05-13 LAB
ALBUMIN SERPL-MCNC: 4.1 G/DL (ref 3.5–5)
ALBUMIN/GLOB SERPL: 1.2 (ref 1.1–2.2)
ALP SERPL-CCNC: 64 U/L (ref 45–117)
ALT SERPL-CCNC: 82 U/L (ref 12–78)
ANION GAP SERPL CALC-SCNC: 10 MMOL/L (ref 5–15)
AST SERPL-CCNC: 44 U/L (ref 15–37)
BILIRUB SERPL-MCNC: 1.3 MG/DL (ref 0.2–1)
BUN SERPL-MCNC: 81 MG/DL (ref 6–20)
BUN/CREAT SERPL: 24 (ref 12–20)
CALCIUM SERPL-MCNC: 9.6 MG/DL (ref 8.5–10.1)
CHLORIDE SERPL-SCNC: 98 MMOL/L (ref 97–108)
CO2 SERPL-SCNC: 28 MMOL/L (ref 21–32)
CREAT SERPL-MCNC: 3.38 MG/DL (ref 0.7–1.3)
ERYTHROCYTE [DISTWIDTH] IN BLOOD BY AUTOMATED COUNT: 17 % (ref 11.5–14.5)
GLOBULIN SER CALC-MCNC: 3.3 G/DL (ref 2–4)
GLUCOSE BLD STRIP.AUTO-MCNC: 120 MG/DL (ref 65–117)
GLUCOSE BLD STRIP.AUTO-MCNC: 126 MG/DL (ref 65–117)
GLUCOSE BLD STRIP.AUTO-MCNC: 217 MG/DL (ref 65–117)
GLUCOSE BLD STRIP.AUTO-MCNC: 218 MG/DL (ref 65–117)
GLUCOSE BLD STRIP.AUTO-MCNC: 224 MG/DL (ref 65–117)
GLUCOSE SERPL-MCNC: 104 MG/DL (ref 65–100)
HCT VFR BLD AUTO: 41.6 % (ref 36.6–50.3)
HGB BLD-MCNC: 13.2 G/DL (ref 12.1–17)
MAGNESIUM SERPL-MCNC: 3.3 MG/DL (ref 1.6–2.4)
MCH RBC QN AUTO: 34.1 PG (ref 26–34)
MCHC RBC AUTO-ENTMCNC: 31.7 G/DL (ref 30–36.5)
MCV RBC AUTO: 107.5 FL (ref 80–99)
NRBC # BLD: 0 K/UL (ref 0–0.01)
NRBC BLD-RTO: 0 PER 100 WBC
PLATELET # BLD AUTO: 212 K/UL (ref 150–400)
PMV BLD AUTO: 11.3 FL (ref 8.9–12.9)
POTASSIUM SERPL-SCNC: 4 MMOL/L (ref 3.5–5.1)
PROT SERPL-MCNC: 7.4 G/DL (ref 6.4–8.2)
RBC # BLD AUTO: 3.87 M/UL (ref 4.1–5.7)
SERVICE CMNT-IMP: ABNORMAL
SODIUM SERPL-SCNC: 136 MMOL/L (ref 136–145)
WBC # BLD AUTO: 11.1 K/UL (ref 4.1–11.1)

## 2023-05-13 PROCEDURE — 2580000003 HC RX 258: Performed by: HOSPITALIST

## 2023-05-13 PROCEDURE — 36415 COLL VENOUS BLD VENIPUNCTURE: CPT

## 2023-05-13 PROCEDURE — 6370000000 HC RX 637 (ALT 250 FOR IP): Performed by: HOSPITALIST

## 2023-05-13 PROCEDURE — 2060000000 HC ICU INTERMEDIATE R&B

## 2023-05-13 PROCEDURE — 83735 ASSAY OF MAGNESIUM: CPT

## 2023-05-13 PROCEDURE — 6370000000 HC RX 637 (ALT 250 FOR IP): Performed by: FAMILY MEDICINE

## 2023-05-13 PROCEDURE — P9047 ALBUMIN (HUMAN), 25%, 50ML: HCPCS | Performed by: INTERNAL MEDICINE

## 2023-05-13 PROCEDURE — 99231 SBSQ HOSP IP/OBS SF/LOW 25: CPT | Performed by: NURSE PRACTITIONER

## 2023-05-13 PROCEDURE — 80053 COMPREHEN METABOLIC PANEL: CPT

## 2023-05-13 PROCEDURE — 6360000002 HC RX W HCPCS: Performed by: INTERNAL MEDICINE

## 2023-05-13 PROCEDURE — 94640 AIRWAY INHALATION TREATMENT: CPT

## 2023-05-13 PROCEDURE — 6360000002 HC RX W HCPCS: Performed by: HOSPITALIST

## 2023-05-13 PROCEDURE — 2580000003 HC RX 258: Performed by: INTERNAL MEDICINE

## 2023-05-13 PROCEDURE — 2500000003 HC RX 250 WO HCPCS: Performed by: INTERNAL MEDICINE

## 2023-05-13 PROCEDURE — 85027 COMPLETE CBC AUTOMATED: CPT

## 2023-05-13 PROCEDURE — 6370000000 HC RX 637 (ALT 250 FOR IP): Performed by: INTERNAL MEDICINE

## 2023-05-13 PROCEDURE — 82962 GLUCOSE BLOOD TEST: CPT

## 2023-05-13 PROCEDURE — 6370000000 HC RX 637 (ALT 250 FOR IP)

## 2023-05-13 RX ORDER — SODIUM CHLORIDE 9 MG/ML
INJECTION, SOLUTION INTRAVENOUS CONTINUOUS
Status: DISPENSED | OUTPATIENT
Start: 2023-05-13 | End: 2023-05-13

## 2023-05-13 RX ORDER — ALBUMIN (HUMAN) 12.5 G/50ML
25 SOLUTION INTRAVENOUS ONCE
Status: COMPLETED | OUTPATIENT
Start: 2023-05-13 | End: 2023-05-13

## 2023-05-13 RX ORDER — PROCHLORPERAZINE EDISYLATE 5 MG/ML
5 INJECTION INTRAMUSCULAR; INTRAVENOUS ONCE
Status: COMPLETED | OUTPATIENT
Start: 2023-05-13 | End: 2023-05-13

## 2023-05-13 RX ADMIN — BUMETANIDE 2 MG: 0.25 INJECTION INTRAMUSCULAR; INTRAVENOUS at 06:57

## 2023-05-13 RX ADMIN — PROCHLORPERAZINE EDISYLATE 5 MG: 5 INJECTION INTRAMUSCULAR; INTRAVENOUS at 12:00

## 2023-05-13 RX ADMIN — INSULIN GLARGINE 12 UNITS: 100 INJECTION, SOLUTION SUBCUTANEOUS at 23:24

## 2023-05-13 RX ADMIN — Medication 1 UNITS: at 12:03

## 2023-05-13 RX ADMIN — POTASSIUM BICARBONATE 50 MEQ: 977.5 TABLET, EFFERVESCENT ORAL at 14:18

## 2023-05-13 RX ADMIN — Medication 1 UNITS: at 17:32

## 2023-05-13 RX ADMIN — POTASSIUM CHLORIDE 40 MEQ: 750 TABLET, FILM COATED, EXTENDED RELEASE ORAL at 09:32

## 2023-05-13 RX ADMIN — APIXABAN 5 MG: 5 TABLET, FILM COATED ORAL at 23:25

## 2023-05-13 RX ADMIN — AMIODARONE HYDROCHLORIDE 200 MG: 200 TABLET ORAL at 09:32

## 2023-05-13 RX ADMIN — SODIUM CHLORIDE, PRESERVATIVE FREE 10 ML: 5 INJECTION INTRAVENOUS at 23:27

## 2023-05-13 RX ADMIN — ROSUVASTATIN 10 MG: 10 TABLET, FILM COATED ORAL at 23:25

## 2023-05-13 RX ADMIN — DOBUTAMINE HYDROCHLORIDE 2.5 MCG/KG/MIN: 200 INJECTION INTRAVENOUS at 23:46

## 2023-05-13 RX ADMIN — CHLORHEXIDINE GLUCONATE 15 ML: 1.2 RINSE ORAL at 09:33

## 2023-05-13 RX ADMIN — SENNOSIDES AND DOCUSATE SODIUM 2 TABLET: 50; 8.6 TABLET ORAL at 23:25

## 2023-05-13 RX ADMIN — SODIUM CHLORIDE, PRESERVATIVE FREE 10 ML: 5 INJECTION INTRAVENOUS at 09:33

## 2023-05-13 RX ADMIN — APIXABAN 5 MG: 5 TABLET, FILM COATED ORAL at 09:32

## 2023-05-13 RX ADMIN — SENNOSIDES AND DOCUSATE SODIUM 2 TABLET: 50; 8.6 TABLET ORAL at 09:32

## 2023-05-13 RX ADMIN — DIPHENHYDRAMINE HYDROCHLORIDE 25 MG: 25 CAPSULE ORAL at 23:25

## 2023-05-13 RX ADMIN — CHLOROTHIAZIDE SODIUM 250 MG: 500 INJECTION, POWDER, LYOPHILIZED, FOR SOLUTION INTRAVENOUS at 09:44

## 2023-05-13 RX ADMIN — SODIUM CHLORIDE: 9 INJECTION, SOLUTION INTRAVENOUS at 14:18

## 2023-05-13 RX ADMIN — POLYETHYLENE GLYCOL 3350 17 G: 17 POWDER, FOR SOLUTION ORAL at 09:32

## 2023-05-13 RX ADMIN — ONDANSETRON 4 MG: 2 INJECTION INTRAMUSCULAR; INTRAVENOUS at 06:57

## 2023-05-13 RX ADMIN — ALBUMIN (HUMAN) 25 G: 0.25 INJECTION, SOLUTION INTRAVENOUS at 17:32

## 2023-05-13 ASSESSMENT — PAIN SCALES - GENERAL
PAINLEVEL_OUTOF10: 0

## 2023-05-14 LAB
ANION GAP SERPL CALC-SCNC: 10 MMOL/L (ref 5–15)
BUN SERPL-MCNC: 85 MG/DL (ref 6–20)
BUN/CREAT SERPL: 23 (ref 12–20)
CALCIUM SERPL-MCNC: 9.8 MG/DL (ref 8.5–10.1)
CHLORIDE SERPL-SCNC: 96 MMOL/L (ref 97–108)
CO2 SERPL-SCNC: 30 MMOL/L (ref 21–32)
COMMENT:: NORMAL
CREAT SERPL-MCNC: 3.73 MG/DL (ref 0.7–1.3)
GLUCOSE BLD STRIP.AUTO-MCNC: 149 MG/DL (ref 65–117)
GLUCOSE BLD STRIP.AUTO-MCNC: 161 MG/DL (ref 65–117)
GLUCOSE BLD STRIP.AUTO-MCNC: 165 MG/DL (ref 65–117)
GLUCOSE BLD STRIP.AUTO-MCNC: 291 MG/DL (ref 65–117)
GLUCOSE SERPL-MCNC: 130 MG/DL (ref 65–100)
POTASSIUM SERPL-SCNC: 3.7 MMOL/L (ref 3.5–5.1)
SERVICE CMNT-IMP: ABNORMAL
SODIUM SERPL-SCNC: 136 MMOL/L (ref 136–145)
SPECIMEN HOLD: NORMAL

## 2023-05-14 PROCEDURE — 2580000003 HC RX 258: Performed by: HOSPITALIST

## 2023-05-14 PROCEDURE — 6370000000 HC RX 637 (ALT 250 FOR IP): Performed by: INTERNAL MEDICINE

## 2023-05-14 PROCEDURE — 6370000000 HC RX 637 (ALT 250 FOR IP): Performed by: HOSPITALIST

## 2023-05-14 PROCEDURE — 2060000000 HC ICU INTERMEDIATE R&B

## 2023-05-14 PROCEDURE — 6360000002 HC RX W HCPCS: Performed by: INTERNAL MEDICINE

## 2023-05-14 PROCEDURE — 82962 GLUCOSE BLOOD TEST: CPT

## 2023-05-14 PROCEDURE — 80048 BASIC METABOLIC PNL TOTAL CA: CPT

## 2023-05-14 PROCEDURE — 6370000000 HC RX 637 (ALT 250 FOR IP)

## 2023-05-14 PROCEDURE — 36415 COLL VENOUS BLD VENIPUNCTURE: CPT

## 2023-05-14 PROCEDURE — 94640 AIRWAY INHALATION TREATMENT: CPT

## 2023-05-14 RX ORDER — HYDROXYZINE HYDROCHLORIDE 10 MG/1
10 TABLET, FILM COATED ORAL ONCE
Status: COMPLETED | OUTPATIENT
Start: 2023-05-14 | End: 2023-05-14

## 2023-05-14 RX ORDER — POTASSIUM CHLORIDE 750 MG/1
40 TABLET, FILM COATED, EXTENDED RELEASE ORAL 2 TIMES DAILY
Status: DISCONTINUED | OUTPATIENT
Start: 2023-05-14 | End: 2023-05-23

## 2023-05-14 RX ADMIN — CHLORHEXIDINE GLUCONATE 15 ML: 1.2 RINSE ORAL at 21:54

## 2023-05-14 RX ADMIN — APIXABAN 5 MG: 5 TABLET, FILM COATED ORAL at 08:43

## 2023-05-14 RX ADMIN — ONDANSETRON 4 MG: 4 TABLET, ORALLY DISINTEGRATING ORAL at 23:52

## 2023-05-14 RX ADMIN — SODIUM CHLORIDE, PRESERVATIVE FREE 10 ML: 5 INJECTION INTRAVENOUS at 08:44

## 2023-05-14 RX ADMIN — Medication 2 UNITS: at 11:49

## 2023-05-14 RX ADMIN — AMIODARONE HYDROCHLORIDE 200 MG: 200 TABLET ORAL at 21:53

## 2023-05-14 RX ADMIN — ROSUVASTATIN 10 MG: 10 TABLET, FILM COATED ORAL at 21:53

## 2023-05-14 RX ADMIN — HYDROXYZINE HYDROCHLORIDE 10 MG: 10 TABLET ORAL at 18:29

## 2023-05-14 RX ADMIN — POTASSIUM CHLORIDE 40 MEQ: 750 TABLET, FILM COATED, EXTENDED RELEASE ORAL at 21:53

## 2023-05-14 RX ADMIN — SODIUM CHLORIDE, PRESERVATIVE FREE 10 ML: 5 INJECTION INTRAVENOUS at 21:54

## 2023-05-14 RX ADMIN — POTASSIUM CHLORIDE 40 MEQ: 750 TABLET, FILM COATED, EXTENDED RELEASE ORAL at 08:43

## 2023-05-14 RX ADMIN — SENNOSIDES AND DOCUSATE SODIUM 2 TABLET: 50; 8.6 TABLET ORAL at 21:53

## 2023-05-14 RX ADMIN — APIXABAN 5 MG: 5 TABLET, FILM COATED ORAL at 21:53

## 2023-05-14 RX ADMIN — DIPHENHYDRAMINE HYDROCHLORIDE 25 MG: 25 CAPSULE ORAL at 21:53

## 2023-05-14 RX ADMIN — POLYETHYLENE GLYCOL 3350 17 G: 17 POWDER, FOR SOLUTION ORAL at 08:44

## 2023-05-14 RX ADMIN — SENNOSIDES AND DOCUSATE SODIUM 2 TABLET: 50; 8.6 TABLET ORAL at 08:43

## 2023-05-14 RX ADMIN — INSULIN GLARGINE 12 UNITS: 100 INJECTION, SOLUTION SUBCUTANEOUS at 21:53

## 2023-05-14 ASSESSMENT — PAIN SCALES - GENERAL: PAINLEVEL_OUTOF10: 0

## 2023-05-15 ENCOUNTER — APPOINTMENT (OUTPATIENT)
Facility: HOSPITAL | Age: 78
DRG: 286 | End: 2023-05-15
Attending: HOSPITALIST
Payer: MEDICARE

## 2023-05-15 ENCOUNTER — ANESTHESIA EVENT (OUTPATIENT)
Facility: HOSPITAL | Age: 78
End: 2023-05-15
Payer: MEDICARE

## 2023-05-15 ENCOUNTER — ANESTHESIA (OUTPATIENT)
Facility: HOSPITAL | Age: 78
End: 2023-05-15
Payer: MEDICARE

## 2023-05-15 LAB
ANION GAP SERPL CALC-SCNC: 11 MMOL/L (ref 5–15)
BUN SERPL-MCNC: 92 MG/DL (ref 6–20)
BUN/CREAT SERPL: 21 (ref 12–20)
CALCIUM SERPL-MCNC: 9.8 MG/DL (ref 8.5–10.1)
CHLORIDE SERPL-SCNC: 94 MMOL/L (ref 97–108)
CO2 SERPL-SCNC: 28 MMOL/L (ref 21–32)
CREAT SERPL-MCNC: 4.37 MG/DL (ref 0.7–1.3)
ECHO BSA: 2.15 M2
EKG ATRIAL RATE: 74 BPM
EKG DIAGNOSIS: NORMAL
EKG P AXIS: 62 DEGREES
EKG P-R INTERVAL: 260 MS
EKG Q-T INTERVAL: 484 MS
EKG QRS DURATION: 136 MS
EKG QTC CALCULATION (BAZETT): 537 MS
EKG R AXIS: 253 DEGREES
EKG T AXIS: 41 DEGREES
EKG VENTRICULAR RATE: 74 BPM
ERYTHROCYTE [DISTWIDTH] IN BLOOD BY AUTOMATED COUNT: 16.8 % (ref 11.5–14.5)
GLUCOSE BLD STRIP.AUTO-MCNC: 111 MG/DL (ref 65–117)
GLUCOSE BLD STRIP.AUTO-MCNC: 163 MG/DL (ref 65–117)
GLUCOSE BLD STRIP.AUTO-MCNC: 181 MG/DL (ref 65–117)
GLUCOSE BLD STRIP.AUTO-MCNC: 216 MG/DL (ref 65–117)
GLUCOSE SERPL-MCNC: 157 MG/DL (ref 65–100)
HCT VFR BLD AUTO: 41.2 % (ref 36.6–50.3)
HGB BLD-MCNC: 13.1 G/DL (ref 12.1–17)
MCH RBC QN AUTO: 34.2 PG (ref 26–34)
MCHC RBC AUTO-ENTMCNC: 31.8 G/DL (ref 30–36.5)
MCV RBC AUTO: 107.6 FL (ref 80–99)
NRBC # BLD: 0 K/UL (ref 0–0.01)
NRBC BLD-RTO: 0 PER 100 WBC
PLATELET # BLD AUTO: 205 K/UL (ref 150–400)
PMV BLD AUTO: 11.6 FL (ref 8.9–12.9)
POTASSIUM SERPL-SCNC: 4.5 MMOL/L (ref 3.5–5.1)
RBC # BLD AUTO: 3.83 M/UL (ref 4.1–5.7)
SERVICE CMNT-IMP: ABNORMAL
SERVICE CMNT-IMP: NORMAL
SODIUM SERPL-SCNC: 133 MMOL/L (ref 136–145)
WBC # BLD AUTO: 12.2 K/UL (ref 4.1–11.1)

## 2023-05-15 PROCEDURE — 2500000003 HC RX 250 WO HCPCS: Performed by: STUDENT IN AN ORGANIZED HEALTH CARE EDUCATION/TRAINING PROGRAM

## 2023-05-15 PROCEDURE — 6370000000 HC RX 637 (ALT 250 FOR IP): Performed by: HOSPITALIST

## 2023-05-15 PROCEDURE — 5A2204Z RESTORATION OF CARDIAC RHYTHM, SINGLE: ICD-10-PCS | Performed by: INTERNAL MEDICINE

## 2023-05-15 PROCEDURE — 6360000002 HC RX W HCPCS: Performed by: NURSE PRACTITIONER

## 2023-05-15 PROCEDURE — 2709999900 HC NON-CHARGEABLE SUPPLY: Performed by: INTERNAL MEDICINE

## 2023-05-15 PROCEDURE — 93451 RIGHT HEART CATH: CPT | Performed by: INTERNAL MEDICINE

## 2023-05-15 PROCEDURE — 3700000000 HC ANESTHESIA ATTENDED CARE: Performed by: INTERNAL MEDICINE

## 2023-05-15 PROCEDURE — B2141ZZ FLUOROSCOPY OF RIGHT HEART USING LOW OSMOLAR CONTRAST: ICD-10-PCS | Performed by: INTERNAL MEDICINE

## 2023-05-15 PROCEDURE — 80048 BASIC METABOLIC PNL TOTAL CA: CPT

## 2023-05-15 PROCEDURE — 36556 INSERT NON-TUNNEL CV CATH: CPT

## 2023-05-15 PROCEDURE — C1751 CATH, INF, PER/CENT/MIDLINE: HCPCS | Performed by: INTERNAL MEDICINE

## 2023-05-15 PROCEDURE — 36415 COLL VENOUS BLD VENIPUNCTURE: CPT

## 2023-05-15 PROCEDURE — C1894 INTRO/SHEATH, NON-LASER: HCPCS | Performed by: INTERNAL MEDICINE

## 2023-05-15 PROCEDURE — 93005 ELECTROCARDIOGRAM TRACING: CPT | Performed by: INTERNAL MEDICINE

## 2023-05-15 PROCEDURE — 86706 HEP B SURFACE ANTIBODY: CPT

## 2023-05-15 PROCEDURE — 2580000003 HC RX 258: Performed by: HOSPITALIST

## 2023-05-15 PROCEDURE — 4A023N6 MEASUREMENT OF CARDIAC SAMPLING AND PRESSURE, RIGHT HEART, PERCUTANEOUS APPROACH: ICD-10-PCS | Performed by: INTERNAL MEDICINE

## 2023-05-15 PROCEDURE — 5A1D70Z PERFORMANCE OF URINARY FILTRATION, INTERMITTENT, LESS THAN 6 HOURS PER DAY: ICD-10-PCS | Performed by: INTERNAL MEDICINE

## 2023-05-15 PROCEDURE — 2060000000 HC ICU INTERMEDIATE R&B

## 2023-05-15 PROCEDURE — C1713 ANCHOR/SCREW BN/BN,TIS/BN: HCPCS | Performed by: INTERNAL MEDICINE

## 2023-05-15 PROCEDURE — C1752 CATH,HEMODIALYSIS,SHORT-TERM: HCPCS

## 2023-05-15 PROCEDURE — 71045 X-RAY EXAM CHEST 1 VIEW: CPT

## 2023-05-15 PROCEDURE — 87340 HEPATITIS B SURFACE AG IA: CPT

## 2023-05-15 PROCEDURE — 2500000003 HC RX 250 WO HCPCS: Performed by: INTERNAL MEDICINE

## 2023-05-15 PROCEDURE — 2709999900 HC NON-CHARGEABLE SUPPLY

## 2023-05-15 PROCEDURE — 76937 US GUIDE VASCULAR ACCESS: CPT | Performed by: INTERNAL MEDICINE

## 2023-05-15 PROCEDURE — 82962 GLUCOSE BLOOD TEST: CPT

## 2023-05-15 PROCEDURE — C1894 INTRO/SHEATH, NON-LASER: HCPCS

## 2023-05-15 PROCEDURE — 92960 CARDIOVERSION ELECTRIC EXT: CPT | Performed by: INTERNAL MEDICINE

## 2023-05-15 PROCEDURE — 6370000000 HC RX 637 (ALT 250 FOR IP): Performed by: INTERNAL MEDICINE

## 2023-05-15 PROCEDURE — 99231 SBSQ HOSP IP/OBS SF/LOW 25: CPT | Performed by: NURSE PRACTITIONER

## 2023-05-15 PROCEDURE — 85027 COMPLETE CBC AUTOMATED: CPT

## 2023-05-15 RX ORDER — ALBUMIN, HUMAN INJ 5% 5 %
25 SOLUTION INTRAVENOUS AS NEEDED
Status: DISCONTINUED | OUTPATIENT
Start: 2023-05-15 | End: 2023-05-15

## 2023-05-15 RX ORDER — DOBUTAMINE HYDROCHLORIDE 200 MG/100ML
2.5 INJECTION INTRAVENOUS CONTINUOUS
Status: DISCONTINUED | OUTPATIENT
Start: 2023-05-15 | End: 2023-05-18

## 2023-05-15 RX ORDER — LIDOCAINE HYDROCHLORIDE 10 MG/ML
INJECTION, SOLUTION INFILTRATION; PERINEURAL PRN
Status: COMPLETED | OUTPATIENT
Start: 2023-05-15 | End: 2023-05-15

## 2023-05-15 RX ORDER — LIDOCAINE HYDROCHLORIDE 10 MG/ML
INJECTION, SOLUTION INFILTRATION; PERINEURAL PRN
Status: DISCONTINUED | OUTPATIENT
Start: 2023-05-15 | End: 2023-05-15 | Stop reason: HOSPADM

## 2023-05-15 RX ADMIN — INSULIN GLARGINE 12 UNITS: 100 INJECTION, SOLUTION SUBCUTANEOUS at 20:59

## 2023-05-15 RX ADMIN — SENNOSIDES AND DOCUSATE SODIUM 2 TABLET: 50; 8.6 TABLET ORAL at 20:59

## 2023-05-15 RX ADMIN — PROPOFOL 30 MG: 10 INJECTION, EMULSION INTRAVENOUS at 08:06

## 2023-05-15 RX ADMIN — DIPHENHYDRAMINE HYDROCHLORIDE 25 MG: 25 CAPSULE ORAL at 20:59

## 2023-05-15 RX ADMIN — LIDOCAINE HYDROCHLORIDE 5 ML: 10 INJECTION, SOLUTION INFILTRATION; PERINEURAL at 16:08

## 2023-05-15 RX ADMIN — SODIUM CHLORIDE, PRESERVATIVE FREE 10 ML: 5 INJECTION INTRAVENOUS at 21:01

## 2023-05-15 RX ADMIN — APIXABAN 5 MG: 5 TABLET, FILM COATED ORAL at 07:55

## 2023-05-15 RX ADMIN — CHLORHEXIDINE GLUCONATE 15 ML: 1.2 RINSE ORAL at 09:47

## 2023-05-15 RX ADMIN — PROPOFOL 30 MG: 10 INJECTION, EMULSION INTRAVENOUS at 08:04

## 2023-05-15 RX ADMIN — CHLORHEXIDINE GLUCONATE 15 ML: 1.2 RINSE ORAL at 21:01

## 2023-05-15 RX ADMIN — AMIODARONE HYDROCHLORIDE 200 MG: 200 TABLET ORAL at 20:59

## 2023-05-15 RX ADMIN — ROSUVASTATIN 10 MG: 10 TABLET, FILM COATED ORAL at 20:59

## 2023-05-15 RX ADMIN — APIXABAN 5 MG: 5 TABLET, FILM COATED ORAL at 20:59

## 2023-05-15 RX ADMIN — ONDANSETRON 4 MG: 4 TABLET, ORALLY DISINTEGRATING ORAL at 20:59

## 2023-05-15 RX ADMIN — AMIODARONE HYDROCHLORIDE 200 MG: 200 TABLET ORAL at 09:48

## 2023-05-15 RX ADMIN — DOBUTAMINE HYDROCHLORIDE 2.5 MCG/KG/MIN: 200 INJECTION INTRAVENOUS at 12:16

## 2023-05-15 ASSESSMENT — ENCOUNTER SYMPTOMS
VOMITING: 0
SHORTNESS OF BREATH: 0
NAUSEA: 0
COUGH: 0

## 2023-05-15 NOTE — ANESTHESIA PRE PROCEDURE
PREGTESTUR, PREGSERUM, HCG, HCGQUANT     ABGs:   Lab Results   Component Value Date/Time    PHART 7.45 07/21/2021 09:36 PM    PO2ART 150 07/21/2021 09:36 PM    RIZ8QOZ 36.7 07/21/2021 09:36 PM    WIB5HNI 25.8 07/21/2021 09:36 PM    BEART 1.9 07/21/2021 09:36 PM        Type & Screen (If Applicable):  No results found for: LABABO, LABRH    Drug/Infectious Status (If Applicable):  Lab Results   Component Value Date/Time    HEPCAB NONREACTIVE 03/12/2016 01:55 PM       COVID-19 Screening (If Applicable):   Lab Results   Component Value Date/Time    COVID19 Please find results under separate order 01/22/2021 06:41 AM    COVID19 Not Detected 01/22/2021 06:41 AM           Anesthesia Evaluation  Patient summary reviewed and Nursing notes reviewed  Airway: Mallampati: II          Dental: normal exam   (+) poor dentition      Pulmonary:Negative Pulmonary ROS and normal exam                               Cardiovascular:Negative CV ROS  Exercise tolerance: good (>4 METS),   (+) hypertension:, CAD:, CHF:,                   Neuro/Psych:   Negative Neuro/Psych ROS              GI/Hepatic/Renal: Neg GI/Hepatic/Renal ROS            Endo/Other: Negative Endo/Other ROS                    Abdominal: normal exam            Vascular: negative vascular ROS. Other Findings:           Anesthesia Plan      MAC     ASA 3       Induction: intravenous. Anesthetic plan and risks discussed with patient. Use of blood products discussed with patient whom consented to blood products. Plan discussed with CRNA.     Attending anesthesiologist reviewed and agrees with Raymundo Libman, MD   5/15/2023

## 2023-05-15 NOTE — ANESTHESIA POSTPROCEDURE EVALUATION
Department of Anesthesiology  Postprocedure Note    Patient: Joe Andres  MRN: [id number]  YOB: 1945  Date of evaluation: 5/15/2023      Procedure Summary     Date: 05/15/23 Room / Location: [address] [place] [address] CATH LAB 1 / [address] [place] [address] CARDIAC CATH LAB    Anesthesia Start: 0802 Anesthesia Stop: 1691    Procedures:       Right heart cath      Ep cardioversion Diagnosis:       Diastolic heart failure, unspecified HF chronicity (HCC)      (heart failure)    Providers: Simona Cardenas MD Responsible Provider: Oc Panda MD    Anesthesia Type: MAC ASA Status: 3          Anesthesia Type: MAC    Daisy Phase I: Daisy Score: 10    Daisy Phase II:        Anesthesia Post Evaluation    Patient location during evaluation: PACU  Patient participation: complete - patient participated  Level of consciousness: awake  Airway patency: patent  Nausea & Vomiting: no nausea  Complications: no  Cardiovascular status: blood pressure returned to baseline and hemodynamically stable  Respiratory status: acceptable  Hydration status: stable  Multimodal analgesia pain management approach

## 2023-05-16 LAB
ALBUMIN SERPL-MCNC: 4 G/DL (ref 3.5–5)
ALBUMIN/GLOB SERPL: 1.2 (ref 1.1–2.2)
ALP SERPL-CCNC: 61 U/L (ref 45–117)
ALT SERPL-CCNC: 1852 U/L (ref 12–78)
AST SERPL-CCNC: >2000 U/L (ref 15–37)
BILIRUB DIRECT SERPL-MCNC: 0.7 MG/DL (ref 0–0.2)
BILIRUB SERPL-MCNC: 1.7 MG/DL (ref 0.2–1)
EKG ATRIAL RATE: 72 BPM
EKG DIAGNOSIS: NORMAL
EKG P AXIS: 69 DEGREES
EKG P AXIS: 76 DEGREES
EKG P AXIS: 86 DEGREES
EKG P-R INTERVAL: 216 MS
EKG P-R INTERVAL: 224 MS
EKG P-R INTERVAL: 226 MS
EKG Q-T INTERVAL: 464 MS
EKG Q-T INTERVAL: 470 MS
EKG Q-T INTERVAL: 540 MS
EKG QRS DURATION: 156 MS
EKG QRS DURATION: 158 MS
EKG QRS DURATION: 182 MS
EKG QTC CALCULATION (BAZETT): 508 MS
EKG QTC CALCULATION (BAZETT): 514 MS
EKG QTC CALCULATION (BAZETT): 591 MS
EKG R AXIS: 184 DEGREES
EKG R AXIS: 185 DEGREES
EKG R AXIS: 258 DEGREES
EKG T AXIS: 52 DEGREES
EKG T AXIS: 52 DEGREES
EKG T AXIS: 85 DEGREES
EKG VENTRICULAR RATE: 72 BPM
GLOBULIN SER CALC-MCNC: 3.4 G/DL (ref 2–4)
GLUCOSE BLD STRIP.AUTO-MCNC: 143 MG/DL (ref 65–117)
GLUCOSE BLD STRIP.AUTO-MCNC: 192 MG/DL (ref 65–117)
GLUCOSE BLD STRIP.AUTO-MCNC: 194 MG/DL (ref 65–117)
GLUCOSE BLD STRIP.AUTO-MCNC: 198 MG/DL (ref 65–117)
HBV SURFACE AB SER QL: REACTIVE
HBV SURFACE AB SER-ACNC: 322.18 MIU/ML
HBV SURFACE AG SER QL: <0.1 INDEX
HBV SURFACE AG SER QL: NEGATIVE
PROT SERPL-MCNC: 7.4 G/DL (ref 6.4–8.2)
SERVICE CMNT-IMP: ABNORMAL

## 2023-05-16 PROCEDURE — 6370000000 HC RX 637 (ALT 250 FOR IP): Performed by: HOSPITALIST

## 2023-05-16 PROCEDURE — 6360000002 HC RX W HCPCS: Performed by: NURSE PRACTITIONER

## 2023-05-16 PROCEDURE — 99231 SBSQ HOSP IP/OBS SF/LOW 25: CPT | Performed by: NURSE PRACTITIONER

## 2023-05-16 PROCEDURE — 90935 HEMODIALYSIS ONE EVALUATION: CPT

## 2023-05-16 PROCEDURE — 6370000000 HC RX 637 (ALT 250 FOR IP): Performed by: INTERNAL MEDICINE

## 2023-05-16 PROCEDURE — 6360000002 HC RX W HCPCS: Performed by: HOSPITALIST

## 2023-05-16 PROCEDURE — 82962 GLUCOSE BLOOD TEST: CPT

## 2023-05-16 PROCEDURE — 94640 AIRWAY INHALATION TREATMENT: CPT

## 2023-05-16 PROCEDURE — 80076 HEPATIC FUNCTION PANEL: CPT

## 2023-05-16 PROCEDURE — 2580000003 HC RX 258: Performed by: HOSPITALIST

## 2023-05-16 PROCEDURE — 2060000000 HC ICU INTERMEDIATE R&B

## 2023-05-16 PROCEDURE — 36415 COLL VENOUS BLD VENIPUNCTURE: CPT

## 2023-05-16 RX ORDER — ALBUMIN (HUMAN) 12.5 G/50ML
25 SOLUTION INTRAVENOUS AS NEEDED
Status: DISCONTINUED | OUTPATIENT
Start: 2023-05-16 | End: 2023-05-24 | Stop reason: HOSPADM

## 2023-05-16 RX ORDER — PROCHLORPERAZINE EDISYLATE 5 MG/ML
5 INJECTION INTRAMUSCULAR; INTRAVENOUS EVERY 6 HOURS PRN
Status: DISCONTINUED | OUTPATIENT
Start: 2023-05-16 | End: 2023-05-24 | Stop reason: HOSPADM

## 2023-05-16 RX ADMIN — APIXABAN 5 MG: 5 TABLET, FILM COATED ORAL at 21:17

## 2023-05-16 RX ADMIN — PROCHLORPERAZINE EDISYLATE 5 MG: 5 INJECTION INTRAMUSCULAR; INTRAVENOUS at 11:32

## 2023-05-16 RX ADMIN — SODIUM CHLORIDE, PRESERVATIVE FREE 10 ML: 5 INJECTION INTRAVENOUS at 21:17

## 2023-05-16 RX ADMIN — SENNOSIDES AND DOCUSATE SODIUM 2 TABLET: 50; 8.6 TABLET ORAL at 08:57

## 2023-05-16 RX ADMIN — DOBUTAMINE HYDROCHLORIDE 2.5 MCG/KG/MIN: 200 INJECTION INTRAVENOUS at 02:47

## 2023-05-16 RX ADMIN — AMIODARONE HYDROCHLORIDE 200 MG: 200 TABLET ORAL at 08:58

## 2023-05-16 RX ADMIN — SENNOSIDES AND DOCUSATE SODIUM 2 TABLET: 50; 8.6 TABLET ORAL at 21:17

## 2023-05-16 RX ADMIN — DIPHENHYDRAMINE HYDROCHLORIDE 25 MG: 25 CAPSULE ORAL at 21:17

## 2023-05-16 RX ADMIN — SODIUM CHLORIDE, PRESERVATIVE FREE 10 ML: 5 INJECTION INTRAVENOUS at 08:58

## 2023-05-16 RX ADMIN — APIXABAN 5 MG: 5 TABLET, FILM COATED ORAL at 08:58

## 2023-05-16 RX ADMIN — PROCHLORPERAZINE EDISYLATE 5 MG: 5 INJECTION INTRAMUSCULAR; INTRAVENOUS at 17:51

## 2023-05-16 RX ADMIN — INSULIN GLARGINE 12 UNITS: 100 INJECTION, SOLUTION SUBCUTANEOUS at 21:16

## 2023-05-16 RX ADMIN — CHLORHEXIDINE GLUCONATE 15 ML: 1.2 RINSE ORAL at 21:17

## 2023-05-16 RX ADMIN — POLYETHYLENE GLYCOL 3350 17 G: 17 POWDER, FOR SOLUTION ORAL at 08:57

## 2023-05-16 ASSESSMENT — ENCOUNTER SYMPTOMS
NAUSEA: 0
SHORTNESS OF BREATH: 0
VOMITING: 0
COUGH: 0

## 2023-05-17 LAB
ALBUMIN SERPL-MCNC: 3.9 G/DL (ref 3.5–5)
ALBUMIN SERPL-MCNC: 4 G/DL (ref 3.5–5)
ALBUMIN/GLOB SERPL: 1.2 (ref 1.1–2.2)
ALBUMIN/GLOB SERPL: 1.3 (ref 1.1–2.2)
ALP SERPL-CCNC: 65 U/L (ref 45–117)
ALP SERPL-CCNC: 66 U/L (ref 45–117)
ALT SERPL-CCNC: 1606 U/L (ref 12–78)
ALT SERPL-CCNC: 1614 U/L (ref 12–78)
ANION GAP SERPL CALC-SCNC: 10 MMOL/L (ref 5–15)
AST SERPL-CCNC: 979 U/L (ref 15–37)
AST SERPL-CCNC: 984 U/L (ref 15–37)
BILIRUB DIRECT SERPL-MCNC: 0.7 MG/DL (ref 0–0.2)
BILIRUB SERPL-MCNC: 1.7 MG/DL (ref 0.2–1)
BILIRUB SERPL-MCNC: 1.7 MG/DL (ref 0.2–1)
BUN SERPL-MCNC: 82 MG/DL (ref 6–20)
BUN/CREAT SERPL: 20 (ref 12–20)
CALCIUM SERPL-MCNC: 9.5 MG/DL (ref 8.5–10.1)
CHLORIDE SERPL-SCNC: 95 MMOL/L (ref 97–108)
CO2 SERPL-SCNC: 26 MMOL/L (ref 21–32)
CREAT SERPL-MCNC: 4.08 MG/DL (ref 0.7–1.3)
ERYTHROCYTE [DISTWIDTH] IN BLOOD BY AUTOMATED COUNT: 16.2 % (ref 11.5–14.5)
GLOBULIN SER CALC-MCNC: 3.2 G/DL (ref 2–4)
GLOBULIN SER CALC-MCNC: 3.3 G/DL (ref 2–4)
GLUCOSE BLD STRIP.AUTO-MCNC: 105 MG/DL (ref 65–117)
GLUCOSE BLD STRIP.AUTO-MCNC: 135 MG/DL (ref 65–117)
GLUCOSE BLD STRIP.AUTO-MCNC: 158 MG/DL (ref 65–117)
GLUCOSE BLD STRIP.AUTO-MCNC: 158 MG/DL (ref 65–117)
GLUCOSE SERPL-MCNC: 115 MG/DL (ref 65–100)
HCT VFR BLD AUTO: 39.4 % (ref 36.6–50.3)
HGB BLD-MCNC: 12.6 G/DL (ref 12.1–17)
INR PPP: 1.8 (ref 0.9–1.1)
LACTATE SERPL-SCNC: 1.9 MMOL/L (ref 0.4–2)
MAGNESIUM SERPL-MCNC: 3.2 MG/DL (ref 1.6–2.4)
MCH RBC QN AUTO: 34 PG (ref 26–34)
MCHC RBC AUTO-ENTMCNC: 32 G/DL (ref 30–36.5)
MCV RBC AUTO: 106.2 FL (ref 80–99)
NRBC # BLD: 0.02 K/UL (ref 0–0.01)
NRBC BLD-RTO: 0.1 PER 100 WBC
PHOSPHATE SERPL-MCNC: 4.5 MG/DL (ref 2.6–4.7)
PLATELET # BLD AUTO: 154 K/UL (ref 150–400)
PMV BLD AUTO: 11.6 FL (ref 8.9–12.9)
POTASSIUM SERPL-SCNC: 4.2 MMOL/L (ref 3.5–5.1)
PROT SERPL-MCNC: 7.2 G/DL (ref 6.4–8.2)
PROT SERPL-MCNC: 7.2 G/DL (ref 6.4–8.2)
PROTHROMBIN TIME: 18.2 SEC (ref 9–11.1)
RBC # BLD AUTO: 3.71 M/UL (ref 4.1–5.7)
SERVICE CMNT-IMP: ABNORMAL
SERVICE CMNT-IMP: NORMAL
SODIUM SERPL-SCNC: 131 MMOL/L (ref 136–145)
WBC # BLD AUTO: 13.4 K/UL (ref 4.1–11.1)

## 2023-05-17 PROCEDURE — 84100 ASSAY OF PHOSPHORUS: CPT

## 2023-05-17 PROCEDURE — 99231 SBSQ HOSP IP/OBS SF/LOW 25: CPT | Performed by: NURSE PRACTITIONER

## 2023-05-17 PROCEDURE — 83605 ASSAY OF LACTIC ACID: CPT

## 2023-05-17 PROCEDURE — 80076 HEPATIC FUNCTION PANEL: CPT

## 2023-05-17 PROCEDURE — 6360000002 HC RX W HCPCS: Performed by: HOSPITALIST

## 2023-05-17 PROCEDURE — 82962 GLUCOSE BLOOD TEST: CPT

## 2023-05-17 PROCEDURE — 6370000000 HC RX 637 (ALT 250 FOR IP): Performed by: HOSPITALIST

## 2023-05-17 PROCEDURE — 85610 PROTHROMBIN TIME: CPT

## 2023-05-17 PROCEDURE — 36415 COLL VENOUS BLD VENIPUNCTURE: CPT

## 2023-05-17 PROCEDURE — 2060000000 HC ICU INTERMEDIATE R&B

## 2023-05-17 PROCEDURE — 94640 AIRWAY INHALATION TREATMENT: CPT

## 2023-05-17 PROCEDURE — 2580000003 HC RX 258: Performed by: HOSPITALIST

## 2023-05-17 PROCEDURE — 83735 ASSAY OF MAGNESIUM: CPT

## 2023-05-17 PROCEDURE — 85027 COMPLETE CBC AUTOMATED: CPT

## 2023-05-17 PROCEDURE — 80053 COMPREHEN METABOLIC PANEL: CPT

## 2023-05-17 PROCEDURE — 6360000002 HC RX W HCPCS: Performed by: NURSE PRACTITIONER

## 2023-05-17 RX ADMIN — PROCHLORPERAZINE EDISYLATE 5 MG: 5 INJECTION INTRAMUSCULAR; INTRAVENOUS at 21:03

## 2023-05-17 RX ADMIN — PROCHLORPERAZINE EDISYLATE 5 MG: 5 INJECTION INTRAMUSCULAR; INTRAVENOUS at 08:21

## 2023-05-17 RX ADMIN — APIXABAN 5 MG: 5 TABLET, FILM COATED ORAL at 08:30

## 2023-05-17 RX ADMIN — SENNOSIDES AND DOCUSATE SODIUM 2 TABLET: 50; 8.6 TABLET ORAL at 22:06

## 2023-05-17 RX ADMIN — DOBUTAMINE HYDROCHLORIDE 2.5 MCG/KG/MIN: 200 INJECTION INTRAVENOUS at 16:38

## 2023-05-17 RX ADMIN — SODIUM CHLORIDE, PRESERVATIVE FREE 10 ML: 5 INJECTION INTRAVENOUS at 22:07

## 2023-05-17 RX ADMIN — SENNOSIDES AND DOCUSATE SODIUM 2 TABLET: 50; 8.6 TABLET ORAL at 08:23

## 2023-05-17 RX ADMIN — POLYETHYLENE GLYCOL 3350 17 G: 17 POWDER, FOR SOLUTION ORAL at 08:23

## 2023-05-17 RX ADMIN — APIXABAN 5 MG: 5 TABLET, FILM COATED ORAL at 22:07

## 2023-05-17 RX ADMIN — PROCHLORPERAZINE EDISYLATE 5 MG: 5 INJECTION INTRAMUSCULAR; INTRAVENOUS at 00:00

## 2023-05-17 RX ADMIN — PROCHLORPERAZINE EDISYLATE 5 MG: 5 INJECTION INTRAMUSCULAR; INTRAVENOUS at 15:03

## 2023-05-17 RX ADMIN — SODIUM CHLORIDE, PRESERVATIVE FREE 10 ML: 5 INJECTION INTRAVENOUS at 08:20

## 2023-05-17 RX ADMIN — INSULIN GLARGINE 12 UNITS: 100 INJECTION, SOLUTION SUBCUTANEOUS at 22:07

## 2023-05-17 RX ADMIN — CHLORHEXIDINE GLUCONATE 15 ML: 1.2 RINSE ORAL at 22:08

## 2023-05-17 ASSESSMENT — PAIN SCALES - GENERAL
PAINLEVEL_OUTOF10: 0

## 2023-05-17 ASSESSMENT — ENCOUNTER SYMPTOMS
VOMITING: 0
NAUSEA: 0
SHORTNESS OF BREATH: 0
COUGH: 0

## 2023-05-18 LAB
ALBUMIN SERPL-MCNC: 3.6 G/DL (ref 3.5–5)
ALBUMIN/GLOB SERPL: 1.2 (ref 1.1–2.2)
ALP SERPL-CCNC: 70 U/L (ref 45–117)
ALT SERPL-CCNC: 1195 U/L (ref 12–78)
ANION GAP SERPL CALC-SCNC: 4 MMOL/L (ref 5–15)
AST SERPL-CCNC: 467 U/L (ref 15–37)
BILIRUB SERPL-MCNC: 2 MG/DL (ref 0.2–1)
BUN SERPL-MCNC: 46 MG/DL (ref 6–20)
BUN/CREAT SERPL: 17 (ref 12–20)
CALCIUM SERPL-MCNC: 8.8 MG/DL (ref 8.5–10.1)
CHLORIDE SERPL-SCNC: 102 MMOL/L (ref 97–108)
CO2 SERPL-SCNC: 27 MMOL/L (ref 21–32)
CREAT SERPL-MCNC: 2.71 MG/DL (ref 0.7–1.3)
ERYTHROCYTE [DISTWIDTH] IN BLOOD BY AUTOMATED COUNT: 16.3 % (ref 11.5–14.5)
GLOBULIN SER CALC-MCNC: 3.1 G/DL (ref 2–4)
GLUCOSE BLD STRIP.AUTO-MCNC: 101 MG/DL (ref 65–117)
GLUCOSE BLD STRIP.AUTO-MCNC: 139 MG/DL (ref 65–117)
GLUCOSE BLD STRIP.AUTO-MCNC: 157 MG/DL (ref 65–117)
GLUCOSE BLD STRIP.AUTO-MCNC: 184 MG/DL (ref 65–117)
GLUCOSE SERPL-MCNC: 118 MG/DL (ref 65–100)
HCT VFR BLD AUTO: 38.7 % (ref 36.6–50.3)
HGB BLD-MCNC: 12.3 G/DL (ref 12.1–17)
MCH RBC QN AUTO: 33.8 PG (ref 26–34)
MCHC RBC AUTO-ENTMCNC: 31.8 G/DL (ref 30–36.5)
MCV RBC AUTO: 106.3 FL (ref 80–99)
NRBC # BLD: 0 K/UL (ref 0–0.01)
NRBC BLD-RTO: 0 PER 100 WBC
PHOSPHATE SERPL-MCNC: 3 MG/DL (ref 2.6–4.7)
PLATELET # BLD AUTO: 113 K/UL (ref 150–400)
PMV BLD AUTO: 11.3 FL (ref 8.9–12.9)
POTASSIUM SERPL-SCNC: 4.4 MMOL/L (ref 3.5–5.1)
PROT SERPL-MCNC: 6.7 G/DL (ref 6.4–8.2)
RBC # BLD AUTO: 3.64 M/UL (ref 4.1–5.7)
SERVICE CMNT-IMP: ABNORMAL
SERVICE CMNT-IMP: NORMAL
SODIUM SERPL-SCNC: 133 MMOL/L (ref 136–145)
WBC # BLD AUTO: 11.4 K/UL (ref 4.1–11.1)

## 2023-05-18 PROCEDURE — 2060000000 HC ICU INTERMEDIATE R&B

## 2023-05-18 PROCEDURE — 99232 SBSQ HOSP IP/OBS MODERATE 35: CPT | Performed by: NURSE PRACTITIONER

## 2023-05-18 PROCEDURE — 02HV33Z INSERTION OF INFUSION DEVICE INTO SUPERIOR VENA CAVA, PERCUTANEOUS APPROACH: ICD-10-PCS | Performed by: RADIOLOGY

## 2023-05-18 PROCEDURE — 36415 COLL VENOUS BLD VENIPUNCTURE: CPT

## 2023-05-18 PROCEDURE — 2580000003 HC RX 258: Performed by: HOSPITALIST

## 2023-05-18 PROCEDURE — 6370000000 HC RX 637 (ALT 250 FOR IP): Performed by: HOSPITALIST

## 2023-05-18 PROCEDURE — 6370000000 HC RX 637 (ALT 250 FOR IP): Performed by: INTERNAL MEDICINE

## 2023-05-18 PROCEDURE — 97535 SELF CARE MNGMENT TRAINING: CPT

## 2023-05-18 PROCEDURE — 6360000002 HC RX W HCPCS: Performed by: NURSE PRACTITIONER

## 2023-05-18 PROCEDURE — 84100 ASSAY OF PHOSPHORUS: CPT

## 2023-05-18 PROCEDURE — 0JH63XZ INSERTION OF TUNNELED VASCULAR ACCESS DEVICE INTO CHEST SUBCUTANEOUS TISSUE AND FASCIA, PERCUTANEOUS APPROACH: ICD-10-PCS | Performed by: RADIOLOGY

## 2023-05-18 PROCEDURE — 80053 COMPREHEN METABOLIC PANEL: CPT

## 2023-05-18 PROCEDURE — 82962 GLUCOSE BLOOD TEST: CPT

## 2023-05-18 PROCEDURE — 85027 COMPLETE CBC AUTOMATED: CPT

## 2023-05-18 PROCEDURE — B5181ZA FLUOROSCOPY OF SUPERIOR VENA CAVA USING LOW OSMOLAR CONTRAST, GUIDANCE: ICD-10-PCS | Performed by: RADIOLOGY

## 2023-05-18 PROCEDURE — 6360000002 HC RX W HCPCS: Performed by: HOSPITALIST

## 2023-05-18 PROCEDURE — 97116 GAIT TRAINING THERAPY: CPT

## 2023-05-18 RX ORDER — DOBUTAMINE HYDROCHLORIDE 200 MG/100ML
1 INJECTION INTRAVENOUS CONTINUOUS
Status: DISCONTINUED | OUTPATIENT
Start: 2023-05-18 | End: 2023-05-19

## 2023-05-18 RX ORDER — HYDROXYZINE HYDROCHLORIDE 10 MG/1
10 TABLET, FILM COATED ORAL 3 TIMES DAILY PRN
Status: DISCONTINUED | OUTPATIENT
Start: 2023-05-18 | End: 2023-05-24 | Stop reason: HOSPADM

## 2023-05-18 RX ADMIN — APIXABAN 5 MG: 5 TABLET, FILM COATED ORAL at 09:19

## 2023-05-18 RX ADMIN — PROCHLORPERAZINE EDISYLATE 5 MG: 5 INJECTION INTRAMUSCULAR; INTRAVENOUS at 09:17

## 2023-05-18 RX ADMIN — SODIUM CHLORIDE, PRESERVATIVE FREE 10 ML: 5 INJECTION INTRAVENOUS at 21:49

## 2023-05-18 RX ADMIN — DIPHENHYDRAMINE HYDROCHLORIDE 25 MG: 25 CAPSULE ORAL at 21:48

## 2023-05-18 RX ADMIN — SODIUM CHLORIDE, PRESERVATIVE FREE 10 ML: 5 INJECTION INTRAVENOUS at 09:19

## 2023-05-18 RX ADMIN — DOBUTAMINE HYDROCHLORIDE 1 MCG/KG/MIN: 200 INJECTION INTRAVENOUS at 11:21

## 2023-05-18 RX ADMIN — HYDROXYZINE HYDROCHLORIDE 10 MG: 10 TABLET ORAL at 17:48

## 2023-05-18 RX ADMIN — PROCHLORPERAZINE EDISYLATE 5 MG: 5 INJECTION INTRAMUSCULAR; INTRAVENOUS at 02:53

## 2023-05-18 RX ADMIN — SENNOSIDES AND DOCUSATE SODIUM 2 TABLET: 50; 8.6 TABLET ORAL at 09:25

## 2023-05-18 RX ADMIN — POLYETHYLENE GLYCOL 3350 17 G: 17 POWDER, FOR SOLUTION ORAL at 09:25

## 2023-05-18 RX ADMIN — INSULIN GLARGINE 12 UNITS: 100 INJECTION, SOLUTION SUBCUTANEOUS at 22:00

## 2023-05-18 RX ADMIN — PROCHLORPERAZINE EDISYLATE 5 MG: 5 INJECTION INTRAMUSCULAR; INTRAVENOUS at 15:44

## 2023-05-18 RX ADMIN — APIXABAN 5 MG: 5 TABLET, FILM COATED ORAL at 21:50

## 2023-05-18 ASSESSMENT — ENCOUNTER SYMPTOMS
NAUSEA: 0
COUGH: 0
VOMITING: 0
SHORTNESS OF BREATH: 0

## 2023-05-18 ASSESSMENT — PAIN SCALES - GENERAL: PAINLEVEL_OUTOF10: 0

## 2023-05-19 ENCOUNTER — HOSPITAL ENCOUNTER (INPATIENT)
Facility: HOSPITAL | Age: 78
Discharge: HOME | DRG: 286 | End: 2023-05-22
Attending: HOSPITALIST
Payer: MEDICARE

## 2023-05-19 VITALS
HEART RATE: 63 BPM | RESPIRATION RATE: 21 BRPM | OXYGEN SATURATION: 98 % | DIASTOLIC BLOOD PRESSURE: 60 MMHG | SYSTOLIC BLOOD PRESSURE: 103 MMHG | TEMPERATURE: 97.6 F

## 2023-05-19 PROBLEM — E80.4 GILBERT'S SYNDROME: Status: ACTIVE | Noted: 2023-01-01

## 2023-05-19 PROBLEM — R74.8 ELEVATED LIVER ENZYMES: Status: ACTIVE | Noted: 2023-01-01

## 2023-05-19 PROBLEM — K72.00 SHOCK LIVER: Status: ACTIVE | Noted: 2023-01-01

## 2023-05-19 PROBLEM — I50.30 DIASTOLIC HEART FAILURE (HCC): Status: ACTIVE | Noted: 2023-05-19

## 2023-05-19 PROBLEM — K76.1 CHRONIC PASSIVE HEPATIC CONGESTION: Status: ACTIVE | Noted: 2023-05-19

## 2023-05-19 LAB
ALBUMIN SERPL-MCNC: 3.3 G/DL (ref 3.5–5)
ALBUMIN/GLOB SERPL: 1.1 (ref 1.1–2.2)
ALP SERPL-CCNC: 69 U/L (ref 45–117)
ALT SERPL-CCNC: 848 U/L (ref 12–78)
ANION GAP SERPL CALC-SCNC: 6 MMOL/L (ref 5–15)
AST SERPL-CCNC: ABNORMAL U/L (ref 15–37)
BILIRUB SERPL-MCNC: 1.4 MG/DL (ref 0.2–1)
BUN SERPL-MCNC: 58 MG/DL (ref 6–20)
BUN/CREAT SERPL: 22 (ref 12–20)
CALCIUM SERPL-MCNC: 8.9 MG/DL (ref 8.5–10.1)
CHLORIDE SERPL-SCNC: 105 MMOL/L (ref 97–108)
CO2 SERPL-SCNC: 20 MMOL/L (ref 21–32)
CREAT SERPL-MCNC: 2.66 MG/DL (ref 0.7–1.3)
ERYTHROCYTE [DISTWIDTH] IN BLOOD BY AUTOMATED COUNT: 16.5 % (ref 11.5–14.5)
GLOBULIN SER CALC-MCNC: 3.1 G/DL (ref 2–4)
GLUCOSE BLD STRIP.AUTO-MCNC: 101 MG/DL (ref 65–117)
GLUCOSE BLD STRIP.AUTO-MCNC: 118 MG/DL (ref 65–117)
GLUCOSE BLD STRIP.AUTO-MCNC: 120 MG/DL (ref 65–117)
GLUCOSE BLD STRIP.AUTO-MCNC: 172 MG/DL (ref 65–117)
GLUCOSE SERPL-MCNC: 117 MG/DL (ref 65–100)
HBV SURFACE AB SER QL: REACTIVE
HBV SURFACE AB SER-ACNC: 325.35 MIU/ML
HCT VFR BLD AUTO: 41.4 % (ref 36.6–50.3)
HGB BLD-MCNC: 12.4 G/DL (ref 12.1–17)
MCH RBC QN AUTO: 34.8 PG (ref 26–34)
MCHC RBC AUTO-ENTMCNC: 30 G/DL (ref 30–36.5)
MCV RBC AUTO: 116.3 FL (ref 80–99)
NRBC # BLD: 0 K/UL (ref 0–0.01)
NRBC BLD-RTO: 0 PER 100 WBC
PHOSPHATE SERPL-MCNC: 3 MG/DL (ref 2.6–4.7)
PLATELET # BLD AUTO: 116 K/UL (ref 150–400)
PMV BLD AUTO: 11.3 FL (ref 8.9–12.9)
POTASSIUM SERPL-SCNC: ABNORMAL MMOL/L (ref 3.5–5.1)
PROT SERPL-MCNC: 6.4 G/DL (ref 6.4–8.2)
RBC # BLD AUTO: 3.56 M/UL (ref 4.1–5.7)
SERVICE CMNT-IMP: ABNORMAL
SERVICE CMNT-IMP: NORMAL
SODIUM SERPL-SCNC: 131 MMOL/L (ref 136–145)
WBC # BLD AUTO: 10 K/UL (ref 4.1–11.1)

## 2023-05-19 PROCEDURE — 2580000003 HC RX 258: Performed by: HOSPITALIST

## 2023-05-19 PROCEDURE — 6360000002 HC RX W HCPCS: Performed by: HOSPITALIST

## 2023-05-19 PROCEDURE — 6360000002 HC RX W HCPCS: Performed by: INTERNAL MEDICINE

## 2023-05-19 PROCEDURE — 6370000000 HC RX 637 (ALT 250 FOR IP): Performed by: HOSPITALIST

## 2023-05-19 PROCEDURE — C1769 GUIDE WIRE: HCPCS

## 2023-05-19 PROCEDURE — 85027 COMPLETE CBC AUTOMATED: CPT

## 2023-05-19 PROCEDURE — 2500000003 HC RX 250 WO HCPCS: Performed by: RADIOLOGY

## 2023-05-19 PROCEDURE — C1881 DIALYSIS ACCESS SYSTEM: HCPCS

## 2023-05-19 PROCEDURE — 6360000002 HC RX W HCPCS: Performed by: NURSE PRACTITIONER

## 2023-05-19 PROCEDURE — P9047 ALBUMIN (HUMAN), 25%, 50ML: HCPCS | Performed by: NURSE PRACTITIONER

## 2023-05-19 PROCEDURE — 86706 HEP B SURFACE ANTIBODY: CPT

## 2023-05-19 PROCEDURE — 2580000003 HC RX 258: Performed by: RADIOLOGY

## 2023-05-19 PROCEDURE — 90935 HEMODIALYSIS ONE EVALUATION: CPT

## 2023-05-19 PROCEDURE — 86704 HEP B CORE ANTIBODY TOTAL: CPT

## 2023-05-19 PROCEDURE — 2060000000 HC ICU INTERMEDIATE R&B

## 2023-05-19 PROCEDURE — 82962 GLUCOSE BLOOD TEST: CPT

## 2023-05-19 PROCEDURE — 36415 COLL VENOUS BLD VENIPUNCTURE: CPT

## 2023-05-19 PROCEDURE — 80053 COMPREHEN METABOLIC PANEL: CPT

## 2023-05-19 PROCEDURE — 6370000000 HC RX 637 (ALT 250 FOR IP): Performed by: INTERNAL MEDICINE

## 2023-05-19 PROCEDURE — 99222 1ST HOSP IP/OBS MODERATE 55: CPT | Performed by: INTERNAL MEDICINE

## 2023-05-19 PROCEDURE — 6360000002 HC RX W HCPCS: Performed by: RADIOLOGY

## 2023-05-19 PROCEDURE — 84100 ASSAY OF PHOSPHORUS: CPT

## 2023-05-19 RX ORDER — MIDAZOLAM HYDROCHLORIDE 2 MG/2ML
INJECTION, SOLUTION INTRAMUSCULAR; INTRAVENOUS PRN
Status: COMPLETED | OUTPATIENT
Start: 2023-05-19 | End: 2023-05-19

## 2023-05-19 RX ORDER — HEPARIN SODIUM 1000 [USP'U]/ML
1900 INJECTION, SOLUTION INTRAVENOUS; SUBCUTANEOUS PRN
Status: DISCONTINUED | OUTPATIENT
Start: 2023-05-19 | End: 2023-05-24 | Stop reason: HOSPADM

## 2023-05-19 RX ORDER — FENTANYL CITRATE 50 UG/ML
INJECTION, SOLUTION INTRAMUSCULAR; INTRAVENOUS PRN
Status: COMPLETED | OUTPATIENT
Start: 2023-05-19 | End: 2023-05-19

## 2023-05-19 RX ORDER — LIDOCAINE HYDROCHLORIDE 10 MG/ML
INJECTION, SOLUTION EPIDURAL; INFILTRATION; INTRACAUDAL; PERINEURAL PRN
Status: COMPLETED | OUTPATIENT
Start: 2023-05-19 | End: 2023-05-19

## 2023-05-19 RX ORDER — HEPARIN SODIUM 1000 [USP'U]/ML
INJECTION, SOLUTION INTRAVENOUS; SUBCUTANEOUS PRN
Status: COMPLETED | OUTPATIENT
Start: 2023-05-19 | End: 2023-05-19

## 2023-05-19 RX ADMIN — WATER 2000 MG: 1 INJECTION INTRAMUSCULAR; INTRAVENOUS; SUBCUTANEOUS at 08:44

## 2023-05-19 RX ADMIN — APIXABAN 5 MG: 5 TABLET, FILM COATED ORAL at 15:15

## 2023-05-19 RX ADMIN — HEPARIN SODIUM 1900 UNITS: 1000 INJECTION INTRAVENOUS; SUBCUTANEOUS at 14:25

## 2023-05-19 RX ADMIN — MIDAZOLAM HYDROCHLORIDE 1 MG: 1 INJECTION, SOLUTION INTRAMUSCULAR; INTRAVENOUS at 10:42

## 2023-05-19 RX ADMIN — SODIUM CHLORIDE, PRESERVATIVE FREE 10 ML: 5 INJECTION INTRAVENOUS at 22:58

## 2023-05-19 RX ADMIN — SODIUM CHLORIDE, PRESERVATIVE FREE 10 ML: 5 INJECTION INTRAVENOUS at 08:00

## 2023-05-19 RX ADMIN — INSULIN GLARGINE 12 UNITS: 100 INJECTION, SOLUTION SUBCUTANEOUS at 22:57

## 2023-05-19 RX ADMIN — MIDAZOLAM HYDROCHLORIDE 1 MG: 1 INJECTION, SOLUTION INTRAMUSCULAR; INTRAVENOUS at 10:36

## 2023-05-19 RX ADMIN — FENTANYL CITRATE 50 MCG: 50 INJECTION, SOLUTION INTRAMUSCULAR; INTRAVENOUS at 10:41

## 2023-05-19 RX ADMIN — HEPARIN SODIUM 1900 UNITS: 1000 INJECTION INTRAVENOUS; SUBCUTANEOUS at 14:23

## 2023-05-19 RX ADMIN — PROCHLORPERAZINE EDISYLATE 5 MG: 5 INJECTION INTRAMUSCULAR; INTRAVENOUS at 02:11

## 2023-05-19 RX ADMIN — APIXABAN 5 MG: 5 TABLET, FILM COATED ORAL at 22:57

## 2023-05-19 RX ADMIN — HYDROXYZINE HYDROCHLORIDE 10 MG: 10 TABLET ORAL at 00:53

## 2023-05-19 RX ADMIN — HEPARIN SODIUM 3800 UNITS: 1000 INJECTION, SOLUTION INTRAVENOUS; SUBCUTANEOUS at 10:49

## 2023-05-19 RX ADMIN — DIPHENHYDRAMINE HYDROCHLORIDE 25 MG: 25 CAPSULE ORAL at 22:57

## 2023-05-19 RX ADMIN — SENNOSIDES AND DOCUSATE SODIUM 2 TABLET: 50; 8.6 TABLET ORAL at 23:02

## 2023-05-19 RX ADMIN — FENTANYL CITRATE 50 MCG: 50 INJECTION, SOLUTION INTRAMUSCULAR; INTRAVENOUS at 10:35

## 2023-05-19 RX ADMIN — LIDOCAINE HYDROCHLORIDE 9 ML: 10 INJECTION, SOLUTION EPIDURAL; INFILTRATION; INTRACAUDAL; PERINEURAL at 10:42

## 2023-05-19 RX ADMIN — ALBUMIN (HUMAN) 25 G: 0.25 INJECTION, SOLUTION INTRAVENOUS at 12:49

## 2023-05-19 ASSESSMENT — ENCOUNTER SYMPTOMS
NAUSEA: 0
SHORTNESS OF BREATH: 0
COUGH: 0
VOMITING: 0

## 2023-05-19 ASSESSMENT — PAIN SCALES - GENERAL: PAINLEVEL_OUTOF10: 0

## 2023-05-19 ASSESSMENT — PULMONARY FUNCTION TESTS
PIF_VALUE: 0
PIF_VALUE: 0

## 2023-05-20 LAB
ALBUMIN SERPL-MCNC: 3.5 G/DL (ref 3.5–5)
ALBUMIN SERPL-MCNC: 3.6 G/DL (ref 3.5–5)
ALBUMIN/GLOB SERPL: 1.1 (ref 1.1–2.2)
ALBUMIN/GLOB SERPL: 1.2 (ref 1.1–2.2)
ALP SERPL-CCNC: 57 U/L (ref 45–117)
ALP SERPL-CCNC: 58 U/L (ref 45–117)
ALT SERPL-CCNC: 491 U/L (ref 12–78)
ALT SERPL-CCNC: 495 U/L (ref 12–78)
ANION GAP SERPL CALC-SCNC: 5 MMOL/L (ref 5–15)
AST SERPL-CCNC: 122 U/L (ref 15–37)
AST SERPL-CCNC: 122 U/L (ref 15–37)
BILIRUB DIRECT SERPL-MCNC: 0.4 MG/DL (ref 0–0.2)
BILIRUB SERPL-MCNC: 1.4 MG/DL (ref 0.2–1)
BILIRUB SERPL-MCNC: 1.5 MG/DL (ref 0.2–1)
BUN SERPL-MCNC: 33 MG/DL (ref 6–20)
BUN/CREAT SERPL: 15 (ref 12–20)
CALCIUM SERPL-MCNC: 8.7 MG/DL (ref 8.5–10.1)
CHLORIDE SERPL-SCNC: 103 MMOL/L (ref 97–108)
CO2 SERPL-SCNC: 27 MMOL/L (ref 21–32)
CREAT SERPL-MCNC: 2.27 MG/DL (ref 0.7–1.3)
ERYTHROCYTE [DISTWIDTH] IN BLOOD BY AUTOMATED COUNT: 16.3 % (ref 11.5–14.5)
GLOBULIN SER CALC-MCNC: 3 G/DL (ref 2–4)
GLOBULIN SER CALC-MCNC: 3.2 G/DL (ref 2–4)
GLUCOSE BLD STRIP.AUTO-MCNC: 121 MG/DL (ref 65–117)
GLUCOSE BLD STRIP.AUTO-MCNC: 131 MG/DL (ref 65–117)
GLUCOSE BLD STRIP.AUTO-MCNC: 140 MG/DL (ref 65–117)
GLUCOSE BLD STRIP.AUTO-MCNC: 187 MG/DL (ref 65–117)
GLUCOSE SERPL-MCNC: 107 MG/DL (ref 65–100)
HBV CORE AB SERPL QL IA: POSITIVE
HCT VFR BLD AUTO: 37.9 % (ref 36.6–50.3)
HGB BLD-MCNC: 11.7 G/DL (ref 12.1–17)
MAGNESIUM SERPL-MCNC: 2.5 MG/DL (ref 1.6–2.4)
MCH RBC QN AUTO: 33.1 PG (ref 26–34)
MCHC RBC AUTO-ENTMCNC: 30.9 G/DL (ref 30–36.5)
MCV RBC AUTO: 107.1 FL (ref 80–99)
NRBC # BLD: 0 K/UL (ref 0–0.01)
NRBC BLD-RTO: 0 PER 100 WBC
PHOSPHATE SERPL-MCNC: 2.2 MG/DL (ref 2.6–4.7)
PLATELET # BLD AUTO: 103 K/UL (ref 150–400)
PMV BLD AUTO: 12.2 FL (ref 8.9–12.9)
POTASSIUM SERPL-SCNC: 4.4 MMOL/L (ref 3.5–5.1)
PROT SERPL-MCNC: 6.6 G/DL (ref 6.4–8.2)
PROT SERPL-MCNC: 6.7 G/DL (ref 6.4–8.2)
RBC # BLD AUTO: 3.54 M/UL (ref 4.1–5.7)
SERVICE CMNT-IMP: ABNORMAL
SODIUM SERPL-SCNC: 135 MMOL/L (ref 136–145)
WBC # BLD AUTO: 9.1 K/UL (ref 4.1–11.1)

## 2023-05-20 PROCEDURE — 85027 COMPLETE CBC AUTOMATED: CPT

## 2023-05-20 PROCEDURE — 99233 SBSQ HOSP IP/OBS HIGH 50: CPT | Performed by: INTERNAL MEDICINE

## 2023-05-20 PROCEDURE — 82962 GLUCOSE BLOOD TEST: CPT

## 2023-05-20 PROCEDURE — 6370000000 HC RX 637 (ALT 250 FOR IP): Performed by: HOSPITALIST

## 2023-05-20 PROCEDURE — 6360000002 HC RX W HCPCS: Performed by: HOSPITALIST

## 2023-05-20 PROCEDURE — 2580000003 HC RX 258: Performed by: HOSPITALIST

## 2023-05-20 PROCEDURE — 80076 HEPATIC FUNCTION PANEL: CPT

## 2023-05-20 PROCEDURE — 1100000003 HC PRIVATE W/ TELEMETRY

## 2023-05-20 PROCEDURE — 83735 ASSAY OF MAGNESIUM: CPT

## 2023-05-20 PROCEDURE — 36415 COLL VENOUS BLD VENIPUNCTURE: CPT

## 2023-05-20 PROCEDURE — 80053 COMPREHEN METABOLIC PANEL: CPT

## 2023-05-20 PROCEDURE — 84100 ASSAY OF PHOSPHORUS: CPT

## 2023-05-20 PROCEDURE — 94760 N-INVAS EAR/PLS OXIMETRY 1: CPT

## 2023-05-20 RX ADMIN — SODIUM CHLORIDE, PRESERVATIVE FREE 10 ML: 5 INJECTION INTRAVENOUS at 09:11

## 2023-05-20 RX ADMIN — SODIUM CHLORIDE, PRESERVATIVE FREE 10 ML: 5 INJECTION INTRAVENOUS at 22:05

## 2023-05-20 RX ADMIN — SENNOSIDES AND DOCUSATE SODIUM 2 TABLET: 50; 8.6 TABLET ORAL at 09:11

## 2023-05-20 RX ADMIN — INSULIN GLARGINE 12 UNITS: 100 INJECTION, SOLUTION SUBCUTANEOUS at 22:05

## 2023-05-20 RX ADMIN — APIXABAN 5 MG: 5 TABLET, FILM COATED ORAL at 09:10

## 2023-05-20 RX ADMIN — POLYETHYLENE GLYCOL 3350 17 G: 17 POWDER, FOR SOLUTION ORAL at 09:10

## 2023-05-20 RX ADMIN — DIPHENHYDRAMINE HYDROCHLORIDE 25 MG: 25 CAPSULE ORAL at 22:05

## 2023-05-20 RX ADMIN — SENNOSIDES AND DOCUSATE SODIUM 2 TABLET: 50; 8.6 TABLET ORAL at 22:13

## 2023-05-20 RX ADMIN — APIXABAN 5 MG: 5 TABLET, FILM COATED ORAL at 22:04

## 2023-05-20 RX ADMIN — PROCHLORPERAZINE EDISYLATE 5 MG: 5 INJECTION INTRAMUSCULAR; INTRAVENOUS at 01:48

## 2023-05-20 ASSESSMENT — PAIN SCALES - GENERAL: PAINLEVEL_OUTOF10: 0

## 2023-05-21 LAB
ALBUMIN SERPL-MCNC: 3.6 G/DL (ref 3.5–5)
ALBUMIN SERPL-MCNC: 3.6 G/DL (ref 3.5–5)
ALBUMIN/GLOB SERPL: 1.1 (ref 1.1–2.2)
ALBUMIN/GLOB SERPL: 1.1 (ref 1.1–2.2)
ALP SERPL-CCNC: 67 U/L (ref 45–117)
ALP SERPL-CCNC: 68 U/L (ref 45–117)
ALT SERPL-CCNC: 373 U/L (ref 12–78)
ALT SERPL-CCNC: 377 U/L (ref 12–78)
ANION GAP SERPL CALC-SCNC: 6 MMOL/L (ref 5–15)
AST SERPL-CCNC: 74 U/L (ref 15–37)
AST SERPL-CCNC: 75 U/L (ref 15–37)
BILIRUB DIRECT SERPL-MCNC: 0.4 MG/DL (ref 0–0.2)
BILIRUB SERPL-MCNC: 1.2 MG/DL (ref 0.2–1)
BILIRUB SERPL-MCNC: 1.2 MG/DL (ref 0.2–1)
BUN SERPL-MCNC: 42 MG/DL (ref 6–20)
BUN/CREAT SERPL: 19 (ref 12–20)
CALCIUM SERPL-MCNC: 9.1 MG/DL (ref 8.5–10.1)
CHLORIDE SERPL-SCNC: 102 MMOL/L (ref 97–108)
CO2 SERPL-SCNC: 24 MMOL/L (ref 21–32)
CREAT SERPL-MCNC: 2.22 MG/DL (ref 0.7–1.3)
ERYTHROCYTE [DISTWIDTH] IN BLOOD BY AUTOMATED COUNT: 16.2 % (ref 11.5–14.5)
GLOBULIN SER CALC-MCNC: 3.4 G/DL (ref 2–4)
GLOBULIN SER CALC-MCNC: 3.4 G/DL (ref 2–4)
GLUCOSE BLD STRIP.AUTO-MCNC: 104 MG/DL (ref 65–117)
GLUCOSE BLD STRIP.AUTO-MCNC: 116 MG/DL (ref 65–117)
GLUCOSE BLD STRIP.AUTO-MCNC: 116 MG/DL (ref 65–117)
GLUCOSE BLD STRIP.AUTO-MCNC: 169 MG/DL (ref 65–117)
GLUCOSE SERPL-MCNC: 106 MG/DL (ref 65–100)
HCT VFR BLD AUTO: 41.4 % (ref 36.6–50.3)
HGB BLD-MCNC: 12.8 G/DL (ref 12.1–17)
MAGNESIUM SERPL-MCNC: 2.5 MG/DL (ref 1.6–2.4)
MCH RBC QN AUTO: 33.1 PG (ref 26–34)
MCHC RBC AUTO-ENTMCNC: 30.9 G/DL (ref 30–36.5)
MCV RBC AUTO: 107 FL (ref 80–99)
NRBC # BLD: 0 K/UL (ref 0–0.01)
NRBC BLD-RTO: 0 PER 100 WBC
PHOSPHATE SERPL-MCNC: 2.6 MG/DL (ref 2.6–4.7)
PLATELET # BLD AUTO: 109 K/UL (ref 150–400)
PMV BLD AUTO: 11.8 FL (ref 8.9–12.9)
POTASSIUM SERPL-SCNC: 3.9 MMOL/L (ref 3.5–5.1)
PROT SERPL-MCNC: 7 G/DL (ref 6.4–8.2)
PROT SERPL-MCNC: 7 G/DL (ref 6.4–8.2)
RBC # BLD AUTO: 3.87 M/UL (ref 4.1–5.7)
SERVICE CMNT-IMP: ABNORMAL
SERVICE CMNT-IMP: NORMAL
SODIUM SERPL-SCNC: 132 MMOL/L (ref 136–145)
WBC # BLD AUTO: 8.7 K/UL (ref 4.1–11.1)

## 2023-05-21 PROCEDURE — 6370000000 HC RX 637 (ALT 250 FOR IP): Performed by: HOSPITALIST

## 2023-05-21 PROCEDURE — 99233 SBSQ HOSP IP/OBS HIGH 50: CPT | Performed by: INTERNAL MEDICINE

## 2023-05-21 PROCEDURE — 84100 ASSAY OF PHOSPHORUS: CPT

## 2023-05-21 PROCEDURE — 85027 COMPLETE CBC AUTOMATED: CPT

## 2023-05-21 PROCEDURE — 80053 COMPREHEN METABOLIC PANEL: CPT

## 2023-05-21 PROCEDURE — 99232 SBSQ HOSP IP/OBS MODERATE 35: CPT | Performed by: INTERNAL MEDICINE

## 2023-05-21 PROCEDURE — 2580000003 HC RX 258: Performed by: HOSPITALIST

## 2023-05-21 PROCEDURE — 83735 ASSAY OF MAGNESIUM: CPT

## 2023-05-21 PROCEDURE — 80076 HEPATIC FUNCTION PANEL: CPT

## 2023-05-21 PROCEDURE — 36415 COLL VENOUS BLD VENIPUNCTURE: CPT

## 2023-05-21 PROCEDURE — 82962 GLUCOSE BLOOD TEST: CPT

## 2023-05-21 PROCEDURE — 1100000003 HC PRIVATE W/ TELEMETRY

## 2023-05-21 RX ADMIN — DIPHENHYDRAMINE HYDROCHLORIDE 25 MG: 25 CAPSULE ORAL at 21:24

## 2023-05-21 RX ADMIN — INSULIN GLARGINE 12 UNITS: 100 INJECTION, SOLUTION SUBCUTANEOUS at 21:24

## 2023-05-21 RX ADMIN — SENNOSIDES AND DOCUSATE SODIUM 2 TABLET: 50; 8.6 TABLET ORAL at 21:24

## 2023-05-21 RX ADMIN — APIXABAN 5 MG: 5 TABLET, FILM COATED ORAL at 08:32

## 2023-05-21 RX ADMIN — POLYETHYLENE GLYCOL 3350 17 G: 17 POWDER, FOR SOLUTION ORAL at 08:32

## 2023-05-21 RX ADMIN — SODIUM CHLORIDE, PRESERVATIVE FREE 10 ML: 5 INJECTION INTRAVENOUS at 08:32

## 2023-05-21 RX ADMIN — SENNOSIDES AND DOCUSATE SODIUM 2 TABLET: 50; 8.6 TABLET ORAL at 08:32

## 2023-05-21 RX ADMIN — CHLORHEXIDINE GLUCONATE 15 ML: 1.2 RINSE ORAL at 21:25

## 2023-05-21 RX ADMIN — SODIUM CHLORIDE, PRESERVATIVE FREE 10 ML: 5 INJECTION INTRAVENOUS at 21:24

## 2023-05-21 RX ADMIN — APIXABAN 5 MG: 5 TABLET, FILM COATED ORAL at 21:24

## 2023-05-21 ASSESSMENT — PAIN SCALES - GENERAL
PAINLEVEL_OUTOF10: 0
PAINLEVEL_OUTOF10: 0

## 2023-05-22 LAB
ALBUMIN SERPL-MCNC: 3.7 G/DL (ref 3.5–5)
ALBUMIN SERPL-MCNC: 3.8 G/DL (ref 3.5–5)
ALBUMIN/GLOB SERPL: 1.1 (ref 1.1–2.2)
ALBUMIN/GLOB SERPL: 1.2 (ref 1.1–2.2)
ALP SERPL-CCNC: 71 U/L (ref 45–117)
ALP SERPL-CCNC: 71 U/L (ref 45–117)
ALT SERPL-CCNC: 295 U/L (ref 12–78)
ALT SERPL-CCNC: 297 U/L (ref 12–78)
ANION GAP SERPL CALC-SCNC: 4 MMOL/L (ref 5–15)
AST SERPL-CCNC: 66 U/L (ref 15–37)
AST SERPL-CCNC: 67 U/L (ref 15–37)
BILIRUB DIRECT SERPL-MCNC: 0.3 MG/DL (ref 0–0.2)
BILIRUB SERPL-MCNC: 0.8 MG/DL (ref 0.2–1)
BILIRUB SERPL-MCNC: 0.8 MG/DL (ref 0.2–1)
BUN SERPL-MCNC: 47 MG/DL (ref 6–20)
BUN/CREAT SERPL: 21 (ref 12–20)
CALCIUM SERPL-MCNC: 8.9 MG/DL (ref 8.5–10.1)
CHLORIDE SERPL-SCNC: 103 MMOL/L (ref 97–108)
CO2 SERPL-SCNC: 27 MMOL/L (ref 21–32)
CREAT SERPL-MCNC: 2.2 MG/DL (ref 0.7–1.3)
ERYTHROCYTE [DISTWIDTH] IN BLOOD BY AUTOMATED COUNT: 16.3 % (ref 11.5–14.5)
GLOBULIN SER CALC-MCNC: 3.2 G/DL (ref 2–4)
GLOBULIN SER CALC-MCNC: 3.3 G/DL (ref 2–4)
GLUCOSE BLD STRIP.AUTO-MCNC: 107 MG/DL (ref 65–117)
GLUCOSE BLD STRIP.AUTO-MCNC: 123 MG/DL (ref 65–117)
GLUCOSE BLD STRIP.AUTO-MCNC: 132 MG/DL (ref 65–117)
GLUCOSE BLD STRIP.AUTO-MCNC: 168 MG/DL (ref 65–117)
GLUCOSE SERPL-MCNC: 108 MG/DL (ref 65–100)
HCT VFR BLD AUTO: 40.6 % (ref 36.6–50.3)
HGB BLD-MCNC: 12.8 G/DL (ref 12.1–17)
MAGNESIUM SERPL-MCNC: 2.5 MG/DL (ref 1.6–2.4)
MCH RBC QN AUTO: 33.1 PG (ref 26–34)
MCHC RBC AUTO-ENTMCNC: 31.5 G/DL (ref 30–36.5)
MCV RBC AUTO: 104.9 FL (ref 80–99)
NRBC # BLD: 0 K/UL (ref 0–0.01)
NRBC BLD-RTO: 0 PER 100 WBC
PHOSPHATE SERPL-MCNC: 2.6 MG/DL (ref 2.6–4.7)
PLATELET # BLD AUTO: 114 K/UL (ref 150–400)
PMV BLD AUTO: 11.8 FL (ref 8.9–12.9)
POTASSIUM SERPL-SCNC: 4.3 MMOL/L (ref 3.5–5.1)
PROT SERPL-MCNC: 7 G/DL (ref 6.4–8.2)
PROT SERPL-MCNC: 7 G/DL (ref 6.4–8.2)
RBC # BLD AUTO: 3.87 M/UL (ref 4.1–5.7)
SERVICE CMNT-IMP: ABNORMAL
SERVICE CMNT-IMP: NORMAL
SODIUM SERPL-SCNC: 134 MMOL/L (ref 136–145)
WBC # BLD AUTO: 10.3 K/UL (ref 4.1–11.1)

## 2023-05-22 PROCEDURE — 6370000000 HC RX 637 (ALT 250 FOR IP): Performed by: HOSPITALIST

## 2023-05-22 PROCEDURE — 82962 GLUCOSE BLOOD TEST: CPT

## 2023-05-22 PROCEDURE — 84100 ASSAY OF PHOSPHORUS: CPT

## 2023-05-22 PROCEDURE — 80076 HEPATIC FUNCTION PANEL: CPT

## 2023-05-22 PROCEDURE — 80053 COMPREHEN METABOLIC PANEL: CPT

## 2023-05-22 PROCEDURE — 1100000003 HC PRIVATE W/ TELEMETRY

## 2023-05-22 PROCEDURE — 6360000002 HC RX W HCPCS: Performed by: INTERNAL MEDICINE

## 2023-05-22 PROCEDURE — 2580000003 HC RX 258: Performed by: HOSPITALIST

## 2023-05-22 PROCEDURE — 36415 COLL VENOUS BLD VENIPUNCTURE: CPT

## 2023-05-22 PROCEDURE — 83735 ASSAY OF MAGNESIUM: CPT

## 2023-05-22 PROCEDURE — 6370000000 HC RX 637 (ALT 250 FOR IP): Performed by: INTERNAL MEDICINE

## 2023-05-22 PROCEDURE — 99232 SBSQ HOSP IP/OBS MODERATE 35: CPT | Performed by: NURSE PRACTITIONER

## 2023-05-22 PROCEDURE — 85027 COMPLETE CBC AUTOMATED: CPT

## 2023-05-22 RX ADMIN — SODIUM CHLORIDE, PRESERVATIVE FREE 10 ML: 5 INJECTION INTRAVENOUS at 20:33

## 2023-05-22 RX ADMIN — INSULIN GLARGINE 12 UNITS: 100 INJECTION, SOLUTION SUBCUTANEOUS at 20:34

## 2023-05-22 RX ADMIN — CHLORHEXIDINE GLUCONATE 15 ML: 1.2 RINSE ORAL at 20:33

## 2023-05-22 RX ADMIN — SODIUM CHLORIDE, PRESERVATIVE FREE 10 ML: 5 INJECTION INTRAVENOUS at 09:04

## 2023-05-22 RX ADMIN — POLYETHYLENE GLYCOL 3350 17 G: 17 POWDER, FOR SOLUTION ORAL at 09:04

## 2023-05-22 RX ADMIN — APIXABAN 5 MG: 5 TABLET, FILM COATED ORAL at 09:04

## 2023-05-22 RX ADMIN — HEPARIN SODIUM 1900 UNITS: 1000 INJECTION INTRAVENOUS; SUBCUTANEOUS at 17:31

## 2023-05-22 RX ADMIN — SENNOSIDES AND DOCUSATE SODIUM 2 TABLET: 50; 8.6 TABLET ORAL at 20:33

## 2023-05-22 RX ADMIN — APIXABAN 5 MG: 5 TABLET, FILM COATED ORAL at 20:33

## 2023-05-22 RX ADMIN — CHLORHEXIDINE GLUCONATE 15 ML: 1.2 RINSE ORAL at 09:04

## 2023-05-22 RX ADMIN — SENNOSIDES AND DOCUSATE SODIUM 2 TABLET: 50; 8.6 TABLET ORAL at 09:04

## 2023-05-22 RX ADMIN — HEPARIN SODIUM 1900 UNITS: 1000 INJECTION INTRAVENOUS; SUBCUTANEOUS at 17:32

## 2023-05-22 RX ADMIN — DIPHENHYDRAMINE HYDROCHLORIDE 25 MG: 25 CAPSULE ORAL at 20:33

## 2023-05-22 RX ADMIN — HYDROXYZINE HYDROCHLORIDE 10 MG: 10 TABLET ORAL at 20:44

## 2023-05-22 ASSESSMENT — PAIN SCALES - GENERAL
PAINLEVEL_OUTOF10: 0

## 2023-05-22 NOTE — FLOWSHEET NOTE
05/22/23 1439   During Hemodialysis Assessment   /68   Pulse (!) 121   Temp 97.3 °F (36.3 °C)   Arterial Pressure (mmHg) -230 mmHg   Venous Pressure (mmHg) 180   TMP 30      Comments Patient is awake and alert, VSS   Access Visible Yes   Ultrafiltration Rate (ml/hr) 1000 ml/hr   Ultrafiltration Removed (mL) 0 ml/min   Hemodialysis Central Access Right Neck   Placement Date/Time: 05/19/23 1046   Present on Admission/Arrival: No  Inserted by: Dr. Romo Finger  Insertion Practices: Chlorohexadine skin antisepsis; Hand hygiene;Maximal barrier precautions; Optimal catheter site selection;Sterile ultrasound technique . .. Continued need for line? Yes   Site Assessment Dry; Intact   Venous Lumen Status Brisk blood return;Flushed   Arterial Lumen Status Brisk blood return;Flushed   Line Care Connections checked and tightened;Line pulled back; Chlorhexidine wipes;Ports disinfected   Dressing Type Antimicrobial;Transparent   Date of Last Dressing Change 05/19/23   Dressing Status Dry; Intact; Old drainage noted   Dressing Change Due 05/26/23     Primary RN SBAR: Rolando Miranda RN  Patient Education: Infection prevention  Hepatitis B Surface Ag   Date/Time Value Ref Range Status   05/15/2023 11:32 PM <0.10 Index Final     Hep B S Ab   Date/Time Value Ref Range Status   05/19/2023 01:10 .35 mIU/mL Final

## 2023-05-22 NOTE — CARE COORDINATION
Transition of Care Plan:    RUR: 17% - moderate  Prior Level of Functioning: independent   Disposition: home with friend; OP HD  Follow up appointments: [facility]; Nephrology; PCP  DME needed: possible RW - will follow  Transportation at discharge: Friend  Caregiver Contact: Frieda Green - p: [phone]  Discharge Caregiver contacted prior to discharge? No  Care Conference needed? No  Barriers to discharge: OP HD Set Up    CM reviewed chart and noted results for Hepatitis panel. Sent results to  [facility] via fax (f: [phone]) for review. Spoke with patient about disposition plan. [facility]. Patient polietly declined. Patient will return home and reside with his friend, . Frieda Green. Patient would like to drive himself to/from OP HD appointments. CM deferred question for provider to make recommendation. Back up plan for transportation is friends and family members; patient has no concerns related to reliable transportation to/from HD sessions. CM advised his tentative schedule for [facility] is TTS; unknown time of day for now. Patient would like 2nd chair time if possible to avoid morning traffic. CM to request same from [address]. CM will continue to follow. Current barrier for discharge is OP HD set up. Patient to have HD today at [facility]; of note, OP schedule is TTS at [facility]. PM UPDATE:   Received update from [facility] admissions; patient accepted; chair time confirmed:    [facility]  OP HD TTS at 1030am  Start Date: Thursday - pt to arrive at [place] [id number]    CM provided update to patient and attending MD. Patient currently on HD.     Miguel Hanley, MPH  Care Manager [facility]  Available via [employer] or

## 2023-05-22 NOTE — CONSULTS
Palliative Medicine      Case discussed with attending MD.  Patient currently undergoing hemodialysis, not a good time to discuss care goals with our team.  Will return tomorrow to check in

## 2023-05-22 NOTE — FLOWSHEET NOTE
05/22/23 1750   Vital Signs   BP (!) 95/57   Temp 97.1 °F (36.2 °C)   Pulse (!) 120   Respirations 18   Post-Hemodialysis Assessment   Post-Treatment Procedures Blood returned;Catheter capped, clamped and heparinized x 2 ports   Machine Disinfection Process Acid/Vinegar Clean;Bleach; Exterior Machine Disinfection   Rinseback Volume (ml) 250 ml   Blood Volume Processed (Liters) 64.3 l/min   Dialyzer Clearance Lightly streaked   Duration of Treatment (minutes) 180 minutes   Heparin Amount Administered During Treatment (mL) 3.8 mL   Hemodialysis Intake (ml) 500 ml   Hemodialysis Output (ml) 3000 ml   NET Removed (ml) 2500   Tolerated Treatment Good   Patient Response to Treatment patient tolerated treatment well   Bilateral Breath Sounds Diminished   Edema None   Physician Notified No   Time Off 5781   Patient Disposition Other (Comment)  (treatment at bedside)     Primary RN Demar Richards, RN  Comments: Patient tolerated treatment well, remaining awake and alert throghout. VSS. UF goal achieved 2500mL.

## 2023-05-23 PROBLEM — Z51.5 PALLIATIVE CARE BY SPECIALIST: Status: ACTIVE | Noted: 2023-05-23

## 2023-05-23 PROBLEM — R53.83 OTHER FATIGUE: Status: ACTIVE | Noted: 2023-05-23

## 2023-05-23 PROBLEM — R00.0 TACHYCARDIA: Status: ACTIVE | Noted: 2023-01-01

## 2023-05-23 LAB
GLUCOSE BLD STRIP.AUTO-MCNC: 116 MG/DL (ref 65–117)
GLUCOSE BLD STRIP.AUTO-MCNC: 136 MG/DL (ref 65–117)
GLUCOSE BLD STRIP.AUTO-MCNC: 150 MG/DL (ref 65–117)
GLUCOSE BLD STRIP.AUTO-MCNC: 165 MG/DL (ref 65–117)
SERVICE CMNT-IMP: ABNORMAL
SERVICE CMNT-IMP: NORMAL

## 2023-05-23 PROCEDURE — 1100000003 HC PRIVATE W/ TELEMETRY

## 2023-05-23 PROCEDURE — 99231 SBSQ HOSP IP/OBS SF/LOW 25: CPT | Performed by: NURSE PRACTITIONER

## 2023-05-23 PROCEDURE — 6370000000 HC RX 637 (ALT 250 FOR IP): Performed by: INTERNAL MEDICINE

## 2023-05-23 PROCEDURE — 82962 GLUCOSE BLOOD TEST: CPT

## 2023-05-23 PROCEDURE — 6370000000 HC RX 637 (ALT 250 FOR IP): Performed by: HOSPITALIST

## 2023-05-23 PROCEDURE — 6360000002 HC RX W HCPCS: Performed by: HOSPITALIST

## 2023-05-23 PROCEDURE — 2580000003 HC RX 258: Performed by: HOSPITALIST

## 2023-05-23 RX ORDER — MIDODRINE HYDROCHLORIDE 5 MG/1
5 TABLET ORAL
Status: DISCONTINUED | OUTPATIENT
Start: 2023-05-23 | End: 2023-05-24 | Stop reason: HOSPADM

## 2023-05-23 RX ORDER — MIDODRINE HYDROCHLORIDE 5 MG/1
2.5 TABLET ORAL
Status: DISCONTINUED | OUTPATIENT
Start: 2023-05-23 | End: 2023-05-23

## 2023-05-23 RX ADMIN — SENNOSIDES AND DOCUSATE SODIUM 2 TABLET: 50; 8.6 TABLET ORAL at 21:14

## 2023-05-23 RX ADMIN — METOPROLOL TARTRATE 12.5 MG: 25 TABLET, FILM COATED ORAL at 13:58

## 2023-05-23 RX ADMIN — HYDROXYZINE HYDROCHLORIDE 10 MG: 10 TABLET ORAL at 08:33

## 2023-05-23 RX ADMIN — APIXABAN 5 MG: 5 TABLET, FILM COATED ORAL at 21:15

## 2023-05-23 RX ADMIN — SODIUM CHLORIDE, PRESERVATIVE FREE 10 ML: 5 INJECTION INTRAVENOUS at 21:15

## 2023-05-23 RX ADMIN — INSULIN GLARGINE 12 UNITS: 100 INJECTION, SOLUTION SUBCUTANEOUS at 21:15

## 2023-05-23 RX ADMIN — METOPROLOL TARTRATE 12.5 MG: 25 TABLET, FILM COATED ORAL at 21:14

## 2023-05-23 RX ADMIN — DIPHENHYDRAMINE HYDROCHLORIDE 25 MG: 25 CAPSULE ORAL at 23:25

## 2023-05-23 RX ADMIN — APIXABAN 5 MG: 5 TABLET, FILM COATED ORAL at 08:33

## 2023-05-23 RX ADMIN — PROCHLORPERAZINE EDISYLATE 5 MG: 5 INJECTION INTRAMUSCULAR; INTRAVENOUS at 01:16

## 2023-05-23 RX ADMIN — SENNOSIDES AND DOCUSATE SODIUM 2 TABLET: 50; 8.6 TABLET ORAL at 08:33

## 2023-05-23 RX ADMIN — HYDROXYZINE HYDROCHLORIDE 10 MG: 10 TABLET ORAL at 19:50

## 2023-05-23 RX ADMIN — MIDODRINE HYDROCHLORIDE 5 MG: 5 TABLET ORAL at 15:54

## 2023-05-23 RX ADMIN — POLYETHYLENE GLYCOL 3350 17 G: 17 POWDER, FOR SOLUTION ORAL at 08:33

## 2023-05-23 RX ADMIN — SODIUM CHLORIDE, PRESERVATIVE FREE 10 ML: 5 INJECTION INTRAVENOUS at 08:34

## 2023-05-23 RX ADMIN — PROCHLORPERAZINE EDISYLATE 5 MG: 5 INJECTION INTRAMUSCULAR; INTRAVENOUS at 21:14

## 2023-05-23 ASSESSMENT — PAIN SCALES - GENERAL
PAINLEVEL_OUTOF10: 0
PAINLEVEL_OUTOF10: 0

## 2023-05-23 NOTE — CONSULTS
Palliative Medicine Consult  Randy: [phone] ([phone])    Patient Name: Nova Dye  YOB: 1945    Date of Initial Consult: 5/23/2023  Date of Today's Visit: 5/23/2023  Reason for Consult: care decisions  Requesting Provider: Dr. Adela Jain:   Nova Dye is a 69 y.o. with a past history of nonischemic cardiomyopathy EF 15%, severe MR, afib , s/p BiVICD 2021, CKD3, mild Bilateral carotid stenosis, who was admitted on 5/1/2023 from home with a diagnosis of fatigue, SOB. Current medical issues leading to Palliative Medicine involvement include: patient with end stage nonischemic cardiomyopathy, not a candidate for heart transplant/ not interested in LVAD, not able to get home inotropes due to need for dialysis, currently getting dialysis for BESSY on CKD3, with hopes to eventually be ready for MV clip procedure. Expected to discharge today, but now with HR 130s and feeling SOB/ anxious. Social: patient is not . MPOA is friend Frankey Douse [phone]  Patient is retired from career in [employer]. On a good day, he enjoys being outside in his yard. Independent in ADLs/ IADLs at home      PALLIATIVE DIAGNOSES:   Shortness of breath at rest.  Afib with RVR  Anxiety about health  Fatigue due to heart failure  Non ischemic cardiomyopathy EF 15%  BESSY on CKD3  Shock liver, improved  Weight loss  Palliative medicine encounter       PLAN:   Case discussed with case management- anticipated discharge today delayed by patient high HR and increase in dyspnea  Palliative Medicine services introduced to patient as added layer of support in chronic illness. Patient is not feeling well right now, and not much up for conversation. He understands that discharge home has been delayed by his change in symptoms. He's felt bad since the last hour of dialysis yesterday -- can't catch his breath and HR is high  He confirms that his MPOA is friend, Frankey Douse.   He is

## 2023-05-24 VITALS
TEMPERATURE: 98.2 F | RESPIRATION RATE: 22 BRPM | WEIGHT: 194 LBS | HEART RATE: 81 BPM | DIASTOLIC BLOOD PRESSURE: 69 MMHG | SYSTOLIC BLOOD PRESSURE: 100 MMHG | OXYGEN SATURATION: 99 % | BODY MASS INDEX: 27.16 KG/M2 | HEIGHT: 71 IN

## 2023-05-24 LAB
ALBUMIN SERPL-MCNC: 3.6 G/DL (ref 3.5–5)
ALBUMIN/GLOB SERPL: 1 (ref 1.1–2.2)
ALP SERPL-CCNC: 70 U/L (ref 45–117)
ALT SERPL-CCNC: 208 U/L (ref 12–78)
ANION GAP SERPL CALC-SCNC: 7 MMOL/L (ref 5–15)
AST SERPL-CCNC: 69 U/L (ref 15–37)
BILIRUB SERPL-MCNC: 1 MG/DL (ref 0.2–1)
BUN SERPL-MCNC: 50 MG/DL (ref 6–20)
BUN/CREAT SERPL: 16 (ref 12–20)
CALCIUM SERPL-MCNC: 9.5 MG/DL (ref 8.5–10.1)
CHLORIDE SERPL-SCNC: 98 MMOL/L (ref 97–108)
CO2 SERPL-SCNC: 22 MMOL/L (ref 21–32)
CREAT SERPL-MCNC: 3.1 MG/DL (ref 0.7–1.3)
ERYTHROCYTE [DISTWIDTH] IN BLOOD BY AUTOMATED COUNT: 16.6 % (ref 11.5–14.5)
GLOBULIN SER CALC-MCNC: 3.7 G/DL (ref 2–4)
GLUCOSE BLD STRIP.AUTO-MCNC: 95 MG/DL (ref 65–117)
GLUCOSE SERPL-MCNC: 152 MG/DL (ref 65–100)
HCT VFR BLD AUTO: 40.4 % (ref 36.6–50.3)
HGB BLD-MCNC: 12.7 G/DL (ref 12.1–17)
MAGNESIUM SERPL-MCNC: 2.7 MG/DL (ref 1.6–2.4)
MCH RBC QN AUTO: 33.4 PG (ref 26–34)
MCHC RBC AUTO-ENTMCNC: 31.4 G/DL (ref 30–36.5)
MCV RBC AUTO: 106.3 FL (ref 80–99)
NRBC # BLD: 0 K/UL (ref 0–0.01)
NRBC BLD-RTO: 0 PER 100 WBC
NT PRO BNP: ABNORMAL PG/ML
PHOSPHATE SERPL-MCNC: 4 MG/DL (ref 2.6–4.7)
PLATELET # BLD AUTO: 106 K/UL (ref 150–400)
PMV BLD AUTO: 12.8 FL (ref 8.9–12.9)
POTASSIUM SERPL-SCNC: 5 MMOL/L (ref 3.5–5.1)
PROT SERPL-MCNC: 7.3 G/DL (ref 6.4–8.2)
RBC # BLD AUTO: 3.8 M/UL (ref 4.1–5.7)
SERVICE CMNT-IMP: NORMAL
SODIUM SERPL-SCNC: 127 MMOL/L (ref 136–145)
WBC # BLD AUTO: 11.1 K/UL (ref 4.1–11.1)

## 2023-05-24 PROCEDURE — 83880 ASSAY OF NATRIURETIC PEPTIDE: CPT

## 2023-05-24 PROCEDURE — 97116 GAIT TRAINING THERAPY: CPT

## 2023-05-24 PROCEDURE — 2580000003 HC RX 258: Performed by: HOSPITALIST

## 2023-05-24 PROCEDURE — 84100 ASSAY OF PHOSPHORUS: CPT

## 2023-05-24 PROCEDURE — 6370000000 HC RX 637 (ALT 250 FOR IP): Performed by: HOSPITALIST

## 2023-05-24 PROCEDURE — 80053 COMPREHEN METABOLIC PANEL: CPT

## 2023-05-24 PROCEDURE — 36415 COLL VENOUS BLD VENIPUNCTURE: CPT

## 2023-05-24 PROCEDURE — 83735 ASSAY OF MAGNESIUM: CPT

## 2023-05-24 PROCEDURE — 99231 SBSQ HOSP IP/OBS SF/LOW 25: CPT | Performed by: NURSE PRACTITIONER

## 2023-05-24 PROCEDURE — 82962 GLUCOSE BLOOD TEST: CPT

## 2023-05-24 PROCEDURE — 6370000000 HC RX 637 (ALT 250 FOR IP): Performed by: INTERNAL MEDICINE

## 2023-05-24 PROCEDURE — 85027 COMPLETE CBC AUTOMATED: CPT

## 2023-05-24 RX ORDER — INSULIN GLARGINE 100 [IU]/ML
12 INJECTION, SOLUTION SUBCUTANEOUS NIGHTLY
Qty: 3.6 ML | Refills: 0 | Status: SHIPPED | OUTPATIENT
Start: 2023-05-24 | End: 2023-05-24 | Stop reason: HOSPADM

## 2023-05-24 RX ORDER — PEN NEEDLE, DIABETIC 30 GX5/16"
1 NEEDLE, DISPOSABLE MISCELLANEOUS DAILY
Qty: 100 EACH | Refills: 0 | Status: SHIPPED | OUTPATIENT
Start: 2023-05-24

## 2023-05-24 RX ORDER — MIDODRINE HYDROCHLORIDE 5 MG/1
5 TABLET ORAL 2 TIMES DAILY
Qty: 60 TABLET | Refills: 0 | Status: SHIPPED | OUTPATIENT
Start: 2023-05-24 | End: 2023-06-23

## 2023-05-24 RX ORDER — INSULIN LISPRO 100 [IU]/ML
INJECTION, SOLUTION INTRAVENOUS; SUBCUTANEOUS
Qty: 1 ADJUSTABLE DOSE PRE-FILLED PEN SYRINGE | Refills: 0 | Status: SHIPPED | OUTPATIENT
Start: 2023-05-24 | End: 2023-05-24 | Stop reason: HOSPADM

## 2023-05-24 RX ADMIN — HYDROXYZINE HYDROCHLORIDE 10 MG: 10 TABLET ORAL at 04:23

## 2023-05-24 RX ADMIN — POLYETHYLENE GLYCOL 3350 17 G: 17 POWDER, FOR SOLUTION ORAL at 08:39

## 2023-05-24 RX ADMIN — APIXABAN 5 MG: 5 TABLET, FILM COATED ORAL at 08:39

## 2023-05-24 RX ADMIN — SENNOSIDES AND DOCUSATE SODIUM 2 TABLET: 50; 8.6 TABLET ORAL at 08:38

## 2023-05-24 RX ADMIN — METOPROLOL TARTRATE 12.5 MG: 25 TABLET, FILM COATED ORAL at 08:40

## 2023-05-24 RX ADMIN — SODIUM CHLORIDE, PRESERVATIVE FREE 10 ML: 5 INJECTION INTRAVENOUS at 08:39

## 2023-05-24 RX ADMIN — MIDODRINE HYDROCHLORIDE 5 MG: 5 TABLET ORAL at 08:38

## 2023-05-24 NOTE — DISCHARGE SUMMARY
Discharge Summary       PATIENT ID: Randy Neves  MRN: [id number]   YOB: 1945    DATE OF ADMISSION: 5/1/2023  4:39 PM    DATE OF DISCHARGE: 5/24/2023   PRIMARY CARE PROVIDER: Coty Myrick MD     DISCHARGING PROVIDER: David Veras MD    To contact this individual call [phone] and ask the  to page. If unavailable ask to be transferred the Adult Hospitalist Department. CONSULTATIONS: IP CONSULT TO OCCUPATIONAL THERAPY  IP CONSULT TO PHYSICAL THERAPY  IP CONSULT TO HEPATOLOGY  IP CONSULT TO CASE MANAGEMENT  IP CONSULT TO PALLIATIVE CARE    PROCEDURES/SURGERIES: Procedure(s):  Right heart cath  Ep cardioversion     ADMITTING DIAGNOSES & HOSPITAL COURSE:   HPI:  Patient is a pleasant 69 y.o. 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      HOSPITAL COURSE:   Acute on chronic combined systolic and diastolic heart failure EF 20% NYHA IV on admission   --bumex drip -> IV pushes -> now off due to BESSY   --HD/UF started 5/15   --s/p RHC - RHC completed showing elevated R/L heart filling pressures and severely decreased CO/CI   --Patient is status post TAVR for aortic stenosis few years back   --Continue Eliquis   --repeat RHC 5/15 - severely elevated R and L sided filling pressures, depressed CO and CI.   -- patient is not interested in LVAD;    -- He is not a candidate for heart transplant due to age >74;   -- Weaned off Dobutamine drip;    -- appreciate AHF team follow-up and rec's: Patient may be candidate for palliative MV Clip once hemodynamics optimized with ultrafiltration;  - outpatient follow-up with Cardiology and AHF team;       Atrial fibrillation/flutter with RVR status post ablation 2016 with recurrent A-fib   --Currently on Eliquis  - was on amio drip

## 2023-05-24 NOTE — PLAN OF CARE
0730: Bedside and Verbal shift change report given to SANDIP Giron (oncoming nurse) by Sandy Perez RN (offgoing nurse). Report included the following information Nurse Handoff Report, Index, Intake/Output, MAR, and Neuro Assessment. 1115: went over discharge instructions with pt. Pt states he has no questions at this time     1120: pt discharged, wheeled down with tech and RN along with pt belongings and wheelchair.    Problem: Discharge Planning  Goal: Discharge to home or other facility with appropriate resources  Outcome: Adequate for Discharge     Problem: Pain  Goal: Verbalizes/displays adequate comfort level or baseline comfort level  5/24/2023 0948 by Remy Gilliland RN  Outcome: Adequate for Discharge  5/24/2023 0149 by Angle Barrera RN  Outcome: Progressing     Problem: Safety - Adult  Goal: Free from fall injury  5/24/2023 0948 by Remy Gilliland RN  Outcome: Adequate for Discharge  5/24/2023 0149 by Angle Barrera RN  Outcome: Progressing     Problem: Chronic Conditions and Co-morbidities  Goal: Patient's chronic conditions and co-morbidity symptoms are monitored and maintained or improved  Outcome: Adequate for Discharge     Problem: Gastrointestinal - Adult  Goal: Minimal or absence of nausea and vomiting  5/24/2023 0948 by Remy Gilliland RN  Outcome: Adequate for Discharge  5/24/2023 0149 by Angle Barrera RN  Outcome: Progressing  Goal: Maintains or returns to baseline bowel function  5/24/2023 0948 by Remy Gilliland RN  Outcome: Adequate for Discharge  5/24/2023 0149 by Angle Barrera RN  Outcome: Progressing  Goal: Maintains adequate nutritional intake  5/24/2023 0948 by Remy Gilliland RN  Outcome: Adequate for Discharge  5/24/2023 0149 by Angle Barrera RN  Outcome: Progressing     Problem: ABCDS Injury Assessment  Goal: Absence of physical injury  5/24/2023 0948 by Remy Gilliland RN  Outcome: Adequate for Discharge  5/24/2023 0149 by Angle Barrera RN  Outcome: Progressing     Problem: Skin/Tissue Integrity  Goal:
Problem: Discharge Planning  Goal: Discharge to home or other facility with appropriate resources  Outcome: Progressing  Flowsheets (Taken 5/22/2023 0900 by Nathan Wright, RN)  Discharge to home or other facility with appropriate resources: Identify barriers to discharge with patient and caregiver     Problem: Pain  Goal: Verbalizes/displays adequate comfort level or baseline comfort level  Outcome: Progressing     Problem: Safety - Adult  Goal: Free from fall injury  Outcome: Progressing     Problem: Chronic Conditions and Co-morbidities  Goal: Patient's chronic conditions and co-morbidity symptoms are monitored and maintained or improved  Outcome: Progressing  Flowsheets (Taken 5/22/2023 0900 by Nathan Wright, RN)  Care Plan - Patient's Chronic Conditions and Co-Morbidity Symptoms are Monitored and Maintained or Improved: Monitor and assess patient's chronic conditions and comorbid symptoms for stability, deterioration, or improvement     Problem: Gastrointestinal - Adult  Goal: Minimal or absence of nausea and vomiting  Outcome: Progressing  Goal: Maintains or returns to baseline bowel function  Outcome: Progressing  Goal: Maintains adequate nutritional intake  Outcome: Progressing     Problem: ABCDS Injury Assessment  Goal: Absence of physical injury  Outcome: Progressing     Problem: Skin/Tissue Integrity  Goal: Absence of new skin breakdown  Description: 1. Monitor for areas of redness and/or skin breakdown  2. Assess vascular access sites hourly  3. Every 4-6 hours minimum:  Change oxygen saturation probe site  4. Every 4-6 hours:  If on nasal continuous positive airway pressure, respiratory therapy assess nares and determine need for appliance change or resting period.   Outcome: Progressing
Problem: Pain  Goal: Verbalizes/displays adequate comfort level or baseline comfort level  Outcome: Progressing     Problem: Safety - Adult  Goal: Free from fall injury  Outcome: Progressing     Problem: Gastrointestinal - Adult  Goal: Minimal or absence of nausea and vomiting  Outcome: Progressing  Goal: Maintains or returns to baseline bowel function  Outcome: Progressing  Goal: Maintains adequate nutritional intake  Outcome: Progressing     Problem: ABCDS Injury Assessment  Goal: Absence of physical injury  Outcome: Progressing     Problem: Skin/Tissue Integrity  Goal: Absence of new skin breakdown  Description: 1. Monitor for areas of redness and/or skin breakdown  2. Assess vascular access sites hourly  3. Every 4-6 hours minimum:  Change oxygen saturation probe site  4. Every 4-6 hours:  If on nasal continuous positive airway pressure, respiratory therapy assess nares and determine need for appliance change or resting period.   Outcome: Progressing
Problem: Physical Therapy - Adult  Goal: By Discharge: Performs mobility at highest level of function for planned discharge setting. See evaluation for individualized goals. Description: FUNCTIONAL STATUS PRIOR TO ADMISSION: Patient was independent and active without use of DME. Driving. Using electric cart in grocery store. No falls. HOME SUPPORT PRIOR TO ADMISSION: The patient lived with partner but did not require assist.    Physical Therapy Goals  Weekly Reassessment 5/18/2023 (unmet goals continued)  Initiated 5/5/2023  1. Patient will move from supine to sit and sit to supine , scoot up and down, and roll side to side in bed with modified independence within 7 day(s). (Goal met 5/18/23)   2. Patient will transfer from bed to chair and chair to bed with modified independence using the least restrictive device within 7 day(s). (Goal met 5/18/2023)  3. Patient will perform sit to stand with modified independence within 7 day(s). (Goal met 5/18/2023)  4. Patient will ambulate with modified independence for 150 feet with the least restrictive device within 7 day(s). 5.  Patient will ascend/descend 3 stairs with right handrail(s) with modified independence within 7 day(s). Outcome: Progressing     PHYSICAL THERAPY TREATMENT    Patient: Chary Taylor (85 y.o. male)  Date: 5/24/2023  Diagnosis: Heart failure, unspecified [I50.9] <principal problem not specified>  Procedure(s) (LRB):  Right heart cath (N/A)  Ep cardioversion (N/A) 9 Days Post-Op  Precautions: Fall Risk                    ASSESSMENT:  Patient continues to benefit from skilled PT services and is progressing towards goals. Noted plan to DC today. Received pt fully dressed, sitting in a low chair, off tele (pt removed to keli his clothes, RN aware), and SOB (d/t effort to get dressed). Pt was seen prior to DC today to gait train w/ a SPC and assess which device (SPC vs RW) is most appropriate for home.   Patient's gait with the cane
Problem: Skin/Tissue Integrity  Goal: Absence of new skin breakdown  Description: 1. Monitor for areas of redness and/or skin breakdown  2. Assess vascular access sites hourly  3. Every 4-6 hours minimum:  Change oxygen saturation probe site  4. Every 4-6 hours:  If on nasal continuous positive airway pressure, respiratory therapy assess nares and determine need for appliance change or resting period. Outcome: Not Progressing     Problem: Pain  Goal: Verbalizes/displays adequate comfort level or baseline comfort level  Outcome: Progressing     Problem: Safety - Adult  Goal: Free from fall injury  Outcome: Progressing     Problem: Chronic Conditions and Co-morbidities  Goal: Patient's chronic conditions and co-morbidity symptoms are monitored and maintained or improved  Outcome: Progressing     Problem: Gastrointestinal - Adult  Goal: Minimal or absence of nausea and vomiting  Outcome: Progressing     Problem: ABCDS Injury Assessment  Goal: Absence of physical injury  Outcome: Progressing     Problem: Skin/Tissue Integrity  Goal: Absence of new skin breakdown  Description: 1. Monitor for areas of redness and/or skin breakdown  2. Assess vascular access sites hourly  3. Every 4-6 hours minimum:  Change oxygen saturation probe site  4. Every 4-6 hours:  If on nasal continuous positive airway pressure, respiratory therapy assess nares and determine need for appliance change or resting period.   Outcome: Not Progressing
 Delivery

## 2023-05-24 NOTE — PROGRESS NOTES
[facility] in [facility]  Inpatient Progress Note      Patient name: Sid Johnson  Patient : 1945  Patient MRN: [id number]  Consulting MD: Judit Jiménez MD  Date of service: 23    REASON FOR REFERRAL:  Management of chronic systolic heart failure      PLAN OF CARE/ATTENDING ATTESTATION:  69 y.o. man with chronic systolic heart failure due to non-ischemic cardiomyopathy and severe MR, stage D, NYHA class IV, GDMT is limited by BP and CKD  Patient is not candidate for heart transplant due to age > 79 years  Patient is not interested in LVAD-DT and not candidate for LVAD-DT due to renal failure  Patient is not a candidate for home inotropic therapy as palliation due to requirement of dialysis outpatient  Patient could potentially be candidate for palliative MV clip once hemodynamics optimized with UF   Overall, the situation is difficult and prognosis limited. INTERVAL HISTORY:  -very tachy in 130s;  -no labs today  -weight down 3lbs; I/O neg 1.7l  -Mr. Throckmorton is not feeling good. He became SOB once his HR increased this high. He denies chest pain, leg swelling, dizziness, nausea. He states he feels anxious. RECOMMENDATIONS:   Will plan to maintain off inotropic support. Back in A-fib with RVR/appears a flutter today. No great options for medical rate control. May need another cardioverson. Will defer management to [facility]. BP has been too low for beta blocker. RN states she's about to try a dose to see if it will help HR  Renal dysfunction makes Digoxin not a great option. Elevated LFTs requiring discontinuation of Amiodarone. No repeat labs today   Sotalol and Dofetilide are not good options due to renal dysfunction and advanced heart failure. Consider AVN ablation  Anticoagulated with Apixaban 5 mg BID  ACEi/ARB/ARNi: Unable to tolerate due to renal dysfunction and hypotension.   MRA: Unable to tolerate due to
[facility] in [address] [place], [address] [facility]  Inpatient Progress Note      Patient name: Danny Ash  Patient : 1945  Patient MRN: [id number]  Consulting MD: Jeffry Richmond MD  Date of service: 23    REASON FOR REFERRAL:  Management of chronic systolic heart failure      PLAN OF CARE/ATTENDING ATTESTATION:  69 y.o. man with chronic systolic heart failure due to non-ischemic cardiomyopathy and severe MR, stage D, NYHA class IV, GDMT is limited by BP and CKD  Patient is not candidate for heart transplant due to age > 79 years  Patient is not interested in LVAD-DT and not candidate for LVAD-DT due to renal failure  Patient is not a candidate for home inotropic therapy as palliation due to requirement of dialysis outpatient  Patient could potentially be candidate for palliative MV clip once hemodynamics optimized with UF   Overall, the situation is difficult and prognosis limited. INTERVAL HISTORY:  -tachy; hypo  -creat steady at 2.2  -weight up 4lbs; I/O even  -Mr. Throckmorton is feeling pretty good. He isn't having SOB at rest, no swelling, no chest pain. Less dizzy than before. RECOMMENDATIONS:   Will plan to maintain off inotropic support. Back in A-fib with RVR. No great options for medical rate control. Will defer management to [facility]. BP too low for beta blocker. Renal dysfunction makes Digoxin not a great option. Elevated LFTs requiring discontinuation of Amiodarone. Sotalol and Dofetilide are not good options due to renal dysfunction and advanced heart failure. Consider AVN ablation  Anticoagulated with Apixaban 5 mg BID  ACEi/ARB/ARNi: Unable to tolerate due to renal dysfunction and hypotension. MRA: Unable to tolerate due to renal dysfunction and hypotension. SGLT2 inhibitor: Unable to tolerate due to renal dysfunction. We will use midodrine only as needed.     Volume management with dialysis  Monitor strict I/Os and daily
[address] [facility] Adult  Hospitalist Group                                                                                              Hospitalist Progress Note     Answering service: [phone] from in house phone            Date of Service:  5/10/2023   NAME:  Vilma Hernandez   :  1945   MRN:  [id number]            Admission Summary:   Vilma Hernandez is a 69 y.o. male with a significant history of combined systolic and diastolic CHF with depressed EF, 15% status post CRT -D, CAD, severe AS status post TAVR, atrial fibrillation, CKD presented with worsening dyspnea. Patient endorses compliance with treatment. He denies chest pain. He denies respiratory symptoms,  fever, cough or chills. Interval history / Subjective:   No new complaints. He is scheduled for dialysis this afternoon. He walked with a rolling walker in the hallway this afternoon. He is looking forward to going home tomorrow. Outpatient dialysis has been secured.           Assessment & Plan:          Acute on chronic combined systolic and diastolic heart failure EF 20% NYHA IV on admission   --bumex drip -> IV pushes -> now off due to BESSY   --HD/UF started 5/15   --s/p RHC - RHC completed showing elevated R/L heart filling pressures and severely decreased CO/CI   --Patient is status post TAVR for aortic stenosis few years back   --Continue Eliquis   --repeat RHC 5/15 - severely elevated R and L sided filling pressures, depressed CO and CI.   -- patient is not interested in LVAD;    -- He is not a candidate for heart transplant due to age >74;   -- Weaned off Dobutamine drip;    -- appreciate AHF team follow-up and rec's: Patient may be candidate for palliative MV Clip once hemodynamics optimized with ultrafiltration;      Atrial fibrillation/flutter with RVR status post ablation  with recurrent A-fib   --Currently on Eliquis  - was on amio drip   --tachycardic on dobutamine
[address] [facility] Adult  Hospitalist Group                                                                                              Hospitalist Progress Note     Answering service: [phone] from in house phone            Date of Service:  5/10/2023   NAME:  Lola Valladares   :  1945   MRN:  [id number]            Admission Summary:   Lola Valladares is a 69 y.o. male with a significant history of combined systolic and diastolic CHF with depressed EF, 15% status post CRT -D, CAD, severe AS status post TAVR, atrial fibrillation, CKD presented with worsening dyspnea. Patient endorses compliance with treatment. He denies chest pain. He denies respiratory symptoms,  fever, cough or chills. Interval history / Subjective:   HR better controlled, started low dose Metoprolol yesterday. Patient was hypotensive, and Midodrine was added. Patient is tolerating both medication well. LFTs still elevated this morning, so unable to restart Amiodarone. Assessment & Plan:          Acute on chronic combined systolic and diastolic heart failure EF 20% NYHA IV on admission   --bumex drip -> IV pushes -> now off due to BESSY   --HD/UF started 5/15   --s/p RHC - RHC completed showing elevated R/L heart filling pressures and severely decreased CO/CI   --Patient is status post TAVR for aortic stenosis few years back   --Continue Eliquis   --repeat RHC 5/15 - severely elevated R and L sided filling pressures, depressed CO and CI.   -- patient is not interested in LVAD;    -- He is not a candidate for heart transplant due to age >74;   -- Weaned off Dobutamine drip;    -- appreciate AHF team follow-up and rec's: Patient may be candidate for palliative MV Clip once hemodynamics optimized with ultrafiltration;  - outpatient follow-up with Cardiology and AHF tea;        Atrial fibrillation/flutter with RVR status post ablation 2016 with recurrent A-fib
Bedside shift change report given to SANDIP Castro (oncoming nurse) by Winnie Suero RN (offgoing nurse). Report included the following information Nurse Handoff Report.
Comprehensive Nutrition Assessment    Type and Reason for Visit: Reassess    Nutrition Recommendations/Plan:   Continue with 4 Carb Choice, 2gm Na+ diet  Continue with Nepro daily (mixed berry) and Magic cup daily (chocolate)       Malnutrition Assessment:  Malnutrition Status:  No malnutrition (05/09/23 1358)         Nutrition Assessment:    70 yo male admitted for CHF. PMHx: CHF with EF 15%, CAD, TAVR, CKD, HTN. Nephrology following for CKD - notes may need UF if not adequately diuresed. 5/23:  Follow up. Pt reports that he has been eating better recently. Was previously ordering his meals but states lately he has not and has been enjoying what he has received. Intakes recorded in chart as % meals. Nepro and Magic cup ordered, pt states he has been getting Ensure because he likes chocolate or berry flavor. Informed him we have mckeon Nepro and he agreed to try. He likes the magic cup only when it is chocolate. Will modify ONS orders to reflect this. Labs reviewed. 5/16: Pt reports \"everything gags me. \" PO intake has been lower with nausea. Pt would like everything \"really cold. \" No BM in 2 days. Previously had diarrhea after miralax. Encouraged pt to order meals and trial Nepro (over ice) and magic cup. Encourage PO as able. Give nausea meds ~30 min prior to meals. 5/9: Seeing pt for LOS. Pt sitting up in chair during visit. States he has been eating well but has not been happy with his meals today as they have been cold/late and not what he ordered. Took his order for dinner and communicated that to diet office. He said he is waiting for pt advocacy to come visit him. Reports usually good appetite PTA until the last 3-4 weeks when he did notice a decrease in his appetite. Likely related to fluid accumulation. He has been diuresing since admission.   Noted weight is down 14% over last 2 months from fluid removal. Discussed his eating habits at home and the importance of
Hospital follow-up PCP transitional care appointment has been scheduled with Dr. Earnestine Sanford for Friday June 2, 2023 at 7:30 AM.? Pending patient discharge.   Caron Miranda, Care Management Assistant
Physical Therapy  -  defer  Therapy treatment session attempted however pt currently undergoing HD. Will follow.
Physical Therapy - defer  Note the plan for DC home today. Attempted to see pt to gait train with a SPC vs continue with the RW. Noted pt's HR in the 130's in the bed and pt stating \"I feel like I can't catch my breath. \"  Offered to order a RW for home in the event pt discharges later today however pt prefers to gait train with a cane first.  Will check back later. RN is aware of pt's HR.      1138 - Checked back for therapy treatment. At last check (11:18), pt's HR was 130. Will defer and check back this afternoon as able.
RENAL  PROGRESS NOTE        Subjective:    Feels ok  Objective:   VITALS SIGNS:    BP 98/61   Pulse (!) 116   Temp 97.4 °F (36.3 °C) (Oral)   Resp 18   Ht 1.803 m (5' 11\")   Wt 89.2 kg (196 lb 10.4 oz)   SpO2 96%   BMI 27.43 kg/m²             Temp (24hrs), Av.6 °F (36.4 °C), Min:97.3 °F (36.3 °C), Max:98 °F (36.7 °C)         PHYSICAL EXAM:  NAD  Less edema    DATA REVIEW:     INTAKE / OUTPUT:   Last shift:      No intake/output data recorded. Last 3 shifts:  1901 -  0700  In: 2175 [P.O.:2175]  Out: 9476 [Urine:1830]    Intake/Output Summary (Last 24 hours) at 2023 0937  Last data filed at 2023 0330  Gross per 24 hour   Intake 1075 ml   Output 750 ml   Net 325 ml         LABS:   Recent Labs     23  0526 23  0406 23  0028   WBC 9.1 8.7 10.3   HGB 11.7* 12.8 12.8   HCT 37.9 41.4 40.6   * 109* 114*     Recent Labs     23  0527 23  0406 23  0028 23  0029   * 132* 134*  --    K 4.4 3.9 4.3  --     102 103  --    CO2 27 24 27  --    BUN 33* 42* 47*  --    MG 2.5* 2.5* 2.5*  --    PHOS 2.2* 2.6 2.6  --    * 377*  373* 297* 295*                Assessment:  CKD-3b (Dr Nathanael Aguilar)- basln Cr 1.8  BESSY, cardiorenal -> Cr up to 4.3mg/dl. Started HD overnight 5/16 am  Hyponatremia: mild  Recent macrohematuria, saw urology as per him and neg work up  A on C combined CHF  Severe MR-> needs MitrClip in the near future  Hx of AS- s/p TAVR  Hx of low C3,C4;now back to normal  CMO ef 20-25%  ABNL LFTs: Congestion vs Amio?  Improving     Permacath       Plan/Recommendations:  HD #4 today  CM consulted to set up outpatient HD at [facility] under BESSY status  Plan is to do outpatient HD for a few weeks to drive down [place] in efforts to get him eventually to MitraClip procedure   Discussed with him  Linda Sanchez MD
RENAL  PROGRESS NOTE        Subjective:    Feels ok;lower BP;tachy  Objective:   VITALS SIGNS:    /74   Pulse (!) 131   Temp 97.5 °F (36.4 °C) (Oral)   Resp 18   Ht 1.803 m (5' 11\")   Wt 87.6 kg (193 lb 2 oz)   SpO2 98%   BMI 26.94 kg/m²             Temp (24hrs), Av.4 °F (36.3 °C), Min:96.8 °F (36 °C), Max:98.1 °F (36.7 °C)         PHYSICAL EXAM:  NAD  Less edema    DATA REVIEW:     INTAKE / OUTPUT:   Last shift:      No intake/output data recorded. Last 3 shifts:  1901 -  0700  In: 1878.8 [P.O.:1375; I.V.:3.8]  Out: 4125 [Urine:1125]    Intake/Output Summary (Last 24 hours) at 2023 0919  Last data filed at 2023 5635  Gross per 24 hour   Intake 1703.8 ml   Output 3375 ml   Net -1671.2 ml           LABS:   Recent Labs     23  0406 23  0028   WBC 8.7 10.3   HGB 12.8 12.8   HCT 41.4 40.6   * 114*       Recent Labs     23  0406 23  0028 23  0029   * 134*  --    K 3.9 4.3  --     103  --    CO2 24 27  --    BUN 42* 47*  --    MG 2.5* 2.5*  --    PHOS 2.6 2.6  --    *  373* 297* 295*                  Assessment:  CKD-3b (Dr López Conte)- basln Cr 1.8  BESSY, cardiorenal -> Cr up to 4.3mg/dl. Started HD overnight  am  Hyponatremia: mild  Recent macrohematuria, saw urology as per him and neg work up  A on C combined CHF  Severe MR-> needs MitrClip in the near future  Hx of AS- s/p TAVR  Hx of low C3,C4;now back to normal  CMO ef 20-25%  ABNL LFTs: Congestion vs Amio?  Improving     Permacath       Plan/Recommendations:  HD #5 tomorrow if her  Labile BP  CM consulted to set up outpatient HD at [facility] under BESSY status/TTS      Discussed with him  Zachary Adrian MD
[facility] Cardiology Progress Note    Usual Cardiologist: Dr. Jessica Washington  Date of Service: 2023    Subjective:  Seems to be in good spirits today. Tolerating dialysis. Denies significant dyspnea with ambulation.      Objective:    Temp (24hrs), Av.5 °F (36.4 °C), Min:97.3 °F (36.3 °C), Max:97.8 °F (36.6 °C)    Patient Vitals for the past 8 hrs:   Pulse   23 1400 (!) 120   23 1200 (!) 114   23 1102 (!) 122   23 1000 (!) 113      Patient Vitals for the past 8 hrs:   Resp   23 1102 20      Patient Vitals for the past 8 hrs:   BP   23 1102 118/77            Intake/Output Summary (Last 24 hours) at 2023 1528  Last data filed at 2023 1300  Gross per 24 hour   Intake 675 ml   Output 750 ml   Net -75 ml       Physical Exam  GEN: NAD, appears stated age, looks fatigued  HEENT: EOMI, MMM, OP clear  NECK: Normal JVP, carotids 2+ b/l and symmetrical  CV: Regular, tachycardic, I/VI systolic murmur at apex  LUNGS: Minimal bibasilar inspiratory crackles   ABD: Soft, ND  EXT: No edema, 2+ and symmetrical radial pulses b/l  PSYCH: Slightly depressed mood and mildly flat affect  NEURO: AAO, MAEW, face symmetrical, speech intact    Current Facility-Administered Medications   Medication Dose Route Frequency    heparin (porcine) PF injection 1,900 Units  1,900 Units IntraCATHeter PRN    heparin (porcine) PF injection 1,900 Units  1,900 Units IntraCATHeter PRN    hydrOXYzine HCl (ATARAX) tablet 10 mg  10 mg Oral TID PRN    albumin human 25 % IV solution 25 g  25 g IntraVENous PRN    prochlorperazine (COMPAZINE) injection 5 mg  5 mg IntraVENous Q6H PRN    [Held by provider] potassium chloride (KLOR-CON) extended release tablet 40 mEq  40 mEq Oral BID    insulin glargine (LANTUS) injection vial 12 Units  12 Units SubCUTAneous Nightly    [Held by provider] amiodarone (CORDARONE) tablet 200 mg  200 mg Oral BID    glucose chewable tablet 16 g  4 tablet Oral PRN    dextrose bolus 10% 125
[facility] Cardiology Progress Note    Usual Cardiologist: Dr. Shari Knowles  Date of Service: 2023    Subjective:  Doing fairly well. Feels improved compared with on admission. No orthopnea, chest discomfort or pre-syncope.     Objective:    Temp (24hrs), Av.1 °F (36.7 °C), Min:98 °F (36.7 °C), Max:98.2 °F (36.8 °C)    Patient Vitals for the past 8 hrs:   Pulse   23 0839 81   23 0739 96   23 0738 98   23 0655 (!) 111   23 0600 99   23 0430 98   23 0400 (!) 101      Patient Vitals for the past 8 hrs:   Resp   23 0655 22   23 0430 22      Patient Vitals for the past 8 hrs:   BP   23 0839 100/69   23 0655 99/73   23 0430 95/67            Intake/Output Summary (Last 24 hours) at 2023 1022  Last data filed at 2023 3836  Gross per 24 hour   Intake 800 ml   Output 320 ml   Net 480 ml       Physical Exam  GEN: NAD, appears stated age, looks fatigued  HEENT: EOMI, MMM, OP clear  NECK: Normal JVP, carotids 2+ b/l and symmetrical  CV: Irregular  LUNGS: Minimal right basilar inspiratory crackles  ABD: Soft, ND  EXT: No edema, 2+ and symmetrical radial pulses b/l  PSYCH: Slightly depressed mood and mildly flat affect  NEURO: AAO, MAEW, face symmetrical, speech intact    Current Facility-Administered Medications   Medication Dose Route Frequency    metoprolol tartrate (LOPRESSOR) tablet 12.5 mg  12.5 mg Oral BID    midodrine (PROAMATINE) tablet 5 mg  5 mg Oral TID WC    heparin (porcine) PF injection 1,900 Units  1,900 Units IntraCATHeter PRN    heparin (porcine) PF injection 1,900 Units  1,900 Units IntraCATHeter PRN    hydrOXYzine HCl (ATARAX) tablet 10 mg  10 mg Oral TID PRN    albumin human 25 % IV solution 25 g  25 g IntraVENous PRN    prochlorperazine (COMPAZINE) injection 5 mg  5 mg IntraVENous Q6H PRN    insulin glargine (LANTUS) injection vial 12 Units  12 Units SubCUTAneous Nightly    glucose chewable tablet 16 g  4 tablet Oral PRN
GUMARO Ruiz NP        Or    dextrose bolus 10% 250 mL  250 mL IntraVENous PRN GUMARO Ruiz NP        glucagon (rDNA) injection 1 mg  1 mg SubCUTAneous PRN GUMARO Ruiz NP        dextrose 10 % infusion   IntraVENous Continuous PRN GUMARO Ruiz NP        insulin lispro (HUMALOG) injection vial 0-4 Units  0-4 Units SubCUTAneous TID WC GUMARO Ruiz NP   2 Units at 05/14/23 1149    insulin lispro (HUMALOG) injection vial 0-4 Units  0-4 Units SubCUTAneous Nightly GUMARO Ruiz NP        [Held by provider] acetaminophen (TYLENOL) tablet 650 mg  650 mg Oral Q6H PRN Logan White MD   650 mg at 05/07/23 2242    Or    [Held by provider] acetaminophen (TYLENOL) suppository 650 mg  650 mg Rectal Q6H PRN Elham Mckoy MD        apixaban (ELIQUIS) tablet 5 mg  5 mg Oral BID Logan White MD   5 mg at 05/23/23 0833    chlorhexidine (PERIDEX) 0.12 % solution 15 mL  15 mL Mouth/Throat BID Logan White MD   15 mL at 05/22/23 2033    diphenhydrAMINE (BENADRYL) capsule 25 mg  25 mg Oral QHS Logan White MD   25 mg at 05/22/23 2033    naloxone [facility]) injection 0.4 mg  0.4 mg IntraVENous PRN Elham Mckoy MD        polyethylene glycol (GLYCOLAX) packet 17 g  17 g Oral Daily Logan White MD   17 g at 05/23/23 0833    polyethylene glycol (GLYCOLAX) packet 17 g  17 g Oral Daily PRN Logan White MD   17 g at 05/08/23 1240    sennosides-docusate sodium (SENOKOT-S) 8.6-50 MG tablet 2 tablet  2 tablet Oral BID Logan White MD   2 tablet at 05/23/23 0833    sodium chloride flush 0.9 % injection 5-40 mL  5-40 mL IntraVENous 2 times per day Logan White MD   10 mL at 05/23/23 0834    sodium chloride flush 0.9 % injection 5-40 mL  5-40 mL IntraVENous PRN Elham Mckoy MD        0.9 % sodium chloride infusion   IntraVENous PRN Logan White MD        sodium chloride (OCEAN) 0.65 % nasal spray 2 spray  (Patient Supplied)  2 spray Each Nostril
mg, SubCUTAneous, PRN, GUMARO Thomas NP    dextrose 10 % infusion, , IntraVENous, Continuous PRN, GUMARO Thomas NP    insulin lispro (HUMALOG) injection vial 0-4 Units, 0-4 Units, SubCUTAneous, TID WC, GUMARO Thomas NP, 2 Units at 05/14/23 1149    insulin lispro (HUMALOG) injection vial 0-4 Units, 0-4 Units, SubCUTAneous, Nightly, GUMARO Thomas NP    [Held by provider] acetaminophen (TYLENOL) tablet 650 mg, 650 mg, Oral, Q6H PRN, 650 mg at 05/07/23 2242 **OR** [Held by provider] acetaminophen (TYLENOL) suppository 650 mg, 650 mg, Rectal, Q6H PRN, Leroy Esquivel MD    apixaban (ELIQUIS) tablet 5 mg, 5 mg, Oral, BID, NEKBOJL ARTEMIO White MD, 5 mg at 05/24/23 0839    chlorhexidine (PERIDEX) 0.12 % solution 15 mL, 15 mL, Mouth/Throat, BID, [id number] ARTEMIO White MD, 15 mL at 05/22/23 2033    diphenhydrAMINE (BENADRYL) capsule 25 mg, 25 mg, Oral, QHS, [place] ARTEMIO White MD, 25 mg at 05/23/23 2325    naloxone (NARCAN) injection 0.4 mg, 0.4 mg, IntraVENous, PRN, Leroy Esquivel MD    polyethylene glycol (GLYCOLAX) packet 17 g, 17 g, Oral, Daily, [id number] ARTEMIO White MD, 17 g at 05/24/23 0839    polyethylene glycol (GLYCOLAX) packet 17 g, 17 g, Oral, Daily PRN, Leroy Esquivel MD, 17 g at 05/08/23 1240    sennosides-docusate sodium (SENOKOT-S) 8.6-50 MG tablet 2 tablet, 2 tablet, Oral, BID, [id number] ARTEMIO White MD, 2 tablet at 05/24/23 0838    sodium chloride flush 0.9 % injection 5-40 mL, 5-40 mL, IntraVENous, 2 times per day, Leroy Esquivel MD, 10 mL at 05/24/23 0839    sodium chloride flush 0.9 % injection 5-40 mL, 5-40 mL, IntraVENous, PRN, Leroy Esquivel MD    0.9 % sodium chloride infusion, , IntraVENous, PRN, Leroy Esquivel MD    sodium chloride (OCEAN) 0.65 % nasal spray 2 spray  (Patient Supplied), 2 spray, Each Nostril, PRN, RACHEL White MD    tiotropium (SPIRIVA RESPIMAT) 2.5 MCG/ACT inhaler 1 puff  (Patient Supplied), 1 puff, Inhalation, Daily, Leroy Esquivel MD, 1 puff at

## 2023-05-24 NOTE — CARE COORDINATION
Transition of Care Plan:    RUR: 19% - moderate  Prior Level of Functioning: independent  Disposition: home with friend  If SNF or IPR: Date FOC offered: Follow up appointments: Nephrology; Cardiology; PCP  DME needed: RW  Transportation at discharge: friend  IM/IMM Medicare/ letter given: 5/24/23  Caregiver Contact: Keesha Gupta [place] - p: [phone]  Discharge Caregiver contacted prior to discharge? no  Care Conference needed? no  Barriers to discharge: none    CM updated by attending MD that patient is medically stable for discharge today. CM spoke with patient re discharge plan. Confirmed pharmacy is [facility] [employer] at [facility]. No questions and concerns re discharge. OP HD information added to AVS.    Updated by PT that patient needs RW for discharge. 1010 - RW ordered via [employer]. MD provided signature for order. CM participated in [facility] Discharge with bedside nursing and   attending, that includes: Follow up appointments with PCP or specialist  Review of medications  Dispatch health information  Education on symptom management    All questions answered and patient concerns addressed SMAART-E checklist completed and placed in appropriate folder.       Jocelyn Cartagena, MPH  Care Manager [facility]  Available via [employer] or

## 2023-05-30 ENCOUNTER — TELEPHONE (OUTPATIENT)
Age: 78
End: 2023-05-30

## 2023-06-02 ENCOUNTER — OFFICE VISIT (OUTPATIENT)
Age: 78
End: 2023-06-02
Payer: MEDICARE

## 2023-06-02 VITALS
OXYGEN SATURATION: 98 % | BODY MASS INDEX: 28.42 KG/M2 | WEIGHT: 203 LBS | HEIGHT: 71 IN | HEART RATE: 62 BPM | RESPIRATION RATE: 18 BRPM | DIASTOLIC BLOOD PRESSURE: 64 MMHG | TEMPERATURE: 97.9 F | SYSTOLIC BLOOD PRESSURE: 88 MMHG

## 2023-06-02 DIAGNOSIS — I50.22 CHRONIC SYSTOLIC (CONGESTIVE) HEART FAILURE (HCC): Primary | ICD-10-CM

## 2023-06-02 PROCEDURE — 99214 OFFICE O/P EST MOD 30 MIN: CPT | Performed by: NURSE PRACTITIONER

## 2023-06-02 PROCEDURE — G8427 DOCREV CUR MEDS BY ELIG CLIN: HCPCS | Performed by: NURSE PRACTITIONER

## 2023-06-02 PROCEDURE — 1123F ACP DISCUSS/DSCN MKR DOCD: CPT | Performed by: NURSE PRACTITIONER

## 2023-06-02 PROCEDURE — G8417 CALC BMI ABV UP PARAM F/U: HCPCS | Performed by: NURSE PRACTITIONER

## 2023-06-02 PROCEDURE — 1036F TOBACCO NON-USER: CPT | Performed by: NURSE PRACTITIONER

## 2023-06-02 PROCEDURE — 3078F DIAST BP <80 MM HG: CPT | Performed by: NURSE PRACTITIONER

## 2023-06-02 PROCEDURE — 3074F SYST BP LT 130 MM HG: CPT | Performed by: NURSE PRACTITIONER

## 2023-06-02 PROCEDURE — 1111F DSCHRG MED/CURRENT MED MERGE: CPT | Performed by: NURSE PRACTITIONER

## 2023-06-02 RX ORDER — MIDODRINE HYDROCHLORIDE 5 MG/1
10 TABLET ORAL 3 TIMES DAILY
Qty: 180 TABLET | Refills: 0 | Status: SHIPPED | OUTPATIENT
Start: 2023-06-02 | End: 2023-07-02

## 2023-06-02 ASSESSMENT — PATIENT HEALTH QUESTIONNAIRE - PHQ9
SUM OF ALL RESPONSES TO PHQ QUESTIONS 1-9: 2
1. LITTLE INTEREST OR PLEASURE IN DOING THINGS: 0
2. FEELING DOWN, DEPRESSED OR HOPELESS: 2
SUM OF ALL RESPONSES TO PHQ9 QUESTIONS 1 & 2: 2
SUM OF ALL RESPONSES TO PHQ QUESTIONS 1-9: 2

## 2023-06-02 ASSESSMENT — ENCOUNTER SYMPTOMS
SORE THROAT: 0
SHORTNESS OF BREATH: 1
ROS SKIN COMMENTS: BUTTOCKS
COUGH: 0
ABDOMINAL PAIN: 0
CHEST TIGHTNESS: 0
EYE PAIN: 0
BLOOD IN STOOL: 0
ABDOMINAL DISTENTION: 0

## 2023-06-02 NOTE — PROGRESS NOTES
[address] [place] [address]  Inpatient Progress Note      Patient name: Nika Adams  Patient : 1945  Patient MRN: [id number]  Consulting MD: No att. providers found  Date of service: 23    PCP: Kamlesh Bueno MD  Cardiologist:  Dr. Chen Bledsoe  [facility] cardiologist.  Dr. Osmin Holley       Chief Complaint   Patient presents with    Shortness of Breath     W/wo exertion    Leg Swelling     bilateral    Follow-Up from [address] [place] [address]:  69 y.o. man with chronic systolic heart failure due to non-ischemic cardiomyopathy and severe MR, stage D, NYHA class IV, GDMT is limited by BP and CKD  Patient is not candidate for heart transplant due to age > 79 years  Patient is not interested in LVAD-DT and not candidate for LVAD-DT due to renal failure  Patient is not a candidate for home inotropic therapy as palliation due to requirement of dialysis outpatient  Patient could potentially be candidate for palliative MV clip once hemodynamics optimized with UF   Discharged home from hospital 23. INTERVAL HISTORY:  -hypotensive with BP 88/64 (asymptomatic)  -labs  reviewed; and he showed me a set of labs from dialysis with continued hyperkalemia   -weight   -Mr. Throckmorton is here for hospital follow up from admission 23-23. He is not feeling great. He is very fatigued and easily SOB. He recently started having leg swelling again. He states his low Bps have been a problem in dialysis. He's not sleeping well due to discomfort. He currently has rash on his bottom that is painful for him. No dizziness. He is accompanied by a friend. RECOMMENDATIONS:   Will plan to maintain off inotropic support. Heart rate appears regular rate and rhythm today   Will continue low dose metoprolol 12.5 BID for rate control.   I instructed him to hold this medication the morning of dialysis   Increase midodrine

## 2023-06-02 NOTE — PATIENT INSTRUCTIONS
Medication changes:    Increase midodrine to 10mg three times a day    You make  hydrocortisone cream over the counter to apply to rash to buttocks. Please hold metoprolol on dialysis days. Please take this to your pharmacy to notify them of the change in medications. Testing Ordered:    none    Other Recommendations:      Ensure your drinking an adequate amount of water with a goal of 6-8 eight ounce glasses (1.5-2 liters) of fluid daily. Your urine should be clear and light yellow straw colored. If your blood pressure begins to consistently run below 90/60 and/or you begin to experience dizziness or lightheadedness, please contact the [facility] [address] at [phone]. Follow up in 1 month with [facility]      Please monitor your weights daily upon waking and after using the bathroom. Keep a written records of your weights and bring to your next appointment. If you have a weight gain of 3 or more pounds overnight OR 5 or more pounds in one week please contact our office. Thank you for allowing us the privilege of being a part of your healthcare team! Please do not hesitate to contact our office at [phone] with any questions or concerns.

## 2023-06-06 ENCOUNTER — TELEPHONE (OUTPATIENT)
Age: 78
End: 2023-06-06

## 2023-06-07 ENCOUNTER — TELEPHONE (OUTPATIENT)
Age: 78
End: 2023-06-07

## 2023-06-07 NOTE — TELEPHONE ENCOUNTER
Pt called back to get scheduled for follow-up. One month follow-up is three weeks late b/c pt has dialysis three days a week so couldn't do other times I offered.

## 2023-06-07 NOTE — TELEPHONE ENCOUNTER
Patient called in concerned about leg swelling (mostly knee down-ankles/ feet) swelling started Friday progressively worsening. Shortness of breath worsening  Hasnt felt well enough to weigh. Dialysis today--at [facility]---Dr Nielson/Desmond-not sure who is managing. RN will review with NP regarding recommendations. Per KEITH Valles NP--Hopefully he will feel better after dialysis today. He definitely needs to mention it at dialysis. Unfortunately I won't be able to do much with his fluid balance. If he feels worse over the weekend, he'll need to come to the ER  (in his case, probably call for an ambulance). LM for patient regarding above. Called [facility] to rely above concerns and information, spoke with Hollis Martins who will share this with his nurse.

## 2023-06-08 ENCOUNTER — TELEMEDICINE (OUTPATIENT)
Age: 78
End: 2023-06-08

## 2023-06-08 DIAGNOSIS — Z95.2 S/P TAVR (TRANSCATHETER AORTIC VALVE REPLACEMENT): ICD-10-CM

## 2023-06-08 DIAGNOSIS — I42.9 CARDIOMYOPATHY, UNSPECIFIED TYPE (HCC): ICD-10-CM

## 2023-06-08 DIAGNOSIS — Z95.0 S/P BIVENTRICULAR CARDIAC PACEMAKER PROCEDURE: ICD-10-CM

## 2023-06-08 DIAGNOSIS — I48.0 PAROXYSMAL ATRIAL FIBRILLATION (HCC): ICD-10-CM

## 2023-06-08 DIAGNOSIS — E87.1 HYPONATREMIA: ICD-10-CM

## 2023-06-08 DIAGNOSIS — R74.01 TRANSAMINITIS: ICD-10-CM

## 2023-06-08 DIAGNOSIS — Z09 HOSPITAL DISCHARGE FOLLOW-UP: Primary | ICD-10-CM

## 2023-06-08 DIAGNOSIS — N17.9 AKI (ACUTE KIDNEY INJURY) (HCC): ICD-10-CM

## 2023-06-08 DIAGNOSIS — N18.31 CHRONIC KIDNEY DISEASE, STAGE 3A (HCC): ICD-10-CM

## 2023-06-08 DIAGNOSIS — Z86.79 S/P ABLATION OF ATRIAL FLUTTER: ICD-10-CM

## 2023-06-08 DIAGNOSIS — D69.6 THROMBOCYTOPENIA (HCC): ICD-10-CM

## 2023-06-08 DIAGNOSIS — Z98.890 S/P ABLATION OF ATRIAL FLUTTER: ICD-10-CM

## 2023-06-08 DIAGNOSIS — I48.3 TYPICAL ATRIAL FLUTTER (HCC): ICD-10-CM

## 2023-06-08 SDOH — ECONOMIC STABILITY: FOOD INSECURITY: WITHIN THE PAST 12 MONTHS, THE FOOD YOU BOUGHT JUST DIDN'T LAST AND YOU DIDN'T HAVE MONEY TO GET MORE.: NEVER TRUE

## 2023-06-08 SDOH — ECONOMIC STABILITY: INCOME INSECURITY: HOW HARD IS IT FOR YOU TO PAY FOR THE VERY BASICS LIKE FOOD, HOUSING, MEDICAL CARE, AND HEATING?: NOT HARD AT ALL

## 2023-06-08 SDOH — ECONOMIC STABILITY: FOOD INSECURITY: WITHIN THE PAST 12 MONTHS, YOU WORRIED THAT YOUR FOOD WOULD RUN OUT BEFORE YOU GOT MONEY TO BUY MORE.: NEVER TRUE

## 2023-06-08 ASSESSMENT — PATIENT HEALTH QUESTIONNAIRE - PHQ9
2. FEELING DOWN, DEPRESSED OR HOPELESS: 0
1. LITTLE INTEREST OR PLEASURE IN DOING THINGS: 0
SUM OF ALL RESPONSES TO PHQ QUESTIONS 1-9: 0
SUM OF ALL RESPONSES TO PHQ9 QUESTIONS 1 & 2: 0
SUM OF ALL RESPONSES TO PHQ QUESTIONS 1-9: 0

## 2023-06-08 NOTE — PROGRESS NOTES
Chief Complaint   Patient presents with    Follow-Up from Hospital     Admitted 5/1/2023- 5/24/2023  2x Right Heart Cath, Ep Cardioversion   1. Have you been to the ER, urgent care clinic since your last visit? Hospitalized since your last visit? Yes Reason for visit: Admitted 5/1/2023- 5/24/2023 2x Right Heart Cath and Ep cadioversion. 2. Have you seen or consulted any other health care providers outside of the [address] [place] [address] since your last visit? Include any pap smears or colon screening.  No
mass  Chest -normal respiratory effort, no visualized signs of respiratory distress  Neurological - alert, awake, normal speech, no focal findings or movement disorder noted  Psych - normal mood and affect  Skin- no apparent lesions      Adelaide Ivychjanae, was evaluated through a synchronous (real-time) audio-video encounter. The patient (or guardian if applicable) is aware that this is a billable service, which includes applicable co-pays. This Virtual Visit was conducted with patient's (and/or legal guardian's) consent. Patient identification was verified, and a caregiver was present when appropriate. The patient was located at Home: [address] [place] [address] [employer]  Provider was located at [facility] (Appt Dept): [address] [facility],  [address] [employer]       An electronic signature was used to authenticate this note.   --Jayme Richmond MD

## 2023-06-23 ENCOUNTER — TELEPHONE (OUTPATIENT)
Age: 78
End: 2023-06-23

## 2023-07-17 PROBLEM — A41.9 SEPSIS (HCC): Status: ACTIVE | Noted: 2023-01-01

## 2023-07-17 NOTE — ED PROVIDER NOTES
ondansetron (ZOFRAN) injection 4 mg (4 mg IntraVENous Given 7/17/23 9563)   iopamidol (ISOVUE-370) 76 % injection 100 mL (100 mLs IntraVENous Given 7/17/23 1849)       CONSULTS: (Who and What was discussed)  None    Chronic Conditions: Heart failure, A fib    Social Determinants affecting Dx or Tx: None    Records Reviewed (source and summary of external notes): Nursing Notes, Old Medical Records, Previous electrocardiograms, Previous Radiology Studies, and Previous Laboratory Studies    CC/HPI Summary, DDx, ED Course, and Reassessment: Patient is a 60-year-old male who presents with hypotension, tachycardia, abdominal pain and shortness of breath. On initial exam, he is alert and oriented, hemodynamically unstable with pulse in the 130s irregular, tachypnea however with appropriate blood pressure with systolics in the 892O. Capillary refill delayed greater than 3 seconds. Initial ECG shows ventricularly paced irregular rhythm concerning for A-fib with RVR. Patient has generalized abdominal pain, tender to palpation. Lungs are clear to auscultation in all lung fields. Presentation concerning for shock of undifferentiated etiology. Possible hypovolemic component in the setting of poor p.o. intake reported. Additionally, abdominal pain tachycardia and reported hypotension concerning for possible distributive/septic etiology. Patient was instructed to not take outpatient metoprolol on mornings prior to dialysis. Will provide him metoprolol dose here for rate control. Additionally must evaluate for underlying etiology of his hypotension reported earlier today. Additionally, considered PE however I believe this to be less likely. No unilateral leg swelling or signs of DVT, no previous DVT/PE, is fully anticoagulated on Eliquis without recent non-adherence. ED Course as of 07/17/23 2332   Mon Jul 17, 2023   1702 EKG interpreted by me. Shows v-paced rhythm with a HR of 122.   LBBB morphology, appears to be Rock Mcrae     chlorhexidine 0.12 % solution  Commonly known as: PERIDEX     metoprolol tartrate 25 MG tablet  Commonly known as: LOPRESSOR  Take 0.5 tablets by mouth 2 times daily Hold morning of dialysis     midodrine 5 MG tablet  Commonly known as: PROAMATINE  Take 2 tablets by mouth 3 times daily     Spiriva Respimat 1.25 MCG/ACT Aers inhaler  Generic drug: tiotropium     vitamin D 25 MCG (1000 UT) Caps                DISCONTINUED MEDICATIONS:  Current Discharge Medication List          Avis Bhardwaj MD (electronically signed)      (Please note that parts of this dictation were completed with voice recognition software. Quite often unanticipated grammatical, syntax, homophones, and other interpretive errors are inadvertently transcribed by the computer software. Please disregards these errors.  Please excuse any errors that have escaped final proofreading.)         Avis Bhardwaj MD  Resident  07/18/23 0619

## 2023-07-17 NOTE — ED TRIAGE NOTES
Pt has a quarter-sized nodule to the L forehead upon arrival to ED. Patient states that he has had it since he was a teenager. Patient has pacemaker.

## 2023-07-18 NOTE — TELEPHONE ENCOUNTER
Pt's partner called to cx appt b/c he thought was this week, and pt is in hospital.  The appt is scheduled for Wed, 7/26/23, so we are going to keep it for the time being.

## 2023-07-18 NOTE — PROGRESS NOTES
Acute event    Patient decompenstating, hypotensive, unresponsive. + pulses and spont breathing. Called Vikki WILKINS and updated on patient's status. Anahi Payton to notify family and come to hospital.  While on phone with Lieztte Garcia, patient lost pulses and received 1 round CPR and 1 epi with ROSC. Confirmed with patient, no intubation, cont ACLS with CPR and DCCV if needed. Post code, NE @ 30 and only PIV access. Will place art line and CVL, emergent.       ARLENE Dawn  Advanced Practice Provider  [facility]  7/18/2023

## 2023-07-18 NOTE — DISCHARGE SUMMARY
Discharge Summary    Madi Choi  :  1945  MRN:  [id number]    ADMIT DATE:  2023  DISCHARGE DATE:  2023    PRIMARY CARE PHYSICIAN:  Johnny Block MD    CODE STATUS:  DNR    DISCHARGE DIAGNOSES:  Principal Problem:    Sepsis ([address])  Resolved Problems:    * No resolved hospital problems. *      HOSPITAL COURSE: 69 y.o. male with history of HFrEF (EF ~15%), CAD, atrial fibrillation on Eliquis and CKD on HD who presents via EMS from dialysis with concern for hypotension. Patient's systolic blood pressure was noted to be in the 80s with bradycardia in the 40s. Patient was started on epinephrine and levophed drip and CRRT was initiated. Patient had cardiac arrest x 2 .  Mental status was normal after the second cardiac arrest and he agreed to be DNR  Patient's code status was changed to DNR  He passed at [address]:         Medication List        CONTINUE taking these medications      Pen Needles 3/16\" 31G X 5 MM Misc  1 each by Does not apply route daily            ASK your doctor about these medications      acetaminophen 325 MG tablet  Commonly known as: TYLENOL     apixaban 5 MG Tabs tablet  Commonly known as: ELIQUIS     chlorhexidine 0.12 % solution  Commonly known as: PERIDEX     metoprolol tartrate 25 MG tablet  Commonly known as: LOPRESSOR  Take 0.5 tablets by mouth 2 times daily Hold morning of dialysis     midodrine 5 MG tablet  Commonly known as: PROAMATINE  Take 2 tablets by mouth 3 times daily     Spiriva Respimat 1.25 MCG/ACT Aers inhaler  Generic drug: tiotropium     vitamin D 25 MCG (1000 UT) Caps              DIET: No diet orders on file      DISCHARGE TIME: < 30 minutes    SIGNED:  Mary Ann Goncalves MD   2023, 3:28 PM

## 2023-07-18 NOTE — PROCEDURES
Procedure Note - Central Venous Access:   Performed by GUMARO Crawford NP  Diagnosis: septic shock  Insertion Date: 07/18/23  Time:3:26 AM  Obtained Consent? no; emergent   Procedure Location:  ICU. Immediately prior to the procedure, the patient was reevaluated and found suitable for the planned procedure and any planned medications. Immediately prior to the procedure a time out was called to verify the correct patient, procedure, equipment, staff, and marking as appropriate. Central line Bundle:  Full sterile barrier precautions used. 7-Step Sterility Protocol followed. (cap, mask sterile gown, sterile gloves, large sterile sheet, hand hygiene, 2% chlorhexidine for cutaneous antisepsis)  5 mL 1% Lidocaine placed at insertion site. Patient positioned in Trendelenburg? no   The site was prepped with chlorhexidine. Catheter TLC Left femoral vein. Using Seldinger technique a Arrow TLC was placed in the Left femoral vein via direct cannulation with 1 number of attempts. [facility] Ultrasound Guidance was utilized. There was good dark, non-pulsatile blood return in all ports. Femoral Site? yes. If Yes, reason femoral site was chosen: caogulopathic  Catheter secured. .  The following complications were encountered: none. A follow-up chest x-ray was not ordered post procedure. The Post Central Line Placement bundle was ordered post procedure. The procedure was tolerated well.       GUMARO Crawford NP  Critical Care Medicine  [facility]

## 2023-07-18 NOTE — H&P
CRITICAL CARE NOTE      Name: Nicolasa Kahn   : 1945   MRN: [id number]   Date: 2023      REASON FOR ICU ADMISSION: sepsis, afib rvr     PRINCIPAL ICU DIAGNOSIS     Sepsis, afib rvr    BRIEF PATIENT SUMMARY     Per ED note  69 y.o. male with history of HFrEF (EF ~15%), CAD, atrial fibrillation on Eliquis and CKD on HD who presents via EMS from dialysis with concern for hypotension. Patient's systolic blood pressure was noted to be in the 80s with bradycardia in the 40s. Additionally, he reports 2 falls over the past 2 days which he describes as mechanical loss of balance and endorses hitting his head. Lastly, he reports 2 to 3 days of worsening shortness of breath at rest and dyspnea on exertion. He denies chest pain. Finally, he reports that he has had decreased p.o. intake for the past 2 to 3 days secondary to generalized abdominal pain and nausea. Reports diarrhea secondary to laxative therapy he has been taking. Reports full compliance with his Eliquis, no history of DVT/PE. ICU c/s for concern of sepsis. Presented with afib RVR and hypotensive. Met patient in ED, confirmed above. Will admit to ICU for cont work up and Mx. HOSPITAL COURSE/DAILY EVENT LOG     Admit    24 HOUR EVENTS: as above    COMPREHENSIVE ASSESSMENT & PLAN:SYSTEM BASED     NEUROLOGICAL:     Aggressive Pain Control. Pain Medications: PRN  Target RASS: 0  Sedation Medications:PRN    PULMONOLOGY:     Vent Goals: NA  Pulmonary toilet: IS, Nebs PRN, HOB > 30  SpO2 Goal: > 92%    CARDIOVASCULAR:  HTN, HLD, PM, Pulm HTN, Ao stenosis, CM, CHF, Afib     Vasopressors to keep MAP 65 and above for adequate tissue perfusion.    SBP Goal of: > 90 mmHg  MAP Goal of: > 65 mmHg  Cardiac Gtts: NA  to maintain SBP/MAP goals  Transfusion Trigger (Hgb): <7 g/dL  Keep K > 4; Mg > 2     Echo pending    GASTROINTESTINAL:      GI Prophylaxis: PPI  Bowel Regimen: PRN  Feeding:  Pending     RENAL/ELECTROLYTE/FLUIDS:  ESRD

## 2023-07-18 NOTE — FLOWSHEET NOTE
07/18/23 0750   Treatment   $CRRT $Yes   Machine #   ([id number])   Cartridge Lot #   ([id number])   Therapy Type CVVHD   Pressures   Access (mmHg) -67 mmHg   Return/Venous (mmHg) 77 mmHg   Filter (mmHg) 165 mmHg   TMP Pressure (mmHg) 9 mmHg   Pressure Drop (mmHg) 41 mmHg   Deaeration Chamber Check Yes   Flow Rates   Blood Flow (mL/min) 200 mL/min   Dialysate (mL/hr) 2000 mL/hr   System Used   System Used Gevo Calculation   (D) Physician Ordered Hourly Removal (mL) 0 ml   CRRT Activities   Intervention Initiated   Ultrafiltrate Assessment   Ultrafiltrate Color Yellow/straw   Ultrafiltrate Appearance Clear   Hemodialysis Central Access Right Subclavian   No placement date or time found. Present on Admission/Arrival: Yes  Orientation: Right  Access Location: Subclavian   Continued need for line? Yes   Site Assessment Clean, dry & intact   CVC Lumen Status Infusing   Venous Lumen Status Blood return noted; Flushed; Infusing   Arterial Lumen Status Blood return noted; Flushed; Infusing   Line Care Ports disinfected; Connections checked and tightened   Dressing Type Bacteriocidal;Transparent   Date of Last Dressing Change 07/18/23   Dressing Status New dressing applied;Clean, dry & intact   Dressing Intervention Dressing changed     BP: 148/65  HR: 85  Resp: 33  SpO2: 97%    Primary RN SBAR: SOLA Ngo RN  Patient Education: CVC infection prevention & control. Hepatitis B Surface Ag   Date/Time Value Ref Range Status   05/15/2023 11:32 PM <0.10 Index Final     Hep B S Ab   Date/Time Value Ref Range Status   05/19/2023 01:10 .35 mIU/mL Final     Arrived to pt room & code blue was initiated on pt. BP stabilized & discussed with Dr. Larissa Russell. Per MD started CVVHD and currently running with no decrease in BP. Factor of 0.

## 2023-07-18 NOTE — PROGRESS NOTES
2230-----Patient admitted around 2200 for Sepsis and AFIB RVR. Patient was at dialysis when he became ill. Patient came to ICU on 3L O2 but quickly changed to HI-PRAKASH. Patient skin was mottled . Wound to R buttock. Patient does not want to be intubated but may proceed with all else. Patient lost pulse and 1 round of CPR performed. Levo runnng at 30 and Bicarb at 150ml/hr. Family was called and is at bedside.

## 2023-07-18 NOTE — PROGRESS NOTES
0710: Code blue called on patient, see RN Diogo Husain for details. 0720: Bedside and Verbal shift change report given to Teodora Loco, RN (oncoming nurse) by Sonali Hadley RN (offgoing nurse). Report included the following information Nurse Handoff Report, ED Encounter Summary, ED SBAR, Adult Overview, and Cardiac Rhythm Paced Rhythm . 6459: Dr. Vasiliy Sanders at bedside, verbal orders to d/c bicarb gtt and start two amp bicarb pushes Q2 hours. 0740: Spoke with  on the phone regarding patient condition,  will come by to see him.   [phone]: Echo at bedside. 0800: Rosalina Rodriguez signed off on patient as patient claims he does not need a  with him.   0711: Dr. Vasiliy Sanders at bedside, spoke with patient regarding code status. 1600: Epinephrine decreased to 4 mcg/min. 0848: Levophed decreased to 80mcg/min per verbal order from MD. Orders to titrate down levophed slowly and as tolerated by patient. See flowsheets for titration values. 1298: Critical value received from the lab, see flow sheets for details. 0930: Dr. Vasiliy Sanders made aware, no new orders at this time  1007: New critical value received from the lab, see flow sheets for details. Patient rectal temperature 93.2.  1032: Dr. Vasiliy Sanders made aware of critical value and temperature, verbal orders to place a bear hugger. 1129: Critical lactic result received from the lab, see flowsheets for details. Dr. Vasiliy Sanders made aware, no new orders at this time. 1200: Reassessment complete, patient temperature increased to 95.4 and remains on bear hugger. 1335: Patient blood returned from CRRT. Patients blood pressure dropping below MAP goal, levophed titrated to account for the drop, see flow sheets for titration. [id number]: Dr. Vasiliy Sanders made aware of patients continuous declining condition, verbal orders to give morphine for discomfort. Power outage in pod 3, morphine pulled at this time.  Pyxis bin opened twice, morphine was pulled on the

## 2023-07-18 NOTE — CARE COORDINATION
Care Management Initial Assessment       RUR:20%  Readmission? No  1st IM letter given? Yes - given on 7/18/23  1st  letter given: No---N/A               07/18/23 1620   Service Assessment   Patient Orientation Alert and Oriented;Person;Place;Situation;Self   Cognition Alert   History Provided By Patient   Primary Caregiver Jaxon Berry is: Named in [address]   PCP Verified by CM Yes  (He had a virtual visit two months ago.)   Last Visit to PCP Within last 3 months   Prior Functional Level Independent in ADLs/IADLs   Current Functional Level Assistance with the following:;Bathing;Dressing; Toileting;Mobility   Can patient return to prior living arrangement Yes   Family able to assist with home care needs: Yes   Would you like for me to discuss the discharge plan with any other family members/significant others, and if so, who? Yes  (His friend Mr. Julianna Mackay.)   Financial Resources Medicare   Community Resources None   Social/Functional History   Lives With Friend(s)  (Lives with his friend Mr. Ladonna Travis)   Type of [place]  (He lives in a two story home. The patient resides on the first floor and the friend resides on the second floor.)   [facility] Yes   Discharge Planning   Type of Residence House   Current Services Prior To Admission Other (Comment)  (Patient goes to dialysis at [facility] on M,W,F for HD)   Patient expects to be discharged to: [facility]        [phone]

## 2023-07-18 NOTE — PROGRESS NOTES
received an daytime page at 4:56 pm stating the patient  and family just left the room.  advised the nurse of her name and thanked her for taking care of this patient.  services are available 24 hours a day as requested. Rev. JAZMYNE Villarreal  [address] [place] [address] E   Paging Service [phone])

## 2023-07-18 NOTE — PROGRESS NOTES
0710: Called to the bedside by Primary night shift nurse as patient is agonally breathing With Systolic A-line pressures in the 60's. Dr. Vasiliy Sanders called immediatly. Order given to quickly increase Levophed infusion, MD currently speaking pt ptient's partner on the phone now. Levophed increased from 40 mcgs to 50 mcgs. 9827: Pt. Still severely hypotensive. Levophed increased to 60 mcgs. 3023: Dr. Ronda Sauceda now at the bedside. Levophed increased to 100 mcgs. 5819: Pt. Now PEA, No pulses palpable. Code blue initiated. CPR now started. 5273: Epi now given per Dr. Ronda Sauceda.     9922: 2 AMPS of Bicarb now given per Dr. Ronda Sauceda.    0719: ROSC obtained, A-line SBP's now in the 150's. Epi infusion now started at 5 mcgs to maintain BP per Dr. Ronda Sauceda.

## 2023-07-18 NOTE — PROGRESS NOTES
Shreya Rice is a 69 y.o. male   Patient's initial status Code blue. CPR and BVM assisted respiration initiated at 7:14. Patient's chart was reviewed and discussed with the patient's nurse. Initial rhythm: PEA  Patient was bagged  ACLS guidelines were followed per code document. Course of code included CPR and ambu bag ventilation   Outcome of Code: ROSC at 719 after 1 dose of epi and 2 amps of bicarb  Current vital signs are Blood pressure 140/80, pulse 68, respiratory rate 14, SpO2 30-40%. Prognosis guarded.    Discussed condition with:  [x]nursing [x]PT/OT    [x]respiratory therapy []Dr. Alen Thornton []     Ernst Zaldivar MD

## 2023-07-18 NOTE — PROGRESS NOTES
Spiritual Care Assessment/Progress Note  Alessio    Name: Luis Forrest MRN: [id number]    Age: 69 y.o. Sex: male   Language: English     Date: 7/18/2023            Total Time Calculated: 13 min              Spiritual Assessment begun in [facility] CRITICAL CARE  Service Provided For[de-identified] Patient  Referral/Consult From[de-identified] Nurse  Encounter Overview/Reason : Crisis    Spiritual beliefs:      [] Involved in a jessica tradition/spiritual practice:     [] Supported by a jessica community:      [] Claims no spiritual orientation:      [] Seeking spiritual identity:           [x] Adheres to an individual form of spirituality: Has own spirituality     [] Not able to assess:                Identified resources for coping and support system:   Support System: Significant other, Other (Comment) (Longtime friends  - Jean-Claude Soto & a female friend.)       [] Prayer                  [] Devotional reading               [] Music                  [] Guided Imagery     [] Pet visits                                        [] Other: (COMMENT)     Specific area/focus of visit   Encounter: Type: Initial Screen/Assessment  Crisis: Type: Code Blue  Spiritual/Emotional needs: Type: Spiritual Support  Ritual, Rites and Sacraments:    Grief, Loss, and Adjustments:    Ethics/Mediation:    Behavioral Health:    Palliative Care: Advance Care Planning:           Narrative:   Stepped into unit after responding to a Code Blue. Patient indicated that he did not need a  there with him. Spoke with nurse on unit. Staff was attending to patient as he is awake. Mague Holliday paging service [phone]

## 2023-07-18 NOTE — PROCEDURES
Procedure Note - Arterial Access:   Performed by GUMARO Angulo NP. Diagnosis: sepsis, shock  Insertion Date: 07/18/23   Time: 3:22 AM   Obtained Consent? No; emergent   Procedure Location:  ICU. Immediately prior to the procedure, the patient was reevaluated and found suitable for the planned procedure and any planned medications. Immediately prior to the procedure a time out was called to verify the correct patient, procedure, equipment, staff, and marking as appropriate. Collateral circulation confirmed with Juline Grounds test.     Line Bundle:  Full sterile barrier precautions used. 5 mL 1% Lidocaine placed at insertion site. Method: Seldinger technique. Site Prep: chlorhexidine. Procedure: Arterial Catheter Insertion in L femoral artery   Catheter Arterial line. Number of Attempts:  1  Indication: shock. There was bright red, pulsatile blood return. Femoral Site? yes. If Yes, reason femoral site was chosen: no radial target. Complication none. The procedure was tolerated well.     GUMARO Angulo NP   Critical Care Medicine  [facility]

## 2023-07-18 NOTE — PROGRESS NOTES
CRITICAL CARE NOTE      Name: Nishant Skinner   : 1945   MRN: [id number]   Date: 2023      REASON FOR ICU ADMISSION: sepsis, afib rvr     PRINCIPAL ICU DIAGNOSIS     Sepsis, afib rvr    BRIEF PATIENT SUMMARY     Per ED note  69 y.o. male with history of HFrEF (EF ~15%), CAD, atrial fibrillation on Eliquis and CKD on HD who presents via EMS from dialysis with concern for hypotension. Patient's systolic blood pressure was noted to be in the 80s with bradycardia in the 40s. Additionally, he reports 2 falls over the past 2 days which he describes as mechanical loss of balance and endorses hitting his head. Lastly, he reports 2 to 3 days of worsening shortness of breath at rest and dyspnea on exertion. He denies chest pain. Finally, he reports that he has had decreased p.o. intake for the past 2 to 3 days secondary to generalized abdominal pain and nausea. Reports diarrhea secondary to laxative therapy he has been taking. Reports full compliance with his Eliquis, no history of DVT/PE.    : patient had another cardiac arrest this am around 7: 15 am . PEA arrest . Successfully resuscitated and agreed to be DNR  HOSPITAL COURSE/DAILY EVENT LOG     On epinephrine and levo and started CRRT  COMPREHENSIVE ASSESSMENT & PLAN:SYSTEM BASED     NEUROLOGICAL:     Aggressive Pain Control. Pain Medications: PRN  Target RASS: 0  Sedation Medications:PRN    PULMONOLOGY:     Hi flow o2    CARDIOVASCULAR:  HTN, HLD, PM, Pulm HTN, Ao stenosis, CM, CHF, Afib     Start amiodarone drip  Vasopressors to keep MAP 65 and above for adequate tissue perfusion.    SBP Goal of: > 90 mmHg  MAP Goal of: > 65 mmHg  Cardiac Gtts: NA  to maintain SBP/MAP goals  Transfusion Trigger (Hgb): <7 g/dL  Keep K > 4; Mg > 2     Echo pending    GASTROINTESTINAL:      Suspect has ischemic gut which is the cause for the lactate acidosis and pressor requirement    RENAL/ELECTROLYTE/FLUIDS:  ESRD     CRRT    ENDOCRINE:   Ivp bicarb 100

## 2023-07-18 NOTE — PROGRESS NOTES
Death Pronouncement Note  Patient's Name: Foreign Nixon   Patient's YOB: 1945  MRN Number: [id number]    Admitting Provider: Leti Yang MD  Attending Provider: Leti Yang MD    Patient was examined and the following were absent: Pulses, Blood Pressure, and Respiratory effort    I declared the patient dead on 7/18/2023  at 12    Preliminary Cause of Death: chf  Electronically signed by Laurence Kim MD on 7/18/23 at 3:26 PM EDT

## 2023-07-18 NOTE — INTERDISCIPLINARY ROUNDS
1100: Critical care interdisciplinary rounds today. Following members present: Case Management, Clinical Lead, Diabetes team, Nutrition, Pharmacy, and Physician. Family invited to participate. Plan of care discussed. See clinical pathway for plan of care and interventions and desired outcomes.

## 2023-07-18 NOTE — CONSULTS
Consultation Note    NAME: Rohan Herrera   :  1945   MRN:  [id number]     Date/Time:  2023 5:44 AM    I have been asked to see this patient by Antelmo Nelson  for advice/opinion re: ESKD. Assessment :    Plan:  ESKD  Perm cath  AG+ MA  S/p cardiac arrest  Hyponatremia  Sepsis  Afib w rvr  Hypotension   MWF HD at [address]  Will start CRRT (CVVHD)  Monitor BC's (pull perm cath if +)  On pressors and bicarb gtt       Subjective:   CHIEF COMPLAINT: eskd    HISTORY OF PRESENT ILLNESS:     Rin Lizarraga is a 69 y.o.   male who has a history of the below. N/v. Falls. Hypotension. Sob. Wasn't able to tolerate HD yesterday. Chart reviewed. Currently on high flow and is sob. The patient was seen on cvvh at 9 am .  BP is stable. Catheter is functioning well.       Past Medical History:   Diagnosis Date    Arrhythmia 2014    CAD (coronary artery disease)     stent, cardiac cath, JACI X2    Cataract (lens) fragments in eye following cataract surgery, right eye     2022    ED (erectile dysfunction) of organic origin     High cholesterol 2010    HTN (hypertension) 2010    Prediabetes 2014    Pulmonary hypertension ([address]) 2017    S/P ablation of atrial flutter 2016    S/P biventricular cardiac pacemaker procedure     Systolic CHF, acute ([address]) 2016      Past Surgical History:   Procedure Laterality Date    CARDIAC CATHETERIZATION      CARDIAC PROCEDURE N/A 5/15/2023    Right heart cath performed by Kailash Ritter MD at [address] [facility] CATH LAB    EP DEVICE PROCEDURE N/A 5/15/2023    Ep cardioversion performed by Kailash Ritter MD at [address] [facility] LAB    INSJ/RPLCMT PERM DFB W/TRNSVNS LDS 1/DU[facility] N/A 2021    INSERT ICD BIV MULTI performed by Lou Bond MD at [facility] CARDIAC CATH LAB    IR TUNNELED C/Casia 10 5 YEARS  2023    IR TUNNELED CATHETER PLACEMENT GREATER THAN 5 YEARS 2023 [facility] RAD ANGIO IR negative fever, negative chills, negative weight loss  Eyes:   negative visual changes  ENT:   negative sore throat, tongue or lip swelling  Respiratory:  negative cough, + dyspnea  Cards:  negative for chest pain, palpitations, lower extremity edema  GI:   negative for nausea, vomiting, diarrhea, and abdominal pain  :  negative for frequency, dysuria  Integument:  negative for rash and pruritus  Heme:  negative for easy bruising and gum/nose bleeding  Musculoskel: negative for myalgias,  back pain; + muscle weakness  Neuro:  negative for headaches, dizziness, vertigo  Psych:  negative for feelings of anxiety, depression   Travel?: none    Objective:   VITALS:    Vitals:    07/18/23 0440   BP:    Pulse: 88   Resp: (!) 37   Temp:    SpO2: 98%     PHYSICAL EXAM:  Gen:  [x]  WD [x]  WN  [] cachectic []  thin []  obese []  disheveled             [x]  ill apearing  [x]   Critical  []   Chronic    []  No acute distress    HEENT:   [] NC/AT/PERRL    [] pink conjunctivae      [] pale conjunctivae                  PERRL  [] yes  [] no      [] moist mucosa    [] dry mucosa    hearing intact to voice [x] yes  [] No                 NECK:   supple [] yes  [] no        masses [] yes  [] No               thyroid  []  non tender  []  tender    RESP:   [] CTA bilaterally/no wheezing/rhonchi/rales/crackles    [] rhonchi bilaterally - no dullness  [] wheezing   [] rhonchi   [x] crackles     use of accessory muscles [] yes [] no    CARD:   [x]  regular rate and rhythm/No murmurs/rubs/gallops    murmur  [] yes ()  [] no      Rubs  [] yes  [] no       Gallops [] yes  [] no    Rate []  regular  []  irregular        carotid bruits  [] Right  []  Left                 LE edema [x] yes  [] no           JVP  []  yes   []  no    ABD:    [x] soft/non distended/non tender/+bowel sounds/no HSM    []  Rigid    tenderness [] yes [] no   Liver enlargement  []  yes []  no                Spleen enlargement  []  yes []  no     distended []  yes [] no

## 2023-07-19 LAB
ECHO AR MAX VEL PISA: 3.2 M/S
ECHO AV AREA PEAK VELOCITY: 0.8 CM2
ECHO AV AREA VTI: 0.9 CM2
ECHO AV AREA/BSA PEAK VELOCITY: 0.4 CM2/M2
ECHO AV AREA/BSA VTI: 0.4 CM2/M2
ECHO AV MEAN GRADIENT: 11 MMHG
ECHO AV MEAN VELOCITY: 1.4 M/S
ECHO AV PEAK GRADIENT: 35 MMHG
ECHO AV PEAK VELOCITY: 3 M/S
ECHO AV REGURGITANT PHT: 400 MILLISECOND
ECHO AV VELOCITY RATIO: 0.23
ECHO AV VTI: 34.3 CM
ECHO BSA: 2.1 M2
ECHO LA DIAMETER INDEX: 2.55 CM/M2
ECHO LA DIAMETER: 5.3 CM
ECHO LA VOL 4C: 172 ML (ref 18–58)
ECHO LA VOL 4C: 181 ML (ref 18–58)
ECHO LV EDV A4C: 199 ML
ECHO LV EDV INDEX A4C: 96 ML/M2
ECHO LV EJECTION FRACTION A4C: 23 %
ECHO LV ESV A4C: 245 ML
ECHO LV ESV INDEX A4C: 118 ML/M2
ECHO LV FRACTIONAL SHORTENING: 1 % (ref 28–44)
ECHO LV INTERNAL DIMENSION DIASTOLE INDEX: 3.22 CM/M2
ECHO LV INTERNAL DIMENSION DIASTOLIC: 6.7 CM (ref 4.2–5.9)
ECHO LV INTERNAL DIMENSION SYSTOLIC INDEX: 3.17 CM/M2
ECHO LV INTERNAL DIMENSION SYSTOLIC: 6.6 CM
ECHO LV IVSD: 1.3 CM (ref 0.6–1)
ECHO LV MASS 2D: 336.9 G (ref 88–224)
ECHO LV MASS INDEX 2D: 162 G/M2 (ref 49–115)
ECHO LV POSTERIOR WALL DIASTOLIC: 0.9 CM (ref 0.6–1)
ECHO LV RELATIVE WALL THICKNESS RATIO: 0.27
ECHO LVOT AREA: 3.5 CM2
ECHO LVOT AV VTI INDEX: 0.27
ECHO LVOT DIAM: 2.1 CM
ECHO LVOT MEAN GRADIENT: 1 MMHG
ECHO LVOT PEAK GRADIENT: 2 MMHG
ECHO LVOT PEAK VELOCITY: 0.7 M/S
ECHO LVOT STROKE VOLUME INDEX: 15.5 ML/M2
ECHO LVOT SV: 32.2 ML
ECHO LVOT VTI: 9.3 CM
ECHO MV AREA VTI: 2.2 CM2
ECHO MV E DECELERATION TIME (DT): 129.5 MS
ECHO MV E VELOCITY: 1.23 M/S
ECHO MV LVOT VTI INDEX: 1.58
ECHO MV MAX VELOCITY: 1.3 M/S
ECHO MV MEAN GRADIENT: 3 MMHG
ECHO MV MEAN VELOCITY: 0.8 M/S
ECHO MV PEAK GRADIENT: 6 MMHG
ECHO MV REGURGITANT ALIASING (NYQUIST) VELOCITY: 30 CM/S
ECHO MV REGURGITANT VELOCITY PISA: 4.5 M/S
ECHO MV REGURGITANT VTIA: 117.3 CM
ECHO MV VTI: 14.7 CM
ECHO RV INTERNAL DIMENSION: 5.3 CM
ECHO RV TAPSE: 1.8 CM (ref 1.7–?)
ECHO TV REGURGITANT MAX VELOCITY: 3.24 M/S
ECHO TV REGURGITANT PEAK GRADIENT: 42 MMHG

## 2023-07-23 LAB
BACTERIA SPEC CULT: NORMAL
BACTERIA SPEC CULT: NORMAL
SERVICE CMNT-IMP: NORMAL
SERVICE CMNT-IMP: NORMAL

## 2023-07-24 NOTE — PROGRESS NOTES
Physician Progress Note      PATIENT:               Candace Wang  CSN #:                  [id number]  :                       1945  ADMIT DATE:       2023 4:29 PM  [facility] DATE:        2023 2:45 PM  RESPONDING  PROVIDER #:        Jil Stevenson MD          QUERY TEXT:    Pt admitted with Sepsis and has Chronic Systolic CHF documented. After further   study can this be further specified as: The medical record reflects the following:  Risk Factors: A-fib    Clinical Indicators:  Pt. presents with reports of 2 to 3 days of worsening shortness of breath at   rest and dyspnea on exertion. Nephrology PN:  Sob. Wasn't able to tolerate HD yesterday. Chart reviewed. Currently on high   flow and is sob. LE edema    Treatment: ICU monitoring, Amiodarone gtt, CVVH  Options provided:  -- Acute on Chronic Systolic CHF/HFrEF  -- Other - I will add my own diagnosis  -- Disagree - Not applicable / Not valid  -- Disagree - Clinically unable to determine / Unknown  -- Refer to Clinical Documentation Reviewer    PROVIDER RESPONSE TEXT:    This patient is in acute on chronic systolic CHF/HFrEF.     Query created by: Tone Zavala on 2023 3:56 PM      Electronically signed by:  Jil Stevenson MD 2023 10:58 AM

## (undated) DEVICE — SYSTEM INTRO 9.5FR L13CM STD WHT CAP HEMSTAT SPLITTABLE

## (undated) DEVICE — KIT MED IMAG CNTRST AGNT W/ IOPAMIDOL REUSE

## (undated) DEVICE — GDWIRE COR 0.035INX260CM -- CONFIDA

## (undated) DEVICE — CATHETER DIAG 6FR L110CM INTRO 6FR BLLN DIA9MM 1CC PULM ART

## (undated) DEVICE — 3M™ TEGADERM™ TRANSPARENT FILM DRESSING FRAME STYLE, 1626W, 4 IN X 4-3/4 IN (10 CM X 12 CM), 50/CT 4CT/CASE: Brand: 3M™ TEGADERM™

## (undated) DEVICE — PACK PROCEDURE SURG HRT CATH

## (undated) DEVICE — REM POLYHESIVE ADULT PATIENT RETURN ELECTRODE: Brand: VALLEYLAB

## (undated) DEVICE — STERILE POLYISOPRENE POWDER-FREE SURGICAL GLOVES WITH EMOLLIENT COATING: Brand: PROTEXIS

## (undated) DEVICE — COVER,TABLE,60X90,STERILE: Brand: MEDLINE

## (undated) DEVICE — INTENDED TO STANDARDIZE OR CAMERAS TO ALLOW FOR THE USE OF THE OR CAMERA COVER: Brand: ASPEN® O.R. CAMERA COVER

## (undated) DEVICE — GUIDEWIRE VASC L150CM DIA0.035IN FLX TIP L7CM PTFE STR FIX

## (undated) DEVICE — CATHETER THRMDIL 6FR 110CM BAL TORQ CTRL

## (undated) DEVICE — TR BAND RADIAL ARTERY COMPRESSION DEVICE: Brand: TR BAND

## (undated) DEVICE — CUSTOM KT PTCA INFL DEV K05 00052M

## (undated) DEVICE — SYR LR LCK 1ML GRAD NSAF 30ML --

## (undated) DEVICE — PRESSURE MONITORING SET: Brand: TRUWAVE

## (undated) DEVICE — INTRO SHTH 8FR 13X20CM -- TEARAWAY

## (undated) DEVICE — APPLICATOR MEDICATED 10.5 CC SOLUTION CLR STRL CHLORAPREP

## (undated) DEVICE — RUNTHROUGH NS EXTRA FLOPPY PTCA GUIDEWIRE: Brand: RUNTHROUGH

## (undated) DEVICE — Z DUPLICATE USE 2103554 VALVE HEMOSTATIC BLEEDBK CTRL COPILOT

## (undated) DEVICE — SHEATH RAIN RAD INTRO SHTH W/ BARE WIRE 10 CM 506610S

## (undated) DEVICE — WRAP SURG W1.31XL1.34M CARD FOR PT 165-172CM THERMOWRP

## (undated) DEVICE — KIT MFLD ISOLATN NACL CNTRST PRT TBNG SPIK W/ PRSS TRNSDUC

## (undated) DEVICE — CATH DIAG IMPLS PIG 6FRX100CM -- IMPULSE 16599-40

## (undated) DEVICE — ANGIOGRAPHY KIT

## (undated) DEVICE — KIT HND CTRL 3 W STPCOCK ROT END 54IN PREM HI PRSS TBNG AT

## (undated) DEVICE — TUBING PRSS MON L6IN PVC M FEM CONN

## (undated) DEVICE — THE MONARCH® "D" CARTRIDGE IS A SINGLE-USE POLYPROPYLENE CARTRIDGE FOR POSTERIOR CHAMBER IOL DELIVERY: Brand: MONARCH® III

## (undated) DEVICE — SPECIAL PROCEDURE DRAPE 32" X 34": Brand: SPECIAL PROCEDURE DRAPE

## (undated) DEVICE — KIT ACCS INTRO 4FR L10CM NDL 21GA L7CM GWIRE L40CM

## (undated) DEVICE — GRADUATED BOWL: Brand: DEVON

## (undated) DEVICE — RADIFOCUS GLIDEWIRE: Brand: GLIDEWIRE

## (undated) DEVICE — ANGIOGRAPHIC CATHETER: Brand: IMPULSE™

## (undated) DEVICE — GLIDESHEATH SLENDER STAINLESS STEEL KIT: Brand: GLIDESHEATH SLENDER

## (undated) DEVICE — INTRO SHTH 7FR 13X20CM -- TEARAWAY

## (undated) DEVICE — SPLINT WR POS F/ARTERIAL ACC -- BX/10

## (undated) DEVICE — LUER-LOK 360°: Brand: CONNECTA, LUER-LOK

## (undated) DEVICE — CATH DIAG FRC4 IMPLS 6FRX100CM -- IMPULSE 16599-02

## (undated) DEVICE — Device

## (undated) DEVICE — MICROPUNCTURE INTRODUCER SET SILHOUETTE TRANSITIONLESS WITH STAINLESS STEEL WIRE GUIDE: Brand: MICROPUNCTURE

## (undated) DEVICE — GUIDE EXTENSION CATHETER: Brand: GUIDEZILLA™ II

## (undated) DEVICE — Device: Brand: EAGLE EYE PLATINUM RX DIGITAL IVUS CATHETER

## (undated) DEVICE — WASTE KIT - ST MARY: Brand: MEDLINE INDUSTRIES, INC.

## (undated) DEVICE — RADIFOCUS GLIDECATH: Brand: GLIDECATH

## (undated) DEVICE — PERCLOSE PROGLIDE™ SUTURE-MEDIATED CLOSURE SYSTEM: Brand: PERCLOSE PROGLIDE™

## (undated) DEVICE — GDWIRE ANGIO SUP STF 0.035IN --

## (undated) DEVICE — PROVE COVER: Brand: UNBRANDED

## (undated) DEVICE — CO-SET DELIVERY SYSTEM FOR 123 ROOM TEMPATURE INJECTATE: Brand: CO-SET+

## (undated) DEVICE — SUT SLK 0 30IN SH BLK --

## (undated) DEVICE — PINNACLE INTRODUCER SHEATH: Brand: PINNACLE

## (undated) DEVICE — SYR 50ML LR LCK 1ML GRAD NSAF --

## (undated) DEVICE — GDWIRE WHISPER HITORQ EDS CSJ -- ACUITY SOLD BY BX ONLY 4648

## (undated) DEVICE — GLOVE SURG SZ 75 CRM LTX FREE POLYISOPRENE POLYMER BEAD ANTI

## (undated) DEVICE — DERMABOND SKIN ADH 0.7ML -- DERMABOND ADVANCED 12/BX

## (undated) DEVICE — SURGICAL PROCEDURE PACK CATRCT CUST

## (undated) DEVICE — GOWN,SIRUS,FABRNF,XL,20/CS: Brand: MEDLINE

## (undated) DEVICE — CATH GUID COR FR40S 6FR 100CM -- LAUNCHER

## (undated) DEVICE — AMPLATZ EXTRA STIFF WIRE GUIDE: Brand: AMPLATZ

## (undated) DEVICE — 450 ML BOTTLE OF 0.05% CHLORHEXIDINE GLUCONATE IN 99.95% STERILE WATER FOR IRRIGATION, USP AND APPLICATOR.: Brand: IRRISEPT ANTIMICROBIAL WOUND LAVAGE

## (undated) DEVICE — 6FR THERMODILUTION BALLOON CATHETER SWAN (ARROW)

## (undated) DEVICE — CATH BLLN ANGIO 4X20MM NC EUPHORIA RX

## (undated) DEVICE — SURGICAL PROCEDURE PACK EYE CUST DR CHNDLR

## (undated) DEVICE — CATH GUID COR JR4.0 6FR 100CM -- LAUNCHER

## (undated) DEVICE — 1000ML,PRESSURE INFUSER W/STOPCOCK: Brand: MEDLINE

## (undated) DEVICE — GUIDEWIRE VASC L260CM DIA0.035IN TIP L3MM STD EXCHG PTFE J

## (undated) DEVICE — ANGIO-SEAL VIP VASCULAR CLOSURE DEVICE: Brand: ANGIO-SEAL

## (undated) DEVICE — GUIDEWIRE VASC L260CM DIA0.035IN RAD 3MM J TIP L7CM PTFE

## (undated) DEVICE — MEDI-TRACE CADENCE ADULT, DEFIBRILLATION ELECTRODE -RTS  (10 PR/PK) - PHILIPS: Brand: MEDI-TRACE CADENCE

## (undated) DEVICE — SOLUTION IRRIG 500ML STRL H2O NONPYROGENIC

## (undated) DEVICE — DRAPE PRB US TRNSDCR 6X96IN --

## (undated) DEVICE — C-ARM: Brand: UNBRANDED

## (undated) DEVICE — CATHETER ETER ANGIO L110CM OD5FR ID046IN L75CM 038IN 145DEG CARD

## (undated) DEVICE — 40418 TRENDELENBURG ONE-STEP ARM PROTECTORS LARGE (1 PAIR): Brand: 40418 TRENDELENBURG ONE-STEP ARM PROTECTORS LARGE (1 PAIR)

## (undated) DEVICE — IRRIGATION KT PIST SYR 60ML -- CONVERT TO ITEM 116415

## (undated) DEVICE — GUIDEWIRE VASC L150CM DIA0.025IN TIP L7CM J RAD 3MM PTFE

## (undated) DEVICE — STERILE POLYISOPRENE POWDER-FREE SURGICAL GLOVES: Brand: PROTEXIS

## (undated) DEVICE — 3M™ IOBAN™ 2 ANTIMICROBIAL INCISE DRAPE 6650EZ: Brand: IOBAN™ 2

## (undated) DEVICE — 6 FOOT DISPOSABLE EXTENSION CABLE WITH SAFE CONNECT / SCREW-DOWN

## (undated) DEVICE — TRUE™ FLOW VALVULOPLASTY PERFUSION CATHETER, 20 MM X 3.5 CM, 110 CM CATHETER: Brand: TRUE FLOW

## (undated) DEVICE — SUTURE VCRL 2-0 L27IN ABSRB UD PS-2 L19MM 1/2 CIR J428H

## (undated) DEVICE — PREP SKN CHLRAPRP APL 26ML STR --

## (undated) DEVICE — TOWEL SURG W17XL27IN STD BLU COT NONFENESTRATED PREWASHED

## (undated) DEVICE — PACEMAKER PACK: Brand: MEDLINE INDUSTRIES, INC.

## (undated) DEVICE — SUTURE COAT VCRL SZ 4-0 L18IN ABSRB UD L19MM PS-2 1/2 CIR J496G